# Patient Record
Sex: MALE | Race: WHITE | NOT HISPANIC OR LATINO | Employment: OTHER | URBAN - METROPOLITAN AREA
[De-identification: names, ages, dates, MRNs, and addresses within clinical notes are randomized per-mention and may not be internally consistent; named-entity substitution may affect disease eponyms.]

---

## 2017-05-18 ENCOUNTER — ALLSCRIPTS OFFICE VISIT (OUTPATIENT)
Dept: OTHER | Facility: OTHER | Age: 57
End: 2017-05-18

## 2017-05-30 ENCOUNTER — LAB CONVERSION - ENCOUNTER (OUTPATIENT)
Dept: OTHER | Facility: OTHER | Age: 57
End: 2017-05-30

## 2017-05-30 LAB
HBA1C MFR BLD HPLC: 5.7 %
PSA (HISTORICAL): 0.4
TSH SERPL DL<=0.05 MIU/L-ACNC: 1.71 M[IU]/L

## 2017-07-19 ENCOUNTER — ALLSCRIPTS OFFICE VISIT (OUTPATIENT)
Dept: OTHER | Facility: OTHER | Age: 57
End: 2017-07-19

## 2017-07-20 ENCOUNTER — GENERIC CONVERSION - ENCOUNTER (OUTPATIENT)
Dept: OTHER | Facility: OTHER | Age: 57
End: 2017-07-20

## 2017-07-24 ENCOUNTER — GENERIC CONVERSION - ENCOUNTER (OUTPATIENT)
Dept: OTHER | Facility: OTHER | Age: 57
End: 2017-07-24

## 2017-07-25 ENCOUNTER — ALLSCRIPTS OFFICE VISIT (OUTPATIENT)
Dept: OTHER | Facility: OTHER | Age: 57
End: 2017-07-25

## 2017-08-17 ENCOUNTER — ALLSCRIPTS OFFICE VISIT (OUTPATIENT)
Dept: OTHER | Facility: OTHER | Age: 57
End: 2017-08-17

## 2017-08-18 ENCOUNTER — GENERIC CONVERSION - ENCOUNTER (OUTPATIENT)
Dept: OTHER | Facility: OTHER | Age: 57
End: 2017-08-18

## 2017-10-17 ENCOUNTER — ALLSCRIPTS OFFICE VISIT (OUTPATIENT)
Dept: OTHER | Facility: OTHER | Age: 57
End: 2017-10-17

## 2017-10-27 RX ORDER — FUROSEMIDE 40 MG/1
40 TABLET ORAL DAILY
COMMUNITY
End: 2018-09-20 | Stop reason: SDUPTHER

## 2017-10-27 RX ORDER — SPIRONOLACTONE 25 MG/1
25 TABLET ORAL DAILY
COMMUNITY
End: 2018-09-20 | Stop reason: SDUPTHER

## 2017-10-27 RX ORDER — METOLAZONE 2.5 MG/1
2.5 TABLET ORAL DAILY
COMMUNITY
End: 2018-09-20 | Stop reason: SDUPTHER

## 2017-10-27 RX ORDER — BUPROPION HYDROCHLORIDE 300 MG/1
300 TABLET ORAL EVERY MORNING
COMMUNITY
End: 2020-03-17

## 2017-10-27 RX ORDER — OMEPRAZOLE 40 MG/1
40 CAPSULE, DELAYED RELEASE ORAL DAILY
COMMUNITY
End: 2018-09-20 | Stop reason: SDUPTHER

## 2017-10-27 NOTE — PRE-PROCEDURE INSTRUCTIONS
Pre-Surgery Instructions:   Medication Instructions    buPROPion (WELLBUTRIN XL) 300 mg 24 hr tablet Patient was instructed by Physician and understands   furosemide (LASIX) 40 mg tablet Patient was instructed by Physician and understands   metolazone (ZAROXOLYN) 2 5 mg tablet Patient was instructed by Physician and understands   omeprazole (PriLOSEC) 40 MG capsule Patient was instructed by Physician and understands   spironolactone (ALDACTONE) 25 mg tablet Patient was instructed by Physician and understands

## 2017-10-30 ENCOUNTER — GENERIC CONVERSION - ENCOUNTER (OUTPATIENT)
Dept: OTHER | Facility: OTHER | Age: 57
End: 2017-10-30

## 2017-10-30 ENCOUNTER — HOSPITAL ENCOUNTER (OUTPATIENT)
Facility: AMBULARY SURGERY CENTER | Age: 57
Setting detail: OUTPATIENT SURGERY
Discharge: HOME | End: 2017-10-30
Attending: INTERNAL MEDICINE | Admitting: INTERNAL MEDICINE
Payer: MEDICARE

## 2017-10-30 ENCOUNTER — ANESTHESIA EVENT (OUTPATIENT)
Dept: GASTROENTEROLOGY | Facility: AMBULARY SURGERY CENTER | Age: 57
End: 2017-10-30
Payer: MEDICARE

## 2017-10-30 VITALS
SYSTOLIC BLOOD PRESSURE: 120 MMHG | RESPIRATION RATE: 18 BRPM | TEMPERATURE: 97.1 F | HEIGHT: 69 IN | WEIGHT: 315 LBS | BODY MASS INDEX: 46.65 KG/M2 | OXYGEN SATURATION: 97 % | DIASTOLIC BLOOD PRESSURE: 68 MMHG | HEART RATE: 56 BPM

## 2017-10-30 RX ORDER — SODIUM CHLORIDE, SODIUM LACTATE, POTASSIUM CHLORIDE, CALCIUM CHLORIDE 600; 310; 30; 20 MG/100ML; MG/100ML; MG/100ML; MG/100ML
75 INJECTION, SOLUTION INTRAVENOUS CONTINUOUS
Status: CANCELLED | OUTPATIENT
Start: 2017-10-30

## 2017-10-30 RX ORDER — PROPOFOL 10 MG/ML
INJECTION, EMULSION INTRAVENOUS AS NEEDED
Status: DISCONTINUED | OUTPATIENT
Start: 2017-10-30 | End: 2017-10-30 | Stop reason: SURG

## 2017-10-30 RX ORDER — PROPOFOL 10 MG/ML
INJECTION, EMULSION INTRAVENOUS CONTINUOUS PRN
Status: DISCONTINUED | OUTPATIENT
Start: 2017-10-30 | End: 2017-10-30 | Stop reason: SURG

## 2017-10-30 RX ADMIN — PROPOFOL 60 MG: 10 INJECTION, EMULSION INTRAVENOUS at 09:36

## 2017-10-30 RX ADMIN — PROPOFOL 100 MCG/KG/MIN: 10 INJECTION, EMULSION INTRAVENOUS at 09:37

## 2017-10-30 RX ADMIN — PROPOFOL 20 MG: 10 INJECTION, EMULSION INTRAVENOUS at 09:37

## 2017-10-30 NOTE — ANESTHESIA PREPROCEDURE EVALUATION
Review of Systems/Medical History      No history of anesthetic complications     Cardiovascular  Exercise tolerance: good,  Hypertension poorly controlled, Past MI , History of percutaneous transluminal coronary angioplasty, No angina , No LAU,   Comment: MI 2013,  Pulmonary  Not a smoker , No COPD , Asthma: well controlled/ stable Last rescue: < 1 year ago Asthma type of rescue: PRN inhaler, No shortness of breath, Sleep apnea CPAP, ,        GI/Hepatic    GERD well controlled,             Endo/Other  Arthritis     GYN       Hematology   Musculoskeletal  Obesity  super morbid obesity,        Neurology   Psychology           Physical Exam    Airway    Mallampati score: IV  TM Distance: >3 FB  Neck ROM: full     Dental       Cardiovascular  Cardiovascular exam normal    Pulmonary  Breath sounds clear to auscultation,     Other Findings        Anesthesia Plan  ASA Score- 4       Anesthesia Type- IV sedation with anesthesia with ASA Monitors  Additional Monitors:   Airway Plan:           Induction- intravenous  Informed Consent- Anesthetic plan and risks discussed with patient

## 2017-10-30 NOTE — DISCHARGE INSTRUCTIONS
Discharge home  Resume regular diet  Resume home medications  Repeat colonoscopy in 10 years  Call with any abdominal pain, bleeding, fevers

## 2017-10-30 NOTE — H&P
History and Physical - [facility]  Romeo Calloway 59 y o  male MRN: [id number]    HPI: Romeo Calloway is a 59y o  year old male who presents with long standing GERD and for screening colonoscopy, last exam 10 years prior  Review of Systems    Historical Information   Past Medical History:   Diagnosis Date    Arthritis     Asthma     CPAP (continuous positive airway pressure) dependence     Edema     left leg    GERD (gastroesophageal reflux disease)     Hypertension     Myocardial infarct     2011    Myocardial infarction     Obesity     Sleep apnea      Past Surgical History:   Procedure Laterality Date    CHOLECYSTECTOMY      COLONOSCOPY      ERCP      GASTRIC BYPASS      2001    HERNIA REPAIR      HERNIA REPAIR      x5 last one 2011    JOINT REPLACEMENT      KNEE ARTHROPLASTY Right     2102     Social History   History   Alcohol Use    Yes     Comment: rarely     History   Drug Use No     History   Smoking Status    Former Smoker    Years: 6 00    Types: Cigarettes   Smokeless Tobacco    Never Used     History reviewed  No pertinent family history  Meds/Allergies     Prescriptions Prior to Admission   Medication    buPROPion (WELLBUTRIN XL) 300 mg 24 hr tablet    furosemide (LASIX) 40 mg tablet    metolazone (ZAROXOLYN) 2 5 mg tablet    omeprazole (PriLOSEC) 40 MG capsule    spironolactone (ALDACTONE) 25 mg tablet       No Known Allergies    Objective     Blood pressure 149/85, pulse 77, temperature (!) 97 1 °F (36 2 °C), temperature source Tympanic, resp  rate 20, height 5' 9" (1 753 m), weight (!) 159 kg (350 lb), SpO2 99 %  PHYSICAL EXAM    Gen: NAD, morbidly obese  CV: RRR  CHEST: Clear  ABD: soft, NT/ND  EXT: no edema  Neuro: AAO      ASSESSMENT/PLAN:  This is a 59y o  year old male here for long standing GERD and for screening colonoscopy, last exam 10 years prior         PLAN:   Procedure: egd/colonoscopy

## 2017-10-30 NOTE — OP NOTE
ESOPHAGOGASTRODUODENOSCOPY    PROCEDURE: EGD    INDICATIONS: GERD    POST-OP DIAGNOSIS: See the impression below    SEDATION: Monitored anesthesia care, check anesthesia records    PHYSICAL EXAM:    Vitals:    10/30/17 0840   BP: 149/85   Pulse: 77   Resp: 20   Temp: (!) 97 1 °F (36 2 °C)   SpO2: 99%    Body mass index is 51 69 kg/m²  General: NAD  Heart: S1 & S2 normal, RRR  Lungs: CTA, No rales or rhonchi  Abdomen: Soft, nontender, nondistended, good bowel sounds    CONSENT:  Informed consent was obtained for the procedure, including sedation after explaining the risks and benefits of the procedure  Risks including but not limited to bleeding, perforation, infection, aspiration were discussed in detail  Also explained about less than 100% sensitivity with the exam and other alternatives  PREPARATION:   EKG tracing, pulse oximetry, blood pressure were monitored throughout the procedure  Patient was identified by myself both verbally and by visual inspection of ID band  DESCRIPTION:   Patient was placed in the left lateral decubitus position and was sedated with the above medication  The gastroscope was introduced in to the oropharynx and the esophagus was intubated under direct visualization  Scope was passed down the esophagus up to 2nd part of the duodenum  A careful inspection was made as the gastroscope was withdrawn, including a retroflexed view of the stomach; findings and interventions are described below  FINDINGS:    #1  Esophagus and GEJ- normal GE junction    #2  Stomach- gastric bypass anatomy with medium to large gastric pouch, normal healthy and anastomosis with normal evaluation of small bowel    #3   Duodenum- normal small bowel, status post gastric bypass         IMPRESSIONS:      No significant changes of chronic GERD, healthy-appearing anastomosis    RECOMMENDATIONS:     Discharge home  Resume regular diet  Resume home medications    COMPLICATIONS:  None; patient tolerated the procedure well            DISPOSITION: PACU           CONDITION: Stable

## 2017-11-01 NOTE — CONSULTS
Assessment  1  Encounter for screening colonoscopy (V76 51) (Z12 11)   2  GERD without esophagitis (530 81) (K21 9)   3  Morbid obesity (278 01) (E66 01)    Plan  Encounter for screening colonoscopy    · Suprep Bowel Prep Kit 17 5-3 13-1 6 GM/180ML Oral Solution; USE AS DIRECTED   Rx By: Praneeth Estrella; Dispense: 0 Days ; #E#:1 X 177 ML Bottle (2 Bottles); Refill: 0;For: Encounter for screening colonoscopy; DIA = N; Verified Transmission to [facility]; Last Updated By: [employer]; 10/17/2017 2:55:17 PM   · COLONOSCOPY (GI, SURG); Status:Hold For - Scheduling; Requested EJF:[id number];    Perform:[facility]; Due:17Oct2018; Ordered;For:Encounter for screening colonoscopy; Ordered By:Kiran Fulton;  GERD without esophagitis    · EGD; Status:Hold For - Scheduling; Requested QMJ:[id number];    Perform:[facility]; Due:22Oct2017;Ordered;without esophagitis; Ordered By:Kiran Fulton;    Discussion/Summary  Discussion Summary:   Morbidly obese 59year-old gentle with history of sleep apnea, compliant with CPAP, history of gastric bypass in the past with chronic reflux, also due for colonoscopy, his last examination was 10 years ago unsure if there were polyps at that time  Screening colonoscopy: The patient is unclear he had polyps, he suspects that he may have however we have no records of thesewill schedule the patient for colonoscopy the  Chronic GERD: He has been on PPI therapy for many years, had a Nissen fundoplication, which was undone in the pastwill schedule for an upper endoscopy to evaluate for Mesa's esophagus  discussed with him the risks of the procedures including bleeding, surgery, perforation, missed polyp detection rate        Chief Complaint  Chief Complaint Free Text Note Form: Evaluation for upper endoscopy and colonoscopy      History of Present Illness  HPI: This is a morbidly obese 75-year-old gentle with history of hypertension, chronic acid reflux, had a gastric bypass many years ago, has remote history of Nissen fundoplication which apparently was undone during his bypass surgery  He reports intermittent breakthrough reflux symptoms, he has been on Nexium/PPI therapy for many years  Denies any dysphagia  Reports fairly regular bowel movements, denies any melena, rectal bleeding, abdominal pain  Last colonoscopy approximately 10 years ago, he is unclear if he had polyps at that time  lost 120 lb after his bypass however he has gained some of that back  Denies any family history of GI or associated malignancies  Has also had abdominal mesh repair and cholecystectomy  He is retired total   Denies any tobacco or alcohol   has sleep apnea but is compliant with CPAP  Review of Systems  Complete-Male GI Adult: Other Symptoms: The remainder of the ten ROS was negative  Active Problems  1  Acute on chronic diastolic congestive heart failure (428 33,428 0) (I50 33)   2  Asthma (493 90) (J45 909)   3  BMI 50 0-59 9, adult (V85 43) (Z68 43)   4  Chronic obstructive pulmonary disease (496) (J44 9)   5  Congestive heart failure (428 0) (I50 9)   6  Depression (311) (F32 9)   7  Diabetes mellitus screening (V77 1) (Z13 1)   8  Encounter for screening colonoscopy (V76 51) (Z12 11)   9  GERD without esophagitis (530 81) (K21 9)   10  Hyperlipidemia (272 4) (E78 5)   11  Hypertension (401 9) (I10)   12  Lymphedema (457 1) (I89 0)   13  Morbid obesity (278 01) (E66 01)   14  Need for pneumococcal vaccination (V03 82) (Z23)   15  Need for tetanus booster (V03 7) (Z23)   16  Prostate cancer screening (V76 44) (Z12 5)   17  Screening for colorectal cancer (V76 51) (Z12 11,Z12 12)   18  Sleep apnea (780 57) (G47 30)   19  Thyroid disorder screening (V77 0) (Z13 29)   20  Varicose Veins Of Lower Extremities (454 9)    Past Medical History  1  History of Thrombophlebitis (V12 52)  Active Problems And Past Medical History Reviewed:    The active problems and past medical history were reviewed and updated today  Surgical History  1  History of Cholecystectomy   2  History of Endovenous Ablation Incompetent Vein Laser Each Addit Vein   3  History of Gastric Surgery For Morbid Obesity   4  History of Knee Replacement   5  History of Repair Of Paraesophageal Hiatus Hernia  Surgical History Reviewed: The surgical history was reviewed and updated today  Family History  Father    1  Family history of malignant neoplasm of prostate (V16 42) (Z80 45)  Family History    2  Family history of Aortic Aneurysm   3  Family history of Stroke Syndrome (V17 1)  Family History Reviewed: The family history was reviewed and updated today  Social History     · Denied: History of Alcohol Use (History)   · Denied: History of Drug Use   · Never A Smoker  Social History Reviewed: The social history was reviewed and updated today  Current Meds   1  Advair Diskus 250-50 MCG/DOSE Inhalation Aerosol Powder Breath Activated; INHALE 1 PUFF TWICE DAILY; Therapy: (Recorded:18Apr2013) to Recorded   2  BuPROPion HCl ER (XL) 300 MG Oral Tablet Extended Release 24 Hour; ONE TAB DAILY  Requested for: 21Jun2017; Last IN:[id number] Ordered   3  Furosemide 40 MG Oral Tablet; one tab BID; Therapy: [id number] to (Last Rx:[id number]) Ordered   4  Furosemide 40 MG Oral Tablet; TAKE 1 TABLET DAILY  Requested for: 21Jun2017; Last JV:[id number] Ordered   5  MetOLazone 2 5 MG Oral Tablet; TAKE 1 TABLET Weekly; Therapy: (Recorded:17Aug2017) to Recorded   6  NexIUM 40 MG Oral Capsule Delayed Release; TAKE 1 CAPSULE DAILY  Requested for: 21Jun2017; Last GV:[id number] Ordered   7  Spironolactone 25 MG Oral Tablet; TAKE 1 TABLET DAILY; Therapy: (Recorded:17Aug2017) to Recorded   8  Tamsulosin HCl - 0 4 MG Oral Capsule; 1 Tab daily; Therapy: (Recorded:18Apr2013) to Recorded   9  Viagra 100 MG Oral Tablet; TAKE 1 TABLET DAILY 1 HOUR BEFORE NEEDED; Therapy: (Recorded:18Apr2013) to Recorded   10   Xopenex 1 25 MG/3ML Inhalation Nebulization Solution; Therapy: (Recorded:12Xuy5403) to Recorded  Medication List Reviewed: The medication list was reviewed and updated today  Allergies  1  No Known Drug Allergies  2  Wheat    Vitals  Vital Signs    Recorded: [id number] 02:47PM   Temperature 98 9 F   Heart Rate 77   Respiration 18   Systolic 087   Diastolic 90   Height 5 ft 9 in   Weight 354 lb    BMI Calculated 52 28   BSA Calculated 2 63   O2 Saturation 98       Physical Exam  Gen:  Morbidly obese wn/wd, NAD HEENT: anicteric, MMM, no cervical LAD CVS: RRR, no m/r/g CHEST: CTA b/l ABD: +BS, soft, NT,ND, no hepatosplenomegaly EXT:  Left lower extremity edema greater than right NEURO: aaox3 SKIN: NO rashes      Future Appointments    Date/Time Provider Specialty Site   11/28/2017 01:45 PM ROSALIA Celaya   Family Medicine [address] [place] [address]       Signatures   Electronically signed by : ELISEO Carpio ; Oct 17 2017  2:57PM EST                       (Author)

## 2017-11-28 ENCOUNTER — GENERIC CONVERSION - ENCOUNTER (OUTPATIENT)
Dept: OTHER | Facility: OTHER | Age: 57
End: 2017-11-28

## 2018-01-11 NOTE — MISCELLANEOUS
Provider Comments  Provider Comments:   CALLED PT FOR NOT SHOW, L/M TO RESCHEDULED          Signatures   Electronically signed by : Alexandre Meza MD; Jul 24 2017  1:22PM EST                       (Author)

## 2018-01-12 VITALS
OXYGEN SATURATION: 98 % | TEMPERATURE: 96.3 F | RESPIRATION RATE: 16 BRPM | BODY MASS INDEX: 46.65 KG/M2 | SYSTOLIC BLOOD PRESSURE: 136 MMHG | WEIGHT: 315 LBS | HEIGHT: 69 IN | DIASTOLIC BLOOD PRESSURE: 88 MMHG | HEART RATE: 74 BPM

## 2018-01-12 VITALS
SYSTOLIC BLOOD PRESSURE: 106 MMHG | BODY MASS INDEX: 46.65 KG/M2 | HEIGHT: 69 IN | OXYGEN SATURATION: 97 % | WEIGHT: 315 LBS | TEMPERATURE: 97.8 F | HEART RATE: 93 BPM | DIASTOLIC BLOOD PRESSURE: 82 MMHG | RESPIRATION RATE: 20 BRPM

## 2018-01-12 NOTE — MISCELLANEOUS
Message   Recorded as Task   Date: 07/20/2017 08:29 AM, Created By: Brett Johnson   Task Name: Call Back   Assigned To: Gary Liu   Regarding Patient: Caroline Cedeno, Status: Active   Comment:    [place] Cordial - 20 Jul 2017 8:29 AM     TASK CREATED  Caller: Self; Other; [phone] (Home); [phone] (Work)  RETURNED YOUR CALL  LR   I spoke to patient and dr Jere Villegas he did not diurese with oral lasix so went to er on cardiologists recommendation      Signatures   Electronically signed by : ELISEO Higgins ; Jul 20 2017  9:20AM EST                       (Author)

## 2018-01-13 VITALS
HEIGHT: 69 IN | WEIGHT: 315 LBS | DIASTOLIC BLOOD PRESSURE: 90 MMHG | OXYGEN SATURATION: 98 % | RESPIRATION RATE: 18 BRPM | HEART RATE: 77 BPM | BODY MASS INDEX: 46.65 KG/M2 | TEMPERATURE: 98.9 F | SYSTOLIC BLOOD PRESSURE: 134 MMHG

## 2018-01-13 VITALS
OXYGEN SATURATION: 97 % | HEART RATE: 82 BPM | HEIGHT: 69 IN | WEIGHT: 315 LBS | TEMPERATURE: 96 F | DIASTOLIC BLOOD PRESSURE: 70 MMHG | BODY MASS INDEX: 46.65 KG/M2 | SYSTOLIC BLOOD PRESSURE: 140 MMHG

## 2018-01-14 VITALS
TEMPERATURE: 97.2 F | OXYGEN SATURATION: 98 % | HEART RATE: 66 BPM | DIASTOLIC BLOOD PRESSURE: 78 MMHG | HEIGHT: 69 IN | BODY MASS INDEX: 46.65 KG/M2 | WEIGHT: 315 LBS | SYSTOLIC BLOOD PRESSURE: 104 MMHG | RESPIRATION RATE: 18 BRPM

## 2018-01-22 VITALS
WEIGHT: 315 LBS | TEMPERATURE: 98.3 F | BODY MASS INDEX: 46.65 KG/M2 | SYSTOLIC BLOOD PRESSURE: 140 MMHG | DIASTOLIC BLOOD PRESSURE: 80 MMHG | RESPIRATION RATE: 18 BRPM | OXYGEN SATURATION: 97 % | HEART RATE: 71 BPM | HEIGHT: 69 IN

## 2018-02-09 PROBLEM — N52.9 ERECTILE DYSFUNCTION: Status: ACTIVE | Noted: 2017-11-28

## 2018-02-09 PROBLEM — N40.0 BPH (BENIGN PROSTATIC HYPERPLASIA): Status: ACTIVE | Noted: 2017-11-28
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
## 2018-02-14 ENCOUNTER — CONSULT (OUTPATIENT)
Dept: UROLOGY | Facility: AMBULATORY SURGERY CENTER | Age: 58
End: 2018-02-14
Payer: MEDICARE

## 2018-02-14 VITALS
BODY MASS INDEX: 46.65 KG/M2 | DIASTOLIC BLOOD PRESSURE: 70 MMHG | HEART RATE: 89 BPM | WEIGHT: 315 LBS | HEIGHT: 69 IN | SYSTOLIC BLOOD PRESSURE: 120 MMHG

## 2018-02-14 DIAGNOSIS — R36.1 HEMATOSPERMIA: ICD-10-CM

## 2018-02-14 DIAGNOSIS — N40.1 BENIGN PROSTATIC HYPERPLASIA WITH LOWER URINARY TRACT SYMPTOMS, SYMPTOM DETAILS UNSPECIFIED: ICD-10-CM

## 2018-02-14 DIAGNOSIS — N52.9 ERECTILE DYSFUNCTION, UNSPECIFIED ERECTILE DYSFUNCTION TYPE: Primary | ICD-10-CM

## 2018-02-14 PROCEDURE — 99204 OFFICE O/P NEW MOD 45 MIN: CPT | Performed by: UROLOGY

## 2018-02-14 RX ORDER — ARIPIPRAZOLE 10 MG/1
10 TABLET ORAL DAILY
COMMUNITY
End: 2019-07-05

## 2018-02-14 RX ORDER — DULOXETIN HYDROCHLORIDE 60 MG/1
CAPSULE, DELAYED RELEASE ORAL DAILY
COMMUNITY
End: 2019-07-05

## 2018-02-14 RX ORDER — CLONIDINE HYDROCHLORIDE 0.1 MG/1
0.2 TABLET ORAL EVERY 12 HOURS SCHEDULED
COMMUNITY
End: 2018-09-20 | Stop reason: SDUPTHER

## 2018-02-14 RX ORDER — TRAZODONE HYDROCHLORIDE 150 MG/1
50 TABLET ORAL
COMMUNITY
End: 2019-07-05

## 2018-05-04 ENCOUNTER — OFFICE VISIT (OUTPATIENT)
Dept: FAMILY MEDICINE CLINIC | Facility: CLINIC | Age: 58
End: 2018-05-04
Payer: COMMERCIAL

## 2018-05-04 VITALS
BODY MASS INDEX: 46.65 KG/M2 | SYSTOLIC BLOOD PRESSURE: 126 MMHG | DIASTOLIC BLOOD PRESSURE: 80 MMHG | HEART RATE: 93 BPM | TEMPERATURE: 97.6 F | OXYGEN SATURATION: 98 % | HEIGHT: 69 IN | RESPIRATION RATE: 20 BRPM | WEIGHT: 315 LBS

## 2018-05-04 DIAGNOSIS — E66.01 MORBID OBESITY WITH BODY MASS INDEX OF 50 OR HIGHER (HCC): ICD-10-CM

## 2018-05-04 DIAGNOSIS — R07.89 CHEST PAIN, ATYPICAL: ICD-10-CM

## 2018-05-04 DIAGNOSIS — R06.00 DYSPNEA ON EXERTION: Primary | ICD-10-CM

## 2018-05-04 PROBLEM — R06.09 DYSPNEA ON EXERTION: Status: ACTIVE | Noted: 2018-05-04

## 2018-05-04 PROCEDURE — 99214 OFFICE O/P EST MOD 30 MIN: CPT | Performed by: FAMILY MEDICINE

## 2018-05-04 PROCEDURE — 3008F BODY MASS INDEX DOCD: CPT | Performed by: FAMILY MEDICINE

## 2018-05-04 RX ORDER — ESOMEPRAZOLE MAGNESIUM 40 MG/1
CAPSULE, DELAYED RELEASE ORAL
COMMUNITY
Start: 2018-02-26 | End: 2019-01-29

## 2018-05-04 NOTE — PATIENT INSTRUCTIONS
The patient's blood pressure is well controlled today  He will continue his present diuretics are used to control his blood pressure and his peripheral edema  The patient was advised to call his cardiologist to set up a evaluation for the atypical chest pain and dyspnea on exertion  The patient had a recent lipid panel done which was in normal limits  The patient's shortness of breath on exertion could be related to his morbid obesity  The patient should continue to use CPAP at night  The patient will follow up with me for his chronic medical problems in 3 months

## 2018-05-04 NOTE — PROGRESS NOTES
Assessment/Plan:    No problem-specific Assessment & Plan notes found for this encounter  Diagnoses and all orders for this visit:    Dyspnea on exertion    Chest pain, atypical    Morbid obesity with body mass index of 50 or higher (HCC)    Other orders  -     esomeprazole (NexIUM) 40 MG capsule;         Subjective:      Patient ID: Yoon Grullon is a 59 y o  male  This is a follow-up appointment for 70-year-old morbidly obese white male  The visit was scheduled for follow-up hypertension  The patient states he is having on and off symptoms of shortness of breath with exertion and chest pain with no particular relationship to his activity level  Patient has a history of congestive heart failure  Patient does see his cardiologist every 6 months related to his history of congestive heart failure  Note that the patient is morbidly obese is present weight is 345 lb  Patient gastric bypass surgery approximately in the year 2002  At one time the patient was over 500 lb  The following portions of the patient's history were reviewed and updated as appropriate: allergies, current medications, past family history, past medical history, past social history, past surgical history and problem list     Review of Systems   Constitutional: Negative  Respiratory: Positive for shortness of breath  Cardiovascular: Positive for chest pain and leg swelling  Musculoskeletal: Negative  Psychiatric/Behavioral: Negative  Objective:      /80   Pulse 93   Temp 97 6 °F (36 4 °C)   Resp 20   Ht 5' 9" (1 753 m)   Wt (!) 156 kg (345 lb)   SpO2 98%   BMI 50 95 kg/m²          Physical Exam   Constitutional: He is oriented to person, place, and time  The patient is morbidly obese with a BMI of 50 95   Cardiovascular: Normal rate, regular rhythm, normal heart sounds and intact distal pulses      Pulmonary/Chest: Effort normal and breath sounds normal    Musculoskeletal: Normal range of motion  Neurological: He is alert and oriented to person, place, and time  Psychiatric: He has a normal mood and affect  His behavior is normal  Judgment and thought content normal    Vitals reviewed

## 2018-09-20 ENCOUNTER — OFFICE VISIT (OUTPATIENT)
Dept: FAMILY MEDICINE CLINIC | Facility: CLINIC | Age: 58
End: 2018-09-20
Payer: COMMERCIAL

## 2018-09-20 VITALS
OXYGEN SATURATION: 98 % | BODY MASS INDEX: 52.28 KG/M2 | RESPIRATION RATE: 16 BRPM | SYSTOLIC BLOOD PRESSURE: 126 MMHG | TEMPERATURE: 98.1 F | DIASTOLIC BLOOD PRESSURE: 90 MMHG | HEART RATE: 72 BPM | WEIGHT: 315 LBS

## 2018-09-20 DIAGNOSIS — G89.29 CHRONIC LEFT HIP PAIN: Primary | ICD-10-CM

## 2018-09-20 DIAGNOSIS — G89.29 CHRONIC PAIN OF LEFT KNEE: ICD-10-CM

## 2018-09-20 DIAGNOSIS — M25.562 CHRONIC PAIN OF LEFT KNEE: ICD-10-CM

## 2018-09-20 DIAGNOSIS — R60.9 EDEMA, UNSPECIFIED TYPE: ICD-10-CM

## 2018-09-20 DIAGNOSIS — I10 ESSENTIAL HYPERTENSION: ICD-10-CM

## 2018-09-20 DIAGNOSIS — M25.552 CHRONIC LEFT HIP PAIN: Primary | ICD-10-CM

## 2018-09-20 DIAGNOSIS — E66.01 MORBID OBESITY WITH BODY MASS INDEX OF 50 OR HIGHER (HCC): ICD-10-CM

## 2018-09-20 DIAGNOSIS — K21.9 GASTROESOPHAGEAL REFLUX DISEASE WITHOUT ESOPHAGITIS: ICD-10-CM

## 2018-09-20 PROCEDURE — 99214 OFFICE O/P EST MOD 30 MIN: CPT | Performed by: FAMILY MEDICINE

## 2018-09-20 RX ORDER — CLONIDINE HYDROCHLORIDE 0.1 MG/1
0.2 TABLET ORAL EVERY 12 HOURS SCHEDULED
Qty: 180 TABLET | Refills: 3 | Status: SHIPPED | OUTPATIENT
Start: 2018-09-20

## 2018-09-20 RX ORDER — FUROSEMIDE 40 MG/1
40 TABLET ORAL DAILY
Qty: 90 TABLET | Refills: 3 | Status: SHIPPED | OUTPATIENT
Start: 2018-09-20 | End: 2019-01-30 | Stop reason: HOSPADM

## 2018-09-20 RX ORDER — OMEPRAZOLE 40 MG/1
40 CAPSULE, DELAYED RELEASE ORAL DAILY
Qty: 90 CAPSULE | Refills: 3 | Status: SHIPPED | OUTPATIENT
Start: 2018-09-20

## 2018-09-20 RX ORDER — METOLAZONE 2.5 MG/1
2.5 TABLET ORAL DAILY
Qty: 90 TABLET | Refills: 3 | Status: SHIPPED | OUTPATIENT
Start: 2018-09-20 | End: 2018-09-25 | Stop reason: SDUPTHER

## 2018-09-20 RX ORDER — SPIRONOLACTONE 25 MG/1
25 TABLET ORAL DAILY
Qty: 90 TABLET | Refills: 3 | Status: SHIPPED | OUTPATIENT
Start: 2018-09-20 | End: 2019-01-24

## 2018-09-20 NOTE — PATIENT INSTRUCTIONS
Patient's hypertension is well controlled at this point  Secondary to his complaints of left hip and knee pain the patient was given orders for x-rays of these 2 joints  The patient states he has a plan to decrease his diet dietary intake of food with 1 meal a day and to increase his exercise  He hopes that this will help him lose weight and help the pain from his joints  I will fill all his chronic  meds with his new mail order pharmacy

## 2018-09-20 NOTE — PROGRESS NOTES
Assessment/Plan:    No problem-specific Assessment & Plan notes found for this encounter  Diagnoses and all orders for this visit:    Chronic left hip pain  -     XR hip/pelv 2-3 vws left if performed; Future    Chronic pain of left knee  -     XR knee 3 vw left non injury; Future        Subjective:      Patient ID: Romeo Calloway is a 59 y o  male  This is a follow-up appointment for 78-year-old male with a history of chronic medical problems that include hypertension, morbid obesity  and new complaints of left hip and knee pain  The complaints of left and he pain are actually chronic to go back over many years related to trauma in the past   The patient is obese and has not had x-rays of his left hip her left knee and many years  The patient states he has new insurance and needs his medicines  refilled        The following portions of the patient's history were reviewed and updated as appropriate: allergies, current medications, past family history, past medical history, past social history, past surgical history and problem list     Review of Systems   Constitutional: Negative  Respiratory: Negative  Cardiovascular: Negative  Endocrine: Negative  Musculoskeletal:         Left knee and hip pain  Allergic/Immunologic: Negative  Psychiatric/Behavioral: Negative  Objective:      /90   Pulse 72   Temp 98 1 °F (36 7 °C)   Resp 16   Wt (!) 161 kg (354 lb)   SpO2 98%   BMI 52 28 kg/m²          Physical Exam   Constitutional: He is oriented to person, place, and time  Patient is morbidly obese with a BMI of 52 28   Cardiovascular: Normal rate, regular rhythm and normal heart sounds  Pulmonary/Chest: Effort normal and breath sounds normal    Musculoskeletal: Normal range of motion  The patient can ambulate without any noticeable  difficulty   Neurological: He is alert and oriented to person, place, and time  Psychiatric: He has a normal mood and affect   His behavior is normal  Judgment and thought content normal    Vitals reviewed

## 2018-09-25 DIAGNOSIS — R60.9 EDEMA, UNSPECIFIED TYPE: ICD-10-CM

## 2018-10-01 RX ORDER — METOLAZONE 2.5 MG/1
2.5 TABLET ORAL DAILY
Qty: 90 TABLET | Refills: 3 | Status: SHIPPED | OUTPATIENT
Start: 2018-10-01 | End: 2019-01-30 | Stop reason: HOSPADM

## 2018-10-04 ENCOUNTER — OFFICE VISIT (OUTPATIENT)
Dept: FAMILY MEDICINE CLINIC | Facility: CLINIC | Age: 58
End: 2018-10-04
Payer: MEDICARE

## 2018-10-04 ENCOUNTER — TELEPHONE (OUTPATIENT)
Dept: FAMILY MEDICINE CLINIC | Facility: CLINIC | Age: 58
End: 2018-10-04

## 2018-10-04 VITALS
SYSTOLIC BLOOD PRESSURE: 122 MMHG | WEIGHT: 315 LBS | RESPIRATION RATE: 20 BRPM | TEMPERATURE: 99 F | OXYGEN SATURATION: 98 % | BODY MASS INDEX: 52.13 KG/M2 | HEART RATE: 78 BPM | DIASTOLIC BLOOD PRESSURE: 88 MMHG

## 2018-10-04 DIAGNOSIS — J20.9 ACUTE BRONCHITIS, UNSPECIFIED ORGANISM: Primary | ICD-10-CM

## 2018-10-04 PROCEDURE — 1036F TOBACCO NON-USER: CPT | Performed by: NURSE PRACTITIONER

## 2018-10-04 PROCEDURE — 99213 OFFICE O/P EST LOW 20 MIN: CPT | Performed by: NURSE PRACTITIONER

## 2018-10-04 RX ORDER — ALBUTEROL SULFATE 2.5 MG/3ML
2.5 SOLUTION RESPIRATORY (INHALATION) EVERY 6 HOURS PRN
Qty: 75 ML | Refills: 0 | Status: SHIPPED | OUTPATIENT
Start: 2018-10-04 | End: 2019-01-29

## 2018-10-04 RX ORDER — ALBUTEROL SULFATE 1.25 MG/3ML
1.25 SOLUTION RESPIRATORY (INHALATION) EVERY 6 HOURS PRN
Status: DISCONTINUED | OUTPATIENT
Start: 2018-10-04 | End: 2019-01-30 | Stop reason: HOSPADM

## 2018-10-04 RX ORDER — PREDNISONE 10 MG/1
10 TABLET ORAL DAILY
Qty: 25 TABLET | Refills: 0 | Status: SHIPPED | OUTPATIENT
Start: 2018-10-04 | End: 2019-01-29

## 2018-10-04 RX ORDER — IPRATROPIUM BROMIDE AND ALBUTEROL SULFATE 2.5; .5 MG/3ML; MG/3ML
3 SOLUTION RESPIRATORY (INHALATION)
Status: DISCONTINUED | OUTPATIENT
Start: 2018-10-04 | End: 2018-10-04

## 2018-10-04 RX ORDER — METHYLPREDNISOLONE SODIUM SUCCINATE 125 MG/2ML
125 INJECTION, POWDER, LYOPHILIZED, FOR SOLUTION INTRAMUSCULAR; INTRAVENOUS ONCE
Status: COMPLETED | OUTPATIENT
Start: 2018-10-04 | End: 2018-10-04

## 2018-10-04 RX ADMIN — ALBUTEROL SULFATE 1.25 MG: 1.25 SOLUTION RESPIRATORY (INHALATION) at 11:24

## 2018-10-04 RX ADMIN — METHYLPREDNISOLONE SODIUM SUCCINATE 125 MG: 125 INJECTION, POWDER, LYOPHILIZED, FOR SOLUTION INTRAMUSCULAR; INTRAVENOUS at 09:45

## 2018-10-04 NOTE — PROGRESS NOTES
Assessment/Plan:  1  Start oral steroid tomorrow  2  Nebulized self 4 times a day  3  Follow up on Monday for recheck  4  Follow-up condition changes or worsens     Diagnoses and all orders for this visit:    Acute bronchitis, unspecified organism  -     predniSONE 10 mg tablet; Take 1 tablet (10 mg total) by mouth daily Take 5 tablets day 1, take 4 tablets day 2, take 3 tablets day 3, take 2 tablets day 4, take 1 tablet day 5  -     albuterol (2 5 mg/3 mL) 0 083 % nebulizer solution; Take 1 vial (2 5 mg total) by nebulization every 6 (six) hours as needed for wheezing or shortness of breath  -     XR chest pa & lateral; Future  -     Discontinue: ipratropium-albuterol (DUO-NEB) 0 5-2 5 mg/3 mL inhalation solution 3 mL; Take 3 mL by nebulization every 6 (six) hours   -     methylPREDNISolone sodium succinate (Solu-MEDROL) injection 125 mg; Inject 2 mL (125 mg total) into a muscle once   -     albuterol (ACCUNEB) nebulizer solution 1 25 mg; Take 3 mL (1 25 mg total) by nebulization every 6 (six) hours as needed for wheezing           Subjective:      Patient ID: Jt Alvarado is a 59 y o  male  63yo M presents with cough for 2 weeks  Some SOB  Denies CP  Has hx CHF  Denies any recent weight gain  Hx of pneumonia  Has low grade fever  Denies medications  Nasal congestion  Wheezing/wet cough  The following portions of the patient's history were reviewed and updated as appropriate: allergies and current medications  Review of Systems   Constitutional: Positive for fever  HENT: Positive for congestion and postnasal drip  Respiratory: Positive for cough and shortness of breath  Cardiovascular: Negative  Objective:      /88 (BP Location: Left arm, Patient Position: Sitting)   Pulse 78   Temp 99 °F (37 2 °C) (Tympanic)   Resp 20   Wt (!) 160 kg (353 lb)   SpO2 98%   BMI 52 13 kg/m²          Physical Exam   Constitutional: He appears well-developed and well-nourished  HENT:   Head: Normocephalic and atraumatic  Right Ear: External ear normal    Left Ear: External ear normal    Nose: Nose normal    Mouth/Throat: Oropharynx is clear and moist    Cardiovascular: Normal rate, regular rhythm and normal heart sounds  Pulmonary/Chest: He has wheezes     Bronchial sounds

## 2018-10-04 NOTE — TELEPHONE ENCOUNTER
JUAN CARLOS - SHE HAS AN ORDER FOR NEBULIZER, TUBING, MASH, ETC   IT'S NOT SIGNED BY A DOCTOR  SHE WANTS A NEW ONE SIGNED BY A DOCTOR AND SENT BACK TO THEM  ALSO DIAG CODE NEEDS TO BE ON SCRIPT

## 2018-10-08 ENCOUNTER — OFFICE VISIT (OUTPATIENT)
Dept: FAMILY MEDICINE CLINIC | Facility: CLINIC | Age: 58
End: 2018-10-08
Payer: MEDICARE

## 2018-10-08 VITALS
SYSTOLIC BLOOD PRESSURE: 152 MMHG | BODY MASS INDEX: 52.28 KG/M2 | HEART RATE: 64 BPM | WEIGHT: 315 LBS | TEMPERATURE: 98.3 F | DIASTOLIC BLOOD PRESSURE: 82 MMHG

## 2018-10-08 DIAGNOSIS — J20.9 ACUTE BRONCHITIS, UNSPECIFIED ORGANISM: Primary | ICD-10-CM

## 2018-10-08 PROCEDURE — 99212 OFFICE O/P EST SF 10 MIN: CPT | Performed by: NURSE PRACTITIONER

## 2018-10-08 NOTE — PROGRESS NOTES
Assessment/Plan:  1  Follow up if condition changes or worsens       Diagnoses and all orders for this visit:    Acute bronchitis, unspecified organism          Subjective:      Patient ID: Juan José Loyola is a 59 y o  male  59 a feels like he still wheezing -year-old male presents for follow-up bronchitis  Has been taking medications as prescribed  Feeling a little better  Denies fever  The following portions of the patient's history were reviewed and updated as appropriate: allergies and current medications  Review of Systems   Constitutional: Negative  HENT: Negative  Respiratory: Positive for cough  Cardiovascular: Negative  Objective:      /82   Pulse 64   Temp 98 3 °F (36 8 °C)   Wt (!) 161 kg (354 lb)   BMI 52 28 kg/m²          Physical Exam   Constitutional: He appears well-developed and well-nourished  HENT:   Head: Normocephalic and atraumatic  Right Ear: External ear normal    Left Ear: External ear normal    Nose: Nose normal    Mouth/Throat: Oropharynx is clear and moist    Cardiovascular: Normal rate, regular rhythm and normal heart sounds      Pulmonary/Chest: Effort normal and breath sounds normal    Dry cough

## 2018-10-25 ENCOUNTER — OFFICE VISIT (OUTPATIENT)
Dept: FAMILY MEDICINE CLINIC | Facility: CLINIC | Age: 58
End: 2018-10-25
Payer: MEDICARE

## 2018-10-25 VITALS
OXYGEN SATURATION: 99 % | TEMPERATURE: 98 F | WEIGHT: 315 LBS | DIASTOLIC BLOOD PRESSURE: 80 MMHG | HEIGHT: 69 IN | SYSTOLIC BLOOD PRESSURE: 150 MMHG | BODY MASS INDEX: 46.65 KG/M2 | HEART RATE: 76 BPM

## 2018-10-25 DIAGNOSIS — L71.9 ROSACEA: Primary | ICD-10-CM

## 2018-10-25 PROCEDURE — 99213 OFFICE O/P EST LOW 20 MIN: CPT | Performed by: FAMILY MEDICINE

## 2018-10-25 NOTE — PROGRESS NOTES
Assessment/Plan:    No problem-specific Assessment & Plan notes found for this encounter  Diagnoses and all orders for this visit:    Rosacea  -     metroNIDAZOLE (METROCREAM) 0 75 % cream; Apply topically 2 (two) times a day        Subjective:      Patient ID: Nico Devlin is a 59 y o  male  This is a 64-year-old morbidly obese white male with complaints of several weeks to months of a erythematous rash on his cheeks  The patient denies the rash is present on other parts of his body  He states the rash has become more prominent with sun exposure  The patient has not tried anything to treat the rash  He denies any other complaints  The following portions of the patient's history were reviewed and updated as appropriate: allergies, current medications, past family history, past medical history, past social history, past surgical history and problem list     Review of Systems   Constitutional: Negative  Respiratory: Negative  Cardiovascular: Negative  Skin: Positive for rash  Erythematous rash on his cheeks with areas of pustules  Psychiatric/Behavioral: Negative  Objective:      /80   Pulse 76   Temp 98 °F (36 7 °C)   Ht 5' 9" (1 753 m)   Wt (!) 159 kg (350 lb)   SpO2 99%   BMI 51 69 kg/m²          Physical Exam   Constitutional: He is oriented to person, place, and time  Patient is morbidly obese with a BMI of 51 69   Cardiovascular: Normal rate, regular rhythm and normal heart sounds  Pulmonary/Chest: Effort normal and breath sounds normal    Neurological: He is alert and oriented to person, place, and time  Skin:   Erythematous rash with scattered pustules present on bilateral cheeks  Psychiatric: He has a normal mood and affect  His behavior is normal  Judgment and thought content normal    Vitals reviewed

## 2018-10-25 NOTE — PATIENT INSTRUCTIONS
This patient has rosacea  He will be started on metronidazole cream to be used b i d  to his face  The patient was advised to avoid excessive alcohol use which can worsen the rosacea  The patient was also advised to avoid excess some sun exposure which could also worsen that was a shin

## 2018-12-20 ENCOUNTER — OFFICE VISIT (OUTPATIENT)
Dept: FAMILY MEDICINE CLINIC | Facility: CLINIC | Age: 58
End: 2018-12-20
Payer: MEDICARE

## 2018-12-20 VITALS
DIASTOLIC BLOOD PRESSURE: 88 MMHG | TEMPERATURE: 98 F | WEIGHT: 315 LBS | BODY MASS INDEX: 46.65 KG/M2 | HEART RATE: 83 BPM | OXYGEN SATURATION: 99 % | SYSTOLIC BLOOD PRESSURE: 148 MMHG | HEIGHT: 69 IN

## 2018-12-20 DIAGNOSIS — R60.9 EDEMA, UNSPECIFIED TYPE: ICD-10-CM

## 2018-12-20 DIAGNOSIS — E66.01 MORBID OBESITY WITH BODY MASS INDEX OF 50 OR HIGHER (HCC): Primary | ICD-10-CM

## 2018-12-20 PROCEDURE — 99214 OFFICE O/P EST MOD 30 MIN: CPT | Performed by: FAMILY MEDICINE

## 2018-12-20 NOTE — PATIENT INSTRUCTIONS
The patient's blood pressure is controlled with his present medications  The patient has gained 10 lb since his last exam and may have a component of heart failure  The patient does have edema in bilateral lower extremities but it is nonpitting  The patient has been noncompliant with his Lasix to treat the edema in his lower extremities  The patient's complaint of shortness of breath with exertion is probably related to his morbid obesity  The patient was instructed to go back to taking Lasix 40 mg daily to treat his peripheral edema  The patient will continue his other medications  The patient will follow up with me in 1 month to recheck his weight once he restarts his Lasix 40 mg daily  The he will continue to take his other diuretics that include Zaroxolyn and Aldactone

## 2018-12-20 NOTE — PROGRESS NOTES
Assessment/Plan:    No problem-specific Assessment & Plan notes found for this encounter  Diagnoses and all orders for this visit:    Morbid obesity with body mass index of 50 or higher (HCC)    Edema, unspecified type        Subjective:      Patient ID: Jericho Subramanian is a 59 y o  male  This is a follow-up appointment for a 40-year-old morbidly obese white male who states recent increased shortness of breath while walking [place]  The patient also states an increase in his weight since his last visit  He denies any chest pain  The patient does state that he is not taking his Lasix as prescribed  He did see his cardiologist in September there was no changes in his medications or any new diagnosis to at to his present list of differential diagnosis  Denies any other complaints        The following portions of the patient's history were reviewed and updated as appropriate: allergies, current medications, past family history, past medical history, past social history, past surgical history and problem list     Review of Systems   Constitutional: Positive for unexpected weight change  Respiratory: Positive for shortness of breath  Shortness of breath while ambulating  Cardiovascular: Negative  Endocrine: Negative  Musculoskeletal: Negative  Neurological: Negative  Psychiatric/Behavioral: Negative  Objective:      /88   Pulse 83   Temp 98 °F (36 7 °C)   Ht 5' 9" (1 753 m)   Wt (!) 163 kg (360 lb)   SpO2 99%   BMI 53 16 kg/m²          Physical Exam   Constitutional: He is oriented to person, place, and time  He appears well-developed and well-nourished  The patient is morbidly obese with a BMI of 53 16   Cardiovascular: Normal rate, regular rhythm and normal heart sounds  Pulmonary/Chest: Effort normal and breath sounds normal    Musculoskeletal: He exhibits edema  Neurological: He is alert and oriented to person, place, and time     Psychiatric: He has a normal mood and affect  His behavior is normal  Judgment and thought content normal    Vitals reviewed

## 2019-01-24 ENCOUNTER — OFFICE VISIT (OUTPATIENT)
Dept: FAMILY MEDICINE CLINIC | Facility: CLINIC | Age: 59
End: 2019-01-24
Payer: MEDICARE

## 2019-01-24 VITALS
WEIGHT: 315 LBS | DIASTOLIC BLOOD PRESSURE: 90 MMHG | HEIGHT: 69 IN | OXYGEN SATURATION: 98 % | SYSTOLIC BLOOD PRESSURE: 148 MMHG | HEART RATE: 65 BPM | BODY MASS INDEX: 46.65 KG/M2

## 2019-01-24 DIAGNOSIS — E66.01 CLASS 3 SEVERE OBESITY DUE TO EXCESS CALORIES WITHOUT SERIOUS COMORBIDITY WITH BODY MASS INDEX (BMI) OF 50.0 TO 59.9 IN ADULT (HCC): ICD-10-CM

## 2019-01-24 DIAGNOSIS — I50.43 CHF (CONGESTIVE HEART FAILURE), NYHA CLASS III, ACUTE ON CHRONIC, COMBINED (HCC): Primary | ICD-10-CM

## 2019-01-24 PROBLEM — E66.813 CLASS 3 SEVERE OBESITY DUE TO EXCESS CALORIES WITHOUT SERIOUS COMORBIDITY WITH BODY MASS INDEX (BMI) OF 50.0 TO 59.9 IN ADULT (HCC): Status: ACTIVE | Noted: 2019-01-24

## 2019-01-24 PROCEDURE — 99214 OFFICE O/P EST MOD 30 MIN: CPT | Performed by: FAMILY MEDICINE

## 2019-01-24 RX ORDER — SPIRONOLACTONE 50 MG/1
50 TABLET, FILM COATED ORAL DAILY
Qty: 30 TABLET | Refills: 1 | Status: SHIPPED | OUTPATIENT
Start: 2019-01-24 | End: 2019-03-27 | Stop reason: SDUPTHER

## 2019-01-24 NOTE — PATIENT INSTRUCTIONS
The patient has worsening congestive heart failure  The patient's Aldactone will be increased to 50 mg daily he will continue his other doses of diuretics  Patient will get a BMP and BNP on Monday morning  He will follow up with me on Tuesday afternoon at 4:00 p m  To check his weight and status of his peripheral edema and congestive heart failure  The patient was advised to go to the nearest emergency room should he developed increased shortness of breath with chest pain

## 2019-01-24 NOTE — PROGRESS NOTES
Assessment/Plan:    No problem-specific Assessment & Plan notes found for this encounter  Diagnoses and all orders for this visit:    CHF (congestive heart failure), NYHA class III, acute on chronic, combined ([facility] )  -     NT-BNP PRO; Future  -     Basic metabolic panel; Future  -     spironolactone (ALDACTONE) 50 mg tablet; Take 1 tablet (50 mg total) by mouth daily    Class 3 severe obesity due to excess calories without serious comorbidity with body mass index (BMI) of 50 0 to 59 9 in adult [facility])        Subjective:      Patient ID: Sanjeev Cool is a 59 y o  male  This is a 68-year-old morbidly obese white male who is following up for congestive heart failure  Over the course of last several months the patient has gained 14 lb  Patient states increased shortness of breath with exertion  He denies shortness of breath at rest   The patient is already on 3 different diuretics to control his congestive heart failure  Patient denies any chest pain  The patient does note increased swelling in his lower extremities  The following portions of the patient's history were reviewed and updated as appropriate: allergies, current medications, past family history, past medical history, past social history, past surgical history and problem list     Review of Systems   Constitutional: Negative  Respiratory: Positive for shortness of breath  Cardiovascular: Positive for leg swelling  Negative for chest pain and palpitations  Musculoskeletal: Negative  Psychiatric/Behavioral: Negative            Objective:      /90 (BP Location: Left arm, Patient Position: Sitting, Cuff Size: Adult)   Pulse 65   Ht 5' 9" (1 753 m)   Wt (!) 165 kg (364 lb)   SpO2 98%   BMI 53 75 kg/m²          Physical Exam

## 2019-01-29 ENCOUNTER — APPOINTMENT (EMERGENCY)
Dept: RADIOLOGY | Facility: HOSPITAL | Age: 59
End: 2019-01-29
Payer: MEDICARE

## 2019-01-29 ENCOUNTER — OFFICE VISIT (OUTPATIENT)
Dept: FAMILY MEDICINE CLINIC | Facility: CLINIC | Age: 59
End: 2019-01-29
Payer: MEDICARE

## 2019-01-29 ENCOUNTER — HOSPITAL ENCOUNTER (OUTPATIENT)
Facility: HOSPITAL | Age: 59
Setting detail: OBSERVATION
Discharge: HOME | End: 2019-01-30
Attending: EMERGENCY MEDICINE | Admitting: FAMILY MEDICINE
Payer: MEDICARE

## 2019-01-29 VITALS
SYSTOLIC BLOOD PRESSURE: 138 MMHG | BODY MASS INDEX: 54.2 KG/M2 | HEART RATE: 70 BPM | RESPIRATION RATE: 20 BRPM | WEIGHT: 315 LBS | DIASTOLIC BLOOD PRESSURE: 82 MMHG | OXYGEN SATURATION: 98 % | TEMPERATURE: 98.5 F

## 2019-01-29 DIAGNOSIS — I50.43 CHF (CONGESTIVE HEART FAILURE), NYHA CLASS III, ACUTE ON CHRONIC, COMBINED (HCC): Primary | ICD-10-CM

## 2019-01-29 DIAGNOSIS — E66.01 CLASS 3 SEVERE OBESITY DUE TO EXCESS CALORIES WITHOUT SERIOUS COMORBIDITY WITH BODY MASS INDEX (BMI) OF 50.0 TO 59.9 IN ADULT (HCC): ICD-10-CM

## 2019-01-29 DIAGNOSIS — R60.0 LEG EDEMA: ICD-10-CM

## 2019-01-29 DIAGNOSIS — I50.9 CHF (CONGESTIVE HEART FAILURE) (HCC): Primary | ICD-10-CM

## 2019-01-29 LAB
ALBUMIN SERPL BCP-MCNC: 3.4 G/DL (ref 3.5–5)
ALP SERPL-CCNC: 79 U/L (ref 46–116)
ALT SERPL W P-5'-P-CCNC: 24 U/L (ref 12–78)
ANION GAP SERPL CALCULATED.3IONS-SCNC: 8 MMOL/L (ref 4–13)
APTT PPP: 27 SECONDS (ref 24–33)
AST SERPL W P-5'-P-CCNC: 18 U/L (ref 5–45)
BASOPHILS # BLD AUTO: 0.05 THOUSANDS/ΜL (ref 0–0.1)
BASOPHILS NFR BLD AUTO: 1 % (ref 0–1)
BILIRUB SERPL-MCNC: 0.5 MG/DL (ref 0.2–1)
BNP SERPL-MCNC: 38 PG/ML (ref 0–100)
BUN SERPL-MCNC: 13 MG/DL (ref 5–25)
BUN SERPL-MCNC: 15 MG/DL (ref 8–27)
BUN/CREAT SERPL: 13 (ref 10–24)
CALCIUM SERPL-MCNC: 8.7 MG/DL (ref 8.3–10.1)
CALCIUM SERPL-MCNC: 9.7 MG/DL (ref 8.6–10.2)
CHLORIDE SERPL-SCNC: 101 MMOL/L (ref 100–108)
CHLORIDE SERPL-SCNC: 98 MMOL/L (ref 96–106)
CHOLEST SERPL-MCNC: 117 MG/DL (ref 50–200)
CO2 SERPL-SCNC: 26 MMOL/L (ref 20–29)
CO2 SERPL-SCNC: 28 MMOL/L (ref 21–32)
CREAT SERPL-MCNC: 1.09 MG/DL (ref 0.6–1.3)
CREAT SERPL-MCNC: 1.12 MG/DL (ref 0.76–1.27)
EOSINOPHIL # BLD AUTO: 0.11 THOUSAND/ΜL (ref 0–0.61)
EOSINOPHIL NFR BLD AUTO: 2 % (ref 0–6)
ERYTHROCYTE [DISTWIDTH] IN BLOOD BY AUTOMATED COUNT: 13.6 % (ref 11.6–15.1)
GFR SERPL CREATININE-BSD FRML MDRD: 73 ML/MIN/1.73SQ M
GLUCOSE SERPL-MCNC: 104 MG/DL (ref 65–140)
GLUCOSE SERPL-MCNC: 124 MG/DL (ref 65–99)
HCT VFR BLD AUTO: 42 % (ref 36.5–49.3)
HDLC SERPL-MCNC: 31 MG/DL (ref 40–60)
HGB BLD-MCNC: 12.9 G/DL (ref 12–17)
IMM GRANULOCYTES # BLD AUTO: 0.03 THOUSAND/UL (ref 0–0.2)
IMM GRANULOCYTES NFR BLD AUTO: 1 % (ref 0–2)
INR PPP: 0.99 (ref 0.86–1.16)
LABCORP COMMENT: NORMAL
LDLC SERPL CALC-MCNC: 64 MG/DL (ref 0–100)
LYMPHOCYTES # BLD AUTO: 1.33 THOUSANDS/ΜL (ref 0.6–4.47)
LYMPHOCYTES NFR BLD AUTO: 21 % (ref 14–44)
MCH RBC QN AUTO: 24.1 PG (ref 26.8–34.3)
MCHC RBC AUTO-ENTMCNC: 30.7 G/DL (ref 31.4–37.4)
MCV RBC AUTO: 79 FL (ref 82–98)
MONOCYTES # BLD AUTO: 0.61 THOUSAND/ΜL (ref 0.17–1.22)
MONOCYTES NFR BLD AUTO: 10 % (ref 4–12)
NEUTROPHILS # BLD AUTO: 4.24 THOUSANDS/ΜL (ref 1.85–7.62)
NEUTS SEG NFR BLD AUTO: 65 % (ref 43–75)
NRBC BLD AUTO-RTO: 0 /100 WBCS
NT-PROBNP SERPL-MCNC: 127 PG/ML
PLATELET # BLD AUTO: 301 THOUSANDS/UL (ref 149–390)
PMV BLD AUTO: 8.8 FL (ref 8.9–12.7)
POTASSIUM SERPL-SCNC: 4 MMOL/L (ref 3.5–5.3)
POTASSIUM SERPL-SCNC: 4.6 MMOL/L (ref 3.5–5.2)
PROT SERPL-MCNC: 7.2 G/DL (ref 6.4–8.2)
PROTHROMBIN TIME: 10.4 SECONDS (ref 9.4–11.7)
RBC # BLD AUTO: 5.35 MILLION/UL (ref 3.88–5.62)
SL AMB EGFR AFRICAN AMERICAN: 82 ML/MIN/1.73
SL AMB EGFR NON AFRICAN AMERICAN: 71 ML/MIN/1.73
SODIUM SERPL-SCNC: 136 MMOL/L (ref 134–144)
SODIUM SERPL-SCNC: 137 MMOL/L (ref 136–145)
TRIGL SERPL-MCNC: 111 MG/DL
TROPONIN I SERPL-MCNC: <0.02 NG/ML
TSH SERPL DL<=0.05 MIU/L-ACNC: 1.82 UIU/ML (ref 0.36–3.74)
WBC # BLD AUTO: 6.37 THOUSAND/UL (ref 4.31–10.16)

## 2019-01-29 PROCEDURE — 85025 COMPLETE CBC W/AUTO DIFF WBC: CPT | Performed by: EMERGENCY MEDICINE

## 2019-01-29 PROCEDURE — 99214 OFFICE O/P EST MOD 30 MIN: CPT | Performed by: FAMILY MEDICINE

## 2019-01-29 PROCEDURE — 36415 COLL VENOUS BLD VENIPUNCTURE: CPT | Performed by: EMERGENCY MEDICINE

## 2019-01-29 PROCEDURE — 87081 CULTURE SCREEN ONLY: CPT | Performed by: FAMILY MEDICINE

## 2019-01-29 PROCEDURE — 83036 HEMOGLOBIN GLYCOSYLATED A1C: CPT | Performed by: STUDENT IN AN ORGANIZED HEALTH CARE EDUCATION/TRAINING PROGRAM

## 2019-01-29 PROCEDURE — 85730 THROMBOPLASTIN TIME PARTIAL: CPT | Performed by: EMERGENCY MEDICINE

## 2019-01-29 PROCEDURE — 80053 COMPREHEN METABOLIC PANEL: CPT | Performed by: EMERGENCY MEDICINE

## 2019-01-29 PROCEDURE — 94660 CPAP INITIATION&MGMT: CPT

## 2019-01-29 PROCEDURE — 84484 ASSAY OF TROPONIN QUANT: CPT | Performed by: EMERGENCY MEDICINE

## 2019-01-29 PROCEDURE — 93005 ELECTROCARDIOGRAM TRACING: CPT

## 2019-01-29 PROCEDURE — 96374 THER/PROPH/DIAG INJ IV PUSH: CPT

## 2019-01-29 PROCEDURE — 71045 X-RAY EXAM CHEST 1 VIEW: CPT

## 2019-01-29 PROCEDURE — 99285 EMERGENCY DEPT VISIT HI MDM: CPT

## 2019-01-29 PROCEDURE — 80061 LIPID PANEL: CPT | Performed by: STUDENT IN AN ORGANIZED HEALTH CARE EDUCATION/TRAINING PROGRAM

## 2019-01-29 PROCEDURE — 84443 ASSAY THYROID STIM HORMONE: CPT | Performed by: STUDENT IN AN ORGANIZED HEALTH CARE EDUCATION/TRAINING PROGRAM

## 2019-01-29 PROCEDURE — 85610 PROTHROMBIN TIME: CPT | Performed by: EMERGENCY MEDICINE

## 2019-01-29 PROCEDURE — 94760 N-INVAS EAR/PLS OXIMETRY 1: CPT

## 2019-01-29 PROCEDURE — 83880 ASSAY OF NATRIURETIC PEPTIDE: CPT | Performed by: EMERGENCY MEDICINE

## 2019-01-29 RX ORDER — TRAZODONE HYDROCHLORIDE 50 MG/1
50 TABLET ORAL
Status: DISCONTINUED | OUTPATIENT
Start: 2019-01-29 | End: 2019-01-30 | Stop reason: HOSPADM

## 2019-01-29 RX ORDER — SPIRONOLACTONE 25 MG/1
50 TABLET ORAL DAILY
Status: DISCONTINUED | OUTPATIENT
Start: 2019-01-30 | End: 2019-01-30

## 2019-01-29 RX ORDER — PANTOPRAZOLE SODIUM 40 MG/1
40 TABLET, DELAYED RELEASE ORAL
Status: DISCONTINUED | OUTPATIENT
Start: 2019-01-30 | End: 2019-01-30 | Stop reason: HOSPADM

## 2019-01-29 RX ORDER — FUROSEMIDE 10 MG/ML
60 INJECTION INTRAMUSCULAR; INTRAVENOUS ONCE
Status: COMPLETED | OUTPATIENT
Start: 2019-01-29 | End: 2019-01-29

## 2019-01-29 RX ORDER — CLONIDINE HYDROCHLORIDE 0.1 MG/1
0.2 TABLET ORAL EVERY 12 HOURS SCHEDULED
Status: DISCONTINUED | OUTPATIENT
Start: 2019-01-29 | End: 2019-01-30

## 2019-01-29 RX ORDER — METOLAZONE 5 MG/1
2.5 TABLET ORAL DAILY
Status: DISCONTINUED | OUTPATIENT
Start: 2019-01-30 | End: 2019-01-30

## 2019-01-29 RX ORDER — BUPROPION HYDROCHLORIDE 300 MG/1
300 TABLET ORAL EVERY MORNING
Status: DISCONTINUED | OUTPATIENT
Start: 2019-01-30 | End: 2019-01-30 | Stop reason: HOSPADM

## 2019-01-29 RX ORDER — DULOXETIN HYDROCHLORIDE 30 MG/1
60 CAPSULE, DELAYED RELEASE ORAL DAILY
Status: DISCONTINUED | OUTPATIENT
Start: 2019-01-30 | End: 2019-01-30 | Stop reason: HOSPADM

## 2019-01-29 RX ORDER — ARIPIPRAZOLE 5 MG/1
10 TABLET ORAL DAILY
Status: DISCONTINUED | OUTPATIENT
Start: 2019-01-30 | End: 2019-01-30 | Stop reason: HOSPADM

## 2019-01-29 RX ADMIN — CLONIDINE HYDROCHLORIDE 0.2 MG: 0.1 TABLET ORAL at 21:22

## 2019-01-29 RX ADMIN — FUROSEMIDE 60 MG: 10 INJECTION, SOLUTION INTRAMUSCULAR; INTRAVENOUS at 16:22

## 2019-01-29 RX ADMIN — TRAZODONE HYDROCHLORIDE 50 MG: 50 TABLET ORAL at 21:20

## 2019-01-29 NOTE — PATIENT INSTRUCTIONS
This patient's heart failure is worse over the last several days  The patient has gained 3 lb since last Friday and a total of 17 lb since the end of October  His peripheral edema is worse in both lower extremities  The patient has exertional shortness of breath secondary to his congestive heart failure  The patient has failed outpatient oral diuretics to treat his congestive heart failure  The patient was sent to the emergency room for most likely admission for worsening congestive heart failure  Cardiology will be consulted for this patient  The patient will most likely need IV diuresis  Further cardiac workup could include a echocardiogram   Emergency room was called about the status of this patient

## 2019-01-29 NOTE — ED PROVIDER NOTES
History  Chief Complaint   Patient presents with    Shortness of Breath     patient c/o being SOB x several months  states Dr Biswas Self sent him in due to CHF and oral Lasix is not helping  60 y/o male with a history of CHF presents with bilateral lower extremity edema, weight gain, worsening over the past few days  Patient is already on 3 oral diuretics  Denies chest pain,admits to dyspnea worsening on exertion, orthopnea  No fevers,chills or other symptoms  Sent in by his PCP for evaluation and management  History provided by:  Patient   used: No        Prior to Admission Medications   Prescriptions Last Dose Informant Patient Reported? Taking? ARIPiprazole (ABILIFY) 10 mg tablet  Self Yes Yes   Sig: Take 10 mg by mouth daily   DULoxetine (CYMBALTA) 60 mg delayed release capsule  Self Yes Yes   Sig: Take by mouth daily     buPROPion (WELLBUTRIN XL) 300 mg 24 hr tablet   Yes Yes   Sig: Take 300 mg by mouth every morning   cloNIDine (CATAPRES) 0 1 mg tablet   No Yes   Sig: Take 2 tablets (0 2 mg total) by mouth every 12 (twelve) hours   furosemide (LASIX) 40 mg tablet   No Yes   Sig: Take 1 tablet (40 mg total) by mouth daily   metolazone (ZAROXOLYN) 2 5 mg tablet   No Yes   Sig: Take 1 tablet (2 5 mg total) by mouth daily Takes every wed  a m    omeprazole (PriLOSEC) 40 MG capsule   No Yes   Sig: Take 1 capsule (40 mg total) by mouth daily   spironolactone (ALDACTONE) 50 mg tablet   No Yes   Sig: Take 1 tablet (50 mg total) by mouth daily   traZODone (DESYREL) 150 mg tablet  Self Yes Yes   Sig: Take 50 mg by mouth daily at bedtime      Facility-Administered Medications Last Administration Doses Remaining   albuterol (ACCUNEB) nebulizer solution 1 25 mg 10/4/2018 11:24 AM           Past Medical History:   Diagnosis Date    Arthritis     Asthma     CPAP (continuous positive airway pressure) dependence     Edema     left leg    GERD (gastroesophageal reflux disease)     Hypertension     Myocardial infarct [facility])     2011    [facility]ocardial infarction ([facility] [address] )     Obesity     Sleep apnea     Thrombophlebitis        Past Surgical History:   Procedure Laterality Date    CHOLECYSTECTOMY      COLONOSCOPY      COLONOSCOPY N/A 10/30/2017    Procedure: COLONOSCOPY;  Surgeon: Cody Hassan MD;  Location: [place] [address] GI LAB; Service: Gastroenterology    ENDOVENOUS ABLATION SAPHENOUS VEIN W/ LASER      each addit vein    ERCP      ESOPHAGOGASTRODUODENOSCOPY N/A 10/30/2017    Procedure: ESOPHAGOGASTRODUODENOSCOPY (EGD); Surgeon: Cody Hassan MD;  Location: [facility] GI LAB; Service: Gastroenterology    GASTRIC BYPASS      2001    GASTRIC BYPASS      HERNIA REPAIR      paraesophageal hiatus hernia    HERNIA REPAIR      x5 last one 2011    JOINT REPLACEMENT      KNEE ARTHROPLASTY Right     2102    REPLACEMENT TOTAL KNEE Right 02/2011       Family History   Problem Relation Age of Onset    No Known Problems Mother     Prostate cancer Father     Stroke Family         syndrome    Aneurysm Family         aoritc     I have reviewed and agree with the history as documented  Social History   Substance Use Topics    Smoking status: Former Smoker     Years: 6 00     Types: Cigarettes    Smokeless tobacco: Never Used      Comment: never a smoker per allscripts     Alcohol use Yes      Comment: rarely - denied per allscripts         Review of Systems   All other systems reviewed and are negative  Physical Exam  Physical Exam   Constitutional: He is oriented to person, place, and time  He appears well-developed and well-nourished  HENT:   Head: Normocephalic and atraumatic  Eyes: Pupils are equal, round, and reactive to light  EOM are normal    Neck: Normal range of motion  Neck supple  Cardiovascular: Normal rate and regular rhythm  Pulmonary/Chest: Effort normal and breath sounds normal  No respiratory distress  He has no wheezes  He has no rales  He exhibits no tenderness  Abdominal: Soft  Bowel sounds are normal    Musculoskeletal: Normal range of motion  He exhibits edema  Neurological: He is alert and oriented to person, place, and time  Skin: Skin is warm and dry  Psychiatric: He has a normal mood and affect  Nursing note and vitals reviewed        Vital Signs  ED Triage Vitals [01/29/19 1539]   Temperature Pulse Respirations Blood Pressure SpO2   98 °F (36 7 °C) 68 22 141/84 97 %      Temp Source Heart Rate Source Patient Position - Orthostatic VS BP Location FiO2 (%)   Tympanic Monitor Sitting Right arm --      Pain Score       No Pain           Vitals:    01/29/19 1539   BP: 141/84   Pulse: 68   Patient Position - Orthostatic VS: Sitting       Visual Acuity      ED Medications  Medications   furosemide (LASIX) injection 60 mg (60 mg Intravenous Given 1/29/19 1622)       Diagnostic Studies  Results Reviewed     Procedure Component Value Units Date/Time    NT-BNP PRO (BNP for [place], [place], [place], [place], [place], [facility], [facility], [place] campuses) [[id number]]  (Abnormal) Collected:  01/29/19 1559    Lab Status:  Final result Specimen:  Blood from Arm, Left Updated:  01/29/19 1628     NT-proBNP 127 (H) pg/mL     Troponin I [[id number]]  (Normal) Collected:  01/29/19 1559    Lab Status:  Final result Specimen:  Blood from Arm, Left Updated:  01/29/19 1626     Troponin I <0 02 ng/mL     Comprehensive metabolic panel [[id number]]  (Abnormal) Collected:  01/29/19 1559    Lab Status:  Final result Specimen:  Blood from Arm, Left Updated:  01/29/19 1621     Sodium 137 mmol/L      Potassium 4 0 mmol/L      Chloride 101 mmol/L      CO2 28 mmol/L      ANION GAP 8 mmol/L      BUN 13 mg/dL      Creatinine 1 09 mg/dL      Glucose 104 mg/dL      Calcium 8 7 mg/dL      AST 18 U/L      ALT 24 U/L      Alkaline Phosphatase 79 U/L      Total Protein 7 2 g/dL      Albumin 3 4 (L) g/dL      Total Bilirubin 0 50 mg/dL      eGFR 73 ml/min/1 73sq m     Narrative:         National Kidney Disease Education Program recommendations are as follows:  GFR calculation is accurate only with a steady state creatinine  Chronic Kidney disease less than 60 ml/min/1 73 sq  meters  Kidney failure less than 15 ml/min/1 73 sq  meters      Protime-INR [[id number]]  (Normal) Collected:  01/29/19 1559    Lab Status:  Final result Specimen:  Blood from Arm, Left Updated:  01/29/19 1620     Protime 10 4 seconds      INR 0 99    APTT [[id number]]  (Normal) Collected:  01/29/19 1559    Lab Status:  Final result Specimen:  Blood from Arm, Left Updated:  01/29/19 1620     PTT 27 seconds     CBC and differential [[id number]]  (Abnormal) Collected:  01/29/19 1559    Lab Status:  Final result Specimen:  Blood from Arm, Left Updated:  01/29/19 1605     WBC 6 37 Thousand/uL      RBC 5 35 Million/uL      Hemoglobin 12 9 g/dL      Hematocrit 42 0 %      MCV 79 (L) fL      MCH 24 1 (L) pg      MCHC 30 7 (L) g/dL      RDW 13 6 %      MPV 8 8 (L) fL      Platelets 364 Thousands/uL      nRBC 0 /100 WBCs      Neutrophils Relative 65 %      Immat GRANS % 1 %      Lymphocytes Relative 21 %      Monocytes Relative 10 %      Eosinophils Relative 2 %      Basophils Relative 1 %      Neutrophils Absolute 4 24 Thousands/µL      Immature Grans Absolute 0 03 Thousand/uL      Lymphocytes Absolute 1 33 Thousands/µL      Monocytes Absolute 0 61 Thousand/µL      Eosinophils Absolute 0 11 Thousand/µL      Basophils Absolute 0 05 Thousands/µL                  XR chest 1 view portable    (Results Pending)              Procedures  ECG 12 Lead Documentation  Performed by: Robby France by: Esther Bautista     ECG reviewed by me, the ED Provider: yes    Patient location:  ED  Previous ECG:     Previous ECG:  Unavailable    Comparison to cardiac monitor: Yes    Interpretation:     Interpretation: non-specific    Rate:     ECG rate assessment: normal    Rhythm:     Rhythm: sinus rhythm    Ectopy:     Ectopy: none    QRS:     QRS axis:  Normal  Conduction:     Conduction: normal    ST segments:     ST segments:  Non-specific  T waves:     T waves: non-specific             Phone Contacts  ED Phone Contact    ED Course                               MDM  Number of Diagnoses or Management Options  CHF (congestive heart failure) [facility]):   Leg edema:   Diagnosis management comments: Patient evaluated with labs, imaging  Reviewed results discussed with patient  Patient given IV diuretics in the ED  Patient admitted to [facility] for further evaluation and management  Patient verbalized understanding of results and agreed with the plan  Amount and/or Complexity of Data Reviewed  Clinical lab tests: ordered and reviewed  Tests in the radiology section of CPT®: reviewed and ordered  Tests in the medicine section of CPT®: ordered and reviewed    Patient Progress  Patient progress: stable      Disposition  Final diagnoses:   CHF (congestive heart failure) ([facility] [address] )   Leg edema     Time reflects when diagnosis was documented in both MDM as applicable and the Disposition within this note     Time User Action Codes Description Comment    1/29/2019  5:48 PM Kenn Beck Add [I50 9] CHF (congestive heart failure) ([facility] [address] )     1/29/2019  5:48 PM Kenn Beck Add [R60 0] Leg edema       ED Disposition     ED Disposition Condition Date/Time Comment    Admit  Tue Jan 29, 2019  5:48 PM        Follow-up Information    None         Patient's Medications   Discharge Prescriptions    No medications on file     No discharge procedures on file      ED Provider  Electronically Signed by           Carlos Conrad DO  01/29/19 2483

## 2019-01-29 NOTE — ASSESSMENT & PLAN NOTE
Located in Bilateral lower extremities likely secondary to CHF   Given lasix 60 mg PO qd  · Monitor I/o's  · Daily weights  · Treatment per CHF

## 2019-01-29 NOTE — H&P
H&P Exam - Jovita Life 59 y o  male MRN: [id number]    Unit/Bed#: ED 08 Encounter: [id number]    Pt with past medical history of morbid obesity status gastric bypass, COPD, and CHF class III will be admitted to the general medical floor under observation for lower extremity edema for an anticipated stay of less than two midnights under the services of Dr Vincent Connolly  Assessment/Plan:  * CHF (congestive heart failure), NYHA class III, acute on chronic, combined (HCC)   Assessment & Plan    , Trop -, 60 mg PO lasix given in ED  -Consults cards   -Echo  -I/O's  -Hold lasix for now since given    -Administer based on I/O's  -Monitor I/O's  -Monitor Weight  -CXR final read pending  -Continue home aldactone 50 mg PO qd  -Continue home metolazone 2 5 mg PO qd       Dyspnea on exertion   Assessment & Plan    Likely secondary to CHF exacerbation  · Manage as above for CHF  · Albuterol prn     Edema   Assessment & Plan    Located in Bilateral lower extremities likely secondary to CHF  Given lasix 60 mg PO qd  · Monitor I/o's  · Daily weights  · Treatment per CHF         Global: SQL, SCD, Cardiac Diet    History of Present Illness     Pt with past medical history of morbid obesity status gastric bypass, COPD, and CHF class III comes to hospital from outpatient clinic complaining of increased shortness of breath on exertion with bilateral lower extremity edema  When asked to describe his dyspnea patient reports he cannot Walk from the [employer] office to the big lots without being out of breath  Sitting down and relaxing helps his shortness of breath  Exertion makes it worse  Patient reports in addition to shortness of breath he is sleeping a lot more  Patient reports these symptoms have been going on "for quite a while "    In regards to his sleeping He has to be raised with pillows and is on CPAP (13)  Patient has COPD but reports he has not had to use his inhaler more   In regard to his swollen legs patient reports "my legs are killing me " Patient went for doctor visit on 1/24 where he complained of leg swelling  He was told to follow up in clinic today (1/29)  Since that last visit he gained another 3 lbs and reports gaining 17 lbs altogether over the last month from retaining fluid  ED Course: lasix 60 mg PO qd, CXR     Review of Systems   Constitutional: Positive for activity change, fatigue and unexpected weight change  Negative for appetite change, chills and fever  Eyes: Negative for visual disturbance  Respiratory: Positive for cough and shortness of breath  Negative for chest tightness and wheezing  Cardiovascular: Positive for leg swelling  Negative for chest pain and palpitations  Gastrointestinal: Negative for abdominal pain, diarrhea, nausea and vomiting  Genitourinary: Negative for difficulty urinating  Musculoskeletal: Positive for joint swelling (right and left knee)  Neurological: Negative for syncope, speech difficulty, light-headedness and headaches  Historical Information   Past Medical History:   Diagnosis Date    Arthritis     Asthma     CPAP (continuous positive airway pressure) dependence     Edema     left leg    GERD (gastroesophageal reflux disease)     Hypertension     Myocardial infarct [facility])     2011    Myocardial infarction ([facility] [address] )     Obesity     Sleep apnea     Thrombophlebitis      Past Surgical History:   Procedure Laterality Date    CHOLECYSTECTOMY      COLONOSCOPY      COLONOSCOPY N/A 10/30/2017    Procedure: COLONOSCOPY;  Surgeon: Magdiel Goodwin MD;  Location: [facility] GI LAB; Service: Gastroenterology    ENDOVENOUS ABLATION SAPHENOUS VEIN W/ LASER      each addit vein    ERCP      ESOPHAGOGASTRODUODENOSCOPY N/A 10/30/2017    Procedure: ESOPHAGOGASTRODUODENOSCOPY (EGD); Surgeon: Magdiel Goodwin MD;  Location: [facility] GI LAB;   Service: Gastroenterology    GASTRIC BYPASS      2001    GASTRIC BYPASS      HERNIA REPAIR      paraesophageal hiatus hernia    HERNIA REPAIR      x5 last one 2011    JOINT REPLACEMENT      KNEE ARTHROPLASTY Right     2102    REPLACEMENT TOTAL KNEE Right 02/2011     Social History   History   Alcohol Use    Yes     Comment: rarely - denied per allscripts      History   Drug Use No     History   Smoking Status    Former Smoker    Years: 6 00    Types: Cigarettes   Smokeless Tobacco    Never Used     Comment: never a smoker per allscripts      Family History:   Family History   Problem Relation Age of Onset    No Known Problems Mother     Prostate cancer Father     Stroke Family         syndrome    Aneurysm Family         aoritc       Meds/Allergies   PTA meds:   Prior to Admission Medications   Prescriptions Last Dose Informant Patient Reported? Taking? ARIPiprazole (ABILIFY) 10 mg tablet  Self Yes Yes   Sig: Take 10 mg by mouth daily   DULoxetine (CYMBALTA) 60 mg delayed release capsule  Self Yes Yes   Sig: Take by mouth daily     buPROPion (WELLBUTRIN XL) 300 mg 24 hr tablet   Yes Yes   Sig: Take 300 mg by mouth every morning   cloNIDine (CATAPRES) 0 1 mg tablet   No Yes   Sig: Take 2 tablets (0 2 mg total) by mouth every 12 (twelve) hours   furosemide (LASIX) 40 mg tablet   No Yes   Sig: Take 1 tablet (40 mg total) by mouth daily   metolazone (ZAROXOLYN) 2 5 mg tablet   No Yes   Sig: Take 1 tablet (2 5 mg total) by mouth daily Takes every wed  a m    omeprazole (PriLOSEC) 40 MG capsule   No Yes   Sig: Take 1 capsule (40 mg total) by mouth daily   spironolactone (ALDACTONE) 50 mg tablet   No Yes   Sig: Take 1 tablet (50 mg total) by mouth daily   traZODone (DESYREL) 150 mg tablet  Self Yes Yes   Sig: Take 50 mg by mouth daily at bedtime      Facility-Administered Medications Last Administration Doses Remaining   albuterol (ACCUNEB) nebulizer solution 1 25 mg 10/4/2018 11:24 AM         Allergies   Allergen Reactions    Gluten Meal        Objective   First Vitals:   Blood Pressure: 141/84 (01/29/19 1539)  Pulse: 68 (01/29/19 1539)  Temperature: 98 °F (36 7 °C) (01/29/19 1539)  Temp Source: Tympanic (01/29/19 1539)  Respirations: 22 (01/29/19 1539)  Weight - Scale: (!) 166 kg (367 lb) (01/29/19 1539)  SpO2: 97 % (01/29/19 1539)    Current Vitals:   Blood Pressure: 141/84 (01/29/19 1539)  Pulse: 68 (01/29/19 1539)  Temperature: 98 °F (36 7 °C) (01/29/19 1539)  Temp Source: Tympanic (01/29/19 1539)  Respirations: 22 (01/29/19 1539)  Weight - Scale: (!) 166 kg (367 lb) (01/29/19 1539)  SpO2: 97 % (01/29/19 1539)    No intake or output data in the 24 hours ending 01/29/19 1800    Invasive Devices     Peripheral Intravenous Line            Peripheral IV 01/29/19 Left Antecubital less than 1 day                Physical Exam   Constitutional: He appears well-developed  HENT:   Head: Normocephalic and atraumatic  Right Ear: External ear normal    Left Ear: External ear normal    Nose: Nose normal    Mouth/Throat: Oropharynx is clear and moist    Eyes: Pupils are equal, round, and reactive to light  Conjunctivae and EOM are normal    Neck: Normal range of motion  Neck supple  No JVD present  No thyromegaly present  Cardiovascular: Normal rate, regular rhythm, normal heart sounds and intact distal pulses  Pulmonary/Chest: Effort normal and breath sounds normal    Abdominal: Soft  Bowel sounds are normal  He exhibits shifting dullness and distension  There is no tenderness  There is no rebound  Musculoskeletal:        Right lower leg: He exhibits edema  Left lower leg: He exhibits edema         Lab Results:   Results for orders placed or performed during the hospital encounter of 01/29/19   CBC and differential   Result Value Ref Range    WBC 6 37 4 31 - 10 16 Thousand/uL    RBC 5 35 3 88 - 5 62 Million/uL    Hemoglobin 12 9 12 0 - 17 0 g/dL    Hematocrit 42 0 36 5 - 49 3 %    MCV 79 (L) 82 - 98 fL    MCH 24 1 (L) 26 8 - 34 3 pg    MCHC 30 7 (L) 31 4 - 37 4 g/dL    RDW 13 6 11 6 - 15 1 %    MPV 8 8 (L) 8 9 - 12 7 fL Platelets 463 124 - 957 Thousands/uL    nRBC 0 /100 WBCs    Neutrophils Relative 65 43 - 75 %    Immat GRANS % 1 0 - 2 %    Lymphocytes Relative 21 14 - 44 %    Monocytes Relative 10 4 - 12 %    Eosinophils Relative 2 0 - 6 %    Basophils Relative 1 0 - 1 %    Neutrophils Absolute 4 24 1 85 - 7 62 Thousands/µL    Immature Grans Absolute 0 03 0 00 - 0 20 Thousand/uL    Lymphocytes Absolute 1 33 0 60 - 4 47 Thousands/µL    Monocytes Absolute 0 61 0 17 - 1 22 Thousand/µL    Eosinophils Absolute 0 11 0 00 - 0 61 Thousand/µL    Basophils Absolute 0 05 0 00 - 0 10 Thousands/µL   Comprehensive metabolic panel   Result Value Ref Range    Sodium 137 136 - 145 mmol/L    Potassium 4 0 3 5 - 5 3 mmol/L    Chloride 101 100 - 108 mmol/L    CO2 28 21 - 32 mmol/L    ANION GAP 8 4 - 13 mmol/L    BUN 13 5 - 25 mg/dL    Creatinine 1 09 0 60 - 1 30 mg/dL    Glucose 104 65 - 140 mg/dL    Calcium 8 7 8 3 - 10 1 mg/dL    AST 18 5 - 45 U/L    ALT 24 12 - 78 U/L    Alkaline Phosphatase 79 46 - 116 U/L    Total Protein 7 2 6 4 - 8 2 g/dL    Albumin 3 4 (L) 3 5 - 5 0 g/dL    Total Bilirubin 0 50 0 20 - 1 00 mg/dL    eGFR 73 ml/min/1 73sq m   Protime-INR   Result Value Ref Range    Protime 10 4 9 4 - 11 7 seconds    INR 0 99 0 86 - 1 16   APTT   Result Value Ref Range    PTT 27 24 - 33 seconds   Troponin I   Result Value Ref Range    Troponin I <0 02 <=0 04 ng/mL   NT-BNP PRO (BNP for [place], [place], [place], [place], [place], [place], [place], [place] campuses)   Result Value Ref Range    NT-proBNP 127 (H) <125 pg/mL       Imaging:   XR chest 1 view portable    (Results Pending)       EKG, Pathology, and Other Studies: Sinus rhythm with 1st degree AV block left axis deviation minimal voltage criteria for LVH, may be normal variant abnormal  Vent rate 60 bpm  AK interval 210 ms   QRS duration 112  QT/QTc 406/406 ms    Code Status: No Order  Advance Directive and Living Will:      Power of :    POLST:      Counseling / Coordination of Care:   Greater than 50% of total time was spent with the patient and / or family counseling and / or coordination of care  A description of the counseling / coordination of care: assessment and plan discussed with attending physician

## 2019-01-29 NOTE — ASSESSMENT & PLAN NOTE
-Follows with [facility]  -  POA, Trop -ve, CXR POA with mild vascular congestion  - s/p 60 mg PO lasix in ED  - Echo ordered and pending  - monitor strict I/O's and daily weights  - s/p 60mg IV lasix in ED, will re-evaluate during rounds for further diuresis, pt is on 40mg PO qd at home which is held as he is getting IV diuresis  -Continue home aldactone 50 mg PO qd, metolazone 2 5 mg PO qd  - Cards consulted, will appreciate further recs, will discuss need for ACEi and/or beta-blocker after current acute exac episode resolves for long-term basis  - currently maintaining sats on RA

## 2019-01-29 NOTE — ASSESSMENT & PLAN NOTE
Likely secondary to CHF exacerbation in setting of vascular congestion and peripheral edema  · Manage as above for CHF  · Albuterol prn

## 2019-01-29 NOTE — PROGRESS NOTES
Assessment/Plan:    No problem-specific Assessment & Plan notes found for this encounter  There are no diagnoses linked to this encounter  Subjective:      Patient ID: Rafat Vann is a 59 y o  male  This is a follow-up appointment for 49-year-old morbidly obese male that was seen last Friday and diagnosed with acute exacerbation of congestive heart failure  The patient has a history of diastolic and systolic heart failure  The patient is already on 3 different diuretics and last Friday his spironolactone was doubled from 25 mg to 50 mg  Since the end of October the patient has gained 14 lb  The patient was complaining of exertional shortness of breath  Patient states an occasional episode of chest pain with the shortness of breath  The patient sees Dr Pilo La in [facility]  He was scheduled to see Dr Pilo La this Thursday  The patient has been on 3 days of double dose of Aldactone  Since last week he has gained 3 more lb  The patient states increased edema in his lower extremities  The patient is having difficulty getting socks the on at this point  The following portions of the patient's history were reviewed and updated as appropriate: allergies, current medications, past family history, past medical history, past social history, past surgical history and problem list     Review of Systems   Constitutional: Positive for unexpected weight change  HENT: Negative  Respiratory: Positive for shortness of breath  Cardiovascular: Positive for leg swelling  Endocrine: Negative  Musculoskeletal: Negative  Psychiatric/Behavioral: Negative  Objective:      /82   Pulse 70   Temp 98 5 °F (36 9 °C)   Resp 20   Wt (!) 166 kg (367 lb)   SpO2 98%   BMI 54 20 kg/m²          Physical Exam   Constitutional: He is oriented to person, place, and time  He appears well-developed and well-nourished     The patient is morbidly obese with a BMI of 54 20   Cardiovascular: Normal rate, regular rhythm, normal heart sounds and intact distal pulses  Pulmonary/Chest: Effort normal and breath sounds normal    Musculoskeletal: He exhibits edema  Neurological: He is alert and oriented to person, place, and time  Psychiatric: He has a normal mood and affect  His behavior is normal  Judgment and thought content normal    Vitals reviewed

## 2019-01-30 ENCOUNTER — APPOINTMENT (OUTPATIENT)
Dept: NON INVASIVE DIAGNOSTICS | Facility: HOSPITAL | Age: 59
End: 2019-01-30
Payer: MEDICARE

## 2019-01-30 VITALS
OXYGEN SATURATION: 93 % | HEIGHT: 69 IN | TEMPERATURE: 97.4 F | RESPIRATION RATE: 22 BRPM | HEART RATE: 57 BPM | BODY MASS INDEX: 46.65 KG/M2 | WEIGHT: 315 LBS | DIASTOLIC BLOOD PRESSURE: 57 MMHG | SYSTOLIC BLOOD PRESSURE: 125 MMHG

## 2019-01-30 PROBLEM — F32.A DEPRESSION: Status: ACTIVE | Noted: 2019-01-30

## 2019-01-30 PROBLEM — I10 HTN (HYPERTENSION): Status: ACTIVE | Noted: 2019-01-30

## 2019-01-30 PROBLEM — G47.30 SLEEP APNEA: Status: ACTIVE | Noted: 2017-07-19

## 2019-01-30 PROBLEM — I50.9 ACUTE ON CHRONIC CONGESTIVE HEART FAILURE (HCC): Status: ACTIVE | Noted: 2019-01-24

## 2019-01-30 PROBLEM — K21.9 GASTROESOPHAGEAL REFLUX DISEASE WITHOUT ESOPHAGITIS: Chronic | Status: ACTIVE | Noted: 2018-09-20

## 2019-01-30 LAB
ANION GAP SERPL CALCULATED.3IONS-SCNC: 8 MMOL/L (ref 4–13)
BUN SERPL-MCNC: 14 MG/DL (ref 5–25)
CALCIUM SERPL-MCNC: 8.6 MG/DL (ref 8.3–10.1)
CHLORIDE SERPL-SCNC: 100 MMOL/L (ref 100–108)
CO2 SERPL-SCNC: 29 MMOL/L (ref 21–32)
CREAT SERPL-MCNC: 1.02 MG/DL (ref 0.6–1.3)
ERYTHROCYTE [DISTWIDTH] IN BLOOD BY AUTOMATED COUNT: 13.8 % (ref 11.6–15.1)
EST. AVERAGE GLUCOSE BLD GHB EST-MCNC: 131 MG/DL
GFR SERPL CREATININE-BSD FRML MDRD: 80 ML/MIN/1.73SQ M
GLUCOSE P FAST SERPL-MCNC: 111 MG/DL (ref 65–99)
GLUCOSE SERPL-MCNC: 111 MG/DL (ref 65–140)
HBA1C MFR BLD: 6.2 % (ref 4.2–6.3)
HCT VFR BLD AUTO: 41.5 % (ref 36.5–49.3)
HGB BLD-MCNC: 12.6 G/DL (ref 12–17)
MAGNESIUM SERPL-MCNC: 2.2 MG/DL (ref 1.6–2.6)
MCH RBC QN AUTO: 24 PG (ref 26.8–34.3)
MCHC RBC AUTO-ENTMCNC: 30.4 G/DL (ref 31.4–37.4)
MCV RBC AUTO: 79 FL (ref 82–98)
PHOSPHATE SERPL-MCNC: 3.5 MG/DL (ref 2.3–4.1)
PLATELET # BLD AUTO: 303 THOUSANDS/UL (ref 149–390)
PMV BLD AUTO: 8.6 FL (ref 8.9–12.7)
POTASSIUM SERPL-SCNC: 3.8 MMOL/L (ref 3.5–5.3)
RBC # BLD AUTO: 5.25 MILLION/UL (ref 3.88–5.62)
SODIUM SERPL-SCNC: 137 MMOL/L (ref 136–145)
WBC # BLD AUTO: 6.32 THOUSAND/UL (ref 4.31–10.16)

## 2019-01-30 PROCEDURE — 93306 TTE W/DOPPLER COMPLETE: CPT | Performed by: INTERNAL MEDICINE

## 2019-01-30 PROCEDURE — 94760 N-INVAS EAR/PLS OXIMETRY 1: CPT

## 2019-01-30 PROCEDURE — 83735 ASSAY OF MAGNESIUM: CPT | Performed by: STUDENT IN AN ORGANIZED HEALTH CARE EDUCATION/TRAINING PROGRAM

## 2019-01-30 PROCEDURE — G8987 SELF CARE CURRENT STATUS: HCPCS

## 2019-01-30 PROCEDURE — 84100 ASSAY OF PHOSPHORUS: CPT | Performed by: STUDENT IN AN ORGANIZED HEALTH CARE EDUCATION/TRAINING PROGRAM

## 2019-01-30 PROCEDURE — 97163 PT EVAL HIGH COMPLEX 45 MIN: CPT

## 2019-01-30 PROCEDURE — 93306 TTE W/DOPPLER COMPLETE: CPT

## 2019-01-30 PROCEDURE — 99204 OFFICE O/P NEW MOD 45 MIN: CPT | Performed by: INTERNAL MEDICINE

## 2019-01-30 PROCEDURE — G8988 SELF CARE GOAL STATUS: HCPCS

## 2019-01-30 PROCEDURE — G8979 MOBILITY GOAL STATUS: HCPCS

## 2019-01-30 PROCEDURE — 97167 OT EVAL HIGH COMPLEX 60 MIN: CPT

## 2019-01-30 PROCEDURE — 99217 PR OBSERVATION CARE DISCHARGE MANAGEMENT: CPT | Performed by: FAMILY MEDICINE

## 2019-01-30 PROCEDURE — 85027 COMPLETE CBC AUTOMATED: CPT | Performed by: STUDENT IN AN ORGANIZED HEALTH CARE EDUCATION/TRAINING PROGRAM

## 2019-01-30 PROCEDURE — G8978 MOBILITY CURRENT STATUS: HCPCS

## 2019-01-30 PROCEDURE — 80048 BASIC METABOLIC PNL TOTAL CA: CPT | Performed by: STUDENT IN AN ORGANIZED HEALTH CARE EDUCATION/TRAINING PROGRAM

## 2019-01-30 RX ORDER — SPIRONOLACTONE 25 MG/1
25 TABLET ORAL DAILY
Status: DISCONTINUED | OUTPATIENT
Start: 2019-01-30 | End: 2019-01-30 | Stop reason: HOSPADM

## 2019-01-30 RX ORDER — CLONIDINE HYDROCHLORIDE 0.1 MG/1
0.1 TABLET ORAL EVERY 12 HOURS SCHEDULED
Status: DISCONTINUED | OUTPATIENT
Start: 2019-01-30 | End: 2019-01-30 | Stop reason: HOSPADM

## 2019-01-30 RX ORDER — TORSEMIDE 20 MG/1
20 TABLET ORAL DAILY
Status: DISCONTINUED | OUTPATIENT
Start: 2019-01-30 | End: 2019-01-30 | Stop reason: HOSPADM

## 2019-01-30 RX ORDER — TORSEMIDE 20 MG/1
20 TABLET ORAL DAILY
Qty: 30 TABLET | Refills: 3 | Status: SHIPPED | OUTPATIENT
Start: 2019-01-31 | End: 2022-01-28 | Stop reason: HOSPADM

## 2019-01-30 RX ADMIN — TORSEMIDE 20 MG: 20 TABLET ORAL at 09:07

## 2019-01-30 RX ADMIN — CLONIDINE HYDROCHLORIDE 0.1 MG: 0.1 TABLET ORAL at 09:07

## 2019-01-30 RX ADMIN — DULOXETINE HYDROCHLORIDE 60 MG: 30 CAPSULE, DELAYED RELEASE ORAL at 09:07

## 2019-01-30 RX ADMIN — ENOXAPARIN SODIUM 40 MG: 40 INJECTION SUBCUTANEOUS at 09:07

## 2019-01-30 RX ADMIN — BUPROPION HYDROCHLORIDE 300 MG: 300 TABLET, FILM COATED, EXTENDED RELEASE ORAL at 09:07

## 2019-01-30 RX ADMIN — ARIPIPRAZOLE 10 MG: 5 TABLET ORAL at 09:07

## 2019-01-30 RX ADMIN — PANTOPRAZOLE SODIUM 40 MG: 40 TABLET, DELAYED RELEASE ORAL at 05:45

## 2019-01-30 RX ADMIN — SPIRONOLACTONE 25 MG: 25 TABLET ORAL at 09:07

## 2019-01-30 NOTE — PLAN OF CARE
CARDIOVASCULAR - ADULT     Maintains optimal cardiac output and hemodynamic stability Progressing     Absence of cardiac dysrhythmias or at baseline rhythm Progressing        DISCHARGE PLANNING     Discharge to home or other facility with appropriate resources Progressing        INFECTION - ADULT     Absence or prevention of progression during hospitalization Progressing     Absence of fever/infection during neutropenic period Progressing        Knowledge Deficit     Patient/family/caregiver demonstrates understanding of disease process, treatment plan, medications, and discharge instructions Progressing        PAIN - ADULT     Verbalizes/displays adequate comfort level or baseline comfort level Progressing        SAFETY ADULT     Patient will remain free of falls Progressing     Maintain or return to baseline ADL function Progressing     Maintain or return mobility status to optimal level Progressing        SKIN/TISSUE INTEGRITY - ADULT     Skin integrity remains intact Progressing

## 2019-01-30 NOTE — PROGRESS NOTES
[address] [facility] [address] [facility] Progress Note - Carie Orozco 59 y o  male MRN: [id number]    Unit/Bed#: ICU 06 Encounter: [id number]      Assessment/Plan:  Sleep apnea   Assessment & Plan    Stable, continue CPAP at night with positive airway pressure of 13     Depression   Assessment & Plan    - c/w home Wellbutrin and Cymbalta  -denies SI/HI     Hypertension   Assessment & Plan    - c/w catapres, aldactone, monitor vitals, BP controlled at this time  - will d/w use of ACEi or beta-blocker after pt euvolemic long term mgt in setting of co-existing CHF     Class 3 severe obesity due to excess calories without serious comorbidity with body mass index (BMI) of 50 0 to 59 9 in adult [facility])   Assessment & Plan    -  on lifestyle modification aimed towards weight loss for goal BMI 25 or less     GERD without esophagitis   Assessment & Plan    - c/w home protonix     Dyspnea on exertion   Assessment & Plan    Likely secondary to CHF exacerbation in setting of vascular congestion and peripheral edema  · Manage as above for CHF  · Albuterol prn     * Acute on chronic congestive heart failure (HCC)   Assessment & Plan    -Follows with [facility]  -  POA, Trop -ve, CXR POA with mild vascular congestion  - s/p 60 mg PO lasix in ED  - Echo ordered and pending  - monitor strict I/O's and daily weights  - s/p 60mg IV lasix in ED, will re-evaluate during rounds for further diuresis, pt is on 40mg PO qd at home which is held as he is getting IV diuresis  -Continue home aldactone 50 mg PO qd, metolazone 2 5 mg PO qd  - Cards consulted, will appreciate further recs, will discuss need for ACEi and/or beta-blocker after current acute exac episode resolves for long-term basis  - currently maintaining sats on RA           Subjective:   Patient seen examined at the bedside  Reports he urinated large amounts after receiving diuresis in the ED, however this did slow down    Denies any shortness of breath at rest or with lying flat  Patient had episodes of bradycardia on telemetry with heart rate in high 40s to low 50s overnight, asymptomatic  Objective:     Vitals: Blood pressure 112/56, pulse 56, temperature (!) 96 7 °F (35 9 °C), temperature source Tympanic, resp  rate (!) 25, height 5' 9" (1 753 m), weight (!) 167 kg (367 lb 9 6 oz), SpO2 95 %  ,Body mass index is 54 29 kg/m²    Wt Readings from Last 3 Encounters:   01/29/19 (!) 167 kg (367 lb 9 6 oz)   01/29/19 (!) 166 kg (367 lb)   01/24/19 (!) 165 kg (364 lb)     No intake or output data in the 24 hours ending 01/30/19 0753    Physical Exam:   General: Pt is AAO x 3, not in any acute distress, morbidly obese  Cardio: RRR, S1 and S2 +, no murmurs/rubs/gallops/clicks  Resp: CTA b/l, no wheezes/rales/rhonchi  Abdomen: soft, NT/ND, BS+  Extremities:  1+ pitting edema up to mid thigh, bilateral lower extremity pulses intact     Recent Results (from the past 24 hour(s))   CBC and differential    Collection Time: 01/29/19  3:59 PM   Result Value Ref Range    WBC 6 37 4 31 - 10 16 Thousand/uL    RBC 5 35 3 88 - 5 62 Million/uL    Hemoglobin 12 9 12 0 - 17 0 g/dL    Hematocrit 42 0 36 5 - 49 3 %    MCV 79 (L) 82 - 98 fL    MCH 24 1 (L) 26 8 - 34 3 pg    MCHC 30 7 (L) 31 4 - 37 4 g/dL    RDW 13 6 11 6 - 15 1 %    MPV 8 8 (L) 8 9 - 12 7 fL    Platelets 703 840 - 290 Thousands/uL    nRBC 0 /100 WBCs    Neutrophils Relative 65 43 - 75 %    Immat GRANS % 1 0 - 2 %    Lymphocytes Relative 21 14 - 44 %    Monocytes Relative 10 4 - 12 %    Eosinophils Relative 2 0 - 6 %    Basophils Relative 1 0 - 1 %    Neutrophils Absolute 4 24 1 85 - 7 62 Thousands/µL    Immature Grans Absolute 0 03 0 00 - 0 20 Thousand/uL    Lymphocytes Absolute 1 33 0 60 - 4 47 Thousands/µL    Monocytes Absolute 0 61 0 17 - 1 22 Thousand/µL    Eosinophils Absolute 0 11 0 00 - 0 61 Thousand/µL    Basophils Absolute 0 05 0 00 - 0 10 Thousands/µL   Comprehensive metabolic panel    Collection Time: 01/29/19  3:59 PM Result Value Ref Range    Sodium 137 136 - 145 mmol/L    Potassium 4 0 3 5 - 5 3 mmol/L    Chloride 101 100 - 108 mmol/L    CO2 28 21 - 32 mmol/L    ANION GAP 8 4 - 13 mmol/L    BUN 13 5 - 25 mg/dL    Creatinine 1 09 0 60 - 1 30 mg/dL    Glucose 104 65 - 140 mg/dL    Calcium 8 7 8 3 - 10 1 mg/dL    AST 18 5 - 45 U/L    ALT 24 12 - 78 U/L    Alkaline Phosphatase 79 46 - 116 U/L    Total Protein 7 2 6 4 - 8 2 g/dL    Albumin 3 4 (L) 3 5 - 5 0 g/dL    Total Bilirubin 0 50 0 20 - 1 00 mg/dL    eGFR 73 ml/min/1 73sq m   Protime-INR    Collection Time: 01/29/19  3:59 PM   Result Value Ref Range    Protime 10 4 9 4 - 11 7 seconds    INR 0 99 0 86 - 1 16   APTT    Collection Time: 01/29/19  3:59 PM   Result Value Ref Range    PTT 27 24 - 33 seconds   Troponin I    Collection Time: 01/29/19  3:59 PM   Result Value Ref Range    Troponin I <0 02 <=0 04 ng/mL   NT-BNP PRO (BNP for [place], [facility], [place], [place], [place], [facility], [facility], [place] campuses)    Collection Time: 01/29/19  3:59 PM   Result Value Ref Range    NT-proBNP 127 (H) <125 pg/mL   Lipid Panel with Direct LDL reflex    Collection Time: 01/29/19  3:59 PM   Result Value Ref Range    Cholesterol 117 50 - 200 mg/dL    Triglycerides 111 <=150 mg/dL    HDL, Direct 31 (L) 40 - 60 mg/dL    LDL Calculated 64 0 - 100 mg/dL   TSH, 3rd generation with Free T4 reflex    Collection Time: 01/29/19  3:59 PM   Result Value Ref Range    TSH 3RD GENERATON 1 822 0 358 - 3 740 uIU/mL   Magnesium    Collection Time: 01/30/19  6:01 AM   Result Value Ref Range    Magnesium 2 2 1 6 - 2 6 mg/dL   Phosphorus    Collection Time: 01/30/19  6:01 AM   Result Value Ref Range    Phosphorus 3 5 2 3 - 4 1 mg/dL   Basic metabolic panel    Collection Time: 01/30/19  6:01 AM   Result Value Ref Range    Sodium 137 136 - 145 mmol/L    Potassium 3 8 3 5 - 5 3 mmol/L    Chloride 100 100 - 108 mmol/L    CO2 29 21 - 32 mmol/L    ANION GAP 8 4 - 13 mmol/L    BUN 14 5 - 25 mg/dL    Creatinine 1 02 0 60 - 1 30 mg/dL    Glucose 111 65 - 140 mg/dL    Glucose, Fasting 111 (H) 65 - 99 mg/dL    Calcium 8 6 8 3 - 10 1 mg/dL    eGFR 80 ml/min/1 73sq m   CBC (With Platelets)    Collection Time: 01/30/19  6:01 AM   Result Value Ref Range    WBC 6 32 4 31 - 10 16 Thousand/uL    RBC 5 25 3 88 - 5 62 Million/uL    Hemoglobin 12 6 12 0 - 17 0 g/dL    Hematocrit 41 5 36 5 - 49 3 %    MCV 79 (L) 82 - 98 fL    MCH 24 0 (L) 26 8 - 34 3 pg    MCHC 30 4 (L) 31 4 - 37 4 g/dL    RDW 13 8 11 6 - 15 1 %    Platelets 682 337 - 748 Thousands/uL    MPV 8 6 (L) 8 9 - 12 7 fL       Current Facility-Administered Medications   Medication Dose Route Frequency Provider Last Rate Last Dose    ARIPiprazole (ABILIFY) tablet 10 mg  10 mg Oral Daily Jordana Ramos MD        buPROPion (WELLBUTRIN XL) 24 hr tablet 300 mg  300 mg Oral QAM Jordana Ramos MD        cloNIDine (CATAPRES) tablet 0 2 mg  0 2 mg Oral Q12H [address] Jordana Ramos MD   0 2 mg at 01/29/19 2122    DULoxetine (CYMBALTA) delayed release capsule 60 mg  60 mg Oral Daily Jordana Ramos MD        enoxaparin (LOVENOX) subcutaneous injection 40 mg  40 mg Subcutaneous Daily Jordana Ramos MD        metolazone (ZAROXOLYN) tablet 2 5 mg  2 5 mg Oral Daily Jordana Ramos MD        pantoprazole (PROTONIX) EC tablet 40 mg  40 mg Oral Early Morning Jordana Ramos MD   40 mg at 01/30/19 0545    spironolactone (ALDACTONE) tablet 50 mg  50 mg Oral Daily Jordana Ramos MD        traZODone (DESYREL) tablet 50 mg  50 mg Oral HS Jordana Ramos MD   50 mg at 01/29/19 2120       Invasive Devices     Peripheral Intravenous Line            Peripheral IV 01/29/19 Left Antecubital less than 1 day                    Jordana Ramos MD

## 2019-01-30 NOTE — DISCHARGE SUMMARY
[facility] Discharge Summary - Medical Diana Orozco 59 y o  male MRN: [id number]    [place] 45  Room / Bed: ICU 06/ICU 06 Encounter: [id number]    BRIEF OVERVIEW  Admitting Provider: Mercedes Barrera, DO  Discharge Provider: Dr Doreen Moreno,     Discharge To: Home  Facility:  None    Outpatient Follow-Up:  [facility], [facility] Cardiology  Things to address at first follow up visit:  Diuresis, lower extremity edema, morbid obesity  Labs and results pending at discharge: none    Admission Date: 1/29/2019     Discharge Date: 1/30/2019  4:22 PM    Primary Discharge Diagnosis  Principal Problem:    Acute on chronic congestive heart failure ([facility] )  Active Problems:    Dyspnea on exertion    GERD without esophagitis    Class 3 severe obesity due to excess calories without serious comorbidity with body mass index (BMI) of 50 0 to 59 9 in [facility])    Hypertension    Depression    Chronic obstructive pulmonary disease ([place] [address] )    Sleep apnea  Resolved Problems:    * No resolved hospital problems  *        Consulting Providers   -cardiology        [address] STAY    Procedures Performed/Pertinent Test results  -echo:  EF 50-55%  -BNP: 127  -chest x-ray:  Mild vascular congestion    HPI  (copied from H and P) Pt with past medical history of morbid obesity status gastric bypass, COPD, and CHF class III comes to hospital from outpatient clinic complaining of increased shortness of breath on exertion with bilateral lower extremity edema  When asked to describe his dyspnea patient reports he cannot Walk from the [place] office to the [place] without being out of breath  Sitting down and relaxing helps his shortness of breath  Exertion makes it worse  Patient reports in addition to shortness of breath he is sleeping a lot more   Patient reports these symptoms have been going on "for quite a while "     In regards to his sleeping He has to be raised with pillows and is on CPAP (13)  Patient has COPD but reports he has not had to use his inhaler more  In regard to his swollen legs patient reports "my legs are killing me " Patient went for doctor visit on 1/24 where he complained of leg swelling  He was told to follow up in clinic today (1/29)  Since that last visit he gained another 3 lbs and reports gaining 17 lbs altogether over the last month from retaining fluid       ED Course: lasix 60 mg PO qd, CXR     Hospital Course  Patient was admitted to the floor with worsening lower extremity edema concern for worsening heart failure  Patient was initially diuresed with 60 milligrams IV Lasix  Cardiology was consulted, an echo was performed which revealed normal EF without right-sided heart failure  Metolazone was discontinued, and patient was transitioned to torsemide 20 milligrams p o  Daily  Lower extremity edema improved significantly and patient was discharged home in stable condition  He will follow up outpatient with cardiologist at [facility]        Medications   ARIPiprazole 10 mg tablet   Commonly known as: ABILIFY   Take 10 mg by mouth daily   Refills: 0            buPROPion 300 mg 24 hr tablet   Commonly known as: WELLBUTRIN XL   Take 300 mg by mouth every morning   Refills: 0           cloNIDine 0 1 mg tablet   Commonly known as: CATAPRES   Take 2 tablets (0 2 mg total) by mouth every 12 (twelve) hours   Refills: 3            DULoxetine 60 mg delayed release capsule   Commonly known as: CYMBALTA   Take by mouth daily   Refills: 0           omeprazole 40 MG capsule   Commonly known as: PriLOSEC   Take 1 capsule (40 mg total) by mouth daily   Refills: 3           spironolactone 50 mg tablet   Commonly known as: ALDACTONE   Take 1 tablet (50 mg total) by mouth daily   Refills: 1           torsemide 20 mg tablet   Commonly known as: DEMADEX   Start taking on: 1/31/2019   Take 1 tablet (20 mg total) by mouth daily   Refills: 3           traZODone 150 mg tablet Commonly known as: DESYREL   Take 50 mg by mouth daily at bedtime   Refills: 0                       Allergies  Allergies   Allergen Reactions    Gluten Meal        Diet restrictions: none  Activity restrictions: none  Code Status: Level 1 - Full Code  Advance Directive and Living Will: <no information>  Power of :    POLST:      Discharge Condition: good      Discharge  Statement   I spent 25 minutes discharging the patient  This time was spent on the day of discharge  I had direct contact with the patient on the day of discharge  Additional documentation is required if more than 30 minutes were spent on discharge

## 2019-01-30 NOTE — PLAN OF CARE
Problem: RESPIRATORY - ADULT  Goal: Achieves optimal ventilation and oxygenation  INTERVENTIONS:  - Assess for changes in respiratory status  - Assess for changes in mentation and behavior  - Position to facilitate oxygenation and minimize respiratory effort  - Oxygen administration by appropriate delivery method based on oxygen saturation (per order) or ABGs  - Encourage broncho-pulmonary hygiene including cough, deep breathe, Incentive Spirometry  - Assess the need for suctioning and aspirate as needed  - Assess and instruct to report SOB or any respiratory difficulty  - Respiratory Therapy support as indicated  - Cpap at bedtime  Outcome: Progressing

## 2019-01-30 NOTE — CONSULTS
Consultation - Cardiology   Sissy Orozco 59 y o  male MRN: [id number]  Unit/Bed#: ICU 06 Encounter: [id number]    Assessment/Plan     Assessment:  1  Weight gain question of right-sided heart failure  2  Hypertension  3  Obstructive sleep apnea compliant with CPAP morbid obesity with a BMI greater than 50   4  Depression with history of bipolar   5  GERD    Plan:  Patient has been admitted to [facility] service  1  Unclear why patient is not on Ace or ARB  Patient is currently on clonidine and has been having bradycardia  Will decrease clonidine to 0 1 mg q 12 hours with the plan on weaning him off and transitioning to standard heart failure/hypertensive care  Unclear why clonidine was 1st line unless is being used also for his bipolar disorder  2  Continue monitor I& O, patient voided 200 mL of dark karlene urine this morning at 8:00 a m  And states that is the 1st time he has urinated since 2:00 a m  This morning  Would hold off on aggressive diuresis and continue spironolactone and torsemide  Would discontinue Zaroxolyn at this time  3  2D echocardiogram pending question if patient has component of pulmonary hypertension  History of Present Illness   Physician Requesting Consult: Yesica Crowder DO  Reason for Consult / Principal Problem:  Shortness of breath peripheral edema question of 14 lb weight gain since October  HPI: Jericho Subramanian is a 59y o  year old male who has been followed by his family doctor for which he describes is a 14 lb weight gain, shortness of breath since October  Patient's diuretics were adjusted as an outpatient with minimal affect per patient  He was admitted for IV diuresis and further evaluation  Chest x-ray on presentation to the hospital demonstrated mild vascular congestion  His proBNP was 127   Patient states that he has a history of an MI and underwent cardiac catheterization at [facility] and was told at that time his ejection fraction was only 25%, he states he has had a few bouts of congestive heart failure  He also has history of COPD, seizure disorder, bipolar, obstructive sleep apnea for which patient states he is compliant with his CPAP  BPH, GERD, dyslipidemia and gastric bypass in 2002  He states at one time he was over 500 lb  Per Dr Brian Pretty note dated August of 2018, patient's cardiac catheterization from 2012 demonstrated no signs of coronary artery disease with an EF by LVgram of 55-60%  Echocardiogram from 2017 was noted to be a technically difficult study due to patient's body habitus  Patient had a Joen Piggs nuclear stress test in 2014 which was also noted as a nonischemic study  Inpatient consult to Cardiology  Consult performed by: Yumiko Ramsey ordered by: Brandee Aguilar          Review of Systems   Constitutional: Negative for activity change, appetite change, fatigue and unexpected weight change  HENT: Negative  Eyes: Negative for photophobia, redness and visual disturbance  Respiratory: Negative for apnea, cough, shortness of breath and wheezing  Cardiovascular: Positive for leg swelling  Negative for chest pain and palpitations  Gastrointestinal: Negative  Endocrine: Negative for polydipsia, polyphagia and polyuria  Genitourinary: Negative  Negative for difficulty urinating and frequency  Musculoskeletal: Positive for arthralgias, back pain and gait problem  Skin: Negative  Neurological: Positive for dizziness, syncope, weakness and light-headedness  Hematological: Negative  Psychiatric/Behavioral: Negative          Historical Information   Past Medical History:   Diagnosis Date    Arthritis     Asthma     CPAP (continuous positive airway pressure) dependence     Edema     left leg    GERD (gastroesophageal reflux disease)     Hypertension     Myocardial infarct [facility])     2011    Myocardial infarction ([facility] )     Obesity     Sleep apnea     Thrombophlebitis      Past Surgical History:   Procedure Laterality Date    CHOLECYSTECTOMY      COLONOSCOPY      COLONOSCOPY N/A 10/30/2017    Procedure: COLONOSCOPY;  Surgeon: Gisela Calabrese MD;  Location: [place] [address] GI LAB; Service: Gastroenterology    ENDOVENOUS ABLATION SAPHENOUS VEIN W/ LASER      each addit vein    ERCP      ESOPHAGOGASTRODUODENOSCOPY N/A 10/30/2017    Procedure: ESOPHAGOGASTRODUODENOSCOPY (EGD); Surgeon: Gisela Calabrese MD;  Location: [facility] GI LAB;   Service: Gastroenterology    GASTRIC BYPASS      2001    GASTRIC BYPASS      HERNIA REPAIR      paraesophageal hiatus hernia    HERNIA REPAIR      x5 last one 2011    JOINT REPLACEMENT      KNEE ARTHROPLASTY Right     2102    REPLACEMENT TOTAL KNEE Right 02/2011     History   Alcohol Use    Yes     Comment: rarely - denied per allscripts      History   Drug Use No     History   Smoking Status    Former Smoker    Years: 6 00    Types: Cigarettes   Smokeless Tobacco    Never Used     Comment: never a smoker per allscripts      Family History:   Family History   Problem Relation Age of Onset    No Known Problems Mother     Prostate cancer Father     Stroke Family         syndrome    Aneurysm Family         aoritc       Meds/Allergies   all current active meds have been reviewed, current meds:   Current Facility-Administered Medications   Medication Dose Route Frequency    ARIPiprazole (ABILIFY) tablet 10 mg  10 mg Oral Daily    buPROPion (WELLBUTRIN XL) 24 hr tablet 300 mg  300 mg Oral QAM    DULoxetine (CYMBALTA) delayed release capsule 60 mg  60 mg Oral Daily    enoxaparin (LOVENOX) subcutaneous injection 40 mg  40 mg Subcutaneous Daily    metolazone (ZAROXOLYN) tablet 2 5 mg  2 5 mg Oral Daily    pantoprazole (PROTONIX) EC tablet 40 mg  40 mg Oral Early Morning    spironolactone (ALDACTONE) tablet 50 mg  50 mg Oral Daily    traZODone (DESYREL) tablet 50 mg  50 mg Oral HS    and PTA meds:   Prior to Admission Medications   Prescriptions Last Dose Informant Patient Reported? Taking? ARIPiprazole (ABILIFY) 10 mg tablet  Self Yes No   Sig: Take 10 mg by mouth daily   DULoxetine (CYMBALTA) 60 mg delayed release capsule  Self Yes Yes   Sig: Take by mouth daily     buPROPion (WELLBUTRIN XL) 300 mg 24 hr tablet   Yes Yes   Sig: Take 300 mg by mouth every morning   cloNIDine (CATAPRES) 0 1 mg tablet   No Yes   Sig: Take 2 tablets (0 2 mg total) by mouth every 12 (twelve) hours   furosemide (LASIX) 40 mg tablet   No Yes   Sig: Take 1 tablet (40 mg total) by mouth daily   metolazone (ZAROXOLYN) 2 5 mg tablet   No Yes   Sig: Take 1 tablet (2 5 mg total) by mouth daily Takes every wed  a m    omeprazole (PriLOSEC) 40 MG capsule   No Yes   Sig: Take 1 capsule (40 mg total) by mouth daily   spironolactone (ALDACTONE) 50 mg tablet   No Yes   Sig: Take 1 tablet (50 mg total) by mouth daily   traZODone (DESYREL) 150 mg tablet  Self Yes Yes   Sig: Take 50 mg by mouth daily at bedtime      Facility-Administered Medications Last Administration Doses Remaining   albuterol (ACCUNEB) nebulizer solution 1 25 mg 10/4/2018 11:24 AM         Allergies   Allergen Reactions    Gluten Meal        Objective   Vitals: Blood pressure 112/56, pulse 56, temperature (!) 96 7 °F (35 9 °C), temperature source Tympanic, resp  rate (!) 25, height 5' 9" (1 753 m), weight (!) 167 kg (367 lb 9 6 oz), SpO2 95 %  Orthostatic Blood Pressures      Most Recent Value   Blood Pressure  112/56 filed at 01/30/2019 0715   Patient Position - Orthostatic VS  Lying filed at 01/30/2019 0715          No intake or output data in the 24 hours ending 01/30/19 0823    Invasive Devices     Peripheral Intravenous Line            Peripheral IV 01/29/19 Left Antecubital less than 1 day                Physical Exam   Constitutional: He is oriented to person, place, and time  He appears well-developed and well-nourished  No distress  HENT:   Head: Normocephalic and atraumatic     Right Ear: External ear normal    Left Ear: External ear normal    Eyes: Pupils are equal, round, and reactive to light  Conjunctivae are normal  Right eye exhibits no discharge  Left eye exhibits no discharge  No scleral icterus  Neck: Normal range of motion  Neck supple  No JVD present  No thyromegaly present  Cardiovascular: Normal rate, regular rhythm, intact distal pulses and normal pulses  Exam reveals no distant heart sounds  No murmur heard  Pulmonary/Chest: Effort normal and breath sounds normal  No respiratory distress  He has no wheezes  Decreased without wheezing, no use of accessory muscles   Abdominal: Soft  Bowel sounds are normal  He exhibits no distension  Musculoskeletal:   Boggy tissue on legs but no pitting edema   Neurological: He is alert and oriented to person, place, and time  Skin: Skin is warm and dry  He is not diaphoretic  Psychiatric: He has a normal mood and affect  Nursing note and vitals reviewed  Lab Results:   I have personally reviewed pertinent lab results      CBC with diff:   Results from last 7 days  Lab Units 01/30/19  0601   WBC Thousand/uL 6 32   RBC Million/uL 5 25   HEMOGLOBIN g/dL 12 6   HEMATOCRIT % 41 5   MCV fL 79*   MCH pg 24 0*   MCHC g/dL 30 4*   RDW % 13 8   MPV fL 8 6*   PLATELETS Thousands/uL 303     CMP:   Results from last 7 days  Lab Units 01/30/19  0601 01/29/19  1559   SODIUM mmol/L 137 137   POTASSIUM mmol/L 3 8 4 0   CHLORIDE mmol/L 100 101   CO2 mmol/L 29 28   BUN mg/dL 14 13   CREATININE mg/dL 1 02 1 09   CALCIUM mg/dL 8 6 8 7   AST U/L  --  18   ALT U/L  --  24   ALK PHOS U/L  --  79   EGFR ml/min/1 73sq m 80 73     Troponin:   0  Lab Value Date/Time   TROPONINI <0 02 01/29/2019 1559     BNP:   Results from last 7 days  Lab Units 01/30/19  0601   POTASSIUM mmol/L 3 8   CHLORIDE mmol/L 100   CO2 mmol/L 29   BUN mg/dL 14   CREATININE mg/dL 1 02   CALCIUM mg/dL 8 6   EGFR ml/min/1 73sq m 80     Coags:   Results from last 7 days  Lab Units 01/29/19  1559   PTT seconds 27   INR  0 99 TSH:   Results from last 7 days  Lab Units 01/29/19  1559   TSH 3RD GENERATON uIU/mL 1 822     Magnesium:   Results from last 7 days  Lab Units 01/30/19  0601   MAGNESIUM mg/dL 2 2     Lipid Profile:   Results from last 7 days  Lab Units 01/29/19  1559   HDL mg/dL 31*   LDL CALC mg/dL 64   TRIGLYCERIDES mg/dL 111     Imaging: I have personally reviewed pertinent reports      EKG:  Sinus rhythm  VTE Prophylaxis: Sequential compression device Bronwyn Pope)     Code Status: Level 1 - Full Code  Advance Directive and Living Will:      Power of :    POLST:      SELVIN [place]

## 2019-01-30 NOTE — NURSING NOTE
Patient discharged to home, medication and discharge instructions given, patient verbalized understanding, IV removed, all questions answered, patient ambulated off unit with family member, gait was steady

## 2019-01-30 NOTE — PHYSICAL THERAPY NOTE
PT EVALUATION     01/30/19 1320   Pain Assessment   Pain Assessment 0-10   Pain Score 9  (B knees and lower leg areas)   Home Living   Type of [address] One level; Laundry in basement;Stairs to enter without rails  (one step to enter)   Home Equipment Walker;Cane   Additional Comments patient independent and not using assistive device prior to admission   Prior Function   Level of [place] Needs assistance with ADLs and functional mobility   Lives With [employer] Help From Family   ADL Assistance Independent   IADLs Needs assistance   Comments patient reports being disabled due to COPD/CHF   Restrictions/Precautions   Other Precautions Fall Risk;Pain   General   Additional Pertinent History chart reviewed, patient admitted with SOB to ICU with CHF, LE edema  Family/Caregiver Present No   Cognition   Overall Cognitive Status WFL   Arousal/Participation Cooperative   Attention Within functional limits   Orientation Level Oriented X4   Following Commands Follows all commands and directions without difficulty   RLE Assessment   RLE Assessment (ROM WFL, strength 3+/4-)   LLE Assessment   LLE Assessment (ROM WFL, strength 3+/4-)   Coordination   Movements are Fluid and Coordinated 0   Transfers   Sit to Stand 5  Supervision   Stand to Sit 5  Supervision   Ambulation/Elevation   Gait Assistance (supervision to min assist)   Additional items Assist x 1;Verbal cues; Tactile cues   Assistive Device (none)   Distance 60 feet with change in direction, antalgic gait patterning at times with reports of B lower leg pains   Balance   Static Sitting Fair +   Dynamic Sitting Fair   Static Standing Fair   Dynamic Standing Fair -   Ambulatory Fair -   Activity Tolerance   Activity Tolerance Patient limited by pain; Patient tolerated treatment well   Nurse Made Aware yes   Assessment   Prognosis Good   Problem List Decreased strength;Decreased range of motion;Decreased endurance; Impaired balance;Decreased mobility; Decreased coordination;Pain   Assessment Patient seen for Physical Therapy evaluation  Patient admitted with Acute on chronic congestive heart failure ([facility] [address] )  Comorbidities affecting patient's physical performance include:morbid obesity status gastric bypass, COPD, and CHF class III  Personal factors affecting patient at time of initial evaluation include: lives in two story house, stairs to enter home, inability to ambulate household distances, inability to navigate community distances, inability to navigate level surfaces without external assistance, inability to perform dynamic tasks in community, limited home support, inability to perform caregiver support/tasks, inability to perform physical activity, inability to perform ADLS and inability to perform IADLS   Prior to admission, patient was independent with functional mobility without assistive device, independent with ADLS, requiring assist for IADLS, living in a multi-level home, ambulating household distance, ambulating community distances and home with family assist   Please find objective findings from Physical Therapy assessment regarding body systems outlined above with impairments and limitations including weakness, decreased ROM, impaired balance, decreased endurance, impaired coordination, gait deviations, pain, decreased activity tolerance, decreased functional mobility tolerance and fall risk  The Barthel Index was used as a functional outcome tool presenting with a score of 55 today indicating marked limitations of functional mobility and ADLS  Patient's clinical presentation is currently unstable/unpredictable as seen in patient's presentation of vital sign response, changing level of pain, increased fall risk, new onset of impairment of functional mobility, decreased endurance and new onset of weakness   Pt would benefit from continued Physical Therapy treatment to address deficits as defined above and maximize level of functional mobility  As demonstrated by objective findings, the assigned level of complexity for this evaluation is high  Goals   Patient Goals go home soon   STG Expiration Date 02/06/19   Short Term Goal #1 transfers and gait independently   Short Term Goal #2 gait endurance to functional household distances, strength BLEs 4-/5 all to meet patient goal of returning home   LTG Expiration Date 02/13/19   Long Term Goal #1 gait endurance to functional community distances   Plan   Treatment/Interventions ADL retraining;Functional transfer training;LE strengthening/ROM; Elevations; Therapeutic exercise; Endurance training;Patient/family training;Equipment eval/education;Gait training;Bed mobility; Compensatory technique education   PT Frequency 5x/wk   Recommendation   Recommendation Home with family support   Barthel Index   Feeding 10   Bathing 0   Grooming Score 0   Dressing Score 5   Bladder Score 10   Bowels Score 10   Toilet Use Score 5   Transfers (Bed/Chair) Score 10   Mobility (Level Surface) Score 0   Stairs Score 5   Barthel Index Score 54   Licensure   [place] License Number  Jules Dsouza PT [id number]

## 2019-01-30 NOTE — OCCUPATIONAL THERAPY NOTE
OT EVALUATION     01/30/19 1340   Note Type   Note type Eval only   Restrictions/Precautions   Other Precautions Fall Risk;Pain   Pain Assessment   Pain Assessment 0-10   Pain Score 9   Pain Type Acute pain   Pain Location Leg;Knee   Pain Orientation Lower   Home Living   Type of [address] One level   Bathroom Shower/Tub Walk-in shower   Bathroom Toilet Standard   Home Equipment Walker;Cane  ([facility])   Additional Comments Pts laundry room in basement  Prior Function   Level of [place] Independent with ADLs and functional mobility   Lives With Family  (Wife and daughter )   ADL Assistance Independent   IADLs Needs assistance   Vocational On disability   Comments Pt states that he is able to shower and dress independently  Pt still drives, wife completes IADLs such as laundry at home  Pt able to perform light housework when necessary  ADL   Eating Assistance 7  Independent   Grooming Assistance 5  Supervision/Setup   UB Bathing Assistance 5  Supervision/Setup   LB Bathing Assistance 4  Minimal Assistance   UB Dressing Assistance 5  Supervision/Setup   LB Dressing Assistance 4  Minimal Assistance   Toileting Assistance  4  Minimal Assistance   Transfers   Sit to Stand 5  Supervision   Stand to Sit 5  Supervision   Functional Mobility   Functional Mobility 5  Supervision   Additional Comments Pt walked approximately 50 feet in hallway, did not display signs of shortness of breath or fatigue  Pt states that with longer distances (walking the mall) he does become fatigued     Balance   Static Sitting Good   Dynamic Sitting Fair   Static Standing Fair   Dynamic Standing Fair -   Activity Tolerance   Activity Tolerance Patient tolerated treatment well   RUE Assessment   RUE Assessment WFL   LUE Assessment   LUE Assessment WFL   Hand Function   Gross Motor Coordination Functional   Fine Motor Coordination Functional   Cognition   Overall Cognitive Status WFL   Attention Within functional limits   Orientation Level Oriented X4   Following Commands Follows all commands and directions without difficulty   Assessment   Limitation Decreased ADL status; Decreased Safe judgement during ADL;Decreased endurance;Decreased high-level ADLs   Prognosis Good   Assessment Patient evaluated by Occupational Therapy  Patient admitted with Acute on chronic congestive heart failure ([facility] [address] )  The patients occupational profile, medical and therapy history includes a extensive additional review of physical, cognitive, or psychosocial history related to current functional performance  Comorbidities affecting functional mobility and ADLS include: dyspnea on exertion, morbid obesity, hypertension, depression, COPD, chronic L hip pain, and chronic L knee pain  Prior to admission, patient was independent with functional mobility without assistive device, independent with ADLS and requiring assist for ADLS  The evaluation identifies the following performance deficits: impaired balance, decreased endurance, increased fall risk, new onset of impairment of functional mobility, decreased ADLS, pain and decreased activity tolerance, that result in activity limitations and/or participation restrictions  This evaluation requires clinical decision making of high complexity, because the patient presents with comorbidites that affect occupational performance and required significant modification of tasks or assistance with consideration of multiple treatment options  The Barthel Index was used as a functional outcome tool presenting with a score of 55, indicating marked limitations of functional mobility and ADLS  Patient will benefit from skilled Occupational Therapy services to address above deficits and facilitate a safe return to prior level of function     Goals   Patient Goals To go home   STG Time Frame (1-7 days)   Short Term Goal  Goals established to promote patient goal of going home:  Patient will increase standing tolerance to 5 minutes during ADL task to decrease assistance level and decrease fall risk; Patient will increase bed mobility to independent in preparation for ADLS and transfers; Patient will increase functional mobility to and from bathroom with single point cane independently to increase performance with ADLS and to use a toilet; Patient will tolerate 7 minutes of UE ROM/strengthening to increase general activity tolerance and performance in ADLS/IADLS; Patient will improve functional activity tolerance to 10 minutes of sustained functional tasks to increase participation in basic self-care and decrease assistance level; Patient will increase static sitting balance to normal and dynamic sitting balance to fair+ to improve the ability to sit at edge of bed or on a chair for ADLS;  Patient will increase static standing balance to fair+ and dynamic standing balance to fair to improve postural stability and decrease fall risk during standing ADLS and transfers  LTG Time Frame (8-14 days)   Long Term Goal Goals established to promote patient goal of going home:  Patient will increase standing tolerance to 10 minutes during ADL task to decrease assistance level and decrease fall risk; Patient will tolerate 10 minutes of UE ROM/strengthening to increase general activity tolerance and performance in ADLS/IADLS; Patient will improve functional activity tolerance to 15 minutes of sustained functional tasks to increase participation in basic self-care and decrease assistance level; Patient will increase dynamic sitting balance to good to improve the ability to sit at edge of bed or on a chair for ADLS;  Patient will increase static standing balance to good and dynamic standing balance to fair+ to improve postural stability and decrease fall risk during standing ADLS and transfers       Functional Transfer Goals   Pt Will Transfer To Toilet (STG independently)   ADL Goals   Pt Will Perform Grooming (STG independent)   Pt Will Perform Bathing (STG supervision, LTG independent)   Pt Will Perform UE Dressing (STG independent)   Pt Will Perform LE Dressing (STG supervision, LTG independent)   Pt Will Perform Toileting (STG supervision, LTG independent)   Plan   Treatment Interventions ADL retraining;Functional transfer training;UE strengthening/ROM; Endurance training;Patient/family training;Equipment evaluation/education; Activityengagement   Goal Expiration Date 02/13/19   OT Frequency 3-5x/wk   Barthel Index   Feeding 10   Bathing 0   Grooming Score 0   Dressing Score 5   Bladder Score 10   Bowels Score 10   Toilet Use Score 5   Transfers (Bed/Chair) Score 10   Mobility (Level Surface) Score 0   Stairs Score 5   Barthel Index Score 54   Licensure   [place] License Number  Jose M Baltazar Lance [address], OTR/L [id number]

## 2019-01-30 NOTE — UTILIZATION REVIEW
Initial Clinical Review    Admission: Date/Time/Statement: 1/29/19 1748  Orders Placed This Encounter   Procedures    Place in Observation (expected length of stay for this patient is less than two midnights)     Standing Status:   Standing     Number of Occurrences:   1     Order Specific Question:   Admitting Physician     Answer:   Shobha Weathers     Order Specific Question:   Level of Care     Answer:   Med Surg [16]     ED: Date/Time/Mode of Arrival:   ED Arrival Information     Expected Arrival Acuity Means of Arrival Escorted By Service Admission Type    - 1/29/2019 15:27 Urgent Walk-In Family Member General Medicine Urgent    Arrival Complaint    sent by Dr Paco Ladna        Chief Complaint:   Chief Complaint   Patient presents with    Shortness of Breath     patient c/o being SOB x several months  states Dr Rafita Edgar sent him in due to CHF and oral Lasix is not helping  History of Illness: Pt with past medical history of morbid obesity status gastric bypass, COPD, and CHF class III comes to hospital from outpatient clinic complaining of increased shortness of breath on exertion with bilateral lower extremity edema  When asked to describe his dyspnea patient reports he cannot Walk from the [place] office to the big [employer] without being out of breath  Sitting down and relaxing helps his shortness of breath  Exertion makes it worse  Patient reports in addition to shortness of breath he is sleeping a lot more  Patient reports these symptoms have been going on "for quite a while "  In regards to his sleeping He has to be raised with pillows and is on CPAP (13)  Patient has COPD but reports he has not had to use his inhaler more  In regard to his swollen legs patient reports "my legs are killing me " Patient went for doctor visit on 1/24 where he complained of leg swelling  He was told to follow up in clinic today (1/29)   Since that last visit he gained another 3 lbs and reports gaining 17 lbs altogether over the last month from retaining fluid  ED Course: lasix 60 mg PO qd, CXR   ABDOMEN He exhibits shifting dullness and distension B/L LOWER EXTREMITY EDEMA  ED Vital Signs:   ED Triage Vitals [01/29/19 1539]   Temperature Pulse Respirations Blood Pressure SpO2   98 °F (36 7 °C) 68 22 141/84 97 %      Temp Source Heart Rate Source Patient Position - Orthostatic VS BP Location FiO2 (%)   Tympanic Monitor Sitting Right arm --      Pain Score       No Pain        Wt Readings from Last 1 Encounters:   01/29/19 (!) 167 kg (367 lb 9 6 oz)     Vital Signs (abnormal):   Pertinent Labs/Diagnostic Test Results: GLUCOSE 111  ALB 3 4   CXR Mild vascular congestion      ED Treatment:   Medication Administration from 01/29/2019 1527 to 01/29/2019 1951       Date/Time Order Dose Route Action Action by Comments     01/29/2019 1622 furosemide (LASIX) injection 60 mg 60 mg Intravenous Given Lu Lake RN         Past Medical/Surgical History:    Active Ambulatory Problems     Diagnosis Date Noted    Erectile dysfunction 11/28/2017    BPH (benign prostatic hyperplasia) 11/28/2017    Hematospermia 02/14/2018    Dyspnea on exertion 05/04/2018    Chest pain, atypical 05/04/2018    Morbid obesity with body mass index of 50 or higher ([facility] [address] ) 05/04/2018    Chronic left hip pain 09/20/2018    Chronic pain of left knee 09/20/2018    GERD without esophagitis 09/20/2018    Acute bronchitis 10/04/2018    Rosacea 10/25/2018    Acute on chronic congestive heart failure ([facility] [address] ) 01/24/2019    Class 3 severe obesity due to excess calories without serious comorbidity with body mass index (BMI) of 50 0 to 59 9 in adult [facility]) 01/24/2019    Asthma 04/18/2013     Resolved Ambulatory Problems     Diagnosis Date Noted    No Resolved Ambulatory Problems     Past Medical History:   Diagnosis Date    Arthritis     Asthma     CPAP (continuous positive airway pressure) dependence     Edema     GERD (gastroesophageal reflux disease)     Hypertension     Myocardial infarct ([facility] [address] )     Myocardial infarction ([facility] [address] )     Obesity     Sleep apnea     Thrombophlebitis      Admitting Diagnosis: Shortness of breath [R06 02]  CHF (congestive heart failure) ([facility]) [I50 9]  Leg edema [R60 0]  Age/Sex: 59 y o  male  Assessment/Plan:      Pt with past medical history of morbid obesity status gastric bypass, COPD, and CHF class III will be admitted to the general medical floor under observation for lower extremity edema for an anticipated stay of less than two midnights under the services of Dr Romero Marlow      Assessment/Plan:      * CHF (congestive heart failure), NYHA class III, acute on chronic, combined ([facility])   Assessment & Plan     , Trop -, 60 mg PO lasix given in ED  -Consults cards              -Echo  -I/O's  -Hold lasix for now since given               -Administer based on I/O's  -Monitor I/O's  -Monitor Weight  -CXR final read pending  -Continue home aldactone 50 mg PO qd  -Continue home metolazone 2 5 mg PO       Dyspnea on exertion   Assessment & Plan     Likely secondary to CHF exacerbation  · Manage as above for CHF  · Albuterol prn   Edema   Assessment & Plan     Located in Bilateral lower extremities likely secondary to CHF   Given lasix 60 mg PO qd  · Monitor I/o's  · Daily weights  · Treatment per CHF    Global: SQL, SCD, Cardiac Diet       Admission Orders:  TELE MON  CPAP HS  DAILY WEIGHT I/O  ECHO  PLT CT  PTOT  CONSULT CARDIOLOGY  Scheduled Meds:   Current Facility-Administered Medications:  ARIPiprazole 10 mg Oral Daily Ruben Valles MD   buPROPion 300 mg Oral QAM Ruben Valles MD   cloNIDine 0 2 mg Oral Q12H Lilliam Coto MD   DULoxetine 60 mg Oral Daily Ruben Valles MD   enoxaparin 40 mg Subcutaneous Daily Ruben Valles MD   metolazone 2 5 mg Oral Daily Ruben Valles MD   pantoprazole 40 mg Oral Early Morning Ruben Valles MD   spironolactone 50 mg Oral Daily Ruben Valles MD   traZODone 50 mg Oral HS Gurdeep Lujan MD     Continuous Infusions:    PRN Meds:

## 2019-01-30 NOTE — PLAN OF CARE
CARDIOVASCULAR - ADULT     Maintains optimal cardiac output and hemodynamic stability Completed     Absence of cardiac dysrhythmias or at baseline rhythm Completed        DISCHARGE PLANNING     Discharge to home or other facility with appropriate resources Completed        INFECTION - ADULT     Absence or prevention of progression during hospitalization Completed     Absence of fever/infection during neutropenic period Completed        Knowledge Deficit     Patient/family/caregiver demonstrates understanding of disease process, treatment plan, medications, and discharge instructions Completed        PAIN - ADULT     Verbalizes/displays adequate comfort level or baseline comfort level Completed        RESPIRATORY - ADULT     Achieves optimal ventilation and oxygenation Completed        SAFETY ADULT     Patient will remain free of falls Completed     Maintain or return to baseline ADL function Completed     Maintain or return mobility status to optimal level Completed        SKIN/TISSUE INTEGRITY - ADULT     Skin integrity remains intact Completed

## 2019-01-30 NOTE — PLAN OF CARE
CARDIOVASCULAR - ADULT     Maintains optimal cardiac output and hemodynamic stability Progressing     Absence of cardiac dysrhythmias or at baseline rhythm Progressing        DISCHARGE PLANNING     Discharge to home or other facility with appropriate resources Progressing        INFECTION - ADULT     Absence or prevention of progression during hospitalization Progressing     Absence of fever/infection during neutropenic period Progressing        Knowledge Deficit     Patient/family/caregiver demonstrates understanding of disease process, treatment plan, medications, and discharge instructions Progressing        PAIN - ADULT     Verbalizes/displays adequate comfort level or baseline comfort level Progressing        RESPIRATORY - ADULT     Achieves optimal ventilation and oxygenation Progressing        SAFETY ADULT     Patient will remain free of falls Progressing     Maintain or return to baseline ADL function Progressing     Maintain or return mobility status to optimal level Progressing        SKIN/TISSUE INTEGRITY - ADULT     Skin integrity remains intact Progressing

## 2019-01-30 NOTE — ASSESSMENT & PLAN NOTE
- c/w catapres, aldactone, monitor vitals, BP controlled at this time  - will d/w use of ACEi or beta-blocker after pt euvolemic long term mgt in setting of co-existing CHF

## 2019-01-31 ENCOUNTER — TRANSITIONAL CARE MANAGEMENT (OUTPATIENT)
Dept: FAMILY MEDICINE CLINIC | Facility: CLINIC | Age: 59
End: 2019-01-31

## 2019-02-01 LAB — MRSA NOSE QL CULT: NORMAL

## 2019-02-02 LAB
ATRIAL RATE: 60 BPM
P AXIS: 48 DEGREES
PR INTERVAL: 210 MS
QRS AXIS: -41 DEGREES
QRSD INTERVAL: 112 MS
QT INTERVAL: 406 MS
QTC INTERVAL: 406 MS
T WAVE AXIS: 31 DEGREES
VENTRICULAR RATE: 60 BPM

## 2019-02-02 PROCEDURE — 93010 ELECTROCARDIOGRAM REPORT: CPT | Performed by: INTERNAL MEDICINE

## 2019-02-06 ENCOUNTER — TELEPHONE (OUTPATIENT)
Dept: FAMILY MEDICINE CLINIC | Facility: CLINIC | Age: 59
End: 2019-02-06

## 2019-02-06 NOTE — TELEPHONE ENCOUNTER
DR Louise Ill - HE IS CALLING TO LET YOU KNOW THAT HE WILL BE FAXING FORMS FOR DME SUPPLIES  HE WILL ALSO NEED OFFICE NOTES TO GO ALONG WITH THE FORMS WHEN THEY ARE DONE

## 2019-02-08 ENCOUNTER — TELEPHONE (OUTPATIENT)
Dept: FAMILY MEDICINE CLINIC | Facility: CLINIC | Age: 59
End: 2019-02-08

## 2019-02-08 NOTE — TELEPHONE ENCOUNTER
DR Fifi Mcallister - PT IS CALLING FOR REFILL ALBUTERAL SULFATE INHALATION SOLUTION FOR NEBULIZER  PT IS OUT AND NEEDS TO HAVE THIS TODAY  [facility] [address] IN [place]  [facility]

## 2019-02-11 ENCOUNTER — OFFICE VISIT (OUTPATIENT)
Dept: FAMILY MEDICINE CLINIC | Facility: CLINIC | Age: 59
End: 2019-02-11
Payer: MEDICARE

## 2019-02-11 VITALS
DIASTOLIC BLOOD PRESSURE: 86 MMHG | OXYGEN SATURATION: 96 % | SYSTOLIC BLOOD PRESSURE: 138 MMHG | TEMPERATURE: 98 F | RESPIRATION RATE: 16 BRPM | WEIGHT: 315 LBS | BODY MASS INDEX: 46.65 KG/M2 | HEIGHT: 69 IN | HEART RATE: 61 BPM

## 2019-02-11 DIAGNOSIS — Z09 HOSPITAL DISCHARGE FOLLOW-UP: Primary | ICD-10-CM

## 2019-02-11 DIAGNOSIS — R60.0 BILATERAL EDEMA OF LOWER EXTREMITY: ICD-10-CM

## 2019-02-11 DIAGNOSIS — Z71.3 NUTRITIONAL COUNSELING: ICD-10-CM

## 2019-02-11 DIAGNOSIS — E66.01 MORBIDLY OBESE (HCC): ICD-10-CM

## 2019-02-11 PROCEDURE — 99495 TRANSJ CARE MGMT MOD F2F 14D: CPT | Performed by: FAMILY MEDICINE

## 2019-02-11 RX ORDER — METOLAZONE 2.5 MG/1
TABLET ORAL
COMMUNITY
Start: 2019-02-05 | End: 2019-07-05

## 2019-02-11 NOTE — PROGRESS NOTES
TCM Call (since 1/11/2019)     Date and time call was made  1/31/2019 12:52 PM    Hospital care reviewed  Records reviewed    Patient was hospitialized at  [facility]    Date of Admission  01/29/19    Date of discharge  01/30/19    Diagnosis  CHF    Disposition  Home    Were the patients medications reviewed and updated  Yes    Current Symptoms  None      TCM Call (since 1/11/2019)     Post hospital issues  None    Should patient be enrolled in anticoag monitoring? No    Scheduled for follow up? Yes    Patients specialists  Cardiologist    Cardiologist name  [facility] cardiology group/ Dr Tamara Martinez    Did you obtain your prescribed medications  Yes    Do you need help managing your prescriptions or medications  No    Is transportation to your appointment needed  No    I have advised the patient to call PCP with any new or worsening symptoms  TACHO Tello LPN             Assessment/Plan:     Diagnoses and all orders for this visit:    Hospital discharge follow-up  Patient presents following up after discharge from hospital   Patient has not seen his cardiologist as discussed prior to his discharge  Advised him to make an appointment to be seen as soon as possible  Overall, patient states that he is feeling better and taking all his prescribed medication especially for his heart condition and leg swelling  Morbidly obese ([address] )   patient lying regarding his weight  Discussed different methods and nutritional approaches to start weight reduction  Bilateral edema of lower extremity  Continue diuretics as prescribed upon discharge from hospital     Nutritional counseling  Discussed at length the benefits of weight loss    Discussed with patient diet and lifestyle modifications which will greatly benefit him in weight loss and improve his quality of life    Other orders  -     metolazone (ZAROXOLYN) 2 5 mg tablet    RTO as necessary if symptoms worsen        Subjective:      Patient ID: Ludger Goltz is a 59 y o  male  58 y/o male presents following up for a hospital admission  Pt was initially admitted due to shortness of breath secondary to congestive heart failure  Additionally, he had significantly swollen lower extremities which were causing him great discomfort  Since discharge from the hospital pt has not been experiencing any  shortness of breath  He does report feeling tired when he is walking  He has started a new diuretic upon discharge and has been complaint with it and other medications  The swelling of his bilateral lower extremities has decreases significantly since admission, however it remains more swollen than his baseline  Patient has not followed up with his cardiologist since discharge  Patient expresses desire to try to lose weight  The following portions of the patient's history were reviewed and updated as appropriate: allergies, current medications, past family history, past medical history, past social history, past surgical history and problem list     Review of Systems   Constitutional: Negative for chills and fever  HENT: Negative for congestion, rhinorrhea, sinus pressure, sinus pain and sore throat  Respiratory: Negative for cough, choking, shortness of breath and wheezing  Cardiovascular: Positive for leg swelling  Negative for chest pain  Gastrointestinal: Negative for constipation, diarrhea, nausea and vomiting  Genitourinary: Negative  Musculoskeletal: Negative  Objective:      /86 (BP Location: Left arm, Patient Position: Sitting, Cuff Size: Extra-Large)   Pulse 61   Temp 98 °F (36 7 °C) (Tympanic)   Resp 16   Ht 5' 9" (1 753 m)   Wt (!) 166 kg (366 lb)   SpO2 96%   BMI 54 05 kg/m²          Physical Exam   Constitutional: He is oriented to person, place, and time  He appears well-developed and well-nourished  No distress  Neck: Normal range of motion  Neck supple     Cardiovascular: Normal rate, regular rhythm and normal heart sounds  Pulmonary/Chest: Effort normal and breath sounds normal  No respiratory distress  He has no wheezes  Abdominal: Soft  Bowel sounds are normal  He exhibits no distension  There is no tenderness  Obese   Musculoskeletal:   +2 pitting edema bilateral    Neurological: He is alert and oriented to person, place, and time  Skin: Skin is warm and dry  He is not diaphoretic  Psychiatric: He has a normal mood and affect  His behavior is normal  Judgment and thought content normal    Nursing note and vitals reviewed

## 2019-02-19 NOTE — PROGRESS NOTES
I have reviewed the notes, assessments, and/or procedures performed by the resident, I concur with her/his documentation of Martinez Villalobos

## 2019-02-20 DIAGNOSIS — I50.9 CONGESTIVE HEART FAILURE, UNSPECIFIED HF CHRONICITY, UNSPECIFIED HEART FAILURE TYPE (HCC): Primary | ICD-10-CM

## 2019-02-20 RX ORDER — IPRATROPIUM BROMIDE AND ALBUTEROL SULFATE 2.5; .5 MG/3ML; MG/3ML
3 SOLUTION RESPIRATORY (INHALATION) 4 TIMES DAILY
Qty: 3 VIAL | Refills: 5 | Status: SHIPPED | OUTPATIENT
Start: 2019-02-20

## 2019-02-26 LAB — PSA SERPL-MCNC: 0.2 NG/ML (ref 0–4)
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
## 2019-02-28 ENCOUNTER — OFFICE VISIT (OUTPATIENT)
Dept: UROLOGY | Facility: CLINIC | Age: 59
End: 2019-02-28
Payer: MEDICARE

## 2019-02-28 VITALS
HEART RATE: 83 BPM | BODY MASS INDEX: 46.65 KG/M2 | DIASTOLIC BLOOD PRESSURE: 80 MMHG | WEIGHT: 315 LBS | HEIGHT: 69 IN | SYSTOLIC BLOOD PRESSURE: 110 MMHG

## 2019-02-28 DIAGNOSIS — R39.15 URGENCY OF URINATION: ICD-10-CM

## 2019-02-28 DIAGNOSIS — N40.1 BENIGN PROSTATIC HYPERPLASIA WITH LOWER URINARY TRACT SYMPTOMS, SYMPTOM DETAILS UNSPECIFIED: Primary | ICD-10-CM

## 2019-02-28 DIAGNOSIS — N52.9 ERECTILE DYSFUNCTION, UNSPECIFIED ERECTILE DYSFUNCTION TYPE: ICD-10-CM

## 2019-02-28 LAB — POST-VOID RESIDUAL VOLUME, ML POC: 68 ML

## 2019-02-28 PROCEDURE — 99214 OFFICE O/P EST MOD 30 MIN: CPT | Performed by: UROLOGY

## 2019-02-28 PROCEDURE — 51798 US URINE CAPACITY MEASURE: CPT | Performed by: UROLOGY

## 2019-02-28 RX ORDER — SOLIFENACIN SUCCINATE 10 MG/1
10 TABLET, FILM COATED ORAL DAILY
Qty: 90 TABLET | Refills: 3 | Status: SHIPPED | OUTPATIENT
Start: 2019-02-28 | End: 2019-07-05

## 2019-03-01 ENCOUNTER — TELEPHONE (OUTPATIENT)
Dept: FAMILY MEDICINE CLINIC | Facility: CLINIC | Age: 59
End: 2019-03-01

## 2019-03-01 NOTE — TELEPHONE ENCOUNTER
DR Yahaira Arriola - PT WOULD LIKE COMPRESSION STOCKINGS  HE SAYS THAT HIS INS WILL COVER THESE EVERY 3 MONTHS  HE WANTS YOU TO DO AN ORDER AND FAX IT TO [facility] IN [facility]  HE DIDN'T HAVE THE FAX NO, BUT THEIR PHONE NO  [phone]  JOBST  HEAVY DUTY NYLON STOCKINGS FOR BOTH LEGS  HE WOULD LIKE TO SPEAK TO YOU

## 2019-03-04 ENCOUNTER — TELEPHONE (OUTPATIENT)
Dept: FAMILY MEDICINE CLINIC | Facility: CLINIC | Age: 59
End: 2019-03-04

## 2019-03-27 DIAGNOSIS — I50.43 CHF (CONGESTIVE HEART FAILURE), NYHA CLASS III, ACUTE ON CHRONIC, COMBINED (HCC): ICD-10-CM

## 2019-03-27 RX ORDER — SPIRONOLACTONE 50 MG/1
TABLET, FILM COATED ORAL
Qty: 30 TABLET | Refills: 5 | Status: SHIPPED | OUTPATIENT
Start: 2019-03-27 | End: 2019-09-19 | Stop reason: SDUPTHER

## 2019-04-22 ENCOUNTER — TELEPHONE (OUTPATIENT)
Dept: FAMILY MEDICINE CLINIC | Facility: CLINIC | Age: 59
End: 2019-04-22

## 2019-06-24 ENCOUNTER — TELEPHONE (OUTPATIENT)
Dept: NEPHROLOGY | Facility: CLINIC | Age: 59
End: 2019-06-24

## 2019-06-24 DIAGNOSIS — E87.6 HYPOKALEMIA: Primary | ICD-10-CM

## 2019-07-05 ENCOUNTER — OFFICE VISIT (OUTPATIENT)
Dept: NEPHROLOGY | Facility: CLINIC | Age: 59
End: 2019-07-05
Payer: MEDICARE

## 2019-07-05 VITALS
HEIGHT: 69 IN | BODY MASS INDEX: 46.65 KG/M2 | SYSTOLIC BLOOD PRESSURE: 112 MMHG | DIASTOLIC BLOOD PRESSURE: 70 MMHG | HEART RATE: 60 BPM | WEIGHT: 315 LBS

## 2019-07-05 DIAGNOSIS — E87.1 HYPONATREMIA: ICD-10-CM

## 2019-07-05 DIAGNOSIS — N17.9 ACUTE KIDNEY INJURY (HCC): Primary | ICD-10-CM

## 2019-07-05 DIAGNOSIS — E87.6 HYPOKALEMIA: ICD-10-CM

## 2019-07-05 PROCEDURE — 99213 OFFICE O/P EST LOW 20 MIN: CPT | Performed by: PHYSICIAN ASSISTANT

## 2019-07-05 RX ORDER — POTASSIUM CHLORIDE 750 MG/1
10 TABLET, EXTENDED RELEASE ORAL DAILY
Qty: 30 TABLET | Refills: 3
Start: 2019-07-05 | End: 2020-03-17

## 2019-07-05 NOTE — PROGRESS NOTES
Assessment and Plan:    Diagnoses and all orders for this visit:    Acute kidney injury ([address] )    Hypokalemia  -     potassium chloride (K-DUR,KLOR-CON) 10 mEq tablet; Take 1 tablet (10 mEq total) by mouth daily    Hyponatremia      Acute Kidney Injury- Resolved  Baseline creatinine around 1   UA bland  Creatinine on 7/1 is 1 02  Hypokalemia- he takes kdur 10meq daily  He also takes spironolactone 50mg daily  Hyponatremia- resolved    Borderline Low Bicarbonate Level- monitor, if it continues to be low, I would recommend sodium bicarbonate tablets 650mg twice a day  With your family doctor and as needed with our office  Follow up with your primary care physician  Reason for Visit: No chief complaint on file  HPI: Chris Barriga is a 62 y o  male who is here for follow up after hospitalization at [facility] being discharged on 6/23/19  He has no acute complaints except he always feels short of breath  He follows with cardiology, pulmonology, gastroenterology, and primary care  ROS: A complete review of systems was performed and was negative unless otherwise noted in the history of present illness      Allergies:   Gluten meal    Medications:     Current Outpatient Medications:     buPROPion (WELLBUTRIN XL) 300 mg 24 hr tablet, Take 300 mg by mouth every morning, Disp: , Rfl:     cloNIDine (CATAPRES) 0 1 mg tablet, Take 2 tablets (0 2 mg total) by mouth every 12 (twelve) hours, Disp: 180 tablet, Rfl: 3    ipratropium-albuterol (DUO-NEB) 0 5-2 5 mg/3 mL nebulizer solution, Take 1 vial (3 mL total) by nebulization 4 (four) times a day, Disp: 3 vial, Rfl: 5    omeprazole (PriLOSEC) 40 MG capsule, Take 1 capsule (40 mg total) by mouth daily, Disp: 90 capsule, Rfl: 3    spironolactone (ALDACTONE) 50 mg tablet, TAKE 1 TABLET BY MOUTH EVERY DAY, Disp: 30 tablet, Rfl: 5    torsemide (DEMADEX) 20 mg tablet, Take 1 tablet (20 mg total) by mouth daily, Disp: 30 tablet, Rfl: 3    potassium chloride (K-DUR,KLOR-CON) 10 mEq tablet, Take 1 tablet (10 mEq total) by mouth daily, Disp: 30 tablet, Rfl: 3    Past Medical History:   Diagnosis Date    Arthritis     Asthma     CPAP (continuous positive airway pressure) dependence     Edema     left leg    GERD (gastroesophageal reflux disease)     Hypertension     My[facility] infarct [facility])     2011    Myocardial infarction ([facility] [address] )     Obesity     Sleep apnea     Thrombophlebitis      Past Surgical History:   Procedure Laterality Date    CHOLECYSTECTOMY      COLONOSCOPY      COLONOSCOPY N/A 10/30/2017    Procedure: COLONOSCOPY;  Surgeon: Kenia Guillen MD;  Location: [facility]; Service: Gastroenterology    ENDOVENOUS ABLATION SAPHENOUS VEIN W/ LASER      each addit vein    ERCP      ESOPHAGOGASTRODUODENOSCOPY N/A 10/30/2017    Procedure: ESOPHAGOGASTRODUODENOSCOPY (EGD); Surgeon: Kenia Guillen MD;  Location: [facility]; Service: Gastroenterology    GASTRIC BYPASS      2001    GASTRIC BYPASS      HERNIA REPAIR      paraesophageal hiatus hernia    HERNIA REPAIR      x5 last one 2011    JOINT REPLACEMENT      KNEE ARTHROPLASTY Right     2102    REPLACEMENT TOTAL KNEE Right 02/2011     Family History   Problem Relation Age of Onset    No Known Problems Mother     Prostate cancer Father     Stroke Family         syndrome    Aneurysm Family         aoritc      reports that he has quit smoking  His smoking use included cigarettes  He quit after 6 00 years of use  He has quit using smokeless tobacco  He reports that he drank alcohol  He reports that he does not use drugs  Physical Exam:   Vitals:    07/05/19 1342 07/05/19 1356   BP:  112/70   BP Location:  Right arm   Patient Position:  Sitting   Cuff Size:  Standard   Pulse:  60   Weight: (!) 156 kg (343 lb 12 8 oz)    Height: 5' 9" (1 753 m)      Body mass index is 50 77 kg/m²      General: NAD  Neuro: AAO  Neck: supple  Skin: no rash  Heart: RRR  Lungs: CTAB  Abdomen: soft nt nd obese  Extremities: trace edema    Procedure:  No results found for this or any previous visit  Labs reviewed      Lab Results   Component Value Date    GLUCOSE 98 07/25/2014    CALCIUM 8 6 01/30/2019     07/25/2014    K 3 8 01/30/2019    CO2 29 01/30/2019     01/30/2019    BUN 14 01/30/2019    CREATININE 1 02 01/30/2019

## 2019-07-08 LAB
EXT GLUCOSE BLD: 125
EXTERNAL ALBUMIN: 4.5
EXTERNAL ALK PHOS: 75
EXTERNAL ALT: 69
EXTERNAL AST: 55
EXTERNAL BICARBONATE: 19
EXTERNAL BUN: 11
EXTERNAL CALCIUM: 9.4
EXTERNAL CHLORIDE: 100
EXTERNAL CREATININE: 1.1
EXTERNAL EGFR: 72
EXTERNAL POTASSIUM: 3.9
EXTERNAL SODIUM: 140
EXTERNAL T.BILIRUBIN: 0.6
EXTERNAL TOTAL PROTEIN: 7.5

## 2019-08-22 LAB — HBA1C MFR BLD HPLC: 6.2 %
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
## 2019-09-12 ENCOUNTER — OFFICE VISIT (OUTPATIENT)
Dept: UROLOGY | Facility: CLINIC | Age: 59
End: 2019-09-12
Payer: MEDICARE

## 2019-09-12 ENCOUNTER — OFFICE VISIT (OUTPATIENT)
Dept: PODIATRY | Facility: CLINIC | Age: 59
End: 2019-09-12
Payer: MEDICARE

## 2019-09-12 ENCOUNTER — TELEPHONE (OUTPATIENT)
Dept: UROLOGY | Facility: CLINIC | Age: 59
End: 2019-09-12

## 2019-09-12 VITALS
SYSTOLIC BLOOD PRESSURE: 128 MMHG | DIASTOLIC BLOOD PRESSURE: 66 MMHG | HEIGHT: 69 IN | WEIGHT: 315 LBS | BODY MASS INDEX: 46.65 KG/M2

## 2019-09-12 VITALS
DIASTOLIC BLOOD PRESSURE: 66 MMHG | WEIGHT: 315 LBS | HEART RATE: 60 BPM | SYSTOLIC BLOOD PRESSURE: 128 MMHG | BODY MASS INDEX: 46.65 KG/M2 | HEIGHT: 69 IN

## 2019-09-12 DIAGNOSIS — N40.1 BENIGN PROSTATIC HYPERPLASIA WITH LOWER URINARY TRACT SYMPTOMS, SYMPTOM DETAILS UNSPECIFIED: Primary | ICD-10-CM

## 2019-09-12 DIAGNOSIS — S93.402A SPRAIN OF LEFT ANKLE, UNSPECIFIED LIGAMENT, INITIAL ENCOUNTER: ICD-10-CM

## 2019-09-12 DIAGNOSIS — M76.822 POSTERIOR TIBIAL TENDINITIS OF LEFT LOWER EXTREMITY: ICD-10-CM

## 2019-09-12 DIAGNOSIS — E11.40 TYPE 2 DIABETES MELLITUS WITH DIABETIC NEUROPATHY, WITHOUT LONG-TERM CURRENT USE OF INSULIN (HCC): Primary | ICD-10-CM

## 2019-09-12 DIAGNOSIS — M25.572 SINUS TARSI SYNDROME OF LEFT FOOT: ICD-10-CM

## 2019-09-12 DIAGNOSIS — B35.1 ONYCHOMYCOSIS: ICD-10-CM

## 2019-09-12 DIAGNOSIS — M24.875 OTHER SPECIFIC JOINT DERANGEMENTS LEFT FOOT, NOT ELSEWHERE CLASSIFIED: ICD-10-CM

## 2019-09-12 LAB — POST-VOID RESIDUAL VOLUME, ML POC: 0 ML

## 2019-09-12 PROCEDURE — 99213 OFFICE O/P EST LOW 20 MIN: CPT | Performed by: PODIATRIST

## 2019-09-12 PROCEDURE — 51798 US URINE CAPACITY MEASURE: CPT | Performed by: PHYSICIAN ASSISTANT

## 2019-09-12 PROCEDURE — 99214 OFFICE O/P EST MOD 30 MIN: CPT | Performed by: PHYSICIAN ASSISTANT

## 2019-09-12 PROCEDURE — 11721 DEBRIDE NAIL 6 OR MORE: CPT | Performed by: PODIATRIST

## 2019-09-12 RX ORDER — EMPAGLIFLOZIN 10 MG/1
10 TABLET, FILM COATED ORAL EVERY MORNING
Refills: 0 | COMMUNITY
Start: 2019-08-29 | End: 2022-01-10 | Stop reason: ALTCHOICE

## 2019-09-12 NOTE — PROGRESS NOTES
PATIENT:  Della Clark  1960       ASSESSMENT:     1  Type 2 diabetes mellitus with diabetic neuropathy, without long-term current use of insulin (Formerly [facility])     2  Sprain of left ankle, unspecified ligament, initial encounter     3  Other specific joint derangements left foot, not elsewhere classified     4  Sinus tarsi syndrome of left foot     5  Posterior tibial tendinitis of left lower extremity     6  Onychomycosis  Debridement             PLAN:  Patient was counseled on the condition and diagnosis  Educated disease prevention and risks related to diabetes  Educated proper daily foot care and exam   Instructed proper skin care / protection and footwear  Instructed to identify any signs of infection and related foot problem  The recent blood work was reviewed and the last HbA1c was 6 2  Discussed proper blood glucose control with diet and exercise  Also discussed management of LE edema with compression, elevation, and diet  The patient will return in 9 weeks periodic diabetic foot exam     Procedure: All mycotic toenails were reduced and debrided in length, width, and girth using a nail nipper and dremel  Patient tolerated procedure(s) well without complications  He also has pain around left sinus tarsi and PT tendon  He is seeing orthopedist for his condition  Discussed possible sinus tarsi injection and physical therapy for PT tendinopathy  He would consider if his orthopedist cannot help him with left ankle pain  Subjective:       HPI  The patient presents for diabetic foot evaluation  The patient has diabetes for more than 5 years  The blood glucose is under control  The patient denied any history of diabetic foot ulcer, related foot infection, or amputation  He has some numbness or paresthesia in his feet  His brother was double amputee before he passed away  Denied weakness or significant functional deficit  He reports he has chronic swelling in his legs  He complained of left ankle pain since April  He sprained his ankle  He is seeing an orthopedist   He also complained of thick toenails and had it for a few years  The following portions of the patient's history were reviewed and updated as appropriate: allergies, current medications, past family history, past medical history, past social history, past surgical history and problem list   All pertinent labs and images were reviewed  Past Medical History  Past Medical History:   Diagnosis Date    Arthritis     Asthma     CPAP (continuous positive airway pressure) dependence     Edema     left leg    GERD (gastroesophageal reflux disease)     Hypertension     Myocardial infarct [facility])     2011    Myocardial infarction ([facility] [address] )     Obesity     Sleep apnea     Thrombophlebitis        Past Surgical History  Past Surgical History:   Procedure Laterality Date    CHOLECYSTECTOMY      COLONOSCOPY      COLONOSCOPY N/A 10/30/2017    Procedure: COLONOSCOPY;  Surgeon: Annette Sommer MD;  Location: [place] [address] GI LAB; Service: Gastroenterology    ENDOVENOUS ABLATION SAPHENOUS VEIN W/ LASER      each addit vein    ERCP      ESOPHAGOGASTRODUODENOSCOPY N/A 10/30/2017    Procedure: ESOPHAGOGASTRODUODENOSCOPY (EGD); Surgeon: Annette Sommer MD;  Location: [facility] GI [facility];   Service: Gastroenterology    GASTRIC BYPASS      2001    GASTRIC BYPASS      HERNIA REPAIR      paraesophageal hiatus hernia    HERNIA REPAIR      x5 last one 2011    JOINT REPLACEMENT      KNEE ARTHROPLASTY Right     2102    REPLACEMENT TOTAL KNEE Right 02/2011        Allergies:  Gluten meal    Medications:  Current Outpatient Medications   Medication Sig Dispense Refill    buPROPion (WELLBUTRIN XL) 300 mg 24 hr tablet Take 300 mg by mouth every morning      cloNIDine (CATAPRES) 0 1 mg tablet Take 2 tablets (0 2 mg total) by mouth every 12 (twelve) hours 180 tablet 3    omeprazole (PriLOSEC) 40 MG capsule Take 1 capsule (40 mg total) by mouth daily 90 capsule 3    potassium chloride (K-DUR,KLOR-CON) 10 mEq tablet Take 1 tablet (10 mEq total) by mouth daily 30 tablet 3    spironolactone (ALDACTONE) 50 mg tablet TAKE 1 TABLET BY MOUTH EVERY DAY 30 tablet 5    ipratropium-albuterol (DUO-NEB) 0 5-2 5 mg/3 mL nebulizer solution Take 1 vial (3 mL total) by nebulization 4 (four) times a day (Patient not taking: Reported on 9/12/2019) 3 vial 5    JARDIANCE 10 MG TABS Take 10 mg by mouth every morning  0    torsemide (DEMADEX) 20 mg tablet Take 1 tablet (20 mg total) by mouth daily (Patient not taking: Reported on 9/12/2019) 30 tablet 3     No current facility-administered medications for this visit          Social History:  Social History     Socioeconomic History    Marital status: /Civil Union     Spouse name: None    Number of children: None    Years of education: None    Highest education level: None   Occupational History    None   Social Needs    Financial resource strain: None    Food insecurity:     Worry: None     Inability: None    Transportation needs:     Medical: None     Non-medical: None   Tobacco Use    Smoking status: Former Smoker     Years: 6 00     Types: Cigarettes    Smokeless tobacco: Former User    Tobacco comment: never a smoker per allscripts    Substance and Sexual Activity    Alcohol use: Not Currently     Comment: rarely - denied per allscripts     Drug use: No    Sexual activity: None   Lifestyle    Physical activity:     Days per week: None     Minutes per session: None    Stress: None   Relationships    Social connections:     Talks on phone: None     Gets together: None     Attends [employer] service: None     Active member of club or organization: None     Attends meetings of clubs or organizations: None     Relationship status: None    Intimate partner violence:     Fear of current or ex partner: None     Emotionally abused: None     Physically abused: None     Forced sexual activity: None Other Topics Concern    None   Social History Narrative    None          Review of Systems   Constitutional: Negative for chills and fever  Respiratory: Negative for cough and shortness of breath  Cardiovascular: Positive for leg swelling  Negative for chest pain  Gastrointestinal: Negative for diarrhea, nausea and vomiting  Musculoskeletal: Positive for arthralgias  Skin: Negative for wound  Neurological: Positive for numbness  Negative for weakness  Hematological: Does not bruise/bleed easily  Psychiatric/Behavioral: Negative for behavioral problems  Objective:      /66 Comment: cuff not working  Pulse 60   Ht 5' 9" (1 753 m)   Wt (!) 157 kg (346 lb 3 2 oz)   BMI 51 12 kg/m²          Physical Exam   Constitutional: He is oriented to person, place, and time  He appears well-developed  No distress  obese   HENT:   Head: Normocephalic and atraumatic  Neck: Normal range of motion  Neck supple  Cardiovascular: Normal rate and regular rhythm  Pulses are no weak pulses  Pulses:       Dorsalis pedis pulses are 1+ on the right side, and 1+ on the left side  Posterior tibial pulses are 0 on the right side, and 0 on the left side  He has class findings with lack of pedal hairs, skin atrophy, and nail dystrophy / brittleness  Pulmonary/Chest: Effort normal and breath sounds normal  No respiratory distress  Musculoskeletal:   Pes planus noted  Focal pain noted left sinus tarsi  Minimal pain at lateral ankle ligament  Pain also noted left PTT  Feet:   Right Foot:   Skin Integrity: Negative for ulcer, skin breakdown, erythema, warmth, callus or dry skin  Left Foot:   Skin Integrity: Negative for ulcer, skin breakdown, erythema, warmth, callus or dry skin  Neurological: He is alert and oriented to person, place, and time  Skin: No rash noted  No erythema  No pallor  Mycotic nails X 6 with hypertrophy and discoloration, especially bilateral hallux  Psychiatric: He has a normal mood and affect  His behavior is normal    Vitals reviewed  Diabetic Foot Exam    Right Foot/Ankle   Right Foot Inspection  Skin Exam: skin normal and skin intact no dry skin, no warmth, no callus, no erythema, no maceration, no abnormal color, no pre-ulcer, no ulcer and no callus                          Toe Exam: right toe deformityno swelling, no tenderness and erythema  Sensory   Vibration: diminished  Proprioception: intact   Monofilament testing: diminished  Vascular  Capillary refills: < 3 seconds  The right DP pulse is 1+  The right PT pulse is 0  Left Foot/Ankle  Left Foot Inspection  Skin Exam: skin normal and skin intactno dry skin, no warmth, no erythema, no maceration, normal color, no pre-ulcer, no ulcer and no callus                         Toe Exam: left toe deformityno swelling, no tenderness and no erythema                   Sensory   Vibration: diminished  Proprioception: intact  Monofilament: diminished  Vascular  Capillary refills: < 3 seconds  The left DP pulse is 1+  The left PT pulse is 0  Assign Risk Category:  Deformity present; Loss of protective sensation;  No weak pulses       Risk: 2

## 2019-09-13 ENCOUNTER — TELEPHONE (OUTPATIENT)
Dept: NEPHROLOGY | Facility: CLINIC | Age: 59
End: 2019-09-13

## 2019-09-13 NOTE — TELEPHONE ENCOUNTER
Called patient and left a message  I told him that it is okay to restart Torsemide if he is swelling  May use previous dose as before  We will attempt to call him back on Monday, 9/16/19, to discuss follow up labs

## 2019-09-13 NOTE — TELEPHONE ENCOUNTER
Patient and stated that his Endocrinologist stopped his Torsemide and he is now having swelling  He called his cardiologist asking what to do and they advise him to call us  He wants to know if he should go back on his water pill  Please advise      Yoni Cunningham MA

## 2019-09-16 DIAGNOSIS — N17.9 ACUTE KIDNEY INJURY (HCC): Primary | ICD-10-CM

## 2019-09-16 NOTE — TELEPHONE ENCOUNTER
Patient has been schedule for a follow up on 9/19/19 and will be getting his BMP on Wednesday and the lab slip has been mailed out for him        Emmanuel Bravo MA

## 2019-09-16 NOTE — TELEPHONE ENCOUNTER
Talked with patient over the phone  He reports improvement in edema with the torsemide  Will have him repeat a BMP on September 18, 2019  Will arrange for office follow-up

## 2019-09-16 NOTE — TELEPHONE ENCOUNTER
Patient returning your call and he is aware that it is ok to restart Torsemide if he is having swelling  No further questions at this time        Kareem Madrid MA

## 2019-09-17 NOTE — TELEPHONE ENCOUNTER
Patient seen for BPH with LUTS ordered cysto and UDS for further evaluation  Cysto should be scheduled first to rule out anatomical cause of symptoms  Called and spoke with patient  Briefly reviewed urodynamic testing and need for cysto prior  Patient agreeable  He is tentatively scheduled for urodynamic testing 12/19 and FU with Dr Huber Johansen 1/7/20  Will reassess whether or not urodynamic testing is truly necessary pending the results of cystoscopy

## 2019-09-19 ENCOUNTER — OFFICE VISIT (OUTPATIENT)
Dept: NEPHROLOGY | Facility: CLINIC | Age: 59
End: 2019-09-19
Payer: MEDICARE

## 2019-09-19 VITALS
BODY MASS INDEX: 46.65 KG/M2 | HEIGHT: 69 IN | SYSTOLIC BLOOD PRESSURE: 136 MMHG | DIASTOLIC BLOOD PRESSURE: 84 MMHG | WEIGHT: 315 LBS

## 2019-09-19 DIAGNOSIS — E55.9 VITAMIN D DEFICIENCY: ICD-10-CM

## 2019-09-19 DIAGNOSIS — I10 BENIGN ESSENTIAL HYPERTENSION: ICD-10-CM

## 2019-09-19 DIAGNOSIS — I50.43 CHF (CONGESTIVE HEART FAILURE), NYHA CLASS III, ACUTE ON CHRONIC, COMBINED (HCC): ICD-10-CM

## 2019-09-19 DIAGNOSIS — E87.6 HYPOKALEMIA: Primary | ICD-10-CM

## 2019-09-19 PROCEDURE — 99214 OFFICE O/P EST MOD 30 MIN: CPT | Performed by: INTERNAL MEDICINE

## 2019-09-19 RX ORDER — FLUCONAZOLE 150 MG/1
150 TABLET ORAL ONCE
COMMUNITY
End: 2020-03-17

## 2019-09-19 RX ORDER — SPIRONOLACTONE 25 MG/1
25 TABLET ORAL DAILY
Qty: 30 TABLET | Refills: 3 | Status: SHIPPED | OUTPATIENT
Start: 2019-09-19 | End: 2020-03-17

## 2019-09-19 RX ORDER — ERGOCALCIFEROL (VITAMIN D2) 1250 MCG
50000 CAPSULE ORAL WEEKLY
Qty: 16 CAPSULE | Refills: 0 | Status: SHIPPED | OUTPATIENT
Start: 2019-09-19 | End: 2020-03-17

## 2019-09-19 RX ORDER — RAMIPRIL 5 MG/1
5 CAPSULE ORAL DAILY
COMMUNITY

## 2019-09-19 RX ORDER — METFORMIN HYDROCHLORIDE 500 MG/1
500 TABLET, FILM COATED, EXTENDED RELEASE ORAL 2 TIMES DAILY WITH MEALS
COMMUNITY

## 2019-09-19 NOTE — LETTER
September 19, 2019     Sage Tom, [address] [place] [address]    Patient: Flori Ovalle   YOB: 1960   Date of Visit: 9/19/2019       Dear Dr Farr Simpler:    Thank you for referring Flori Ovalle to me for evaluation  Below are my notes for this consultation  If you have questions, please do not hesitate to call me  I look forward to following your patient along with you  Sincerely,        Shraddha Gee MD        CC: MD Elicia Huizar, [address], MD  9/19/2019  2:16 PM  Sign at close encounter  [address] [place] 62 y o  male MRN: [id number]  DATE: 09/19/19  Reason for visit: Continued evaluation of electrolyte abnormalities  ASSESSMENT & PLAN:  1  Hypokalemia:  · Most recent K was 3 2 prompting Torsemide to be stopped  · However, he developed swelling and called us on 9/13/19 at which time we asked him to restart Torsemide  · He will continue on Torsemide 20 mg daily and Kdur 10 meq BID  · Will add Spironolactone 25 mg daily and repeat BMP in 10 days  · If K is > 4 5 on repeat labs, we will decrease or stop Kdur  2  Hypertension:  · BP is controlled on Ramipril 5 mg daily, Clonidine 0 2 mg BID and Torsemide 20 mg daily  · Will decrease Clonidine to 0 1 mg BID since Spironolactone will be added  3  Congestive heart failure:  · Continue Torsemide 20 mg daily  · Reports that he has lost weight since restarting Torsemide on 9/13/19      4  Vitamin D deficiency:  · Level 13 1 on 8/22/19 - Start Ergocalciferol 50,000 units once a week  Patient Instructions   Decrease Clonidine to 0 1 mg twice a day  Add Spironolactone 25 mg daily  Stay on Torsemide 20 mg a day  Check blood test in 10 days and 2 weeks after that  Start Ergocalciferol 26525 units once a week  Follow up in 2 months       SUBJECTIVE / INTERVAL HISTORY:  Flori Ovalle is a 17-year-old  gentleman who I am seeing in the office for the 1st time for continued evaluation of electrolyte abnormalities  We met him during a recent admission to [facility] in June 2019 when we were consulted for electrolyte abnormalities  He was at [facility] for pancreatitis on May 26 to Vivian 3  He was readmitted on June 18 to June 23 for [place] and low potassium at which time we were consulted  He was discharged on torsemide 20 mg daily and Kdur 10 meq daily  He had repeat labs on 8/22/19 and his K was 3 2  He was told by Dr Wendy Mcallister to stop Torsemide on 9/5/19  Since then, he developed swelling and called Dr Otis Keyes and Dr Jean-Claude Higgins who recommended that he call us  He called us on 9/13/19 and I recommended that he restart Torsemide 20 mg daily  He has lost weight since then  He had labs today and the results are pending  He is only on Torsemide 20 mg daily  He is not on Metolazone or Spironolactone  PMH/PSH: HTN, DM, CHF, cholecystectomy, fatty liver, GERD, obesity s/p gastric bypass in Jan 2002, COPD, JOSIAH on CPAP, DJD  No malignancy  ALLERGIES:   Allergies   Allergen Reactions    Gluten Meal      REVIEW OF SYSTEMS:  Review of Systems   Constitutional: Positive for fatigue  Negative for appetite change, chills and fever  Respiratory: Positive for shortness of breath  Negative for cough  Cardiovascular: Positive for leg swelling  Negative for chest pain  Gastrointestinal: Positive for abdominal pain  Negative for diarrhea, nausea and vomiting  Genitourinary: Negative for hematuria  Musculoskeletal: Positive for arthralgias  Neurological: Negative for dizziness and light-headedness  OBJECTIVE:  /84   Ht 5' 9" (1 753 m)   Wt (!) 154 kg (339 lb)   BMI 50 06 kg/m²    Current Weight: Weight - Scale: (!) 154 kg (339 lb) Body mass index is 50 06 kg/m²  Physical Exam   Constitutional: He is oriented to person, place, and time  He appears well-developed and well-nourished  No distress     HENT:   Head: Normocephalic and atraumatic  Mouth/Throat: Mucous membranes are normal    Eyes: Conjunctivae are normal  No scleral icterus  Neck: Neck supple  No JVD present  Cardiovascular: Normal rate, regular rhythm and normal heart sounds  Pulmonary/Chest: Effort normal and breath sounds normal  No respiratory distress  Abdominal: Soft  Bowel sounds are normal    Musculoskeletal: He exhibits edema (mild)  Neurological: He is alert and oriented to person, place, and time  Skin: Skin is warm and dry  Psychiatric: He has a normal mood and affect  His behavior is normal      Medications:  Current Outpatient Medications:     buPROPion (WELLBUTRIN XL) 300 mg 24 hr tablet, Take 300 mg by mouth every morning, Disp: , Rfl:     cloNIDine (CATAPRES) 0 1 mg tablet, Take 2 tablets (0 2 mg total) by mouth every 12 (twelve) hours, Disp: 180 tablet, Rfl: 3    ipratropium-albuterol (DUO-NEB) 0 5-2 5 mg/3 mL nebulizer solution, Take 1 vial (3 mL total) by nebulization 4 (four) times a day (Patient not taking: Reported on 9/12/2019), Disp: 3 vial, Rfl: 5    JARDIANCE 10 MG TABS, Take 10 mg by mouth every morning, Disp: , Rfl: 0    omeprazole (PriLOSEC) 40 MG capsule, Take 1 capsule (40 mg total) by mouth daily, Disp: 90 capsule, Rfl: 3    potassium chloride (K-DUR,KLOR-CON) 10 mEq tablet, Take 1 tablet (10 mEq total) by mouth daily, Disp: 30 tablet, Rfl: 3    spironolactone (ALDACTONE) 50 mg tablet, TAKE 1 TABLET BY MOUTH EVERY DAY, Disp: 30 tablet, Rfl: 5    torsemide (DEMADEX) 20 mg tablet, Take 1 tablet (20 mg total) by mouth daily (Patient not taking: Reported on 9/12/2019), Disp: 30 tablet, Rfl: 3    Laboratory Results:  Aug 22, 2019:  Vitamin-D 13 1, glucose 127, BUN 8, creatinine 0 94, sodium 138, potassium 3 2, chloride 98, carbon dioxide 22, calcium 9 5, albumin 4 2, WBC 7 1, hemoglobin 13 3, platelets 248

## 2019-09-19 NOTE — PATIENT INSTRUCTIONS
Decrease Clonidine to 0 1 mg twice a day  Add Spironolactone 25 mg daily  Stay on Torsemide 20 mg a day  Check blood test in 10 days and 2 weeks after that  Start Ergocalciferol 17057 units once a week  Follow up in 2 months

## 2019-09-19 NOTE — PROGRESS NOTES
NEPHROLOGY OFFICE PROGRESS NOTE   Romeo Calloway 62 y o  male MRN: [id number]  DATE: 09/19/19  Reason for visit: Continued evaluation of electrolyte abnormalities  ASSESSMENT & PLAN:  1  Hypokalemia:  · Most recent K was 3 2 prompting Torsemide to be stopped  · However, he developed swelling and called us on 9/13/19 at which time we asked him to restart Torsemide  · He will continue on Torsemide 20 mg daily and Kdur 10 meq BID  · Will add Spironolactone 25 mg daily and repeat BMP in 10 days  · If K is > 4 5 on repeat labs, we will decrease or stop Kdur  2  Hypertension:  · BP is controlled on Ramipril 5 mg daily, Clonidine 0 2 mg BID and Torsemide 20 mg daily  · Will decrease Clonidine to 0 1 mg BID since Spironolactone will be added  3  Congestive heart failure:  · Continue Torsemide 20 mg daily  · Reports that he has lost weight since restarting Torsemide on 9/13/19      4  Vitamin D deficiency:  · Level 13 1 on 8/22/19 - Start Ergocalciferol 50,000 units once a week  Patient Instructions   Decrease Clonidine to 0 1 mg twice a day  Add Spironolactone 25 mg daily  Stay on Torsemide 20 mg a day  Check blood test in 10 days and 2 weeks after that  Start Ergocalciferol 09520 units once a week  Follow up in 2 months  SUBJECTIVE / INTERVAL HISTORY:  Romeo Calloway is a 56-year-old  gentleman who I am seeing in the office for the 1st time for continued evaluation of electrolyte abnormalities  We met him during a recent admission to [facility] in June 2019 when we were consulted for electrolyte abnormalities  He was at [facility] for pancreatitis on May 26 to Vivian 3  He was readmitted on June 18 to June 23 for [place] and low potassium at which time we were consulted  He was discharged on torsemide 20 mg daily and Kdur 10 meq daily  He had repeat labs on 8/22/19 and his K was 3 2  He was told by Dr Wendy Mcallister to stop Torsemide on 9/5/19     Since then, he developed swelling and called Dr Jens Haro and Dr Harley Carney who recommended that he call us  He called us on 9/13/19 and I recommended that he restart Torsemide 20 mg daily  He has lost weight since then  He had labs today and the results are pending  He is only on Torsemide 20 mg daily  He is not on Metolazone or Spironolactone  PMH/PSH: HTN, DM, CHF, cholecystectomy, fatty liver, GERD, obesity s/p gastric bypass in Jan 2002, COPD, JOSIAH on CPAP, DJD  No malignancy  ALLERGIES:   Allergies   Allergen Reactions    Gluten Meal      REVIEW OF SYSTEMS:  Review of Systems   Constitutional: Positive for fatigue  Negative for appetite change, chills and fever  Respiratory: Positive for shortness of breath  Negative for cough  Cardiovascular: Positive for leg swelling  Negative for chest pain  Gastrointestinal: Positive for abdominal pain  Negative for diarrhea, nausea and vomiting  Genitourinary: Negative for hematuria  Musculoskeletal: Positive for arthralgias  Neurological: Negative for dizziness and light-headedness  OBJECTIVE:  /84   Ht 5' 9" (1 753 m)   Wt (!) 154 kg (339 lb)   BMI 50 06 kg/m²   Current Weight: Weight - Scale: (!) 154 kg (339 lb) Body mass index is 50 06 kg/m²  Physical Exam   Constitutional: He is oriented to person, place, and time  He appears well-developed and well-nourished  No distress  HENT:   Head: Normocephalic and atraumatic  Mouth/Throat: Mucous membranes are normal    Eyes: Conjunctivae are normal  No scleral icterus  Neck: Neck supple  No JVD present  Cardiovascular: Normal rate, regular rhythm and normal heart sounds  Pulmonary/Chest: Effort normal and breath sounds normal  No respiratory distress  Abdominal: Soft  Bowel sounds are normal    Musculoskeletal: He exhibits edema (mild)  Neurological: He is alert and oriented to person, place, and time  Skin: Skin is warm and dry  Psychiatric: He has a normal mood and affect   His behavior is normal      Medications:  Current Outpatient Medications:     buPROPion (WELLBUTRIN XL) 300 mg 24 hr tablet, Take 300 mg by mouth every morning, Disp: , Rfl:     cloNIDine (CATAPRES) 0 1 mg tablet, Take 2 tablets (0 2 mg total) by mouth every 12 (twelve) hours, Disp: 180 tablet, Rfl: 3    ipratropium-albuterol (DUO-NEB) 0 5-2 5 mg/3 mL nebulizer solution, Take 1 vial (3 mL total) by nebulization 4 (four) times a day (Patient not taking: Reported on 9/12/2019), Disp: 3 vial, Rfl: 5    JARDIANCE 10 MG TABS, Take 10 mg by mouth every morning, Disp: , Rfl: 0    omeprazole (PriLOSEC) 40 MG capsule, Take 1 capsule (40 mg total) by mouth daily, Disp: 90 capsule, Rfl: 3    potassium chloride (K-DUR,KLOR-CON) 10 mEq tablet, Take 1 tablet (10 mEq total) by mouth daily, Disp: 30 tablet, Rfl: 3    spironolactone (ALDACTONE) 50 mg tablet, TAKE 1 TABLET BY MOUTH EVERY DAY, Disp: 30 tablet, Rfl: 5    torsemide (DEMADEX) 20 mg tablet, Take 1 tablet (20 mg total) by mouth daily (Patient not taking: Reported on 9/12/2019), Disp: 30 tablet, Rfl: 3    Laboratory Results:  Aug 22, 2019:  Vitamin-D 13 1, glucose 127, BUN 8, creatinine 0 94, sodium 138, potassium 3 2, chloride 98, carbon dioxide 22, calcium 9 5, albumin 4 2, WBC 7 1, hemoglobin 13 3, platelets 485

## 2019-09-20 ENCOUNTER — TELEPHONE (OUTPATIENT)
Dept: NEPHROLOGY | Facility: CLINIC | Age: 59
End: 2019-09-20

## 2019-09-20 LAB
EXT GLUCOSE BLD: 101
EXTERNAL BUN: 14
EXTERNAL CALCIUM: 10.1
EXTERNAL CHLORIDE: 99
EXTERNAL CO2: 24
EXTERNAL CREATININE: 0.91
EXTERNAL EGFR: 91
EXTERNAL POTASSIUM: 4.1
EXTERNAL SODIUM: 140

## 2019-09-20 NOTE — TELEPHONE ENCOUNTER
----- Message from Yvon Melo MD sent at 9/20/2019 10:38 AM EDT -----  Please call patient and let him know that I reviewed the labs that he did on 9/19/19 but were not available yet during his visit with me  Please have him decrease Kdur to 10 meq once a day (I recall he told me he was taking it BID)  Let me know if he is taking a different frequency

## 2019-09-20 NOTE — TELEPHONE ENCOUNTER
Spoke with the patient and he is aware of his lab test results and to reduce his potassium to 10 MEQ's daily from twice daily  The patient has no further questions at this time        Sheila Quinones MA

## 2019-10-01 ENCOUNTER — TELEPHONE (OUTPATIENT)
Dept: NEPHROLOGY | Facility: CLINIC | Age: 59
End: 2019-10-01

## 2019-10-01 LAB
BUN SERPL-MCNC: 11 MG/DL (ref 8–27)
BUN/CREAT SERPL: 11 (ref 10–24)
CALCIUM SERPL-MCNC: 9.6 MG/DL (ref 8.6–10.2)
CHLORIDE SERPL-SCNC: 98 MMOL/L (ref 96–106)
CO2 SERPL-SCNC: 24 MMOL/L (ref 20–29)
CREAT SERPL-MCNC: 0.96 MG/DL (ref 0.76–1.27)
GLUCOSE SERPL-MCNC: 104 MG/DL (ref 65–99)
MAGNESIUM SERPL-MCNC: 2.1 MG/DL (ref 1.6–2.3)
POTASSIUM SERPL-SCNC: 4.6 MMOL/L (ref 3.5–5.2)
SL AMB EGFR AFRICAN AMERICAN: 98 ML/MIN/1.73
SL AMB EGFR NON AFRICAN AMERICAN: 85 ML/MIN/1.73
SODIUM SERPL-SCNC: 138 MMOL/L (ref 134–144)

## 2019-10-01 NOTE — TELEPHONE ENCOUNTER
----- Message from Levon Gallardo MD sent at 10/1/2019 12:02 PM EDT -----  Please call patient and inform him that his potassium level is great and he can stop Kdur 10 meq daily

## 2019-10-01 NOTE — TELEPHONE ENCOUNTER
Spoke with the patient and he is aware of his potassium level and that he can stop his potassium 10 MEQ's daily  The patient has no further questions at this time        Pat Singh MA

## 2019-10-15 ENCOUNTER — TELEPHONE (OUTPATIENT)
Dept: NEPHROLOGY | Facility: CLINIC | Age: 59
End: 2019-10-15

## 2019-10-15 LAB
BUN SERPL-MCNC: 14 MG/DL (ref 8–27)
BUN/CREAT SERPL: 18 (ref 10–24)
CALCIUM SERPL-MCNC: 9.9 MG/DL (ref 8.6–10.2)
CHLORIDE SERPL-SCNC: 100 MMOL/L (ref 96–106)
CO2 SERPL-SCNC: 20 MMOL/L (ref 20–29)
CREAT SERPL-MCNC: 0.8 MG/DL (ref 0.76–1.27)
GLUCOSE SERPL-MCNC: 104 MG/DL (ref 65–99)
POTASSIUM SERPL-SCNC: 4.6 MMOL/L (ref 3.5–5.2)
SL AMB EGFR AFRICAN AMERICAN: 111 ML/MIN/1.73
SL AMB EGFR NON AFRICAN AMERICAN: 96 ML/MIN/1.73
SODIUM SERPL-SCNC: 138 MMOL/L (ref 134–144)

## 2019-10-15 NOTE — TELEPHONE ENCOUNTER
----- Message from Margot Chamberlain MD sent at 10/15/2019  2:54 PM EDT -----  Please call patient and inform him that kidney function and potassium level are stable  No changes necessary at this time unless he has worsening swelling

## 2019-11-07 ENCOUNTER — TELEPHONE (OUTPATIENT)
Dept: NEPHROLOGY | Facility: CLINIC | Age: 59
End: 2019-11-07

## 2019-11-07 NOTE — TELEPHONE ENCOUNTER
Just an FYI- Pt called today stating that he hasn't felt right since last evening, dry heaves and same as he felt last time that all levels were low, pt will be going to ER, possibly [facility] made aware of the above

## 2019-11-13 LAB
ALBUMIN SERPL-MCNC: 4.5 G/DL (ref 3.6–4.8)
ALBUMIN/GLOB SERPL: 1.8 {RATIO} (ref 1.2–2.2)
ALP SERPL-CCNC: 67 IU/L (ref 39–117)
ALT SERPL-CCNC: 33 IU/L (ref 0–44)
AST SERPL-CCNC: 29 IU/L (ref 0–40)
BILIRUB SERPL-MCNC: 0.5 MG/DL (ref 0–1.2)
BUN SERPL-MCNC: 12 MG/DL (ref 8–27)
BUN/CREAT SERPL: 13 (ref 10–24)
CALCIUM SERPL-MCNC: 9.4 MG/DL (ref 8.6–10.2)
CHLORIDE SERPL-SCNC: 98 MMOL/L (ref 96–106)
CO2 SERPL-SCNC: 23 MMOL/L (ref 20–29)
CREAT SERPL-MCNC: 0.91 MG/DL (ref 0.76–1.27)
CREAT UR-MCNC: 161.9 MG/DL
GLOBULIN SER-MCNC: 2.5 G/DL (ref 1.5–4.5)
GLUCOSE SERPL-MCNC: 122 MG/DL (ref 65–99)
MAGNESIUM SERPL-MCNC: 2.3 MG/DL (ref 1.6–2.3)
POTASSIUM SERPL-SCNC: 4.5 MMOL/L (ref 3.5–5.2)
PROT SERPL-MCNC: 7 G/DL (ref 6–8.5)
PROT UR-MCNC: 21.4 MG/DL
PROT/CREAT UR: 132 MG/G CREAT (ref 0–200)
SL AMB EGFR AFRICAN AMERICAN: 105 ML/MIN/1.73
SL AMB EGFR NON AFRICAN AMERICAN: 91 ML/MIN/1.73
SODIUM SERPL-SCNC: 137 MMOL/L (ref 134–144)

## 2019-11-19 ENCOUNTER — OFFICE VISIT (OUTPATIENT)
Dept: NEPHROLOGY | Facility: CLINIC | Age: 59
End: 2019-11-19
Payer: MEDICARE

## 2019-11-19 VITALS
HEIGHT: 69 IN | WEIGHT: 315 LBS | SYSTOLIC BLOOD PRESSURE: 142 MMHG | BODY MASS INDEX: 46.65 KG/M2 | DIASTOLIC BLOOD PRESSURE: 82 MMHG

## 2019-11-19 DIAGNOSIS — I10 BENIGN ESSENTIAL HYPERTENSION: ICD-10-CM

## 2019-11-19 DIAGNOSIS — E55.9 VITAMIN D DEFICIENCY: ICD-10-CM

## 2019-11-19 DIAGNOSIS — E87.6 HYPOKALEMIA: Primary | ICD-10-CM

## 2019-11-19 PROCEDURE — 99213 OFFICE O/P EST LOW 20 MIN: CPT | Performed by: INTERNAL MEDICINE

## 2019-11-19 NOTE — PATIENT INSTRUCTIONS
Once you are done with weekly Vitamin D - start taking over the counter Vitamin D3 5000 units a day  Continue with Spironolactone 50 mg daily  You can take Torsemide 20 mg daily if you have swelling or gain fluid weight  Check your BP at home x 1 week  Follow up in 4 months

## 2019-11-19 NOTE — LETTER
November 19, 2019     Aime Solis, [address] [place] [address] [place]    Patient: Terell Cristina   YOB: 1960   Date of Visit: 11/19/2019       Dear Dr Julio Starr:    Thank you for referring Terell Cristina to me for evaluation  Below are my notes for this consultation  If you have questions, please do not hesitate to call me  I look forward to following your patient along with you  Sincerely,        Levi Cody MD        CC: MD Levi Rebollar MD  11/19/2019 12:54 PM  Sign at close encounter  [address] [place] 62 y o  male MRN: [id number]  DATE: 11/19/19  Reason for visit: Continued evaluation of electrolyte abnormalities  ASSESSMENT & PLAN:  1  Hypokalemia:  · Resolved with initiation of Spironolactone  · K is 4 5  Now off [address]  2  Hypertension:  · BP is high in the office today but controlled at other MD offices on Ramipril 5 mg daily, Clonidine 0 2 mg BID, Spironolactone 50 mg daily  · No changes  · Advised to check BP at home x 1 week prior to next follow up  3  Congestive heart failure:  · Currently not on daily Torsemide  · Advised to take Torsemide if gains weight or has swelling  4  Vitamin D deficiency:  · Level 13 1 on 8/22/19 and started on Ergocalciferol 50,000 units once a week  · Change to Vitamin D3 5000 units daily once Ergocalciferol completed  Patient Instructions   Once you are done with weekly Vitamin D - start taking over the counter Vitamin D3 5000 units a day  Continue with Spironolactone 50 mg daily  You can take Torsemide 20 mg daily if you have swelling or gain fluid weight  Check your BP at home x 1 week  Follow up in 4 months  SUBJECTIVE / INTERVAL HISTORY:  Marci Bianchi was last seen in the office in September 2019  Blood pressure readings at other doctor's offices has been in the 120s over 80s  He does not check his blood pressure at home    Since his last office visit, we weaned him off potassium chloride after he was started on spironolactone  Since then, his spironolactone dose was increased by another doctor is now up to 50 mg daily  Additionally, he was taken off Torsemide by another doctor  No recent hospitalizations  PMH/PSH: HTN, DM, CHF, cholecystectomy, fatty liver, GERD, obesity s/p gastric bypass in Jan 2002, COPD, JOSIAH on CPAP, DJD  ALLERGIES:   Allergies   Allergen Reactions    Gluten Meal      REVIEW OF SYSTEMS:  Review of Systems   Constitutional: Negative for appetite change, chills, fatigue and fever  Respiratory: Negative for cough and shortness of breath  Cardiovascular: Positive for leg swelling (stable  )  Negative for chest pain  Gastrointestinal: Positive for diarrhea (resolved  ) and nausea  Negative for abdominal pain and vomiting  Genitourinary: Negative for dysuria and hematuria  Musculoskeletal: Negative for arthralgias  Neurological: Negative for dizziness and light-headedness  OBJECTIVE:  /82   Ht 5' 9" (1 753 m)   Wt (!) 153 kg (338 lb)   BMI 49 91 kg/m²    Current Weight: Weight - Scale: (!) 153 kg (338 lb) Body mass index is 49 91 kg/m²  Physical Exam   Constitutional: He is oriented to person, place, and time  He appears well-developed and well-nourished  No distress  Obese   HENT:   Head: Normocephalic and atraumatic  Mouth/Throat: Mucous membranes are normal    Eyes: Conjunctivae are normal  No scleral icterus  Neck: Neck supple  No JVD present  Cardiovascular: Normal rate, regular rhythm and normal heart sounds  Pulmonary/Chest: Effort normal and breath sounds normal    Abdominal: Soft  Bowel sounds are normal    Musculoskeletal: He exhibits edema (mild)  Neurological: He is alert and oriented to person, place, and time  Skin: Skin is warm and dry  Psychiatric: He has a normal mood and affect   His behavior is normal  Judgment normal      Medications:  Current Outpatient Medications:    buPROPion (WELLBUTRIN XL) 300 mg 24 hr tablet, Take 300 mg by mouth every morning, Disp: , Rfl:     cloNIDine (CATAPRES) 0 1 mg tablet, Take 2 tablets (0 2 mg total) by mouth every 12 (twelve) hours, Disp: 180 tablet, Rfl: 3    ergocalciferol (ERGOCALCIFEROL) 89661 units capsule, Take 1 capsule (50,000 Units total) by mouth once a week, Disp: 16 capsule, Rfl: 0    fluconazole (DIFLUCAN) 150 mg tablet, Take 150 mg by mouth once, Disp: , Rfl:     ipratropium-albuterol (DUO-NEB) 0 5-2 5 mg/3 mL nebulizer solution, Take 1 vial (3 mL total) by nebulization 4 (four) times a day, Disp: 3 vial, Rfl: 5    JARDIANCE 10 MG TABS, Take 10 mg by mouth every morning, Disp: , Rfl: 0    metFORMIN (GLUMETZA) 500 MG (MOD) 24 hr tablet, Take 500 mg by mouth 2 (two) times a day with meals, Disp: , Rfl:     omeprazole (PriLOSEC) 40 MG capsule, Take 1 capsule (40 mg total) by mouth daily, Disp: 90 capsule, Rfl: 3    Oyster Shell (OYSCO 500 PO), Take 500 mg by mouth daily, Disp: , Rfl:     ramipril (ALTACE) 5 mg capsule, Take 5 mg by mouth daily, Disp: , Rfl:     spironolactone (ALDACTONE) 25 mg tablet, Take 1 tablet (25 mg total) by mouth daily (Patient taking differently: Take 50 mg by mouth daily ), Disp: 30 tablet, Rfl: 3    potassium chloride (K-DUR,KLOR-CON) 10 mEq tablet, Take 1 tablet (10 mEq total) by mouth daily (Patient taking differently: Take 10 mEq by mouth 2 (two) times a day ), Disp: 30 tablet, Rfl: 3    torsemide (DEMADEX) 20 mg tablet, Take 1 tablet (20 mg total) by mouth daily (Patient not taking: Reported on 11/19/2019), Disp: 30 tablet, Rfl: 3    Laboratory Results:  Results for orders placed or performed in visit on 11/11/19   Comprehensive metabolic panel   Result Value Ref Range    Glucose, Random 122 (H) 65 - 99 mg/dL    BUN 12 8 - 27 mg/dL    Creatinine 0 91 0 76 - 1 27 mg/dL    eGFR Non African American 91 >59 mL/min/1 73    eGFR  105 >59 mL/min/1 73    SL AMB BUN/CREATININE RATIO 13 10 - 24    Sodium 137 134 - 144 mmol/L    Potassium 4 5 3 5 - 5 2 mmol/L    Chloride 98 96 - 106 mmol/L    CO2 23 20 - 29 mmol/L    CALCIUM 9 4 8 6 - 10 2 mg/dL    Protein, Total 7 0 6 0 - 8 5 g/dL    Albumin 4 5 3 6 - 4 8 g/dL    Globulin, Total 2 5 1 5 - 4 5 g/dL    Albumin/Globulin Ratio 1 8 1 2 - 2 2    TOTAL BILIRUBIN 0 5 0 0 - 1 2 mg/dL    Alk Phos Isoenzymes 67 39 - 117 IU/L    AST 29 0 - 40 IU/L    ALT 33 0 - 44 IU/L   Protein / creatinine ratio, urine   Result Value Ref Range    Creatinine, Urine 161 9 Not Estab  mg/dL    Total Protein, Urine 21 4 Not Estab  mg/dL    Prot/Creat Ratio, Ur 132 0 - 200 mg/g creat   Magnesium   Result Value Ref Range    Magnesium, Serum 2 3 1 6 - 2 3 mg/dL

## 2019-11-19 NOTE — PROGRESS NOTES
NEPHROLOGY OFFICE PROGRESS NOTE   Paige Caballero 62 y o  male MRN: [id number]  DATE: 11/19/19  Reason for visit: Continued evaluation of electrolyte abnormalities  ASSESSMENT & PLAN:  1  Hypokalemia:  · Resolved with initiation of Spironolactone  · K is 4 5  Now off [address]  2  Hypertension:  · BP is high in the office today but controlled at other MD offices on Ramipril 5 mg daily, Clonidine 0 2 mg BID, Spironolactone 50 mg daily  · No changes  · Advised to check BP at home x 1 week prior to next follow up  3  Congestive heart failure:  · Currently not on daily Torsemide  · Advised to take Torsemide if gains weight or has swelling  4  Vitamin D deficiency:  · Level 13 1 on 8/22/19 and started on Ergocalciferol 50,000 units once a week  · Change to Vitamin D3 5000 units daily once Ergocalciferol completed  Patient Instructions   Once you are done with weekly Vitamin D - start taking over the counter Vitamin D3 5000 units a day  Continue with Spironolactone 50 mg daily  You can take Torsemide 20 mg daily if you have swelling or gain fluid weight  Check your BP at home x 1 week  Follow up in 4 months  SUBJECTIVE / INTERVAL HISTORY:  Raymond Alvarez was last seen in the office in September 2019  Blood pressure readings at other doctor's offices has been in the 120s over 80s  He does not check his blood pressure at home  Since his last office visit, we weaned him off potassium chloride after he was started on spironolactone  Since then, his spironolactone dose was increased by another doctor is now up to 50 mg daily  Additionally, he was taken off Torsemide by another doctor  No recent hospitalizations  PMH/PSH: HTN, DM, CHF, cholecystectomy, fatty liver, GERD, obesity s/p gastric bypass in Jan 2002, COPD, JOSIAH on CPAP, DJD       ALLERGIES:   Allergies   Allergen Reactions    Gluten Meal      REVIEW OF SYSTEMS:  Review of Systems   Constitutional: Negative for appetite change, chills, fatigue and fever  Respiratory: Negative for cough and shortness of breath  Cardiovascular: Positive for leg swelling (stable  )  Negative for chest pain  Gastrointestinal: Positive for diarrhea (resolved  ) and nausea  Negative for abdominal pain and vomiting  Genitourinary: Negative for dysuria and hematuria  Musculoskeletal: Negative for arthralgias  Neurological: Negative for dizziness and light-headedness  OBJECTIVE:  /82   Ht 5' 9" (1 753 m)   Wt (!) 153 kg (338 lb)   BMI 49 91 kg/m²   Current Weight: Weight - Scale: (!) 153 kg (338 lb) Body mass index is 49 91 kg/m²  Physical Exam   Constitutional: He is oriented to person, place, and time  He appears well-developed and well-nourished  No distress  Obese   HENT:   Head: Normocephalic and atraumatic  Mouth/Throat: Mucous membranes are normal    Eyes: Conjunctivae are normal  No scleral icterus  Neck: Neck supple  No JVD present  Cardiovascular: Normal rate, regular rhythm and normal heart sounds  Pulmonary/Chest: Effort normal and breath sounds normal    Abdominal: Soft  Bowel sounds are normal    Musculoskeletal: He exhibits edema (mild)  Neurological: He is alert and oriented to person, place, and time  Skin: Skin is warm and dry  Psychiatric: He has a normal mood and affect   His behavior is normal  Judgment normal      Medications:  Current Outpatient Medications:     buPROPion (WELLBUTRIN XL) 300 mg 24 hr tablet, Take 300 mg by mouth every morning, Disp: , Rfl:     cloNIDine (CATAPRES) 0 1 mg tablet, Take 2 tablets (0 2 mg total) by mouth every 12 (twelve) hours, Disp: 180 tablet, Rfl: 3    ergocalciferol (ERGOCALCIFEROL) 94969 units capsule, Take 1 capsule (50,000 Units total) by mouth once a week, Disp: 16 capsule, Rfl: 0    fluconazole (DIFLUCAN) 150 mg tablet, Take 150 mg by mouth once, Disp: , Rfl:     ipratropium-albuterol (DUO-NEB) 0 5-2 5 mg/3 mL nebulizer solution, Take 1 vial (3 mL total) by nebulization 4 (four) times a day, Disp: 3 vial, Rfl: 5    JARDIANCE 10 MG TABS, Take 10 mg by mouth every morning, Disp: , Rfl: 0    metFORMIN (GLUMETZA) 500 MG (MOD) 24 hr tablet, Take 500 mg by mouth 2 (two) times a day with meals, Disp: , Rfl:     omeprazole (PriLOSEC) 40 MG capsule, Take 1 capsule (40 mg total) by mouth daily, Disp: 90 capsule, Rfl: 3    Oyster Shell (OYSCO 500 PO), Take 500 mg by mouth daily, Disp: , Rfl:     ramipril (ALTACE) 5 mg capsule, Take 5 mg by mouth daily, Disp: , Rfl:     spironolactone (ALDACTONE) 25 mg tablet, Take 1 tablet (25 mg total) by mouth daily (Patient taking differently: Take 50 mg by mouth daily ), Disp: 30 tablet, Rfl: 3    potassium chloride (K-DUR,KLOR-CON) 10 mEq tablet, Take 1 tablet (10 mEq total) by mouth daily (Patient taking differently: Take 10 mEq by mouth 2 (two) times a day ), Disp: 30 tablet, Rfl: 3    torsemide (DEMADEX) 20 mg tablet, Take 1 tablet (20 mg total) by mouth daily (Patient not taking: Reported on 11/19/2019), Disp: 30 tablet, Rfl: 3    Laboratory Results:  Results for orders placed or performed in visit on 11/11/19   Comprehensive metabolic panel   Result Value Ref Range    Glucose, Random 122 (H) 65 - 99 mg/dL    BUN 12 8 - 27 mg/dL    Creatinine 0 91 0 76 - 1 27 mg/dL    eGFR Non African American 91 >59 mL/min/1 73    eGFR  105 >59 mL/min/1 73    SL AMB BUN/CREATININE RATIO 13 10 - 24    Sodium 137 134 - 144 mmol/L    Potassium 4 5 3 5 - 5 2 mmol/L    Chloride 98 96 - 106 mmol/L    CO2 23 20 - 29 mmol/L    CALCIUM 9 4 8 6 - 10 2 mg/dL    Protein, Total 7 0 6 0 - 8 5 g/dL    Albumin 4 5 3 6 - 4 8 g/dL    Globulin, Total 2 5 1 5 - 4 5 g/dL    Albumin/Globulin Ratio 1 8 1 2 - 2 2    TOTAL BILIRUBIN 0 5 0 0 - 1 2 mg/dL    Alk Phos Isoenzymes 67 39 - 117 IU/L    AST 29 0 - 40 IU/L    ALT 33 0 - 44 IU/L   Protein / creatinine ratio, urine   Result Value Ref Range    Creatinine, Urine 161 9 Not Estab  mg/dL    Total Protein, Urine 21 4 Not Estab  mg/dL    Prot/Creat Ratio, Ur 132 0 - 200 mg/g creat   Magnesium   Result Value Ref Range    Magnesium, Serum 2 3 1 6 - 2 3 mg/dL

## 2019-12-09 PROCEDURE — 52000 CYSTOURETHROSCOPY: CPT | Performed by: UROLOGY
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
## 2019-12-13 ENCOUNTER — PROCEDURE VISIT (OUTPATIENT)
Dept: UROLOGY | Facility: CLINIC | Age: 59
End: 2019-12-13
Payer: MEDICARE

## 2019-12-13 VITALS
DIASTOLIC BLOOD PRESSURE: 84 MMHG | BODY MASS INDEX: 46.65 KG/M2 | WEIGHT: 315 LBS | HEART RATE: 72 BPM | HEIGHT: 69 IN | SYSTOLIC BLOOD PRESSURE: 160 MMHG

## 2019-12-13 DIAGNOSIS — N40.1 BENIGN PROSTATIC HYPERPLASIA WITH LOWER URINARY TRACT SYMPTOMS, SYMPTOM DETAILS UNSPECIFIED: Primary | ICD-10-CM

## 2019-12-13 DIAGNOSIS — R39.15 URGENCY OF URINATION: ICD-10-CM

## 2019-12-13 LAB
SL AMB  POCT GLUCOSE, UA: 2000
SL AMB LEUKOCYTE ESTERASE,UA: NORMAL
SL AMB POCT BILIRUBIN,UA: NORMAL
SL AMB POCT BLOOD,UA: NORMAL
SL AMB POCT CLARITY,UA: CLEAR
SL AMB POCT COLOR,UA: NORMAL
SL AMB POCT KETONES,UA: NORMAL
SL AMB POCT NITRITE,UA: NORMAL
SL AMB POCT PH,UA: 6
SL AMB POCT SPECIFIC GRAVITY,UA: 1.01
SL AMB POCT URINE PROTEIN: NORMAL
SL AMB POCT UROBILINOGEN: 0.2

## 2019-12-13 PROCEDURE — 81002 URINALYSIS NONAUTO W/O SCOPE: CPT | Performed by: UROLOGY

## 2019-12-13 RX ORDER — MULTIVITAMIN
1 TABLET ORAL DAILY
COMMUNITY

## 2019-12-19 ENCOUNTER — PROCEDURE VISIT (OUTPATIENT)
Dept: UROLOGY | Facility: CLINIC | Age: 59
End: 2019-12-19
Payer: MEDICARE

## 2019-12-19 DIAGNOSIS — R39.15 URGENCY OF URINATION: ICD-10-CM

## 2019-12-19 DIAGNOSIS — N32.81 DETRUSOR INSTABILITY: ICD-10-CM

## 2019-12-19 DIAGNOSIS — N39.41 URGE INCONTINENCE OF URINE: Primary | ICD-10-CM

## 2019-12-19 LAB
SL AMB  POCT GLUCOSE, UA: 4
SL AMB LEUKOCYTE ESTERASE,UA: NEGATIVE
SL AMB POCT BILIRUBIN,UA: NEGATIVE
SL AMB POCT BLOOD,UA: NEGATIVE
SL AMB POCT CLARITY,UA: CLEAR
SL AMB POCT COLOR,UA: YELLOW
SL AMB POCT KETONES,UA: NEGATIVE
SL AMB POCT NITRITE,UA: NEGATIVE
SL AMB POCT PH,UA: 5
SL AMB POCT SPECIFIC GRAVITY,UA: 1.02
SL AMB POCT URINE PROTEIN: NEGATIVE
SL AMB POCT UROBILINOGEN: NORMAL

## 2019-12-19 PROCEDURE — 51797 INTRAABDOMINAL PRESSURE TEST: CPT

## 2019-12-19 PROCEDURE — 51728 CYSTOMETROGRAM W/VP: CPT

## 2019-12-19 PROCEDURE — 81002 URINALYSIS NONAUTO W/O SCOPE: CPT

## 2019-12-19 PROCEDURE — 51784 ANAL/URINARY MUSCLE STUDY: CPT

## 2019-12-19 NOTE — PROGRESS NOTES
CC: " I get a sudden urge and it leaks out"    Voided 5 ml, Volume in bladder with straight cath 125 ml     CMG:       Position:     standing       First urge:          37 ml,     pdet  2 cm of H2O         Normal urge:     Not verbalized           Must urge:         49 ml,     pdet  9 cm of H2O            Capacity:           54 ml,     pdet 30 cm of H2O             Max pdet during void  42   cm H2O       Voided volume:   52  Ml         Bladder stability:     unstable at 20 ml and 54 ml with  Leakage         Compliance:  normal        EMG activity:       Normal during filling,        normal during voiding    Comments:   After fill started at 15 ml, patient had detrusor contraction and leaked and voided 86 ml  Fill then restarted at decreased rate of 30 ml / min and after 54 ml again had detrusor contraction with leak and void       Urodynamics  Date/Time: 12/19/2019 2:43 PM  Performed by: Bartolo Daly RN  Authorized by: Sophia Darnell MD   Procedure - Urodynamics:  Procedure details: CMG      Voiding Pressure Study: Yes    Intra-abdominal Voiding Pressure Study: Yes    Post-procedure:     Patient tolerance: Patient tolerated procedure well with no immediate complications

## 2020-01-06 ENCOUNTER — OFFICE VISIT (OUTPATIENT)
Dept: UROLOGY | Facility: CLINIC | Age: 60
End: 2020-01-06
Payer: MEDICARE

## 2020-01-06 VITALS
BODY MASS INDEX: 46.65 KG/M2 | DIASTOLIC BLOOD PRESSURE: 82 MMHG | WEIGHT: 315 LBS | HEART RATE: 89 BPM | SYSTOLIC BLOOD PRESSURE: 124 MMHG | HEIGHT: 69 IN

## 2020-01-06 DIAGNOSIS — R39.15 URGENCY OF URINATION: ICD-10-CM

## 2020-01-06 DIAGNOSIS — N32.81 DETRUSOR INSTABILITY: Primary | ICD-10-CM

## 2020-01-06 PROCEDURE — 99214 OFFICE O/P EST MOD 30 MIN: CPT | Performed by: UROLOGY
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
## 2020-03-02 LAB
CREAT ?TM UR-SCNC: 25.4 UMOL/L
EXT PROTEIN URINE: 209.4
PROT/CREAT UR: 121 MG/G{CREAT}

## 2020-03-03 ENCOUNTER — DOCUMENTATION (OUTPATIENT)
Dept: NEPHROLOGY | Facility: CLINIC | Age: 60
End: 2020-03-03

## 2020-03-03 LAB
25(OH)D3 SERPL-MCNC: 25.1 NG/ML (ref 30–100)
ALBUMIN SNV-MCNC: 4.6 G/DL
ALP SERPL-CCNC: 83 U/L (ref 46–116)
ALT SERPL W P-5'-P-CCNC: 28 U/L (ref 12–78)
AST SERPL W P-5'-P-CCNC: 26 U/L (ref 5–45)
BUN SERPL-MCNC: 10 MG/DL (ref 5–25)
CALCIUM SERPL-MCNC: 9.6 MG/DL (ref 8.3–10.1)
CHLORIDE SERPL-SCNC: 102 MMOL/L (ref 100–108)
CO2 SERPL-SCNC: 25 MMOL/L (ref 21–32)
CREAT SERPL-MCNC: 0.81 MG/DL (ref 0.6–1.3)
GLUCOSE SERPL-MCNC: 103 MG/DL
MAGNESIUM SERPL-MCNC: 1.9 MG/DL (ref 1.6–2.6)
PHOSPHATE SERPL-MCNC: 2.8 MG/DL (ref 2.3–4.1)
POTASSIUM SERPL-SCNC: 4.4 MMOL/L (ref 3.5–5.3)
PROTEIN/CREAT RATIO (HISTORICAL): 0.1
SODIUM SERPL-SCNC: 142 MMOL/L (ref 136–145)

## 2020-03-04 LAB
25(OH)D3+25(OH)D2 SERPL-MCNC: 25.1 NG/ML (ref 30–100)
ALBUMIN SERPL-MCNC: 4.6 G/DL (ref 3.8–4.8)
ALBUMIN/GLOB SERPL: 1.8 {RATIO} (ref 1.2–2.2)
ALP SERPL-CCNC: 83 IU/L (ref 39–117)
ALT SERPL-CCNC: 28 IU/L (ref 0–44)
AST SERPL-CCNC: 26 IU/L (ref 0–40)
BILIRUB SERPL-MCNC: 0.5 MG/DL (ref 0–1.2)
BUN SERPL-MCNC: 10 MG/DL (ref 8–27)
BUN/CREAT SERPL: 12 (ref 10–24)
CALCIUM SERPL-MCNC: 9.6 MG/DL (ref 8.6–10.2)
CHLORIDE SERPL-SCNC: 102 MMOL/L (ref 96–106)
CO2 SERPL-SCNC: 25 MMOL/L (ref 20–29)
CREAT SERPL-MCNC: 0.81 MG/DL (ref 0.76–1.27)
CREAT UR-MCNC: 209.4 MG/DL
GLOBULIN SER-MCNC: 2.6 G/DL (ref 1.5–4.5)
GLUCOSE SERPL-MCNC: 103 MG/DL (ref 65–99)
MAGNESIUM SERPL-MCNC: 1.9 MG/DL (ref 1.6–2.3)
PHOSPHATE SERPL-MCNC: 2.8 MG/DL (ref 2.8–4.1)
POTASSIUM SERPL-SCNC: 4.4 MMOL/L (ref 3.5–5.2)
PROT SERPL-MCNC: 7.2 G/DL (ref 6–8.5)
PROT UR-MCNC: 25.4 MG/DL
PROT/CREAT UR: 121 MG/G CREAT (ref 0–200)
SL AMB EGFR AFRICAN AMERICAN: 111 ML/MIN/1.73
SL AMB EGFR NON AFRICAN AMERICAN: 96 ML/MIN/1.73
SODIUM SERPL-SCNC: 142 MMOL/L (ref 134–144)

## 2020-03-17 ENCOUNTER — OFFICE VISIT (OUTPATIENT)
Dept: NEPHROLOGY | Facility: CLINIC | Age: 60
End: 2020-03-17
Payer: MEDICARE

## 2020-03-17 VITALS
DIASTOLIC BLOOD PRESSURE: 84 MMHG | HEIGHT: 69 IN | BODY MASS INDEX: 46.65 KG/M2 | WEIGHT: 315 LBS | SYSTOLIC BLOOD PRESSURE: 122 MMHG

## 2020-03-17 DIAGNOSIS — E55.9 VITAMIN D DEFICIENCY: ICD-10-CM

## 2020-03-17 DIAGNOSIS — E87.6 HYPOKALEMIA: ICD-10-CM

## 2020-03-17 DIAGNOSIS — I10 BENIGN ESSENTIAL HYPERTENSION: Primary | ICD-10-CM

## 2020-03-17 PROBLEM — N17.9 ACUTE KIDNEY INJURY (HCC): Status: RESOLVED | Noted: 2019-07-05 | Resolved: 2020-03-17

## 2020-03-17 PROBLEM — E87.1 HYPONATREMIA: Status: RESOLVED | Noted: 2019-07-05 | Resolved: 2020-03-17

## 2020-03-17 PROCEDURE — 3066F NEPHROPATHY DOC TX: CPT | Performed by: INTERNAL MEDICINE

## 2020-03-17 PROCEDURE — 3008F BODY MASS INDEX DOCD: CPT | Performed by: INTERNAL MEDICINE

## 2020-03-17 PROCEDURE — 99213 OFFICE O/P EST LOW 20 MIN: CPT | Performed by: INTERNAL MEDICINE

## 2020-03-17 PROCEDURE — 3079F DIAST BP 80-89 MM HG: CPT | Performed by: INTERNAL MEDICINE

## 2020-03-17 PROCEDURE — 3074F SYST BP LT 130 MM HG: CPT | Performed by: INTERNAL MEDICINE

## 2020-03-17 PROCEDURE — 1036F TOBACCO NON-USER: CPT | Performed by: INTERNAL MEDICINE

## 2020-03-17 RX ORDER — MELOXICAM 15 MG/1
15 TABLET ORAL DAILY
COMMUNITY
End: 2022-01-10 | Stop reason: ALTCHOICE

## 2020-03-17 NOTE — PROGRESS NOTES
NEPHROLOGY OFFICE PROGRESS NOTE   Sammi Blood 62 y o  male MRN: [id number]  DATE: 03/17/20  Reason for visit: Continued evaluation of electrolyte abnormalities  ASSESSMENT & PLAN:  1  Hypokalemia:  · K is stable at 4 4 without KDur or Spironolactone  · No changes today  2  Hypertension:  · BP is at goal with Ramipril 5 mg daily, Torsemide 20 mg daily, Clonidine 0 2 mg BID  · No changes today  3  Congestive heart failure:  · Continue Torsemide 20 mg daily  · Unclear when Spironolactone was stopped  · No objection to ACEi or ARB use but will defer to cardiology  4  Vitamin D deficiency:  · Level 25 1 on 3/2/20 while on Vitamin D3 5000 units daily  · Will increase to 96757 units daily  Patient Instructions   No change to medications  Increase Vitamin D to 66736 units daily  Try to minimize the use of Meloxicam    Follow up in 9 months  SUBJECTIVE / INTERVAL HISTORY:  Carlton San was last seen in the office in November 2019  During that time, we had him on spironolactone 50 mg daily and not on Torsemide  On review of medications today, he appears to be on torsemide and no longer on spironolactone  He does not seem to remember when this change happened  His labs show stable renal function and a normal potassium level  He denies any acute complaints right now but he is taking meloxicam for joint pain  PMH/PSH: HTN, DM, CHF, cholecystectomy, fatty liver, GERD, obesity s/p gastric bypass in Jan 2002, COPD, JOSIAH on CPAP, DJD  ALLERGIES:   Allergies   Allergen Reactions    Gluten Meal      REVIEW OF SYSTEMS:  Review of Systems   Constitutional: Negative for appetite change, fatigue and fever  Respiratory: Negative for cough and shortness of breath  Cardiovascular: Positive for leg swelling  Negative for chest pain  Gastrointestinal: Negative for diarrhea, nausea and vomiting  Genitourinary: Negative for dysuria and hematuria  Musculoskeletal: Positive for arthralgias  Neurological: Negative for light-headedness  OBJECTIVE:  Ht 5' 9" (1 753 m)   Wt (!) 159 kg (351 lb)   BMI 51 83 kg/m²   Current Weight: Weight - Scale: (!) 159 kg (351 lb) Body mass index is 51 83 kg/m²  Physical Exam   Constitutional: He is oriented to person, place, and time  He appears well-developed and well-nourished  No distress  HENT:   Head: Normocephalic and atraumatic  Mouth/Throat: Mucous membranes are normal    Eyes: Conjunctivae are normal  No scleral icterus  Neck: Neck supple  No JVD present  Cardiovascular: Normal rate, regular rhythm and normal heart sounds  Pulmonary/Chest: Effort normal and breath sounds normal  No respiratory distress  Abdominal: Soft  Bowel sounds are normal    Musculoskeletal: He exhibits edema  Neurological: He is alert and oriented to person, place, and time  Skin: Skin is warm and dry  Psychiatric: He has a normal mood and affect   His behavior is normal      Medications:  Current Outpatient Medications:     cloNIDine (CATAPRES) 0 1 mg tablet, Take 2 tablets (0 2 mg total) by mouth every 12 (twelve) hours, Disp: 180 tablet, Rfl: 3    JARDIANCE 10 MG TABS, Take 10 mg by mouth every morning, Disp: , Rfl: 0    meloxicam (MOBIC) 15 mg tablet, Take 15 mg by mouth daily, Disp: , Rfl:     metFORMIN (GLUMETZA) 500 MG (MOD) 24 hr tablet, Take 500 mg by mouth 2 (two) times a day with meals, Disp: , Rfl:     Mirabegron ER 25 MG TB24, Take 25 mg by mouth daily at bedtime, Disp: 90 tablet, Rfl: 3    Multiple Vitamin (MULTIVITAMIN) tablet, Take 1 tablet by mouth daily, Disp: , Rfl:     omeprazole (PriLOSEC) 40 MG capsule, Take 1 capsule (40 mg total) by mouth daily, Disp: 90 capsule, Rfl: 3    Oyster Shell (OYSCO 500 PO), Take 500 mg by mouth daily, Disp: , Rfl:     ramipril (ALTACE) 5 mg capsule, Take 5 mg by mouth daily, Disp: , Rfl:     torsemide (DEMADEX) 20 mg tablet, Take 1 tablet (20 mg total) by mouth daily, Disp: 30 tablet, Rfl: 3   buPROPion (WELLBUTRIN XL) 300 mg 24 hr tablet, Take 300 mg by mouth every morning, Disp: , Rfl:     ergocalciferol (ERGOCALCIFEROL) 39314 units capsule, Take 1 capsule (50,000 Units total) by mouth once a week (Patient not taking: Reported on 12/13/2019), Disp: 16 capsule, Rfl: 0    fluconazole (DIFLUCAN) 150 mg tablet, Take 150 mg by mouth once, Disp: , Rfl:     ipratropium-albuterol (DUO-NEB) 0 5-2 5 mg/3 mL nebulizer solution, Take 1 vial (3 mL total) by nebulization 4 (four) times a day (Patient not taking: Reported on 12/13/2019), Disp: 3 vial, Rfl: 5    potassium chloride (K-DUR,KLOR-CON) 10 mEq tablet, Take 1 tablet (10 mEq total) by mouth daily (Patient not taking: Reported on 12/13/2019), Disp: 30 tablet, Rfl: 3    spironolactone (ALDACTONE) 25 mg tablet, Take 1 tablet (25 mg total) by mouth daily (Patient not taking: Reported on 3/17/2020), Disp: 30 tablet, Rfl: 3    Laboratory Results:  Results for orders placed or performed in visit on 03/03/20   VITAMIN D   Result Value Ref Range    GLUCOSE RANDOM 103 mg/dL    BUN 10 5 - 25 mg/dL    Creatinine 0 81 0 60 - 1 30 mg/dL    GFR MDRD Non Af Amer 96 ml/min/1 73sq m    Sodium 142 136 - 145 mmol/L    Potassium 4 4 3 5 - 5 3 mmol/L    Chloride 102 100 - 108 mmol/L    CO2 25 21 - 32 mmol/L    Calcium 9 6 8 3 - 10 1 mg/dL    Albumin 4 6     Alkaline Phosphatase 83 46 - 116 U/L    AST 26 5 - 45 U/L    ALT 28 12 - 78 U/L    Phosphorus 2 8 2 3 - 4 1 mg/dL    Protein/Creat Ratio 0 1     Vit D, 25-Hydroxy 25 1 (A) 30 0 - 100 0 ng/mL    Magnesium 1 9 1 6 - 2 6 mg/dL

## 2020-03-17 NOTE — LETTER
March 17, 2020     Sara Simpson, [address] [place] [address]    Patient: Sosa Calle   YOB: 1960   Date of Visit: 3/17/2020       Dear Dr Jsoette Skelton:    Thank you for referring Sosa Calle to me for evaluation  Below are my notes for this consultation  If you have questions, please do not hesitate to call me  I look forward to following your patient along with you  Sincerely,        Jennifer Hyman MD        CC: MD Jennifer Arshad MD  3/17/2020 11:39 AM  Sign at close encounter  [address] [place] 62 y o  male MRN: [id number]  DATE: 03/17/20  Reason for visit: Continued evaluation of electrolyte abnormalities  ASSESSMENT & PLAN:  1  Hypokalemia:  · K is stable at 4 4 without KDur or Spironolactone  · No changes today  2  Hypertension:  · BP is at goal with Ramipril 5 mg daily, Torsemide 20 mg daily, Clonidine 0 2 mg BID  · No changes today  3  Congestive heart failure:  · Continue Torsemide 20 mg daily  · Unclear when Spironolactone was stopped  · No objection to ACEi or ARB use but will defer to cardiology  4  Vitamin D deficiency:  · Level 25 1 on 3/2/20 while on Vitamin D3 5000 units daily  · Will increase to 61283 units daily  Patient Instructions   No change to medications  Increase Vitamin D to 03312 units daily  Try to minimize the use of Meloxicam    Follow up in 9 months  SUBJECTIVE / INTERVAL HISTORY:  Karan Irizarry was last seen in the office in November 2019  During that time, we had him on spironolactone 50 mg daily and not on Torsemide  On review of medications today, he appears to be on torsemide and no longer on spironolactone  He does not seem to remember when this change happened  His labs show stable renal function and a normal potassium level  He denies any acute complaints right now but he is taking meloxicam for joint pain      PMH/PSH: HTN, DM, CHF, cholecystectomy, fatty liver, GERD, obesity s/p gastric bypass in Jan 2002, COPD, JOSIAH on CPAP, DJD  ALLERGIES:   Allergies   Allergen Reactions    Gluten Meal      REVIEW OF SYSTEMS:  Review of Systems   Constitutional: Negative for appetite change, fatigue and fever  Respiratory: Negative for cough and shortness of breath  Cardiovascular: Positive for leg swelling  Negative for chest pain  Gastrointestinal: Negative for diarrhea, nausea and vomiting  Genitourinary: Negative for dysuria and hematuria  Musculoskeletal: Positive for arthralgias  Neurological: Negative for light-headedness  OBJECTIVE:  Ht 5' 9" (1 753 m)   Wt (!) 159 kg (351 lb)   BMI 51 83 kg/m²    Current Weight: Weight - Scale: (!) 159 kg (351 lb) Body mass index is 51 83 kg/m²  Physical Exam   Constitutional: He is oriented to person, place, and time  He appears well-developed and well-nourished  No distress  HENT:   Head: Normocephalic and atraumatic  Mouth/Throat: Mucous membranes are normal    Eyes: Conjunctivae are normal  No scleral icterus  Neck: Neck supple  No JVD present  Cardiovascular: Normal rate, regular rhythm and normal heart sounds  Pulmonary/Chest: Effort normal and breath sounds normal  No respiratory distress  Abdominal: Soft  Bowel sounds are normal    Musculoskeletal: He exhibits edema  Neurological: He is alert and oriented to person, place, and time  Skin: Skin is warm and dry  Psychiatric: He has a normal mood and affect   His behavior is normal      Medications:  Current Outpatient Medications:     cloNIDine (CATAPRES) 0 1 mg tablet, Take 2 tablets (0 2 mg total) by mouth every 12 (twelve) hours, Disp: 180 tablet, Rfl: 3    JARDIANCE 10 MG TABS, Take 10 mg by mouth every morning, Disp: , Rfl: 0    meloxicam (MOBIC) 15 mg tablet, Take 15 mg by mouth daily, Disp: , Rfl:     metFORMIN (GLUMETZA) 500 MG (MOD) 24 hr tablet, Take 500 mg by mouth 2 (two) times a day with meals, Disp: , Rfl:     Mirabegron ER 25 MG TB24, Take 25 mg by mouth daily at bedtime, Disp: 90 tablet, Rfl: 3    Multiple Vitamin (MULTIVITAMIN) tablet, Take 1 tablet by mouth daily, Disp: , Rfl:     omeprazole (PriLOSEC) 40 MG capsule, Take 1 capsule (40 mg total) by mouth daily, Disp: 90 capsule, Rfl: 3    Oyster Shell (OYSCO 500 PO), Take 500 mg by mouth daily, Disp: , Rfl:     ramipril (ALTACE) 5 mg capsule, Take 5 mg by mouth daily, Disp: , Rfl:     torsemide (DEMADEX) 20 mg tablet, Take 1 tablet (20 mg total) by mouth daily, Disp: 30 tablet, Rfl: 3    buPROPion (WELLBUTRIN XL) 300 mg 24 hr tablet, Take 300 mg by mouth every morning, Disp: , Rfl:     ergocalciferol (ERGOCALCIFEROL) 62146 units capsule, Take 1 capsule (50,000 Units total) by mouth once a week (Patient not taking: Reported on 12/13/2019), Disp: 16 capsule, Rfl: 0    fluconazole (DIFLUCAN) 150 mg tablet, Take 150 mg by mouth once, Disp: , Rfl:     ipratropium-albuterol (DUO-NEB) 0 5-2 5 mg/3 mL nebulizer solution, Take 1 vial (3 mL total) by nebulization 4 (four) times a day (Patient not taking: Reported on 12/13/2019), Disp: 3 vial, Rfl: 5    potassium chloride (K-DUR,KLOR-CON) 10 mEq tablet, Take 1 tablet (10 mEq total) by mouth daily (Patient not taking: Reported on 12/13/2019), Disp: 30 tablet, Rfl: 3    spironolactone (ALDACTONE) 25 mg tablet, Take 1 tablet (25 mg total) by mouth daily (Patient not taking: Reported on 3/17/2020), Disp: 30 tablet, Rfl: 3    Laboratory Results:  Results for orders placed or performed in visit on 03/03/20   VITAMIN D   Result Value Ref Range    GLUCOSE RANDOM 103 mg/dL    BUN 10 5 - 25 mg/dL    Creatinine 0 81 0 60 - 1 30 mg/dL    GFR MDRD Non Af Amer 96 ml/min/1 73sq m    Sodium 142 136 - 145 mmol/L    Potassium 4 4 3 5 - 5 3 mmol/L    Chloride 102 100 - 108 mmol/L    CO2 25 21 - 32 mmol/L    Calcium 9 6 8 3 - 10 1 mg/dL    Albumin 4 6     Alkaline Phosphatase 83 46 - 116 U/L    AST 26 5 - 45 U/L    ALT 28 12 - 78 U/L    Phosphorus 2 8 2 3 - 4 1 mg/dL    Protein/Creat Ratio 0 1     Vit D, 25-Hydroxy 25 1 (A) 30 0 - 100 0 ng/mL    Magnesium 1 9 1 6 - 2 6 mg/dL

## 2020-03-17 NOTE — PATIENT INSTRUCTIONS
No change to medications  Increase Vitamin D to 91792 units daily  Try to minimize the use of Meloxicam    Follow up in 9 months

## 2020-06-08 LAB
25(OH)D3+25(OH)D2 SERPL-MCNC: 32.2 NG/ML (ref 30–100)
ALBUMIN SERPL-MCNC: 4.7 G/DL (ref 3.8–4.8)
BUN SERPL-MCNC: 10 MG/DL (ref 8–27)
BUN/CREAT SERPL: 11 (ref 10–24)
CALCIUM SERPL-MCNC: 9.5 MG/DL (ref 8.6–10.2)
CHLORIDE SERPL-SCNC: 105 MMOL/L (ref 96–106)
CO2 SERPL-SCNC: 23 MMOL/L (ref 20–29)
CREAT SERPL-MCNC: 0.89 MG/DL (ref 0.76–1.27)
GLUCOSE SERPL-MCNC: 164 MG/DL (ref 65–99)
MAGNESIUM SERPL-MCNC: 2.2 MG/DL (ref 1.6–2.3)
PHOSPHATE SERPL-MCNC: 2.3 MG/DL (ref 2.8–4.1)
POTASSIUM SERPL-SCNC: 4.2 MMOL/L (ref 3.5–5.2)
SL AMB EGFR AFRICAN AMERICAN: 106 ML/MIN/1.73
SL AMB EGFR NON AFRICAN AMERICAN: 92 ML/MIN/1.73
SODIUM SERPL-SCNC: 142 MMOL/L (ref 134–144)

## 2020-06-10 ENCOUNTER — TELEPHONE (OUTPATIENT)
Dept: NEPHROLOGY | Facility: CLINIC | Age: 60
End: 2020-06-10

## 2020-10-13 ENCOUNTER — TELEPHONE (OUTPATIENT)
Dept: NEPHROLOGY | Facility: CLINIC | Age: 60
End: 2020-10-13

## 2020-12-21 ENCOUNTER — DOCUMENTATION (OUTPATIENT)
Dept: NEPHROLOGY | Facility: CLINIC | Age: 60
End: 2020-12-21

## 2020-12-21 LAB
EXT GLUCOSE BLD: 80
EXTERNAL ALBUMIN: 3.6
EXTERNAL ALK PHOS: 80
EXTERNAL ALT: 31
EXTERNAL AST: 21
EXTERNAL BICARBONATE: 29
EXTERNAL BUN: 9
EXTERNAL CALCIUM: 8.6
EXTERNAL CHLORIDE: 105
EXTERNAL CREATININE: 0.77
EXTERNAL EGFR: 97
EXTERNAL POTASSIUM: 3.9
EXTERNAL SODIUM: 138

## 2020-12-23 ENCOUNTER — OFFICE VISIT (OUTPATIENT)
Dept: NEPHROLOGY | Facility: CLINIC | Age: 60
End: 2020-12-23
Payer: MEDICARE

## 2020-12-23 VITALS
WEIGHT: 315 LBS | HEIGHT: 69 IN | DIASTOLIC BLOOD PRESSURE: 78 MMHG | SYSTOLIC BLOOD PRESSURE: 128 MMHG | BODY MASS INDEX: 46.65 KG/M2

## 2020-12-23 DIAGNOSIS — I10 ESSENTIAL HYPERTENSION: ICD-10-CM

## 2020-12-23 DIAGNOSIS — I50.32 CHRONIC DIASTOLIC CONGESTIVE HEART FAILURE (HCC): ICD-10-CM

## 2020-12-23 DIAGNOSIS — E87.6 HYPOKALEMIA: Primary | ICD-10-CM

## 2020-12-23 DIAGNOSIS — E55.9 VITAMIN D DEFICIENCY: ICD-10-CM

## 2020-12-23 PROCEDURE — 99214 OFFICE O/P EST MOD 30 MIN: CPT | Performed by: INTERNAL MEDICINE

## 2020-12-23 RX ORDER — SPIRONOLACTONE 25 MG/1
25 TABLET ORAL DAILY
COMMUNITY
End: 2022-01-28 | Stop reason: HOSPADM

## 2020-12-23 RX ORDER — CARIPRAZINE 3 MG/1
3 CAPSULE, GELATIN COATED ORAL DAILY
COMMUNITY
Start: 2020-12-18

## 2020-12-23 RX ORDER — TRAZODONE HYDROCHLORIDE 150 MG/1
150 TABLET ORAL
COMMUNITY
Start: 2020-12-18

## 2020-12-23 RX ORDER — BUPROPION HYDROCHLORIDE 300 MG/1
300 TABLET ORAL DAILY
COMMUNITY
Start: 2020-12-18

## 2021-01-07 ENCOUNTER — TELEPHONE (OUTPATIENT)
Dept: UROLOGY | Facility: AMBULATORY SURGERY CENTER | Age: 61
End: 2021-01-07

## 2021-01-07 NOTE — TELEPHONE ENCOUNTER
Patient called to cancel OV with Roxianne Dakin today  Asked if wanted to r/s and reason for cancelling, he stated "I just want to cancel at this time" he will call back when ready to r/s

## 2021-01-12 ENCOUNTER — TELEPHONE (OUTPATIENT)
Dept: PULMONOLOGY | Facility: CLINIC | Age: 61
End: 2021-01-12

## 2021-01-12 NOTE — TELEPHONE ENCOUNTER
Called and spoke to patient to reschedule his march 1st appt with Dr Angelita Donovan in [facility], patient stated he will take care of it as he has another provider in mind  Not scheduling at this time with our office

## 2021-03-25 NOTE — TELEPHONE ENCOUNTER
Note to chart:  3/25/21 [facility] because [facility] CPAP supplies to the pt under Dr Jodi Pizarro name, but I checked the chart and advised they need to call the pt b/c he chose to not schedule a f/u with one of our current Pulm Drs  Spoke with "Doreen Rouse" from [facility]

## 2022-01-09 NOTE — PROGRESS NOTES
[facility]- Outpatient Follow-up Note  Georges Adan 59 y o  male MRN: [id number]  Encounter: [id number]          ASSESSMENT AND PLAN:      1  Constipation, unspecified constipation type  2  Lower abdominal pain  3  Colon cancer screening  Patient with chronic constipation, now with intermittent bilateral lower abdominal pain  Recent CT abdomen/pelvis without contrast showed moderate volume of fecal material throughout the colon without evidence of diverticulitis colitis, or focal colonic lesion  Last colonoscopy was in 2017 with internal hemorrhoids, no polyps  He has many risk factors for constipation including parkinson's disease, sedentary lifestyle, low fiber diet, and some of his cardiac medications can contribute as well  Discussed increasing fiber in his diet, activity as tolerated, and starting Miralax daily w/ titration to effect  Will obtain colonoscopy for further evaluation  Prep and procedure explained  -     Colonoscopy; Future; Expected date: 01/10/2022  -     polyethylene glycol (GLYCOLAX) 17 GM/SCOOP powder; Take 17 g by mouth daily        ______________________________________________________________________    SUBJECTIVE:  Georges Adan is a 59 y o  male with history of HTN, HLD, CHF, cholecystectomy, fatty liver, GERD, obesity s/p gastric bypass 2002, COPD, JOSIAH on CPAP, BPH, pancreatitis, Parkinson's disease, urinary incontinence who presents for follow up for constipation  He was seen recently at [facility] ED due to left flank pain which felt similar to kidney stones he had in the past, he had CT abd/pelvis w/o contrast done showing no stone, but did show evidence of a moderate volume of fecal material in the colon  CBC, CMP, lipase unremarkable  He has a history of constipation for years but had ignored it until he started having pain  He typically moves his bowels every 3-4 days with staining and hard stool  Denies any blood in bowel movements   He reports colace and magnesium citrate were ineffective so he stopped taking them  He has mid lower abdominal pain, LLQ abdominal pain which is intermittent and associated with moving his bowels  GERD well controlled on Prilosec 40 mg  Takes Advil rarely for headaches, advised him to avoid this due to history of gastric bypass  He used to be 500 pounds, lowest was 220 pounds after his weight loss surgery in 2002  Last colonoscopy was done by Dr Derik Leslie in 2017 showing mild internal hemorrhoids, no polyps  He was recommended to repeat colonoscopy in October 2027  Last EGD was done in 2019 by Dr Romel Collado showing 1cm hematoma vs superficial ulcer w/ adherent clot in the gastric pouch  Follows with Dr Katharina Francis cardiologist from [facility]  Had his gastric bypass done at [address]  Denies alcohol, drug use, tobacco use  Denies family history of colon cancer  He has had vaccination series for COVID as well as booster  REVIEW OF SYSTEMS IS OTHERWISE NEGATIVE  Historical Information   Past Medical History:   Diagnosis Date    Arthritis     Asthma     CPAP (continuous positive airway pressure) dependence     Edema     left leg    GERD (gastroesophageal reflux disease)     History of echocardiogram 07/2017    History of Holter monitoring 09/2014    History of stress test 08/2014    Hypertension     Myocardial infarct [facility])     2011    Myocardial infarction ([facility] [address] )     Obesity     Sleep apnea     Thrombophlebitis      Past Surgical History:   Procedure Laterality Date    CARDIAC CATHETERIZATION  03/2012    [id number]    CHOLECYSTECTOMY      COLONOSCOPY      COLONOSCOPY N/A 10/30/2017    Procedure: COLONOSCOPY;  Surgeon: Adela Lozada MD;  Location: [place] [address] GI LAB; Service: Gastroenterology    ENDOVENOUS ABLATION SAPHENOUS VEIN W/ LASER      each addit vein    ERCP      ESOPHAGOGASTRODUODENOSCOPY N/A 10/30/2017    Procedure: ESOPHAGOGASTRODUODENOSCOPY (EGD);   Surgeon: Adela Lozada MD;  Location: [facility] GI LAB;   Service: Gastroenterology    GASTRIC BYPASS      2001    GASTRIC BYPASS      HERNIA REPAIR      paraesophageal hiatus hernia    HERNIA REPAIR      x5 last one 2011    JOINT REPLACEMENT      KNEE ARTHROPLASTY Right     2102    REPLACEMENT TOTAL KNEE Right 02/2011     Social History   Social History     Substance and Sexual Activity   Alcohol Use Not Currently    Comment: rarely - denied per allscripts      Social History     Substance and Sexual Activity   Drug Use No     Social History     Tobacco Use   Smoking Status Former Smoker    Packs/day: 1 00    Years: 3 00    Pack years: 3 00    Types: Cigarettes   Smokeless Tobacco Former User   Tobacco Comment    never a smoker per allscripts - as per [place]     Family History   Problem Relation Age of Onset    Uterine cancer Mother     Prostate cancer Father     Diabetes Father     Heart failure Father     Stroke Family         syndrome    Aneurysm Family         aoritc    Diabetes Brother     Other Brother         amputation-    Diabetes Paternal Grandmother     Diabetes Paternal Grandfather        Meds/Allergies       Current Outpatient Medications:     aspirin (ECOTRIN LOW STRENGTH) 81 mg EC tablet    atorvastatin (LIPITOR) 20 mg tablet    buPROPion (WELLBUTRIN XL) 300 mg 24 hr tablet    carbidopa-levodopa (SINEMET)  mg per tablet    cloNIDine (CATAPRES) 0 1 mg tablet    Dulaglutide 0 75 MG/0 5ML SOPN    fluorouracil (EFUDEX) 5 % cream    gabapentin (NEURONTIN) 100 mg capsule    HYDROcodone-acetaminophen (NORCO) 5-325 mg per tablet    ipratropium-albuterol (DUO-NEB) 0 5-2 5 mg/3 mL nebulizer solution    metFORMIN (GLUMETZA) 500 MG (MOD) 24 hr tablet    Multiple Vitamin (MULTIVITAMIN) tablet    omeprazole (PriLOSEC) 40 MG capsule    Oyster Shell (OYSCO 500 PO)    ramipril (ALTACE) 5 mg capsule    sildenafil (VIAGRA) 100 mg tablet    spironolactone (ALDACTONE) 25 mg tablet    torsemide (DEMADEX) 20 mg tablet   traZODone (DESYREL) 150 mg tablet    Vraylar 3 MG capsule    Mirabegron ER 25 MG TB24    polyethylene glycol (GLYCOLAX) 17 GM/SCOOP powder    Allergies   Allergen Reactions    Gluten Meal - Food Allergy     Adhesive [Medical Tape] Rash           Objective     Blood pressure 116/86, pulse 85, height 5' 9" (1 753 m), weight (!) 153 kg (338 lb)  Body mass index is 49 91 kg/m²  PHYSICAL EXAM:      General Appearance:   Alert, cooperative, no distress   HEENT:   Normocephalic, atraumatic, anicteric  Neck:  Supple, symmetrical, trachea midline   Lungs:   Clear to auscultation bilaterally; no rales, rhonchi or wheezing; respirations unlabored    Heart[de-identified]   Regular rate and rhythm; no murmur  Abdomen:   Soft, non-tender, non-distended; normal bowel sounds; no masses, no organomegaly    Genitalia:   Deferred    Rectal:   Deferred    Extremities:  No cyanosis, clubbing or edema    Skin:  No jaundice, rashes, or lesions    Lymph nodes:  No palpable cervical lymphadenopathy        Lab Results:   No visits with results within 1 Day(s) from this visit  Latest known visit with results is:   Documentation on 12/21/2020   Component Date Value    SODIUM 10/30/2020 138     POTASSIUM 10/30/2020 3 9     CHLORIDE 10/30/2020 105     BICARB 10/30/2020 29     BUN 10/30/2020 9     CREATININE 10/30/2020 0 77     Glucose 10/30/2020 80     EXTERNAL CALCIUM 10/30/2020 8 6     ALBUMIN 10/30/2020 3 6     AST 10/30/2020 21     ALT 10/30/2020 31     ALK PHOS 10/30/2020 80     EXTERNAL EGFR 10/30/2020 97          Radiology Results:   No results found

## 2022-01-10 ENCOUNTER — OFFICE VISIT (OUTPATIENT)
Dept: GASTROENTEROLOGY | Facility: CLINIC | Age: 62
End: 2022-01-10
Payer: MEDICARE

## 2022-01-10 VITALS
HEART RATE: 85 BPM | HEIGHT: 69 IN | BODY MASS INDEX: 46.65 KG/M2 | SYSTOLIC BLOOD PRESSURE: 116 MMHG | WEIGHT: 315 LBS | DIASTOLIC BLOOD PRESSURE: 86 MMHG

## 2022-01-10 DIAGNOSIS — Z12.11 COLON CANCER SCREENING: ICD-10-CM

## 2022-01-10 DIAGNOSIS — K59.00 CONSTIPATION, UNSPECIFIED CONSTIPATION TYPE: Primary | ICD-10-CM

## 2022-01-10 DIAGNOSIS — R10.30 LOWER ABDOMINAL PAIN: ICD-10-CM

## 2022-01-10 PROCEDURE — 99203 OFFICE O/P NEW LOW 30 MIN: CPT | Performed by: NURSE PRACTITIONER

## 2022-01-10 RX ORDER — HYDROCODONE BITARTRATE AND ACETAMINOPHEN 5; 325 MG/1; MG/1
TABLET ORAL
COMMUNITY
Start: 2021-10-09 | End: 2022-03-21 | Stop reason: ALTCHOICE

## 2022-01-10 RX ORDER — FLUOROURACIL 50 MG/G
CREAM TOPICAL
COMMUNITY
Start: 2021-10-18

## 2022-01-10 RX ORDER — B-COMPLEX WITH VITAMIN C
1 TABLET ORAL
COMMUNITY
End: 2022-01-10 | Stop reason: ALTCHOICE

## 2022-01-10 RX ORDER — GABAPENTIN 100 MG/1
300 CAPSULE ORAL
COMMUNITY
Start: 2021-02-23 | End: 2022-02-23

## 2022-01-10 RX ORDER — ATORVASTATIN CALCIUM 20 MG/1
20 TABLET, FILM COATED ORAL DAILY
COMMUNITY
Start: 2021-10-27 | End: 2022-10-27

## 2022-01-10 RX ORDER — SILDENAFIL 100 MG/1
100 TABLET, FILM COATED ORAL DAILY PRN
COMMUNITY
Start: 2021-10-05

## 2022-01-10 RX ORDER — POLYETHYLENE GLYCOL 3350 17 G/17G
17 POWDER, FOR SOLUTION ORAL DAILY
Qty: 289 G | Refills: 1 | Status: SHIPPED | OUTPATIENT
Start: 2022-01-10

## 2022-01-10 RX ORDER — ASPIRIN 81 MG/1
81 TABLET ORAL DAILY
COMMUNITY
Start: 2021-01-14 | End: 2022-01-14

## 2022-01-10 NOTE — PATIENT INSTRUCTIONS
You have been prescribed miralax (polyethylene glycol) for treatment of your CONSTIPATION  Start 1 capful daily  Take this dose x 3 days  If your symptoms are improved, great! Continue this dose daily  If you have diarrhea (stools are loose, associated with accidents, or urgency) with this dose, cut down to 1/2 capful daily  Continue to titrate down until you find the dose for you  This might be 1 tbsp daily  Wait 3 days before making a change  If you have continued constipation with 1 capful daily, you may need a higher dose  Start with 1 5 capfuls daily and titrate upward  Eg might need 1 capful twice daily  There is no maximum dose, whatever works for you  Again, wait 3 days before making a change  High Fiber Diet   AMBULATORY CARE:   A high-fiber diet  includes foods that have a high amount of fiber  Fiber is the part of fruits, vegetables, and grains that is not broken down by your body  Fiber keeps your bowel movements regular  Fiber can also help lower your cholesterol level, control blood sugar in people with diabetes, and relieve constipation  Fiber can also help you control your weight because it helps you feel full faster  Most adults should eat 25 to 35 grams of fiber each day  Talk to your dietitian or healthcare provider about the amount of fiber you need  Good sources of fiber:       · Foods with at least 4 grams of fiber per serving:      ? ? to ½ cup of high-fiber cereal (check the nutrition label on the box)    ? ½ cup of blackberries or raspberries    ? 4 dried prunes    ? 1 cooked artichoke    ? ½ cup of cooked legumes, such as lentils, or red, kidney, and navarro beans    · Foods with 1 to 3 grams of fiber per serving:      ? 1 slice of whole-wheat, pumpernickel, or rye bread    ? ½ cup of cooked brown rice    ? 4 whole-wheat crackers    ? 1 cup of oatmeal    ? ½ cup of cereal with 1 to 3 grams of fiber per serving (check the nutrition label on the box)    ?  1 small piece of fruit, such as an apple, banana, pear, kiwi, or orange    ? 3 dates    ? ½ cup of canned apricots, fruit cocktail, peaches, or pears    ? ½ cup of raw or cooked vegetables, such as carrots, cauliflower, cabbage, spinach, squash, or corn    Ways that you can increase fiber in your diet:   · Choose brown or wild rice instead of white rice  · Use whole wheat flour in recipes instead of white or all-purpose flour  · Add beans and peas to casseroles or soups  · Choose fresh fruit and vegetables with peels or skins on instead of juices  Other diet guidelines to follow:   · Add fiber to your diet slowly  You may have abdominal discomfort, bloating, and gas if you add fiber to your diet too quickly  · Drink plenty of liquids as you add fiber to your diet  You may have nausea or develop constipation if you do not drink enough water  Ask how much liquid to drink each day and which liquids are best for you  © Copyright [employer] 2021 Information is for End User's use only and may not be sold, redistributed or otherwise used for commercial purposes  All illustrations and images included in CareNotes® are the copyrighted property of [employer]  or [facility] Krystle Watson   The above information is an  only  It is not intended as medical advice for individual conditions or treatments  Talk to your doctor, nurse or pharmacist before following any medical regimen to see if it is safe and effective for you

## 2022-01-25 ENCOUNTER — APPOINTMENT (EMERGENCY)
Dept: CT IMAGING | Facility: HOSPITAL | Age: 62
DRG: 439 | End: 2022-01-25
Payer: MEDICARE

## 2022-01-25 ENCOUNTER — HOSPITAL ENCOUNTER (INPATIENT)
Facility: HOSPITAL | Age: 62
LOS: 3 days | Discharge: HOME HEALTH | DRG: 439 | End: 2022-01-28
Attending: EMERGENCY MEDICINE | Admitting: INTERNAL MEDICINE
Payer: MEDICARE

## 2022-01-25 ENCOUNTER — TELEPHONE (OUTPATIENT)
Dept: GASTROENTEROLOGY | Facility: CLINIC | Age: 62
End: 2022-01-25

## 2022-01-25 DIAGNOSIS — I48.91 ATRIAL FIBRILLATION, UNSPECIFIED TYPE (HCC): ICD-10-CM

## 2022-01-25 DIAGNOSIS — K85.90 ACUTE PANCREATITIS, UNSPECIFIED COMPLICATION STATUS, UNSPECIFIED PANCREATITIS TYPE: Primary | ICD-10-CM

## 2022-01-25 DIAGNOSIS — K21.9 GERD WITHOUT ESOPHAGITIS: Chronic | ICD-10-CM

## 2022-01-25 DIAGNOSIS — I48.91 NEW ONSET ATRIAL FIBRILLATION (HCC): ICD-10-CM

## 2022-01-25 PROBLEM — K59.09 OTHER CONSTIPATION: Status: ACTIVE | Noted: 2022-01-25

## 2022-01-25 PROBLEM — E11.9 TYPE 2 DIABETES MELLITUS WITHOUT COMPLICATION, WITHOUT LONG-TERM CURRENT USE OF INSULIN (HCC): Status: ACTIVE | Noted: 2022-01-25

## 2022-01-25 LAB
ALBUMIN SERPL BCP-MCNC: 3.8 G/DL (ref 3.5–5)
ALP SERPL-CCNC: 93 U/L (ref 46–116)
ALT SERPL W P-5'-P-CCNC: 9 U/L (ref 12–78)
ANION GAP SERPL CALCULATED.3IONS-SCNC: 8 MMOL/L (ref 4–13)
AST SERPL W P-5'-P-CCNC: 20 U/L (ref 5–45)
BASOPHILS # BLD AUTO: 0.04 THOUSANDS/ΜL (ref 0–0.1)
BASOPHILS NFR BLD AUTO: 0 % (ref 0–1)
BILIRUB SERPL-MCNC: 0.81 MG/DL (ref 0.2–1)
BILIRUB UR QL STRIP: NEGATIVE
BUN SERPL-MCNC: 12 MG/DL (ref 5–25)
CALCIUM SERPL-MCNC: 8.7 MG/DL (ref 8.3–10.1)
CHLORIDE SERPL-SCNC: 101 MMOL/L (ref 100–108)
CLARITY UR: CLEAR
CO2 SERPL-SCNC: 27 MMOL/L (ref 21–32)
COLOR UR: YELLOW
CREAT SERPL-MCNC: 0.83 MG/DL (ref 0.6–1.3)
EOSINOPHIL # BLD AUTO: 0.11 THOUSAND/ΜL (ref 0–0.61)
EOSINOPHIL NFR BLD AUTO: 1 % (ref 0–6)
ERYTHROCYTE [DISTWIDTH] IN BLOOD BY AUTOMATED COUNT: 14.1 % (ref 11.6–15.1)
FLUAV RNA RESP QL NAA+PROBE: NEGATIVE
FLUBV RNA RESP QL NAA+PROBE: NEGATIVE
GFR SERPL CREATININE-BSD FRML MDRD: 93 ML/MIN/1.73SQ M
GLUCOSE SERPL-MCNC: 109 MG/DL (ref 65–140)
GLUCOSE SERPL-MCNC: 121 MG/DL (ref 65–140)
GLUCOSE SERPL-MCNC: 98 MG/DL (ref 65–140)
GLUCOSE UR STRIP-MCNC: NEGATIVE MG/DL
HCT VFR BLD AUTO: 45.2 % (ref 36.5–49.3)
HGB BLD-MCNC: 13.9 G/DL (ref 12–17)
HGB UR QL STRIP.AUTO: NEGATIVE
IMM GRANULOCYTES # BLD AUTO: 0.05 THOUSAND/UL (ref 0–0.2)
IMM GRANULOCYTES NFR BLD AUTO: 0 % (ref 0–2)
KETONES UR STRIP-MCNC: NEGATIVE MG/DL
LEUKOCYTE ESTERASE UR QL STRIP: NEGATIVE
LIPASE SERPL-CCNC: 1187 U/L (ref 73–393)
LYMPHOCYTES # BLD AUTO: 1.37 THOUSANDS/ΜL (ref 0.6–4.47)
LYMPHOCYTES NFR BLD AUTO: 11 % (ref 14–44)
MCH RBC QN AUTO: 23.9 PG (ref 26.8–34.3)
MCHC RBC AUTO-ENTMCNC: 30.8 G/DL (ref 31.4–37.4)
MCV RBC AUTO: 78 FL (ref 82–98)
MONOCYTES # BLD AUTO: 1.03 THOUSAND/ΜL (ref 0.17–1.22)
MONOCYTES NFR BLD AUTO: 8 % (ref 4–12)
NEUTROPHILS # BLD AUTO: 10.23 THOUSANDS/ΜL (ref 1.85–7.62)
NEUTS SEG NFR BLD AUTO: 80 % (ref 43–75)
NITRITE UR QL STRIP: NEGATIVE
NRBC BLD AUTO-RTO: 0 /100 WBCS
PH UR STRIP.AUTO: 7 [PH] (ref 4.5–8)
PLATELET # BLD AUTO: 319 THOUSANDS/UL (ref 149–390)
PMV BLD AUTO: 9.3 FL (ref 8.9–12.7)
POTASSIUM SERPL-SCNC: 4.2 MMOL/L (ref 3.5–5.3)
PROT SERPL-MCNC: 7.6 G/DL (ref 6.4–8.2)
PROT UR STRIP-MCNC: NEGATIVE MG/DL
RBC # BLD AUTO: 5.81 MILLION/UL (ref 3.88–5.62)
RSV RNA RESP QL NAA+PROBE: NEGATIVE
SARS-COV-2 RNA RESP QL NAA+PROBE: NEGATIVE
SODIUM SERPL-SCNC: 136 MMOL/L (ref 136–145)
SP GR UR STRIP.AUTO: 1.02 (ref 1–1.03)
TRIGL SERPL-MCNC: 48 MG/DL
UROBILINOGEN UR QL STRIP.AUTO: 4 E.U./DL
WBC # BLD AUTO: 12.83 THOUSAND/UL (ref 4.31–10.16)

## 2022-01-25 PROCEDURE — 36415 COLL VENOUS BLD VENIPUNCTURE: CPT | Performed by: PHYSICIAN ASSISTANT

## 2022-01-25 PROCEDURE — 74177 CT ABD & PELVIS W/CONTRAST: CPT

## 2022-01-25 PROCEDURE — 80053 COMPREHEN METABOLIC PANEL: CPT | Performed by: PHYSICIAN ASSISTANT

## 2022-01-25 PROCEDURE — 85025 COMPLETE CBC W/AUTO DIFF WBC: CPT | Performed by: PHYSICIAN ASSISTANT

## 2022-01-25 PROCEDURE — 0241U HB NFCT DS VIR RESP RNA 4 TRGT: CPT | Performed by: NURSE PRACTITIONER

## 2022-01-25 PROCEDURE — 84478 ASSAY OF TRIGLYCERIDES: CPT | Performed by: INTERNAL MEDICINE

## 2022-01-25 PROCEDURE — 83036 HEMOGLOBIN GLYCOSYLATED A1C: CPT | Performed by: INTERNAL MEDICINE

## 2022-01-25 PROCEDURE — 83690 ASSAY OF LIPASE: CPT | Performed by: PHYSICIAN ASSISTANT

## 2022-01-25 PROCEDURE — 82948 REAGENT STRIP/BLOOD GLUCOSE: CPT

## 2022-01-25 PROCEDURE — 81003 URINALYSIS AUTO W/O SCOPE: CPT

## 2022-01-25 PROCEDURE — 99285 EMERGENCY DEPT VISIT HI MDM: CPT

## 2022-01-25 PROCEDURE — 99285 EMERGENCY DEPT VISIT HI MDM: CPT | Performed by: PHYSICIAN ASSISTANT

## 2022-01-25 PROCEDURE — G1004 CDSM NDSC: HCPCS

## 2022-01-25 PROCEDURE — 96375 TX/PRO/DX INJ NEW DRUG ADDON: CPT

## 2022-01-25 PROCEDURE — 96376 TX/PRO/DX INJ SAME DRUG ADON: CPT

## 2022-01-25 PROCEDURE — 99223 1ST HOSP IP/OBS HIGH 75: CPT | Performed by: INTERNAL MEDICINE

## 2022-01-25 PROCEDURE — 96374 THER/PROPH/DIAG INJ IV PUSH: CPT

## 2022-01-25 RX ORDER — OXYBUTYNIN CHLORIDE 5 MG/1
5 TABLET, EXTENDED RELEASE ORAL DAILY
Status: DISCONTINUED | OUTPATIENT
Start: 2022-01-26 | End: 2022-01-28 | Stop reason: HOSPADM

## 2022-01-25 RX ORDER — OXYCODONE HYDROCHLORIDE 5 MG/1
5 TABLET ORAL EVERY 4 HOURS PRN
Status: DISCONTINUED | OUTPATIENT
Start: 2022-01-25 | End: 2022-01-28 | Stop reason: HOSPADM

## 2022-01-25 RX ORDER — GABAPENTIN 100 MG/1
100 CAPSULE ORAL 3 TIMES DAILY
Status: DISCONTINUED | OUTPATIENT
Start: 2022-01-25 | End: 2022-01-28 | Stop reason: HOSPADM

## 2022-01-25 RX ORDER — ATORVASTATIN CALCIUM 20 MG/1
20 TABLET, FILM COATED ORAL DAILY
Status: DISCONTINUED | OUTPATIENT
Start: 2022-01-26 | End: 2022-01-28 | Stop reason: HOSPADM

## 2022-01-25 RX ORDER — HYDROMORPHONE HCL/PF 1 MG/ML
0.5 SYRINGE (ML) INJECTION EVERY 4 HOURS PRN
Status: DISCONTINUED | OUTPATIENT
Start: 2022-01-25 | End: 2022-01-28 | Stop reason: HOSPADM

## 2022-01-25 RX ORDER — BUPROPION HYDROCHLORIDE 150 MG/1
300 TABLET ORAL DAILY
Status: DISCONTINUED | OUTPATIENT
Start: 2022-01-26 | End: 2022-01-28 | Stop reason: HOSPADM

## 2022-01-25 RX ORDER — TORSEMIDE 20 MG/1
20 TABLET ORAL DAILY
Status: DISCONTINUED | OUTPATIENT
Start: 2022-01-26 | End: 2022-01-27

## 2022-01-25 RX ORDER — PANTOPRAZOLE SODIUM 40 MG/1
40 INJECTION, POWDER, FOR SOLUTION INTRAVENOUS
Status: DISCONTINUED | OUTPATIENT
Start: 2022-01-26 | End: 2022-01-28

## 2022-01-25 RX ORDER — AMOXICILLIN 250 MG
2 CAPSULE ORAL DAILY
Status: DISCONTINUED | OUTPATIENT
Start: 2022-01-26 | End: 2022-01-28 | Stop reason: HOSPADM

## 2022-01-25 RX ORDER — SPIRONOLACTONE 25 MG/1
25 TABLET ORAL DAILY
Status: DISCONTINUED | OUTPATIENT
Start: 2022-01-26 | End: 2022-01-27

## 2022-01-25 RX ORDER — SODIUM CHLORIDE 9 MG/ML
50 INJECTION, SOLUTION INTRAVENOUS CONTINUOUS
Status: DISCONTINUED | OUTPATIENT
Start: 2022-01-25 | End: 2022-01-28 | Stop reason: HOSPADM

## 2022-01-25 RX ORDER — CLONIDINE HYDROCHLORIDE 0.1 MG/1
0.1 TABLET ORAL EVERY 12 HOURS SCHEDULED
Status: DISCONTINUED | OUTPATIENT
Start: 2022-01-25 | End: 2022-01-28 | Stop reason: HOSPADM

## 2022-01-25 RX ORDER — ACETAMINOPHEN 325 MG/1
650 TABLET ORAL EVERY 4 HOURS PRN
Status: DISCONTINUED | OUTPATIENT
Start: 2022-01-25 | End: 2022-01-28 | Stop reason: HOSPADM

## 2022-01-25 RX ORDER — ONDANSETRON 2 MG/ML
4 INJECTION INTRAMUSCULAR; INTRAVENOUS ONCE
Status: COMPLETED | OUTPATIENT
Start: 2022-01-25 | End: 2022-01-25

## 2022-01-25 RX ORDER — OXYCODONE HYDROCHLORIDE 10 MG/1
10 TABLET ORAL EVERY 4 HOURS PRN
Status: DISCONTINUED | OUTPATIENT
Start: 2022-01-25 | End: 2022-01-28 | Stop reason: HOSPADM

## 2022-01-25 RX ORDER — MORPHINE SULFATE 4 MG/ML
4 INJECTION, SOLUTION INTRAMUSCULAR; INTRAVENOUS ONCE
Status: COMPLETED | OUTPATIENT
Start: 2022-01-25 | End: 2022-01-25

## 2022-01-25 RX ORDER — LISINOPRIL 20 MG/1
20 TABLET ORAL DAILY
Status: DISCONTINUED | OUTPATIENT
Start: 2022-01-26 | End: 2022-01-28 | Stop reason: HOSPADM

## 2022-01-25 RX ORDER — POLYETHYLENE GLYCOL 3350 17 G/17G
17 POWDER, FOR SOLUTION ORAL DAILY
Status: DISCONTINUED | OUTPATIENT
Start: 2022-01-26 | End: 2022-01-28 | Stop reason: HOSPADM

## 2022-01-25 RX ORDER — ASPIRIN 81 MG/1
81 TABLET ORAL DAILY
Status: DISCONTINUED | OUTPATIENT
Start: 2022-01-26 | End: 2022-01-28 | Stop reason: HOSPADM

## 2022-01-25 RX ADMIN — CLONIDINE HYDROCHLORIDE 0.1 MG: 0.1 TABLET ORAL at 20:21

## 2022-01-25 RX ADMIN — OXYCODONE HYDROCHLORIDE 10 MG: 10 TABLET ORAL at 20:21

## 2022-01-25 RX ADMIN — TRAZODONE HYDROCHLORIDE 150 MG: 100 TABLET ORAL at 22:23

## 2022-01-25 RX ADMIN — IOHEXOL 50 ML: 240 INJECTION, SOLUTION INTRATHECAL; INTRAVASCULAR; INTRAVENOUS; ORAL at 12:28

## 2022-01-25 RX ADMIN — SODIUM CHLORIDE 100 ML/HR: 9 INJECTION, SOLUTION INTRAVENOUS at 20:23

## 2022-01-25 RX ADMIN — MORPHINE SULFATE 4 MG: 4 INJECTION INTRAVENOUS at 15:07

## 2022-01-25 RX ADMIN — IOHEXOL 100 ML: 350 INJECTION, SOLUTION INTRAVENOUS at 14:07

## 2022-01-25 RX ADMIN — MORPHINE SULFATE 4 MG: 4 INJECTION INTRAVENOUS at 12:14

## 2022-01-25 RX ADMIN — ONDANSETRON 4 MG: 2 INJECTION INTRAMUSCULAR; INTRAVENOUS at 12:13

## 2022-01-25 RX ADMIN — CARBIDOPA AND LEVODOPA 1 TABLET: 25; 100 TABLET ORAL at 20:21

## 2022-01-25 RX ADMIN — GABAPENTIN 100 MG: 100 CAPSULE ORAL at 20:21

## 2022-01-25 NOTE — ASSESSMENT & PLAN NOTE
· POA, CT of the abdomen/pelvis showed moderate amount of feces in the colon  · He follows outpatient with GI and was recently seen on 1/10/22  · Apparently has chronic constipation and was prescribed MiraLax daily  · Will continue with bowel regimen

## 2022-01-25 NOTE — TELEPHONE ENCOUNTER
Agree thanks
Called and scheduled f/u to colon and pancreatitis 
Patient called  He states he woke up at 3 am with excruciating pain in the abdomen  Pain level 8/10  He also states he has dry heaves and has vomited liquid 3-5 times an hour since 3 am  He takes omeprazole 40 mg daily  He denies any other symptoms  Please advise   Thank you
Patient called again and states his pain is getting worse  Pain level 10/10  I advised patient to go to ER  Patient verbalized understanding and will go to [facility]   Thank you
Patient in ER with Ct scan showing pancreatitis   Can call for follow up in office in the next 1-2 months
No

## 2022-01-25 NOTE — ASSESSMENT & PLAN NOTE
Wt Readings from Last 3 Encounters:   01/10/22 (!) 153 kg (338 lb)   12/23/20 (!) 167 kg (368 lb 12 8 oz)   03/17/20 (!) 159 kg (351 lb)     · No acute exacerbation at this time  · Continue home medications  · Monitor with daily weights, I's and O's  · Low-salt diet once cleared for oral intake

## 2022-01-25 NOTE — ED PROVIDER NOTES
History  Chief Complaint   Patient presents with    Abdominal Pain     States generalized mid abd cramping since this am, pain making him nauseous, denies vomiting or fevers     42-year-old male presents the emergency department with complaints of abdominal pain  States that he has had some mid abdominal pain and cramping starting this morning  Reports associated nausea without vomiting  No fevers  He denies diarrhea  States that he has had several previous abdominal surgeries including a hiatal hernia surgery and gastric bypass  States that the hiatal hernia surgery was done in the late 90s while the Gastric Bypass was done at [facility] in 2002 by Dr Duque Epp  Additionally has had abdominal hernia surgery repaired with mesh  No history of small-bowel obstructions  States that although he is nauseous he has been unable to vomit  Unsure if he is able to vomit after hiatal hernia and gastric bypass surgery  History provided by:  Patient   used: No    Abdominal Pain  Pain location:  Periumbilical and epigastric  Pain quality: aching and cramping    Pain radiates to:  Does not radiate  Pain severity:  Moderate  Onset quality:  Gradual  Timing:  Constant  Progression:  Waxing and waning  Chronicity:  New  Relieved by:  Nothing  Ineffective treatments:  None tried  Associated symptoms: nausea    Associated symptoms: no chest pain, no chills, no constipation, no cough, no diarrhea, no dysuria, no fever, no shortness of breath, no sore throat and no vomiting        Prior to Admission Medications   Prescriptions Last Dose Informant Patient Reported? Taking?    Dulaglutide 0 75 MG/0 5ML SOPN  Self Yes No   Sig: Inject 1 5 mg under the skin Once a week   HYDROcodone-acetaminophen (NORCO) 5-325 mg per tablet  Self Yes No   Mirabegron ER 25 MG TB24  Self No No   Sig: Take 25 mg by mouth daily at bedtime   Multiple Vitamin (MULTIVITAMIN) tablet  Self Yes No   Sig: Take 1 tablet by mouth daily   Oyster Shell (OYSCO 500 PO)  Self Yes No   Sig: Take 500 mg by mouth daily   Vraylar 3 MG capsule  Self Yes No   Sig: Take 3 mg by mouth daily   aspirin (ECOTRIN LOW STRENGTH) 81 mg EC tablet  Self Yes No   Sig: Take 81 mg by mouth daily   atorvastatin (LIPITOR) 20 mg tablet  Self Yes No   Sig: Take 20 mg by mouth daily   buPROPion (WELLBUTRIN XL) 300 mg 24 hr tablet  Self Yes No   Sig: Take 300 mg by mouth daily   carbidopa-levodopa (SINEMET)  mg per tablet  Self Yes No   Sig: TAKE 1 TABLET BY MOUTH THREE TIMES DAILY   INCREASE SLOWLY ACCORDING TO SEPARATE SCHEDULE   cloNIDine (CATAPRES) 0 1 mg tablet  Self No No   Sig: Take 2 tablets (0 2 mg total) by mouth every 12 (twelve) hours   fluorouracil (EFUDEX) 5 % cream  Self Yes No   gabapentin (NEURONTIN) 100 mg capsule  Self Yes No   Sig: Take 300 mg by mouth   ipratropium-albuterol (DUO-NEB) 0 5-2 5 mg/3 mL nebulizer solution  Self No No   Sig: Take 1 vial (3 mL total) by nebulization 4 (four) times a day   metFORMIN (GLUMETZA) 500 MG (MOD) 24 hr tablet  Self Yes No   Sig: Take 500 mg by mouth 2 (two) times a day with meals   omeprazole (PriLOSEC) 40 MG capsule  Self No No   Sig: Take 1 capsule (40 mg total) by mouth daily   polyethylene glycol (GLYCOLAX) 17 GM/SCOOP powder   No No   Sig: Take 17 g by mouth daily   ramipril (ALTACE) 5 mg capsule  Self Yes No   Sig: Take 5 mg by mouth daily   sildenafil (VIAGRA) 100 mg tablet  Self Yes No   Sig: Take 100 mg by mouth daily as needed   spironolactone (ALDACTONE) 25 mg tablet  Self Yes No   Sig: Take 25 mg by mouth daily   torsemide (DEMADEX) 20 mg tablet  Self No No   Sig: Take 1 tablet (20 mg total) by mouth daily   traZODone (DESYREL) 150 mg tablet  Self Yes No   Sig: Take 150 mg by mouth daily at bedtime      Facility-Administered Medications: None       Past Medical History:   Diagnosis Date    Arthritis     Asthma     CPAP (continuous positive airway pressure) dependence     Edema left leg    GERD (gastroesophageal reflux disease)     History of echocardiogram 07/2017    History of Holter monitoring 09/2014    History of stress test 08/2014    Hypertension     Myocardial infarct [facility])     2011    Myocardial infarction ([facility] [address] )     Obesity     Sleep apnea     Thrombophlebitis        Past Surgical History:   Procedure Laterality Date    CARDIAC CATHETERIZATION  03/2012    6099,[id number]    CHOLECYSTECTOMY      COLONOSCOPY      COLONOSCOPY N/A 10/30/2017    Procedure: COLONOSCOPY;  Surgeon: Gisela Calabrese MD;  Location: [facility] GI LAB; Service: Gastroenterology    ENDOVENOUS ABLATION SAPHENOUS VEIN W/ LASER      each addit vein    ERCP      ESOPHAGOGASTRODUODENOSCOPY N/A 10/30/2017    Procedure: ESOPHAGOGASTRODUODENOSCOPY (EGD); Surgeon: Gisela Calabrese MD;  Location: [facility] GI LAB; Service: Gastroenterology    GASTRIC BYPASS      2001    GASTRIC BYPASS      HERNIA REPAIR      paraesophageal hiatus hernia    HERNIA REPAIR      x5 last one 2011    JOINT REPLACEMENT      KNEE ARTHROPLASTY Right     2102    REPLACEMENT TOTAL KNEE Right 02/2011       Family History   Problem Relation Age of Onset    Uterine cancer Mother     Prostate cancer Father     Diabetes Father     Heart failure Father     Stroke Family         syndrome    Aneurysm Family         aoritc    Diabetes Brother     Other Brother         amputation-    Diabetes Paternal Grandmother     Diabetes Paternal Grandfather      I have reviewed and agree with the history as documented      E-Cigarette/Vaping    E-Cigarette Use Never User      E-Cigarette/Vaping Substances     Social History     Tobacco Use    Smoking status: Former Smoker     Packs/day: 1 00     Years: 3 00     Pack years: 3 00     Types: Cigarettes    Smokeless tobacco: Former User    Tobacco comment: never a smoker per allscripts - as per [employer] Vaping Use: Never used   Substance Use Topics    Alcohol use: Not Currently Comment: rarely - denied per allscripts     Drug use: No       Review of Systems   Constitutional: Negative for activity change, appetite change, chills and fever  HENT: Negative for congestion, dental problem, drooling, ear discharge, ear pain, mouth sores, nosebleeds, rhinorrhea, sore throat and trouble swallowing  Eyes: Negative for pain, discharge and itching  Respiratory: Negative for cough, chest tightness, shortness of breath and wheezing  Cardiovascular: Negative for chest pain and palpitations  Gastrointestinal: Positive for abdominal distention, abdominal pain and nausea  Negative for blood in stool, constipation, diarrhea and vomiting  Endocrine: Negative for cold intolerance and heat intolerance  Genitourinary: Negative for difficulty urinating, dysuria, flank pain, frequency and urgency  Skin: Negative for rash and wound  Allergic/Immunologic: Negative for food allergies and immunocompromised state  Neurological: Negative for dizziness, seizures, syncope, weakness, numbness and headaches  Psychiatric/Behavioral: Negative for agitation, behavioral problems and confusion  Physical Exam  Physical Exam  Vitals and nursing note reviewed  Constitutional:       General: He is not in acute distress  Appearance: He is not diaphoretic  HENT:      Head: Normocephalic and atraumatic  Right Ear: External ear normal       Left Ear: External ear normal       Mouth/Throat:      Pharynx: No oropharyngeal exudate  Eyes:      Conjunctiva/sclera: Conjunctivae normal    Neck:      Vascular: No JVD  Trachea: No tracheal deviation  Cardiovascular:      Rate and Rhythm: Normal rate and regular rhythm  Heart sounds: Normal heart sounds  No murmur heard  No friction rub  No gallop  Pulmonary:      Effort: Pulmonary effort is normal  No respiratory distress  Breath sounds: Normal breath sounds  No wheezing or rales  Chest:      Chest wall: No tenderness  Abdominal:      General: Bowel sounds are normal  There is distension  Palpations: Abdomen is soft  Tenderness: There is abdominal tenderness in the epigastric area and periumbilical area  There is no guarding  Musculoskeletal:         General: No tenderness or deformity  Normal range of motion  Lymphadenopathy:      Cervical: No cervical adenopathy  Skin:     General: Skin is warm and dry  Findings: No erythema or rash  Neurological:      General: No focal deficit present  Mental Status: He is alert and oriented to person, place, and time     Psychiatric:         Mood and Affect: Mood normal          Behavior: Behavior normal          Vital Signs  ED Triage Vitals [01/25/22 1107]   Temperature Pulse Respirations Blood Pressure SpO2   98 3 °F (36 8 °C) 60 18 (!) 185/88 100 %      Temp Source Heart Rate Source Patient Position - Orthostatic VS BP Location FiO2 (%)   Oral Monitor Sitting Right arm --      Pain Score       8           Vitals:    01/25/22 1107 01/25/22 1505 01/25/22 1938 01/25/22 1938   BP: (!) 185/88 145/68 (!) 176/88 (!) 176/88   Pulse: 60 63 70 71   Patient Position - Orthostatic VS: Sitting Lying           Visual Acuity      ED Medications  Medications   cloNIDine (CATAPRES) tablet 0 1 mg (0 1 mg Oral Given 1/25/22 2021)   torsemide (DEMADEX) tablet 20 mg (has no administration in time range)   lisinopril (ZESTRIL) tablet 20 mg (has no administration in time range)   multivitamin stress formula tablet 1 tablet (has no administration in time range)   oxybutynin (DITROPAN-XL) 24 hr tablet 5 mg (has no administration in time range)   Dulaglutide SOPN 1 5 mg (has no administration in time range)   buPROPion (WELLBUTRIN XL) 24 hr tablet 300 mg (has no administration in time range)   carbidopa-levodopa (SINEMET)  mg per tablet 1 tablet (1 tablet Oral Given 1/25/22 2021)   cariprazine (VRAYLAR) capsule 3 mg (has no administration in time range)   traZODone (DESYREL) tablet 150 mg (has no administration in time range)   spironolactone (ALDACTONE) tablet 25 mg (has no administration in time range)   gabapentin (NEURONTIN) capsule 100 mg (100 mg Oral Given 1/25/22 2021)   atorvastatin (LIPITOR) tablet 20 mg (has no administration in time range)   aspirin (ECOTRIN LOW STRENGTH) EC tablet 81 mg (has no administration in time range)   pantoprazole (PROTONIX) injection 40 mg (has no administration in time range)   sodium chloride 0 9 % infusion (100 mL/hr Intravenous New Bag 1/25/22 2023)   enoxaparin (LOVENOX) subcutaneous injection 40 mg (has no administration in time range)   insulin lispro (HumaLOG) 100 units/mL subcutaneous injection 2-12 Units (2 Units Subcutaneous Not Given 1/25/22 1856)   senna-docusate sodium (SENOKOT S) 8 6-50 mg per tablet 2 tablet (has no administration in time range)   polyethylene glycol (MIRALAX) packet 17 g (has no administration in time range)   acetaminophen (TYLENOL) tablet 650 mg (has no administration in time range)   oxyCODONE (ROXICODONE) IR tablet 5 mg (has no administration in time range)   HYDROmorphone (DILAUDID) injection 0 5 mg (has no administration in time range)   oxyCODONE (ROXICODONE) immediate release tablet 10 mg (10 mg Oral Given 1/25/22 2021)   naloxone (NARCAN) 0 04 mg/mL syringe 0 04 mg (has no administration in time range)   ondansetron (ZOFRAN) injection 4 mg (4 mg Intravenous Given 1/25/22 1213)   morphine (PF) 4 mg/mL injection 4 mg (4 mg Intravenous Given 1/25/22 1214)   iohexol (OMNIPAQUE) 240 MG/ML solution 50 mL (50 mL Oral Given 1/25/22 1228)   iohexol (OMNIPAQUE) 350 MG/ML injection (SINGLE-DOSE) 100 mL (100 mL Intravenous Given 1/25/22 1407)   morphine (PF) 4 mg/mL injection 4 mg (4 mg Intravenous Given 1/25/22 1507)       Diagnostic Studies  Results Reviewed     Procedure Component Value Units Date/Time    Triglycerides [[id number]]  (Normal) Collected: 01/25/22 1214    Lab Status: Final result Specimen: Blood from Arm, Right Updated: 01/25/22 1949     Triglycerides 48 mg/dL     Fingerstick Glucose (POCT) [[id number]]  (Normal) Collected: 01/25/22 1832    Lab Status: Final result Updated: 01/25/22 1833     POC Glucose 98 mg/dl     Hemoglobin A1c w/EAG Estimation (Orders if not completed within the last 90 days) [[id number]] Collected: 01/25/22 1214    Lab Status:  In process Specimen: Blood from Arm, Right Updated: 01/25/22 1800    Comprehensive metabolic panel [[id number]]  (Abnormal) Collected: 01/25/22 1214    Lab Status: Final result Specimen: Blood from Arm, Right Updated: 01/25/22 1300     Sodium 136 mmol/L      Potassium 4 2 mmol/L      Chloride 101 mmol/L      CO2 27 mmol/L      ANION GAP 8 mmol/L      BUN 12 mg/dL      Creatinine 0 83 mg/dL      Glucose 109 mg/dL      Calcium 8 7 mg/dL      AST 20 U/L      ALT 9 U/L      Alkaline Phosphatase 93 U/L      Total Protein 7 6 g/dL      Albumin 3 8 g/dL      Total Bilirubin 0 81 mg/dL      eGFR 93 ml/min/1 73sq m     Narrative:      [place] guidelines for Chronic Kidney Disease (CKD):     Stage 1 with normal or high GFR (GFR > 90 mL/min/1 73 square meters)    Stage 2 Mild CKD (GFR = 60-89 mL/min/1 73 square meters)    Stage 3A Moderate CKD (GFR = 45-59 mL/min/1 73 square meters)    Stage 3B Moderate CKD (GFR = 30-44 mL/min/1 73 square meters)    Stage 4 Severe CKD (GFR = 15-29 mL/min/1 73 square meters)    Stage 5 End Stage CKD (GFR <15 mL/min/1 73 square meters)  Note: GFR calculation is accurate only with a steady state creatinine    Lipase [[id number]]  (Abnormal) Collected: 01/25/22 1214    Lab Status: Final result Specimen: Blood from Arm, Right Updated: 01/25/22 1300     Lipase 1,187 u/L     CBC and differential [[id number]]  (Abnormal) Collected: 01/25/22 1214    Lab Status: Final result Specimen: Blood from Arm, Right Updated: 01/25/22 1236     WBC 12 83 Thousand/uL      RBC 5 81 Million/uL      Hemoglobin 13 9 g/dL      Hematocrit 45 2 % MCV 78 fL      MCH 23 9 pg      MCHC 30 8 g/dL      RDW 14 1 %      MPV 9 3 fL      Platelets 894 Thousands/uL      nRBC 0 /100 WBCs      Neutrophils Relative 80 %      Immat GRANS % 0 %      Lymphocytes Relative 11 %      Monocytes Relative 8 %      Eosinophils Relative 1 %      Basophils Relative 0 %      Neutrophils Absolute 10 23 Thousands/µL      Immature Grans Absolute 0 05 Thousand/uL      Lymphocytes Absolute 1 37 Thousands/µL      Monocytes Absolute 1 03 Thousand/µL      Eosinophils Absolute 0 11 Thousand/µL      Basophils Absolute 0 04 Thousands/µL     Urine Macroscopic, POC [[id number]]  (Abnormal) Collected: 01/25/22 1220    Lab Status: Final result Specimen: Urine Updated: 01/25/22 1221     Color, UA Yellow     Clarity, UA Clear     pH, UA 7 0     Leukocytes, UA Negative     Nitrite, UA Negative     Protein, UA Negative mg/dl      Glucose, UA Negative mg/dl      Ketones, UA Negative mg/dl      Urobilinogen, UA 4 0 E U /dl      Bilirubin, UA Negative     Blood, UA Negative     Specific Gravity, UA 1 020    Narrative:      CLINITEK RESULT                 CT abdomen pelvis with contrast   Final Result by Loree Rouse MD (01/25 1433)      Findings consistent with acute pancreatitis involving the head and proximal body of the pancreas  Workstation performed: [id number]                    Procedures  Procedures         ED Course                               SBIRT 20yo+      Most Recent Value   SBIRT (22 yo +)    In order to provide better care to our patients, we are screening all of our patients for alcohol and drug use  Would it be okay to ask you these screening questions?  Unable to answer at this time Filed at: 01/25/2022 1110                    MDM  Number of Diagnoses or Management Options  Acute pancreatitis, unspecified complication status, unspecified pancreatitis type  Diagnosis management comments: Differential diagnosis includes but not limited to:  Ileus, small-bowel obstruction, pancreatitis, cholelithiasis, cholecystitis         Amount and/or Complexity of Data Reviewed  Clinical lab tests: ordered and reviewed  Tests in the radiology section of CPT®: ordered and reviewed  Discuss the patient with other providers: yes        Disposition  Final diagnoses:   Acute pancreatitis, unspecified complication status, unspecified pancreatitis type     Time reflects when diagnosis was documented in both MDM as applicable and the Disposition within this note     Time User Action Codes Description Comment    1/25/2022  3:22 PM Alla Jennifermelissa Garvey Add [K85 [id number]] Acute pancreatitis, unspecified complication status, unspecified pancreatitis type       ED Disposition     ED Disposition Condition Date/Time Comment    Admit Stable Tue Jan 25, 2022  3:22 PM Case was discussed with KILEY and the patient's admission status was agreed to be Admission Status: inpatient status to the service of Dr Stevens Beverage   Follow-up Information    None         Current Discharge Medication List      CONTINUE these medications which have NOT CHANGED    Details   aspirin (ECOTRIN LOW STRENGTH) 81 mg EC tablet Take 81 mg by mouth daily      atorvastatin (LIPITOR) 20 mg tablet Take 20 mg by mouth daily      buPROPion (WELLBUTRIN XL) 300 mg 24 hr tablet Take 300 mg by mouth daily      carbidopa-levodopa (SINEMET)  mg per tablet TAKE 1 TABLET BY MOUTH THREE TIMES DAILY   INCREASE SLOWLY ACCORDING TO SEPARATE SCHEDULE      cloNIDine (CATAPRES) 0 1 mg tablet Take 2 tablets (0 2 mg total) by mouth every 12 (twelve) hours  Qty: 180 tablet, Refills: 3    Associated Diagnoses: Essential hypertension      Dulaglutide 0 75 MG/0 5ML SOPN Inject 1 5 mg under the skin Once a week      fluorouracil (EFUDEX) 5 % cream       gabapentin (NEURONTIN) 100 mg capsule Take 300 mg by mouth      HYDROcodone-acetaminophen (NORCO) 5-325 mg per tablet       ipratropium-albuterol (DUO-NEB) 0 5-2 5 mg/3 mL nebulizer solution Take 1 vial (3 mL total) by nebulization 4 (four) times a day  Qty: 3 vial, Refills: 5    Associated Diagnoses: Congestive heart failure, unspecified HF chronicity, unspecified heart failure type ([facility])      metFORMIN (GLUMETZA) 500 MG (MOD) 24 hr tablet Take 500 mg by mouth 2 (two) times a day with meals      Mirabegron ER 25 MG TB24 Take 25 mg by mouth daily at bedtime  Qty: 90 tablet, Refills: 3    Associated Diagnoses: Detrusor instability; Urgency of urination      Multiple Vitamin (MULTIVITAMIN) tablet Take 1 tablet by mouth daily      omeprazole (PriLOSEC) 40 MG capsule Take 1 capsule (40 mg total) by mouth daily  Qty: 90 capsule, Refills: 3    Associated Diagnoses: Gastroesophageal reflux disease without esophagitis      Oyster Shell (OYSCO 500 PO) Take 500 mg by mouth daily      polyethylene glycol (GLYCOLAX) 17 GM/SCOOP powder Take 17 g by mouth daily  Qty: 289 g, Refills: 1    Associated Diagnoses: Constipation, unspecified constipation type; Lower abdominal pain; Colon cancer screening      ramipril (ALTACE) 5 mg capsule Take 5 mg by mouth daily      sildenafil (VIAGRA) 100 mg tablet Take 100 mg by mouth daily as needed      spironolactone (ALDACTONE) 25 mg tablet Take 25 mg by mouth daily      torsemide (DEMADEX) 20 mg tablet Take 1 tablet (20 mg total) by mouth daily  Qty: 30 tablet, Refills: 3    Associated Diagnoses: CHF (congestive heart failure) ([facility])      traZODone (DESYREL) 150 mg tablet Take 150 mg by mouth daily at bedtime      Vraylar 3 MG capsule Take 3 mg by mouth daily             No discharge procedures on file      PDMP Review     None          ED Provider  Electronically Signed by           Chanel George PA-C  01/25/22 2032

## 2022-01-25 NOTE — ASSESSMENT & PLAN NOTE
Lab Results   Component Value Date    HGBA1C 6 6 (H) 03/26/2020     No results for input(s): POCGLU in the last 72 hours  Blood Sugar Average: Last 72 hrs:  · Check hemoglobin A1c    Takes Trulicity and metformin at home  · Accu-Cheks q 6 hours while NPO, SSI coverage  · Hold metformin

## 2022-01-25 NOTE — ASSESSMENT & PLAN NOTE
· POA, presented with abdominal pain which started at 3:00 a m  This morning  Woke patient from sleep  · CT of the abdomen/pelvis in the ED showed findings consistent with acute pancreatitis involving the head and proximal body of the pancreas  · He is status post cholecystectomy 1993  No ductal dilatation noted  Denies alcohol use  · Admit to med surg, bowel rest, pain control    Gentle IV fluids and will also continue patient's diuretic for CHF  · Will also check triglycerides

## 2022-01-25 NOTE — H&P
[facility]  H&P- Cely Orozco 1960, 59 y o  male MRN: [id number]  Unit/Bed#: ED 15 Encounter: [id number]  Primary Care Provider: Vijay Sandoval DO   Date and time admitted to hospital: 1/25/2022 11:05 AM    * Acute pancreatitis without infection or necrosis  Assessment & Plan  · POA, presented with abdominal pain which started at 3:00 a m  This morning  Woke patient from sleep  · CT of the abdomen/pelvis in the ED showed findings consistent with acute pancreatitis involving the head and proximal body of the pancreas  · He is status post cholecystectomy 1993  No ductal dilatation noted  Denies alcohol use  · Admit to med surg, bowel rest, pain control  Gentle IV fluids and will also continue patient's diuretic for CHF  · Will also check triglycerides    Type 2 diabetes mellitus without complication, without long-term current use of insulin (HCC)  Assessment & Plan  Lab Results   Component Value Date    HGBA1C 6 6 (H) 03/26/2020     No results for input(s): POCGLU in the last 72 hours  Blood Sugar Average: Last 72 hrs:  · Check hemoglobin A1c  Takes Trulicity and metformin at home  · Accu-Cheks q 6 hours while NPO, SSI coverage  · Hold metformin    Chronic congestive heart failure ([facility] [address] )  Assessment & Plan  Wt Readings from Last 3 Encounters:   01/10/22 (!) 153 kg (338 lb)   12/23/20 (!) 167 kg (368 lb 12 8 oz)   03/17/20 (!) 159 kg (351 lb)     · No acute exacerbation at this time  · Continue home medications  · Monitor with daily weights, I's and O's  · Low-salt diet once cleared for oral intake    Benign essential hypertension  Assessment & Plan  · Blood pressure acceptable at 145/68  · Continue home medications    Sleep apnea  Assessment & Plan  · On q h s   CPAP    Other constipation  Assessment & Plan  · POA, CT of the abdomen/pelvis showed moderate amount of feces in the colon  · He follows outpatient with GI and was recently seen on 1/10/22  · Apparently has chronic constipation and was prescribed MiraLax daily  · Will continue with bowel regimen      VTE Prophylaxis: Enoxaparin (Lovenox)      Code Status:  Full code    Anticipated Length of Stay:  Patient will be admitted on an Inpatient basis with an anticipated length of stay of  > 2 midnights  Justification for Hospital Stay:  Acute pancreatitis    Chief Complaint:     Abdominal pain    History of Present Illness:  Georges Adan is a 59 y o  male who presents with acute onset abdominal pain which woke him up from sleep at about 3:00 a m  This morning   Pain located central abdomen and epigastric area  He reports associated nausea and spitting up of phlegmy content  No mac vomiting  He denies fever or chills, no nausea vomiting  He is status post cholecystectomy 1993  No aggravating or relieving factors  He is unaware if associated with meals as onset was recent  He reports similar episode approximately 2-3 years ago and things he was admitted at the hospital during that time  He however is unaware of details  He continues to report excruciating abdominal pain during my evaluation  Review of Systems   Constitutional: Negative  HENT: Negative  Respiratory: Negative  Cardiovascular: Negative  Gastrointestinal: Positive for abdominal pain and nausea  Negative for vomiting  Genitourinary: Negative for dysuria, flank pain and hematuria  Chronic urinary incontinence   Musculoskeletal: Negative  Neurological: Negative  Psychiatric/Behavioral: Negative  All other systems reviewed and are negative        Past Medical History:   Diagnosis Date    Arthritis     Asthma     CPAP (continuous positive airway pressure) dependence     Edema     left leg    GERD (gastroesophageal reflux disease)     History of echocardiogram 07/2017    History of Holter monitoring 09/2014    History of stress test 08/2014    Hypertension     Myocardial infarct [facility])     2011    Myocardial infarction ([facility] [place] [address] )     Obesity     Sleep apnea     Thrombophlebitis        Past Surgical History:   Procedure Laterality Date    CARDIAC CATHETERIZATION  03/2012    [id number]    CHOLECYSTECTOMY      COLONOSCOPY      COLONOSCOPY N/A 10/30/2017    Procedure: COLONOSCOPY;  Surgeon: Russ Alston MD;  Location: [facility] GI LAB; Service: Gastroenterology    ENDOVENOUS ABLATION SAPHENOUS VEIN W/ LASER      each addit vein    ERCP      ESOPHAGOGASTRODUODENOSCOPY N/A 10/30/2017    Procedure: ESOPHAGOGASTRODUODENOSCOPY (EGD); Surgeon: Russ Alston MD;  Location: [facility] GI LAB; Service: Gastroenterology    GASTRIC BYPASS      2001    GASTRIC BYPASS      HERNIA REPAIR      paraesophageal hiatus hernia    HERNIA REPAIR      x5 last one 2011    JOINT REPLACEMENT      KNEE ARTHROPLASTY Right     2102    REPLACEMENT TOTAL KNEE Right 02/2011       Family History:  Family History   Problem Relation Age of Onset    Uterine cancer Mother     Prostate cancer Father     Diabetes Father     Heart failure Father     Stroke Family         syndrome    Aneurysm Family         aoritc    Diabetes Brother     Other Brother         amputation-    Diabetes Paternal Grandmother     Diabetes Paternal Grandfather        Home Meds:  all medications and allergies reviewed    Allergies:    Allergies   Allergen Reactions    Gluten Meal - Food Allergy     Adhesive [Medical Tape] Rash         Marital Status: /Civil Union     Social History     Substance and Sexual Activity   Alcohol Use Not Currently    Comment: rarely - denied per allscripts      Social History     Tobacco Use   Smoking Status Former Smoker    Packs/day: 1 00    Years: 3 00    Pack years: 3 00    Types: Cigarettes   Smokeless Tobacco Former User   Tobacco Comment    never a smoker per allscripts - as per Yuliana Dang     Social History     Substance and Sexual Activity   Drug Use No           Physical Exam:   Vitals:   Blood Pressure: 145/68 (01/25/22 1505)  Pulse: 63 (01/25/22 1505)  Temperature: 98 3 °F (36 8 °C) (01/25/22 1107)  Temp Source: Oral (01/25/22 1107)  Respirations: 18 (01/25/22 1505)  Height: 5' 9" (175 3 cm) (01/25/22 1107)  SpO2: 97 % (01/25/22 1505)    Physical Exam  Vitals reviewed  Constitutional:       General: He is not in acute distress  Appearance: He is not ill-appearing, toxic-appearing or diaphoretic  HENT:      Head: Normocephalic and atraumatic  Nose: Nose normal       Mouth/Throat:      Mouth: Mucous membranes are dry  Eyes:      General: No scleral icterus  Right eye: No discharge  Left eye: No discharge  Extraocular Movements: Extraocular movements intact  Cardiovascular:      Rate and Rhythm: Normal rate and regular rhythm  Pulmonary:      Effort: Pulmonary effort is normal  No respiratory distress  Breath sounds: Normal breath sounds  No wheezing, rhonchi or rales  Abdominal:      Palpations: Abdomen is soft  There is no mass  Tenderness: There is abdominal tenderness in the epigastric area and periumbilical area  There is no guarding  Musculoskeletal:      Cervical back: Neck supple  Comments: Trace lower extremity edema bilaterally   Skin:     General: Skin is warm  Neurological:      General: No focal deficit present  Mental Status: He is alert and oriented to person, place, and time  Mental status is at baseline  Psychiatric:         Mood and Affect: Mood normal          Behavior: Behavior normal          Lab Results: I have personally reviewed pertinent reports        Results from last 7 days   Lab Units 01/25/22  1214   WBC Thousand/uL 12 83*   HEMOGLOBIN g/dL 13 9   HEMATOCRIT % 45 2   PLATELETS Thousands/uL 319   NEUTROS PCT % 80*   LYMPHS PCT % 11*   MONOS PCT % 8   EOS PCT % 1     Results from last 7 days   Lab Units 01/25/22  1214   POTASSIUM mmol/L 4 2   CHLORIDE mmol/L 101   CO2 mmol/L 27   BUN mg/dL 12   CREATININE mg/dL 0 83   CALCIUM mg/dL 8 7   ALK PHOS U/L 93 ALT U/L 9*   AST U/L 20           Imaging: I have personally reviewed pertinent reports  CT abdomen pelvis with contrast    Result Date: 1/25/2022  Narrative: CT ABDOMEN AND PELVIS WITH IV CONTRAST INDICATION:   Bowel obstruction suspected SBO   64-year-old male with mid abdominal cramping and nausea since this morning  COMPARISON:  CT of the abdomen and pelvis from June 18, 2019  TECHNIQUE:  CT examination of the abdomen and pelvis was performed  Axial, sagittal, and coronal 2D reformatted images were created from the source data and submitted for interpretation  Radiation dose length product (DLP) for this visit:  9254 mGy-cm   This examination, like all CT scans performed in the [facility], was performed utilizing techniques to minimize radiation dose exposure, including the use of iterative reconstruction and automated exposure control  IV Contrast:  100 mL of iohexol (OMNIPAQUE) 50 mL of iohexol (OMNIPAQUE) Enteric Contrast:  Enteric contrast was administered  FINDINGS: ABDOMEN LOWER CHEST:  No clinically significant abnormality identified in the visualized lower chest  LIVER/BILIARY TREE:  Unremarkable  GALLBLADDER:  Postcholecystectomy  SPLEEN:  Unremarkable  PANCREAS:  Atrophy and fatty infiltration  Relative enlargement of the pancreatic head and proximal body with surrounding fluid  No peripancreatic gas or discrete collection  ADRENAL GLANDS:  Unremarkable  KIDNEYS/URETERS:  Bilateral renal cysts, the largest a 5 7 cm cyst in the upper pole the left kidney  No calculi or hydronephrosis  STOMACH AND BOWEL:  Stomach unremarkable  Mural thickening in the 2nd and 3rd portions of the duodenum  Small intestine otherwise unremarkable  Other than a moderate amount of feces, colon unremarkable  APPENDIX:  No findings to suggest appendicitis  ABDOMINOPELVIC CAVITY: No lymphadenopathy or mass  No ascites or discrete fluid collection  No extraluminal gas   VESSELS:  Unremarkable for patient's age  PELVIS REPRODUCTIVE ORGANS: Prostate and seminal vesicles unremarkable for patient's age  URINARY BLADDER:  Unremarkable  ABDOMINAL WALL/INGUINAL REGIONS:  Status post ventral hernia repair  Otherwise unremarkable  OSSEOUS STRUCTURES:  L5 spondylolysis and 1st degree spondylolisthesis of L5 on S1  No acute fracture or destructive lesion  Impression: Findings consistent with acute pancreatitis involving the head and proximal body of the pancreas  Workstation performed: [id number]     ** Please Note: Dragon 360 Dictation voice to text software may have been used in the creation of this document   **

## 2022-01-26 LAB
ALBUMIN SERPL BCP-MCNC: 3.4 G/DL (ref 3.5–5)
ALP SERPL-CCNC: 82 U/L (ref 46–116)
ALT SERPL W P-5'-P-CCNC: 7 U/L (ref 12–78)
ANION GAP SERPL CALCULATED.3IONS-SCNC: 11 MMOL/L (ref 4–13)
AST SERPL W P-5'-P-CCNC: 15 U/L (ref 5–45)
BILIRUB SERPL-MCNC: 1.53 MG/DL (ref 0.2–1)
BUN SERPL-MCNC: 10 MG/DL (ref 5–25)
CALCIUM ALBUM COR SERPL-MCNC: 9.3 MG/DL (ref 8.3–10.1)
CALCIUM SERPL-MCNC: 8.8 MG/DL (ref 8.3–10.1)
CHLORIDE SERPL-SCNC: 99 MMOL/L (ref 100–108)
CO2 SERPL-SCNC: 24 MMOL/L (ref 21–32)
CREAT SERPL-MCNC: 0.77 MG/DL (ref 0.6–1.3)
ERYTHROCYTE [DISTWIDTH] IN BLOOD BY AUTOMATED COUNT: 14 % (ref 11.6–15.1)
EST. AVERAGE GLUCOSE BLD GHB EST-MCNC: 128 MG/DL
GFR SERPL CREATININE-BSD FRML MDRD: 96 ML/MIN/1.73SQ M
GLUCOSE SERPL-MCNC: 123 MG/DL (ref 65–140)
GLUCOSE SERPL-MCNC: 125 MG/DL (ref 65–140)
GLUCOSE SERPL-MCNC: 126 MG/DL (ref 65–140)
GLUCOSE SERPL-MCNC: 133 MG/DL (ref 65–140)
GLUCOSE SERPL-MCNC: 137 MG/DL (ref 65–140)
HBA1C MFR BLD: 6.1 %
HCT VFR BLD AUTO: 41.2 % (ref 36.5–49.3)
HGB BLD-MCNC: 12.8 G/DL (ref 12–17)
LIPASE SERPL-CCNC: 326 U/L (ref 73–393)
MCH RBC QN AUTO: 24.1 PG (ref 26.8–34.3)
MCHC RBC AUTO-ENTMCNC: 31.1 G/DL (ref 31.4–37.4)
MCV RBC AUTO: 78 FL (ref 82–98)
PLATELET # BLD AUTO: 277 THOUSANDS/UL (ref 149–390)
PMV BLD AUTO: 9.5 FL (ref 8.9–12.7)
POTASSIUM SERPL-SCNC: 4 MMOL/L (ref 3.5–5.3)
PROT SERPL-MCNC: 7 G/DL (ref 6.4–8.2)
RBC # BLD AUTO: 5.31 MILLION/UL (ref 3.88–5.62)
SODIUM SERPL-SCNC: 134 MMOL/L (ref 136–145)
WBC # BLD AUTO: 11.55 THOUSAND/UL (ref 4.31–10.16)

## 2022-01-26 PROCEDURE — C9113 INJ PANTOPRAZOLE SODIUM, VIA: HCPCS | Performed by: INTERNAL MEDICINE

## 2022-01-26 PROCEDURE — 82948 REAGENT STRIP/BLOOD GLUCOSE: CPT

## 2022-01-26 PROCEDURE — 85027 COMPLETE CBC AUTOMATED: CPT | Performed by: INTERNAL MEDICINE

## 2022-01-26 PROCEDURE — 83690 ASSAY OF LIPASE: CPT | Performed by: INTERNAL MEDICINE

## 2022-01-26 PROCEDURE — 99232 SBSQ HOSP IP/OBS MODERATE 35: CPT | Performed by: PHYSICIAN ASSISTANT

## 2022-01-26 PROCEDURE — 80053 COMPREHEN METABOLIC PANEL: CPT | Performed by: INTERNAL MEDICINE

## 2022-01-26 RX ORDER — MAGNESIUM CARB/ALUMINUM HYDROX 105-160MG
296 TABLET,CHEWABLE ORAL ONCE
Status: COMPLETED | OUTPATIENT
Start: 2022-01-26 | End: 2022-01-26

## 2022-01-26 RX ORDER — ONDANSETRON 2 MG/ML
4 INJECTION INTRAMUSCULAR; INTRAVENOUS ONCE
Status: COMPLETED | OUTPATIENT
Start: 2022-01-26 | End: 2022-01-26

## 2022-01-26 RX ADMIN — ATORVASTATIN CALCIUM 20 MG: 20 TABLET, FILM COATED ORAL at 08:42

## 2022-01-26 RX ADMIN — GABAPENTIN 100 MG: 100 CAPSULE ORAL at 14:50

## 2022-01-26 RX ADMIN — ASPIRIN 81 MG: 81 TABLET, COATED ORAL at 08:42

## 2022-01-26 RX ADMIN — TORSEMIDE 20 MG: 20 TABLET ORAL at 08:43

## 2022-01-26 RX ADMIN — PANTOPRAZOLE SODIUM 40 MG: 40 INJECTION, POWDER, FOR SOLUTION INTRAVENOUS at 08:44

## 2022-01-26 RX ADMIN — HYDROMORPHONE HYDROCHLORIDE 0.5 MG: 1 INJECTION, SOLUTION INTRAMUSCULAR; INTRAVENOUS; SUBCUTANEOUS at 18:43

## 2022-01-26 RX ADMIN — SPIRONOLACTONE 25 MG: 25 TABLET ORAL at 08:43

## 2022-01-26 RX ADMIN — ENOXAPARIN SODIUM 40 MG: 40 INJECTION SUBCUTANEOUS at 08:43

## 2022-01-26 RX ADMIN — SODIUM CHLORIDE 100 ML/HR: 9 INJECTION, SOLUTION INTRAVENOUS at 05:24

## 2022-01-26 RX ADMIN — CARBIDOPA AND LEVODOPA 1 TABLET: 25; 100 TABLET ORAL at 08:42

## 2022-01-26 RX ADMIN — CLONIDINE HYDROCHLORIDE 0.1 MG: 0.1 TABLET ORAL at 08:43

## 2022-01-26 RX ADMIN — OXYCODONE HYDROCHLORIDE 10 MG: 10 TABLET ORAL at 03:32

## 2022-01-26 RX ADMIN — CLONIDINE HYDROCHLORIDE 0.1 MG: 0.1 TABLET ORAL at 22:26

## 2022-01-26 RX ADMIN — TRAZODONE HYDROCHLORIDE 150 MG: 100 TABLET ORAL at 22:26

## 2022-01-26 RX ADMIN — OXYCODONE HYDROCHLORIDE 10 MG: 10 TABLET ORAL at 08:43

## 2022-01-26 RX ADMIN — GABAPENTIN 100 MG: 100 CAPSULE ORAL at 08:43

## 2022-01-26 RX ADMIN — HYDROMORPHONE HYDROCHLORIDE 0.5 MG: 1 INJECTION, SOLUTION INTRAMUSCULAR; INTRAVENOUS; SUBCUTANEOUS at 10:58

## 2022-01-26 RX ADMIN — ONDANSETRON 4 MG: 2 INJECTION INTRAMUSCULAR; INTRAVENOUS at 06:14

## 2022-01-26 RX ADMIN — CARBIDOPA AND LEVODOPA 1 TABLET: 25; 100 TABLET ORAL at 14:50

## 2022-01-26 RX ADMIN — ACETAMINOPHEN 650 MG: 325 TABLET, FILM COATED ORAL at 14:50

## 2022-01-26 RX ADMIN — CARBIDOPA AND LEVODOPA 1 TABLET: 25; 100 TABLET ORAL at 22:26

## 2022-01-26 RX ADMIN — GABAPENTIN 100 MG: 100 CAPSULE ORAL at 22:25

## 2022-01-26 RX ADMIN — HYDROMORPHONE HYDROCHLORIDE 0.5 MG: 1 INJECTION, SOLUTION INTRAMUSCULAR; INTRAVENOUS; SUBCUTANEOUS at 06:14

## 2022-01-26 RX ADMIN — OXYBUTYNIN CHLORIDE 5 MG: 5 TABLET, EXTENDED RELEASE ORAL at 08:43

## 2022-01-26 RX ADMIN — BUPROPION HYDROCHLORIDE 300 MG: 150 TABLET, EXTENDED RELEASE ORAL at 08:42

## 2022-01-26 RX ADMIN — OXYCODONE HYDROCHLORIDE 10 MG: 10 TABLET ORAL at 14:50

## 2022-01-26 RX ADMIN — SENNOSIDES AND DOCUSATE SODIUM 2 TABLET: 50; 8.6 TABLET ORAL at 08:43

## 2022-01-26 RX ADMIN — B-COMPLEX W/ C & FOLIC ACID TAB 1 TABLET: TAB at 08:42

## 2022-01-26 RX ADMIN — LISINOPRIL 20 MG: 20 TABLET ORAL at 08:42

## 2022-01-26 RX ADMIN — ACETAMINOPHEN 650 MG: 325 TABLET, FILM COATED ORAL at 00:21

## 2022-01-26 RX ADMIN — MAGNESIUM CITRATE 296 ML: 1.75 LIQUID ORAL at 12:04

## 2022-01-26 RX ADMIN — POLYETHYLENE GLYCOL 3350 17 G: 17 POWDER, FOR SOLUTION ORAL at 08:44

## 2022-01-26 RX ADMIN — OXYCODONE HYDROCHLORIDE 10 MG: 10 TABLET ORAL at 22:39

## 2022-01-26 NOTE — PROGRESS NOTES
[facility]  Progress Note Jarrod Orozco 1960, 59 y o  male MRN: [id number]  Unit/Bed#: W -96 Encounter: [id number]  Primary Care Provider: Brian Méndez DO   Date and time admitted to hospital: 1/25/2022 11:05 AM    * Acute pancreatitis without infection or necrosis  Assessment & Plan  · POA, presented with abdominal pain which started at 3:00 AM this morning  Woke patient from sleep  · CT of the abdomen/pelvis in the ED showed findings consistent with acute pancreatitis involving the head and proximal body of the pancreas  · He is status post cholecystectomy 1993  No ductal dilatation noted  · Triglycerides normal  · Suspect medication induced - Trulicity/Lipitor  · Symptoms unimproved   · Continue Fluids, Bowel Rest, Antiemetics, Pain control   · Attempt to advance diet tomorrow       Chronic congestive heart failure (HCC)  Assessment & Plan  Wt Readings from Last 3 Encounters:   01/25/22 (!) 152 kg (335 lb 12 2 oz)   01/10/22 (!) 153 kg (338 lb)   12/23/20 (!) 167 kg (368 lb 12 8 oz)     · No acute exacerbation at this time  · Continue home medications  · Monitor with daily weights, I's and O's  · Low-salt diet once cleared for oral intake    Type 2 diabetes mellitus without complication, without long-term current use of insulin [facility])  Assessment & Plan  Lab Results   Component Value Date    HGBA1C 6 1 (H) 01/25/2022     Recent Labs     01/25/22  1832 01/25/22  2103 01/26/22  0021 01/26/22  0602   POCGLU 98 121 133 126       Blood Sugar Average: Last 72 hrs:  · (P) 119 5   · Check hemoglobin A1c    · Takes Trulicity and metformin at home  · Accu-Cheks q 6 hours while NPO, SSI coverage  · Hold Metformin, Trulicity     Other constipation  Assessment & Plan  · POA, CT of the abdomen/pelvis showed moderate amount of feces in the colon  · He follows outpatient with GI and was recently seen on 1/10/22  · Apparently has chronic constipation and was prescribed MiraLax daily  · Will continue with bowel regimen  · Add Citroma     Sleep apnea  Assessment & Plan  · On QHS CPAP    Benign essential hypertension  Assessment & Plan  · Blood pressure acceptable at 145/68  · Continue home medications        VTE Pharmacologic Prophylaxis: VTE Score: 2 Moderate Risk (Score 3-4) - Pharmacological DVT Prophylaxis Ordered: enoxaparin (Lovenox)  Patient Centered Rounds: I performed bedside rounds with nursing staff today  Discussions with Specialists or Other Care Team Provider: Discussed with RN, ARYAN    Education and Discussions with Family / Patient: Attempted to update  (wife) via phone  Left voicemail  Time Spent for Care: 30 minutes  More than 50% of total time spent on counseling and coordination of care as described above  Current Length of Stay: 1 day(s)  Current Patient Status: Inpatient   Certification Statement: The patient will continue to require additional inpatient hospital stay due to further management of pancreatitis   Discharge Plan: Anticipate discharge in 24-48 hrs to home  Code Status: Level 1 - Full Code    Subjective:   Patient reports only minimal improvement of abdominal pain  Reports some nausea  Denies vomiting, also denies bowel movement  Denies fevers or chills  Denies chest pain  Reports chronic shortness of breath  Objective:     Vitals:   Temp (24hrs), Av 7 °F (37 1 °C), Min:97 8 °F (36 6 °C), Max:99 2 °F (37 3 °C)    Temp:  [97 8 °F (36 6 °C)-99 2 °F (37 3 °C)] 97 8 °F (36 6 °C)  HR:  [63-72] 72  Resp:  [18] 18  BP: (145-179)/(68-94) 179/94  SpO2:  [95 %-97 %] 95 %  Body mass index is 49 58 kg/m²  Input and Output Summary (last 24 hours): Intake/Output Summary (Last 24 hours) at 2022 1143  Last data filed at 2022 1104  Gross per 24 hour   Intake 1141 67 ml   Output 575 ml   Net 566 67 ml       Physical Exam:   Physical Exam  Constitutional:       General: He is not in acute distress       Appearance: Normal appearance  He is obese  He is not ill-appearing or diaphoretic  HENT:      Head: Normocephalic and atraumatic  Mouth/Throat:      Mouth: Mucous membranes are dry  Eyes:      General: No scleral icterus  Pupils: Pupils are equal, round, and reactive to light  Cardiovascular:      Rate and Rhythm: Normal rate and regular rhythm  Pulses: Normal pulses  Heart sounds: Normal heart sounds, S1 normal and S2 normal  No murmur heard  No systolic murmur is present  No diastolic murmur is present  No gallop  No S3 or S4 sounds  Pulmonary:      Effort: Pulmonary effort is normal  No accessory muscle usage or respiratory distress  Breath sounds: Normal breath sounds  No stridor  No decreased breath sounds, wheezing, rhonchi or rales  Chest:      Chest wall: No tenderness  Abdominal:      General: Bowel sounds are normal  There is no distension  Palpations: Abdomen is soft  Tenderness: There is no abdominal tenderness  There is no guarding  Musculoskeletal:      Right lower leg: No edema  Left lower leg: No edema  Skin:     General: Skin is warm and dry  Coloration: Skin is not jaundiced  Neurological:      General: No focal deficit present  Mental Status: He is alert  Mental status is at baseline  Motor: No tremor or seizure activity  Psychiatric:         Behavior: Behavior is cooperative  Additional Data:     Labs:  Results from last 7 days   Lab Units 01/26/22  0518 01/25/22  1214 01/25/22  1214   WBC Thousand/uL 11 55*   < > 12 83*   HEMOGLOBIN g/dL 12 8   < > 13 9   HEMATOCRIT % 41 2   < > 45 2   PLATELETS Thousands/uL 277   < > 319   NEUTROS PCT %  --   --  80*   LYMPHS PCT %  --   --  11*   MONOS PCT %  --   --  8   EOS PCT %  --   --  1    < > = values in this interval not displayed       Results from last 7 days   Lab Units 01/26/22  0518   SODIUM mmol/L 134*   POTASSIUM mmol/L 4 0   CHLORIDE mmol/L 99*   CO2 mmol/L 24   BUN mg/dL 10 CREATININE mg/dL 0 77   ANION GAP mmol/L 11   CALCIUM mg/dL 8 8   ALBUMIN g/dL 3 4*   TOTAL BILIRUBIN mg/dL 1 53*   ALK PHOS U/L 82   ALT U/L 7*   AST U/L 15   GLUCOSE RANDOM mg/dL 125         Results from last 7 days   Lab Units 01/26/22  0602 01/26/22  0021 01/25/22  2103 01/25/22  1832   POC GLUCOSE mg/dl 126 133 121 98     Results from last 7 days   Lab Units 01/25/22  1214   HEMOGLOBIN A1C % 6 1*           Lines/Drains:  Invasive Devices  Report    Peripheral Intravenous Line            Peripheral IV 01/25/22 Right Antecubital <1 day          Drain            External Urinary Catheter Small <1 day                      Imaging: Reviewed radiology reports from this admission including: abdominal/pelvic CT    Recent Cultures (last 7 days):         Last 24 Hours Medication List:   Current Facility-Administered Medications   Medication Dose Route Frequency Provider Last Rate    acetaminophen  650 mg Oral Q4H PRN Franco Love MD      aspirin  81 mg Oral Daily Kirsty Geiger MD      atorvastatin  20 mg Oral Daily Kirsty Geiger MD      buPROPion  300 mg Oral Daily Kirsty Geiger MD      carbidopa-levodopa  1 tablet Oral TID Sherley Candace Geiger MD      cariprazine  3 mg Oral Daily Kirsty Geiger MD      cloNIDine  0 1 mg Oral Q12H [address] Kirsty Geiger MD      enoxaparin  40 mg Subcutaneous Q24H [address] Kirsty Geiger MD      gabapentin  100 mg Oral TID Franco Love MD      HYDROmorphone  0 5 mg Intravenous Q4H PRN Kirsty Geiger MD      insulin lispro  2-12 Units Subcutaneous Q6H [address] Kirsty Geiger MD      lisinopril  20 mg Oral Daily Kirsty Geiger MD      magnesium citrate  296 mL Oral Once Denys Emi Adams PA-C      multivitamin stress formula  1 tablet Oral Daily Kirsty Geiger MD      naloxone  0 04 mg Intravenous Q1MIN PRN Franco Love MD      oxybutynin  5 mg Oral Daily MD Jennifer Rees oxyCODONE  10 mg Oral Q4H PRN True Block, MD      oxyCODONE  5 mg Oral Q4H PRN True Block, MD      pantoprazole  40 mg Intravenous Q24H [address] Kirsty Ogden MD      polyethylene glycol  17 g Oral Daily Kirsty Ogden MD      senna-docusate sodium  2 tablet Oral Daily Kirsty Ogden MD      sodium chloride  100 mL/hr Intravenous Continuous True Block,  mL/hr (01/26/22 0524)    spironolactone  25 mg Oral Daily Kirsty Ogden MD      torsemide  20 mg Oral Daily Kirsty Ogden MD      traZODone  150 mg Oral HS True Block, MD          Today, Patient Was Seen By: Jairo Ruelas PA-C    **Please Note: This note may have been constructed using a voice recognition system  **

## 2022-01-26 NOTE — ASSESSMENT & PLAN NOTE
Wt Readings from Last 3 Encounters:   01/25/22 (!) 152 kg (335 lb 12 2 oz)   01/10/22 (!) 153 kg (338 lb)   12/23/20 (!) 167 kg (368 lb 12 8 oz)     · No acute exacerbation at this time  · Continue home medications  · Monitor with daily weights, I's and O's  · Low-salt diet once cleared for oral intake

## 2022-01-26 NOTE — ASSESSMENT & PLAN NOTE
· POA, CT of the abdomen/pelvis showed moderate amount of feces in the colon  · He follows outpatient with GI and was recently seen on 1/10/22  · Apparently has chronic constipation and was prescribed MiraLax daily  · Will continue with bowel regimen  · Add Citroma

## 2022-01-26 NOTE — ASSESSMENT & PLAN NOTE
Lab Results   Component Value Date    HGBA1C 6 1 (H) 01/25/2022     Recent Labs     01/25/22  1832 01/25/22  2103 01/26/22  0021 01/26/22  0602   POCGLU 98 121 133 126       Blood Sugar Average: Last 72 hrs:  · (P) 119 5   · Check hemoglobin A1c    · Takes Trulicity and metformin at home  · Accu-Cheks q 6 hours while NPO, SSI coverage  · Hold Metformin, Trulicity

## 2022-01-26 NOTE — PLAN OF CARE
Problem: PAIN - ADULT  Goal: Verbalizes/displays adequate comfort level or baseline comfort level  Description: Interventions:  - Encourage patient to monitor pain and request assistance  - Assess pain using appropriate pain scale  - Administer analgesics based on type and severity of pain and evaluate response  - Implement non-pharmacological measures as appropriate and evaluate response  - Consider cultural and social influences on pain and pain management  - Notify physician/advanced practitioner if interventions unsuccessful or patient reports new pain  Outcome: Progressing     Problem: INFECTION - ADULT  Goal: Absence or prevention of progression during hospitalization  Description: INTERVENTIONS:  - Assess and monitor for signs and symptoms of infection  - Monitor lab/diagnostic results  - Monitor all insertion sites, i e  indwelling lines, tubes, and drains  - Monitor endotracheal if appropriate and nasal secretions for changes in amount and color  - [place] appropriate cooling/warming therapies per order  - Administer medications as ordered  - Instruct and encourage patient and family to use good hand hygiene technique  - Identify and instruct in appropriate isolation precautions for identified infection/condition  Outcome: Progressing  Goal: Absence of fever/infection during neutropenic period  Description: INTERVENTIONS:  - Monitor WBC    Outcome: Progressing     Problem: SAFETY ADULT  Goal: Patient will remain free of falls  Description: INTERVENTIONS:  - Educate patient/family on patient safety including physical limitations  - Instruct patient to call for assistance with activity   - Consult OT/PT to assist with strengthening/mobility   - Keep Call bell within reach  - Keep bed low and locked with side rails adjusted as appropriate  - Keep care items and personal belongings within reach  - Initiate and maintain comfort rounds  - Make Fall Risk Sign visible to staff  - Apply yellow socks and bracelet for high fall risk patients  - Consider moving patient to room near nurses station  Outcome: Progressing  Goal: Maintain or return to baseline ADL function  Description: INTERVENTIONS:  -  Assess patient's ability to carry out ADLs; assess patient's baseline for ADL function and identify physical deficits which impact ability to perform ADLs (bathing, care of mouth/teeth, toileting, grooming, dressing, etc )  - Assess/evaluate cause of self-care deficits   - Assess range of motion  - Assess patient's mobility; develop plan if impaired  - Assess patient's need for assistive devices and provide as appropriate  - Encourage maximum independence but intervene and supervise when necessary  - Involve family in performance of ADLs  - Assess for home care needs following discharge   - Consider OT consult to assist with ADL evaluation and planning for discharge  - Provide patient education as appropriate  Outcome: Progressing  Goal: Maintains/Returns to pre admission functional level  Description: INTERVENTIONS:  - Perform BMAT or MOVE assessment daily    - Set and communicate daily mobility goal to care team and patient/family/caregiver     - Collaborate with rehabilitation services on mobility goals if consulted  - Out of bed for toileting  - Record patient progress and toleration of activity level   Outcome: Progressing     Problem: DISCHARGE PLANNING  Goal: Discharge to home or other facility with appropriate resources  Description: INTERVENTIONS:  - Identify barriers to discharge w/patient and caregiver  - Arrange for needed discharge resources and transportation as appropriate  - Identify discharge learning needs (meds, wound care, etc )  - Arrange for interpretive services to assist at discharge as needed  - Refer to Case Management Department for coordinating discharge planning if the patient needs post-hospital services based on physician/advanced practitioner order or complex needs related to functional status, cognitive ability, or social support system  Outcome: Progressing     Problem: Knowledge Deficit  Goal: Patient/family/caregiver demonstrates understanding of disease process, treatment plan, medications, and discharge instructions  Description: Complete learning assessment and assess knowledge base  Interventions:  - Provide teaching at level of understanding  - Provide teaching via preferred learning methods  Outcome: Progressing     Problem: Nutrition/Hydration-ADULT  Goal: Nutrient/Hydration intake appropriate for improving, restoring or maintaining nutritional needs  Description: Monitor and assess patient's nutrition/hydration status for malnutrition  Collaborate with interdisciplinary team and initiate plan and interventions as ordered  Monitor patient's weight and dietary intake as ordered or per policy  Utilize nutrition screening tool and intervene as necessary  Determine patient's food preferences and provide high-protein, high-caloric foods as appropriate       INTERVENTIONS:  - Monitor oral intake, urinary output, labs, and treatment plans  - Assess nutrition and hydration status and recommend course of action  - Evaluate amount of meals eaten  - Assist patient with eating if necessary   - Allow adequate time for meals  - Recommend/ encourage appropriate diets, oral nutritional supplements, and vitamin/mineral supplements  - Order, calculate, and assess calorie counts as needed  - Recommend, monitor, and adjust tube feedings and TPN/PPN based on assessed needs  - Assess need for intravenous fluids  - Provide specific nutrition/hydration education as appropriate  - Include patient/family/caregiver in decisions related to nutrition  Outcome: Progressing

## 2022-01-26 NOTE — ASSESSMENT & PLAN NOTE
· POA, presented with abdominal pain which started at 3:00 AM this morning  Woke patient from sleep  · CT of the abdomen/pelvis in the ED showed findings consistent with acute pancreatitis involving the head and proximal body of the pancreas  · He is status post cholecystectomy 1993  No ductal dilatation noted     · Triglycerides normal  · Suspect medication induced - Trulicity/Lipitor  · Symptoms unimproved   · Continue Fluids, Bowel Rest, Antiemetics, Pain control   · Attempt to advance diet tomorrow

## 2022-01-26 NOTE — PLAN OF CARE
Problem: PAIN - ADULT  Goal: Verbalizes/displays adequate comfort level or baseline comfort level  Description: Interventions:  - Encourage patient to monitor pain and request assistance  - Assess pain using appropriate pain scale  - Administer analgesics based on type and severity of pain and evaluate response  - Implement non-pharmacological measures as appropriate and evaluate response  - Consider cultural and social influences on pain and pain management  - Notify physician/advanced practitioner if interventions unsuccessful or patient reports new pain  Outcome: Progressing     Problem: INFECTION - ADULT  Goal: Absence or prevention of progression during hospitalization  Description: INTERVENTIONS:  - Assess and monitor for signs and symptoms of infection  - Monitor lab/diagnostic results  - Monitor all insertion sites, i e  indwelling lines, tubes, and drains  - Monitor endotracheal if appropriate and nasal secretions for changes in amount and color  - [place] appropriate cooling/warming therapies per order  - Administer medications as ordered  - Instruct and encourage patient and family to use good hand hygiene technique  - Identify and instruct in appropriate isolation precautions for identified infection/condition  Outcome: Progressing  Goal: Absence of fever/infection during neutropenic period  Description: INTERVENTIONS:  - Monitor WBC    Outcome: Progressing     Goal: Maintain or return to baseline ADL function  Description: INTERVENTIONS:  -  Assess patient's ability to carry out ADLs; assess patient's baseline for ADL function and identify physical deficits which impact ability to perform ADLs (bathing, care of mouth/teeth, toileting, grooming, dressing, etc )  - Assess/evaluate cause of self-care deficits   - Assess range of motion  - Assess patient's mobility; develop plan if impaired  - Assess patient's need for assistive devices and provide as appropriate  - Encourage maximum independence but intervene and supervise when necessary  - Involve family in performance of ADLs  - Assess for home care needs following discharge   - Consider OT consult to assist with ADL evaluation and planning for discharge  - Provide patient education as appropriate  Outcome: Progressing    Problem: DISCHARGE PLANNING  Goal: Discharge to home or other facility with appropriate resources  Description: INTERVENTIONS:  - Identify barriers to discharge w/patient and caregiver  - Arrange for needed discharge resources and transportation as appropriate  - Identify discharge learning needs (meds, wound care, etc )  - Arrange for interpretive services to assist at discharge as needed  - Refer to Case Management Department for coordinating discharge planning if the patient needs post-hospital services based on physician/advanced practitioner order or complex needs related to functional status, cognitive ability, or social support system  Outcome: Progressing     Problem: Knowledge Deficit  Goal: Patient/family/caregiver demonstrates understanding of disease process, treatment plan, medications, and discharge instructions  Description: Complete learning assessment and assess knowledge base  Interventions:  - Provide teaching at level of understanding  - Provide teaching via preferred learning methods  Outcome: Progressing     Problem: Nutrition/Hydration-ADULT  Goal: Nutrient/Hydration intake appropriate for improving, restoring or maintaining nutritional needs  Description: Monitor and assess patient's nutrition/hydration status for malnutrition  Collaborate with interdisciplinary team and initiate plan and interventions as ordered  Monitor patient's weight and dietary intake as ordered or per policy  Utilize nutrition screening tool and intervene as necessary  Determine patient's food preferences and provide high-protein, high-caloric foods as appropriate       INTERVENTIONS:  - Monitor oral intake, urinary output, labs, and treatment plans  - Assess nutrition and hydration status and recommend course of action  - Evaluate amount of meals eaten  - Assist patient with eating if necessary   - Allow adequate time for meals  - Recommend/ encourage appropriate diets, oral nutritional supplements, and vitamin/mineral supplements  - Order, calculate, and assess calorie counts as needed  - Recommend, monitor, and adjust tube feedings and TPN/PPN based on assessed needs  - Assess need for intravenous fluids  - Provide specific nutrition/hydration education as appropriate  - Include patient/family/caregiver in decisions related to nutrition  Outcome: Progressing

## 2022-01-27 ENCOUNTER — APPOINTMENT (INPATIENT)
Dept: NON INVASIVE DIAGNOSTICS | Facility: HOSPITAL | Age: 62
DRG: 439 | End: 2022-01-27
Payer: MEDICARE

## 2022-01-27 ENCOUNTER — APPOINTMENT (INPATIENT)
Dept: CT IMAGING | Facility: HOSPITAL | Age: 62
DRG: 439 | End: 2022-01-27
Payer: MEDICARE

## 2022-01-27 PROBLEM — R65.10 SIRS (SYSTEMIC INFLAMMATORY RESPONSE SYNDROME) (HCC): Status: ACTIVE | Noted: 2022-01-27

## 2022-01-27 PROBLEM — I48.91 NEW ONSET ATRIAL FIBRILLATION (HCC): Status: ACTIVE | Noted: 2022-01-27

## 2022-01-27 LAB
ALBUMIN SERPL BCP-MCNC: 3.5 G/DL (ref 3.5–5)
ALP SERPL-CCNC: 87 U/L (ref 46–116)
ALT SERPL W P-5'-P-CCNC: 12 U/L (ref 12–78)
ANION GAP SERPL CALCULATED.3IONS-SCNC: 10 MMOL/L (ref 4–13)
AORTIC ROOT: 4.1 CM
APTT PPP: 30 SECONDS (ref 23–37)
APTT PPP: 34 SECONDS (ref 23–37)
APTT PPP: 45 SECONDS (ref 23–37)
AST SERPL W P-5'-P-CCNC: 21 U/L (ref 5–45)
ATRIAL RATE: 288 BPM
BASOPHILS # BLD AUTO: 0.04 THOUSANDS/ΜL (ref 0–0.1)
BASOPHILS NFR BLD AUTO: 0 % (ref 0–1)
BILIRUB SERPL-MCNC: 2.21 MG/DL (ref 0.2–1)
BUN SERPL-MCNC: 15 MG/DL (ref 5–25)
CALCIUM SERPL-MCNC: 9.3 MG/DL (ref 8.3–10.1)
CHLORIDE SERPL-SCNC: 95 MMOL/L (ref 100–108)
CO2 SERPL-SCNC: 28 MMOL/L (ref 21–32)
CREAT SERPL-MCNC: 1.04 MG/DL (ref 0.6–1.3)
EOSINOPHIL # BLD AUTO: 0.01 THOUSAND/ΜL (ref 0–0.61)
EOSINOPHIL NFR BLD AUTO: 0 % (ref 0–6)
ERYTHROCYTE [DISTWIDTH] IN BLOOD BY AUTOMATED COUNT: 13.9 % (ref 11.6–15.1)
FRACTIONAL SHORTENING: 28 % (ref 28–44)
GFR SERPL CREATININE-BSD FRML MDRD: 76 ML/MIN/1.73SQ M
GLUCOSE SERPL-MCNC: 105 MG/DL (ref 65–140)
GLUCOSE SERPL-MCNC: 120 MG/DL (ref 65–140)
GLUCOSE SERPL-MCNC: 143 MG/DL (ref 65–140)
GLUCOSE SERPL-MCNC: 143 MG/DL (ref 65–140)
GLUCOSE SERPL-MCNC: 152 MG/DL (ref 65–140)
GLUCOSE SERPL-MCNC: 153 MG/DL (ref 65–140)
GLUCOSE SERPL-MCNC: 160 MG/DL (ref 65–140)
HCT VFR BLD AUTO: 46.1 % (ref 36.5–49.3)
HGB BLD-MCNC: 14.3 G/DL (ref 12–17)
IMM GRANULOCYTES # BLD AUTO: 0.19 THOUSAND/UL (ref 0–0.2)
IMM GRANULOCYTES NFR BLD AUTO: 1 % (ref 0–2)
INR PPP: 1.23 (ref 0.84–1.19)
INTERVENTRICULAR SEPTUM IN DIASTOLE (PARASTERNAL SHORT AXIS VIEW): 1 CM (ref 0.61–1.14)
LACTATE SERPL-SCNC: 1.6 MMOL/L (ref 0.5–2)
LACTATE SERPL-SCNC: 2.1 MMOL/L (ref 0.5–2)
LEFT ATRIUM AREA SYSTOLE SINGLE PLANE A4C: 21.7 CM2
LEFT ATRIUM SIZE: 3.8 CM
LEFT INTERNAL DIMENSION IN SYSTOLE: 3.3 CM (ref 2.1–4)
LEFT VENTRICULAR INTERNAL DIMENSION IN DIASTOLE: 4.6 CM (ref 30.41–45.36)
LEFT VENTRICULAR POSTERIOR WALL IN END DIASTOLE: 1.2 CM (ref 0.59–1.13)
LEFT VENTRICULAR STROKE VOLUME: 53 ML
LYMPHOCYTES # BLD AUTO: 0.79 THOUSANDS/ΜL (ref 0.6–4.47)
LYMPHOCYTES NFR BLD AUTO: 4 % (ref 14–44)
MAGNESIUM SERPL-MCNC: 2.3 MG/DL (ref 1.6–2.6)
MCH RBC QN AUTO: 24.1 PG (ref 26.8–34.3)
MCHC RBC AUTO-ENTMCNC: 31 G/DL (ref 31.4–37.4)
MCV RBC AUTO: 78 FL (ref 82–98)
MONOCYTES # BLD AUTO: 2.26 THOUSAND/ΜL (ref 0.17–1.22)
MONOCYTES NFR BLD AUTO: 10 % (ref 4–12)
NEUTROPHILS # BLD AUTO: 18.63 THOUSANDS/ΜL (ref 1.85–7.62)
NEUTS SEG NFR BLD AUTO: 85 % (ref 43–75)
NRBC BLD AUTO-RTO: 0 /100 WBCS
P AXIS: 85 DEGREES
PLATELET # BLD AUTO: 299 THOUSANDS/UL (ref 149–390)
PMV BLD AUTO: 9.1 FL (ref 8.9–12.7)
POTASSIUM SERPL-SCNC: 4.1 MMOL/L (ref 3.5–5.3)
PROT SERPL-MCNC: 7.9 G/DL (ref 6.4–8.2)
PROTHROMBIN TIME: 15.5 SECONDS (ref 11.6–14.5)
QRS AXIS: -27 DEGREES
QRSD INTERVAL: 98 MS
QT INTERVAL: 402 MS
QTC INTERVAL: 554 MS
RBC # BLD AUTO: 5.94 MILLION/UL (ref 3.88–5.62)
RIGHT ATRIUM AREA SYSTOLE A4C: 21.9 CM2
SL CV LV EF: 60
SL CV PED ECHO LEFT VENTRICLE DIASTOLIC VOLUME (MOD BIPLANE) 2D: 99 ML
SL CV PED ECHO LEFT VENTRICLE SYSTOLIC VOLUME (MOD BIPLANE) 2D: 46 ML
SODIUM SERPL-SCNC: 133 MMOL/L (ref 136–145)
T WAVE AXIS: 26 DEGREES
T4 FREE SERPL-MCNC: 1.29 NG/DL (ref 0.76–1.46)
TSH SERPL DL<=0.05 MIU/L-ACNC: 0.34 UIU/ML (ref 0.36–3.74)
VENTRICULAR RATE: 114 BPM
WBC # BLD AUTO: 21.92 THOUSAND/UL (ref 4.31–10.16)
Z-SCORE OF INTERVENTRICULAR SEPTUM IN END DIASTOLE: 0.92
Z-SCORE OF LEFT VENTRICULAR DIMENSION IN END SYSTOLE: -20.89
Z-SCORE OF LEFT VENTRICULAR POSTERIOR WALL IN END DIASTOLE: 2.5

## 2022-01-27 PROCEDURE — C9113 INJ PANTOPRAZOLE SODIUM, VIA: HCPCS | Performed by: INTERNAL MEDICINE

## 2022-01-27 PROCEDURE — 74177 CT ABD & PELVIS W/CONTRAST: CPT

## 2022-01-27 PROCEDURE — 93306 TTE W/DOPPLER COMPLETE: CPT | Performed by: INTERNAL MEDICINE

## 2022-01-27 PROCEDURE — 85730 THROMBOPLASTIN TIME PARTIAL: CPT | Performed by: INTERNAL MEDICINE

## 2022-01-27 PROCEDURE — C8929 TTE W OR WO FOL WCON,DOPPLER: HCPCS

## 2022-01-27 PROCEDURE — 99232 SBSQ HOSP IP/OBS MODERATE 35: CPT | Performed by: PHYSICIAN ASSISTANT

## 2022-01-27 PROCEDURE — 82948 REAGENT STRIP/BLOOD GLUCOSE: CPT

## 2022-01-27 PROCEDURE — 85730 THROMBOPLASTIN TIME PARTIAL: CPT | Performed by: NURSE PRACTITIONER

## 2022-01-27 PROCEDURE — 93005 ELECTROCARDIOGRAM TRACING: CPT

## 2022-01-27 PROCEDURE — 87040 BLOOD CULTURE FOR BACTERIA: CPT | Performed by: PHYSICIAN ASSISTANT

## 2022-01-27 PROCEDURE — 84439 ASSAY OF FREE THYROXINE: CPT | Performed by: PHYSICIAN ASSISTANT

## 2022-01-27 PROCEDURE — 83605 ASSAY OF LACTIC ACID: CPT | Performed by: PHYSICIAN ASSISTANT

## 2022-01-27 PROCEDURE — 83735 ASSAY OF MAGNESIUM: CPT | Performed by: PHYSICIAN ASSISTANT

## 2022-01-27 PROCEDURE — 84443 ASSAY THYROID STIM HORMONE: CPT | Performed by: PHYSICIAN ASSISTANT

## 2022-01-27 PROCEDURE — 85025 COMPLETE CBC W/AUTO DIFF WBC: CPT | Performed by: PHYSICIAN ASSISTANT

## 2022-01-27 PROCEDURE — 71260 CT THORAX DX C+: CPT

## 2022-01-27 PROCEDURE — 93010 ELECTROCARDIOGRAM REPORT: CPT | Performed by: INTERNAL MEDICINE

## 2022-01-27 PROCEDURE — 99223 1ST HOSP IP/OBS HIGH 75: CPT | Performed by: INTERNAL MEDICINE

## 2022-01-27 PROCEDURE — G1004 CDSM NDSC: HCPCS

## 2022-01-27 PROCEDURE — 80053 COMPREHEN METABOLIC PANEL: CPT | Performed by: PHYSICIAN ASSISTANT

## 2022-01-27 PROCEDURE — 85610 PROTHROMBIN TIME: CPT | Performed by: NURSE PRACTITIONER

## 2022-01-27 RX ORDER — METOPROLOL TARTRATE 50 MG/1
50 TABLET, FILM COATED ORAL EVERY 12 HOURS SCHEDULED
Status: DISCONTINUED | OUTPATIENT
Start: 2022-01-27 | End: 2022-01-28 | Stop reason: HOSPADM

## 2022-01-27 RX ORDER — HEPARIN SODIUM 1000 [USP'U]/ML
4000 INJECTION, SOLUTION INTRAVENOUS; SUBCUTANEOUS
Status: DISCONTINUED | OUTPATIENT
Start: 2022-01-27 | End: 2022-01-28

## 2022-01-27 RX ORDER — HEPARIN SODIUM 1000 [USP'U]/ML
2000 INJECTION, SOLUTION INTRAVENOUS; SUBCUTANEOUS
Status: DISCONTINUED | OUTPATIENT
Start: 2022-01-27 | End: 2022-01-28

## 2022-01-27 RX ADMIN — GABAPENTIN 100 MG: 100 CAPSULE ORAL at 16:20

## 2022-01-27 RX ADMIN — IOHEXOL 50 ML: 240 INJECTION, SOLUTION INTRATHECAL; INTRAVASCULAR; INTRAVENOUS; ORAL at 10:43

## 2022-01-27 RX ADMIN — METOPROLOL TARTRATE 25 MG: 25 TABLET, FILM COATED ORAL at 09:00

## 2022-01-27 RX ADMIN — OXYCODONE HYDROCHLORIDE 10 MG: 10 TABLET ORAL at 04:08

## 2022-01-27 RX ADMIN — GABAPENTIN 100 MG: 100 CAPSULE ORAL at 08:59

## 2022-01-27 RX ADMIN — TORSEMIDE 20 MG: 20 TABLET ORAL at 09:00

## 2022-01-27 RX ADMIN — LISINOPRIL 20 MG: 20 TABLET ORAL at 08:59

## 2022-01-27 RX ADMIN — OXYBUTYNIN CHLORIDE 5 MG: 5 TABLET, EXTENDED RELEASE ORAL at 09:00

## 2022-01-27 RX ADMIN — B-COMPLEX W/ C & FOLIC ACID TAB 1 TABLET: TAB at 09:00

## 2022-01-27 RX ADMIN — ASPIRIN 81 MG: 81 TABLET, COATED ORAL at 09:00

## 2022-01-27 RX ADMIN — POLYETHYLENE GLYCOL 3350 17 G: 17 POWDER, FOR SOLUTION ORAL at 08:59

## 2022-01-27 RX ADMIN — HYDROMORPHONE HYDROCHLORIDE 0.5 MG: 1 INJECTION, SOLUTION INTRAMUSCULAR; INTRAVENOUS; SUBCUTANEOUS at 06:43

## 2022-01-27 RX ADMIN — METOPROLOL TARTRATE 25 MG: 25 TABLET, FILM COATED ORAL at 11:13

## 2022-01-27 RX ADMIN — CARBIDOPA AND LEVODOPA 1 TABLET: 25; 100 TABLET ORAL at 09:00

## 2022-01-27 RX ADMIN — PANTOPRAZOLE SODIUM 40 MG: 40 INJECTION, POWDER, FOR SOLUTION INTRAVENOUS at 09:00

## 2022-01-27 RX ADMIN — HEPARIN SODIUM 2000 UNITS: 1000 INJECTION INTRAVENOUS; SUBCUTANEOUS at 21:35

## 2022-01-27 RX ADMIN — GABAPENTIN 100 MG: 100 CAPSULE ORAL at 20:57

## 2022-01-27 RX ADMIN — PERFLUTREN 0.4 ML/MIN: 6.52 INJECTION, SUSPENSION INTRAVENOUS at 13:15

## 2022-01-27 RX ADMIN — METOPROLOL TARTRATE 50 MG: 50 TABLET, FILM COATED ORAL at 20:58

## 2022-01-27 RX ADMIN — CARBIDOPA AND LEVODOPA 1 TABLET: 25; 100 TABLET ORAL at 16:20

## 2022-01-27 RX ADMIN — HEPARIN SODIUM 11.1 UNITS/KG/HR: 10000 INJECTION, SOLUTION INTRAVENOUS; SUBCUTANEOUS at 07:57

## 2022-01-27 RX ADMIN — OXYCODONE HYDROCHLORIDE 10 MG: 10 TABLET ORAL at 17:26

## 2022-01-27 RX ADMIN — CARBIDOPA AND LEVODOPA 1 TABLET: 25; 100 TABLET ORAL at 20:58

## 2022-01-27 RX ADMIN — INSULIN LISPRO 2 UNITS: 100 INJECTION, SOLUTION INTRAVENOUS; SUBCUTANEOUS at 01:00

## 2022-01-27 RX ADMIN — SPIRONOLACTONE 25 MG: 25 TABLET ORAL at 08:59

## 2022-01-27 RX ADMIN — INSULIN LISPRO 2 UNITS: 100 INJECTION, SOLUTION INTRAVENOUS; SUBCUTANEOUS at 06:43

## 2022-01-27 RX ADMIN — TRAZODONE HYDROCHLORIDE 150 MG: 100 TABLET ORAL at 21:00

## 2022-01-27 RX ADMIN — ATORVASTATIN CALCIUM 20 MG: 20 TABLET, FILM COATED ORAL at 08:59

## 2022-01-27 RX ADMIN — HEPARIN SODIUM 4000 UNITS: 1000 INJECTION INTRAVENOUS; SUBCUTANEOUS at 15:05

## 2022-01-27 RX ADMIN — CARIPRAZINE 3 MG: 3 CAPSULE, GELATIN COATED ORAL at 09:07

## 2022-01-27 RX ADMIN — CLONIDINE HYDROCHLORIDE 0.1 MG: 0.1 TABLET ORAL at 20:58

## 2022-01-27 RX ADMIN — SODIUM CHLORIDE 100 ML/HR: 9 INJECTION, SOLUTION INTRAVENOUS at 15:55

## 2022-01-27 RX ADMIN — IOHEXOL 100 ML: 350 INJECTION, SOLUTION INTRAVENOUS at 12:13

## 2022-01-27 RX ADMIN — SENNOSIDES AND DOCUSATE SODIUM 2 TABLET: 50; 8.6 TABLET ORAL at 08:59

## 2022-01-27 RX ADMIN — CLONIDINE HYDROCHLORIDE 0.1 MG: 0.1 TABLET ORAL at 08:59

## 2022-01-27 RX ADMIN — HYDROMORPHONE HYDROCHLORIDE 0.5 MG: 1 INJECTION, SOLUTION INTRAMUSCULAR; INTRAVENOUS; SUBCUTANEOUS at 15:50

## 2022-01-27 RX ADMIN — BUPROPION HYDROCHLORIDE 300 MG: 150 TABLET, EXTENDED RELEASE ORAL at 09:00

## 2022-01-27 NOTE — ASSESSMENT & PLAN NOTE
Lab Results   Component Value Date    HGBA1C 6 1 (H) 01/25/2022     Recent Labs     01/27/22  0022 01/27/22  0406 01/27/22  0609 01/27/22  1154   POCGLU 153* 160* 152* 143*       Blood Sugar Average: Last 72 hrs:  · (P) 134 6   · A1c and BG acceptable   · Takes Trulicity and metformin at home  · Accu-Cheks q 6 hours while NPO, SSI coverage  · Hold Metformin, Trulicity

## 2022-01-27 NOTE — ASSESSMENT & PLAN NOTE
· POA, CT of the abdomen/pelvis showed moderate amount of feces in the colon  · He follows outpatient with GI and was recently seen on 1/10/22  · Apparently has chronic constipation and was prescribed MiraLax daily  · Will continue with bowel regimen  · Add Citroma   · Recheck CT abdomen pelvis

## 2022-01-27 NOTE — ASSESSMENT & PLAN NOTE
· Early morning 1/27 patient went into A  Fib  New onset  [id number] = 3  Suspect pancreatitis vs other underlying intra-abdominal etiology driving  Doubt heart failure exacerbation as appears relatively at baseline for this     · TSH noted to be mildly low, doubt uncontrolled hyperthyroid   · K and Mag acceptable   · Cardiology input appreciated   · Started Metoprolol 25 mg BID, increased to 50 mg BID   · Follow up echo   · Monitor electrolytes   · Start Heparin drip

## 2022-01-27 NOTE — PROGRESS NOTES
[facility]  Progress Note Lisa Mujicacelestina 1960, 59 y o  male MRN: [id number]  Unit/Bed#: S -[place] Encounter: [id number]  Primary Care Provider: Ita Orozco DO   Date and time admitted to hospital: 1/25/2022 11:05 AM    * Acute pancreatitis without infection or necrosis  Assessment & Plan  · POA, presented with abdominal pain which started at 3:00 AM this morning  Woke patient from sleep  · CT of the abdomen/pelvis in the ED showed findings consistent with acute pancreatitis involving the head and proximal body of the pancreas  · He is status post cholecystectomy 1993  No ductal dilatation noted  · Triglycerides normal  · Suspect medication induced - Trulicity/Lipitor  · Symptoms unimproved, bowel sounds decreased   · Continue Fluids, Bowel Rest, Antiemetics, Pain control   · Continue NPO  · Recheck CT abdomen pelvis with contrast       Chronic congestive heart failure [facility])  Assessment & Plan  Wt Readings from Last 3 Encounters:   01/25/22 (!) 152 kg (335 lb 12 2 oz)   01/10/22 (!) 153 kg (338 lb)   12/23/20 (!) 167 kg (368 lb 12 8 oz)     · No acute exacerbation at this time  · Continue home medications  · Monitor with daily weights, I's and O's  · Low-salt diet once cleared for oral intake  · Update echo with new onset A  Fib     SIRS (systemic inflammatory response syndrome) (HCC)  Assessment & Plan  · Early morning 1/27 patient met SIRS criteria of tachycardia, leukocytosis  At this time, no infectious process identified  Mildly elevated lactic acid of 2 1  · Check CT Chest, Abdomen, Pelvis with contrast, follow up   · Continue IVF  · Monitor off antibiotics   · Check blood cultures   · Trend fevers, CBC, BMP    New onset atrial fibrillation with RVR  Assessment & Plan  · Early morning 1/27 patient went into A  Fib  New onset  [id number] = 3  Suspect pancreatitis vs other underlying intra-abdominal etiology driving   Doubt heart failure exacerbation as appears relatively at baseline for this  · TSH noted to be mildly low, doubt uncontrolled hyperthyroid   · K and Mag acceptable   · Cardiology input appreciated   · Started Metoprolol 25 mg BID, increased to 50 mg BID   · Follow up echo   · Monitor electrolytes   · Start Heparin drip     Other constipation  Assessment & Plan  · POA, CT of the abdomen/pelvis showed moderate amount of feces in the colon  · He follows outpatient with GI and was recently seen on 1/10/22  · Apparently has chronic constipation and was prescribed MiraLax daily  · Will continue with bowel regimen  · Add Citroma   · Recheck CT abdomen pelvis     Type 2 diabetes mellitus without complication, without long-term current use of insulin [facility])  Assessment & Plan  Lab Results   Component Value Date    HGBA1C 6 1 (H) 01/25/2022     Recent Labs     01/27/22  0022 01/27/22  0406 01/27/22  0609 01/27/22  1154   POCGLU 153* 160* 152* 143*       Blood Sugar Average: Last 72 hrs:  · (P) 134 6   · A1c and BG acceptable   · Takes Trulicity and metformin at home  · Accu-Cheks q 6 hours while NPO, SSI coverage  · Hold Metformin, Trulicity     Sleep apnea  Assessment & Plan  · On QHS CPAP    Benign essential hypertension  Assessment & Plan  · Blood pressure acceptable at 145/68  · Continue home medications      VTE Pharmacologic Prophylaxis: VTE Score: 2 Heparin GTT    Patient Centered Rounds: I performed bedside rounds with nursing staff today  Discussions with Specialists or Other Care Team Provider: Discussed with Cardiology, RN, CM    Education and Discussions with Family / Patient: Updated  (wife) via phone  Time Spent for Care: 30 minutes  More than 50% of total time spent on counseling and coordination of care as described above      Current Length of Stay: 2 day(s)  Current Patient Status: Inpatient   Certification Statement: The patient will continue to require additional inpatient hospital stay due to on going management of above   Discharge Plan: Anticipate discharge in 48-72 hrs to home  Code Status: Level 1 - Full Code    Subjective:   Patient reports that he has on going abdominal pain that is unchanged from yesterday  Continues to deny bowel movements or passing gas  Reports feeling that his heart was racing overnight  Denies increased shortness of breath, leg swelling or chest pain  Objective:     Vitals:   Temp (24hrs), Av °F (37 2 °C), Min:98 4 °F (36 9 °C), Max:100 2 °F (37 9 °C)    Temp:  [98 4 °F (36 9 °C)-100 2 °F (37 9 °C)] 98 4 °F (36 9 °C)  HR:  [] 91  Resp:  [18] 18  BP: (109-151)/(72-87) 151/79  SpO2:  [91 %-95 %] 95 %  Body mass index is 49 58 kg/m²  Input and Output Summary (last 24 hours):      Intake/Output Summary (Last 24 hours) at 2022 1248  Last data filed at 2022 1101  Gross per 24 hour   Intake 2460 ml   Output 1875 ml   Net 585 ml       Physical Exam:   Physical Exam     Additional Data:     Labs:  Results from last 7 days   Lab Units 22  0536   WBC Thousand/uL 21 92*   HEMOGLOBIN g/dL 14 3   HEMATOCRIT % 46 1   PLATELETS Thousands/uL 299   NEUTROS PCT % 85*   LYMPHS PCT % 4*   MONOS PCT % 10   EOS PCT % 0     Results from last 7 days   Lab Units 22  0536   SODIUM mmol/L 133*   POTASSIUM mmol/L 4 1   CHLORIDE mmol/L 95*   CO2 mmol/L 28   BUN mg/dL 15   CREATININE mg/dL 1 04   ANION GAP mmol/L 10   CALCIUM mg/dL 9 3   ALBUMIN g/dL 3 5   TOTAL BILIRUBIN mg/dL 2 21*   ALK PHOS U/L 87   ALT U/L 12   AST U/L 21   GLUCOSE RANDOM mg/dL 143*     Results from last 7 days   Lab Units 22  0817   INR  1 23*     Results from last 7 days   Lab Units 22  1154 22  0609 22  0406 22  0022 22  1705 22  1138 22  0602 22  0021 22  2103 22  1832   POC GLUCOSE mg/dl 143* 152* 160* 153* 123 137 126 133 121 98     Results from last 7 days   Lab Units 22  1214   HEMOGLOBIN A1C % 6 1*     Results from last 7 days   Lab Units 22  1000 LACTIC ACID mmol/L 2 1*       Lines/Drains:  Invasive Devices  Report    Peripheral Intravenous Line            Peripheral IV 01/25/22 Right Antecubital 2 days    Long-Dwell Peripheral IV (Midline) 39/64/38 Right Basilic <1 day    Peripheral IV 01/27/22 Left Forearm <1 day                  Telemetry:  Telemetry Orders (From admission, onward)             48 Hour Telemetry Monitoring  Continuous x 48 hours        References:    Telemetry Guidelines   Question:  Reason for 48 Hour Telemetry  Answer:  Arrhythmias Requiring Medical Therapy (eg  SVT, Vtach/fib, Bradycardia, Uncontrolled A-fib)                 Telemetry Reviewed: Atrial fibrillation  HR averaging 100-140  Indication for Continued Telemetry Use: Arrthymias requiring medical therapy             Imaging: No pertinent imaging reviewed      Recent Cultures (last 7 days):         Last 24 Hours Medication List:   Current Facility-Administered Medications   Medication Dose Route Frequency Provider Last Rate    acetaminophen  650 mg Oral Q4H PRN Caitlin Do MD      aspirin  81 mg Oral Daily Kirsty Pro MD      atorvastatin  20 mg Oral Daily Kirsty Pro MD      buPROPion  300 mg Oral Daily Kirsty Pro MD      carbidopa-levodopa  1 tablet Oral TID Caitlin Do MD      cariprazine  3 mg Oral Daily Kirsty Pro MD      cloNIDine  0 1 mg Oral Q12H [facility] Kirsty Pro MD      gabapentin  100 mg Oral TID Caitlin Do MD      heparin (porcine)  3-20 Units/kg/hr (Order-Specific) Intravenous Titrated SELVIN [place] 11 1 Units/kg/hr (01/27/22 0757)    heparin (porcine)  2,000 Units Intravenous Q1H PRN SELVIN Corona      heparin (porcine)  4,000 Units Intravenous Q1H PRN SELVIN Corona      HYDROmorphone  0 5 mg Intravenous Q4H PRN Caitlin Do MD      insulin lispro  2-12 Units Subcutaneous Q6H [facility] Kirsty Pro MD      lisinopril  20 mg Oral Daily Kirsty Caceres MD      metoprolol tartrate  50 mg Oral Q12H Robi Yang MD      multivitamin stress formula  1 tablet Oral Daily Kirsty Caceres MD      naloxone  0 04 mg Intravenous Q1MIN PRN Facundo Jones MD      oxybutynin  5 mg Oral Daily Kirsty Caceres MD      oxyCODONE  10 mg Oral Q4H PRN Facundo Jones MD      oxyCODONE  5 mg Oral Q4H PRN Facundo Jones MD      pantoprazole  40 mg Intravenous Q24H [address] Kirsty Caceres MD      polyethylene glycol  17 g Oral Daily Kirsty Caceres MD      senna-docusate sodium  2 tablet Oral Daily Kirsty Caceres MD      sodium chloride  100 mL/hr Intravenous Continuous Facundo Jones  mL/hr (01/27/22 0600)    spironolactone  25 mg Oral Daily Kirsty Caceres MD      torsemide  20 mg Oral Daily Kirsty Caceres MD      traZODone  150 mg Oral HS Facundo Jones MD          Today, Patient Was Seen By: Luis Felipe Orozco PA-C    **Please Note: This note may have been constructed using a voice recognition system  **

## 2022-01-27 NOTE — ASSESSMENT & PLAN NOTE
· POA, presented with abdominal pain which started at 3:00 AM this morning  Woke patient from sleep  · CT of the abdomen/pelvis in the ED showed findings consistent with acute pancreatitis involving the head and proximal body of the pancreas  · He is status post cholecystectomy 1993  No ductal dilatation noted     · Triglycerides normal  · Suspect medication induced - Trulicity/Lipitor  · Symptoms unimproved, bowel sounds decreased   · Continue Fluids, Bowel Rest, Antiemetics, Pain control   · Continue NPO  · Recheck CT abdomen pelvis with contrast

## 2022-01-27 NOTE — ASSESSMENT & PLAN NOTE
· Early morning 1/27 patient met SIRS criteria of tachycardia, leukocytosis  At this time, no infectious process identified   Mildly elevated lactic acid of 2 1  · Check CT Chest, Abdomen, Pelvis with contrast, follow up   · Continue IVF  · Monitor off antibiotics   · Check blood cultures   · Trend fevers, CBC, BMP

## 2022-01-27 NOTE — ASSESSMENT & PLAN NOTE
Wt Readings from Last 3 Encounters:   01/25/22 (!) 152 kg (335 lb 12 2 oz)   01/10/22 (!) 153 kg (338 lb)   12/23/20 (!) 167 kg (368 lb 12 8 oz)     · No acute exacerbation at this time  · Continue home medications  · Monitor with daily weights, I's and O's  · Low-salt diet once cleared for oral intake  · Update echo with new onset A   Fib

## 2022-01-27 NOTE — CONSULTS
Consultation - Cardiology Team 1  [facility] 59 y o  male MRN: [id number]  Unit/Bed#: S -01 Encounter: [id number]  01/27/22  11:47 AM    Assessment/ Plan:  1  New onset of atrial fibrillation with RVR  · Patient on admission with acute abdominal pain consistent with acute pancreatitis on admission  · Patient experienced a new onset of AFib this morning with RVR  Patient was stable  Patient with past medical history of chronic CHF, diabetes mellitus  · Patient was started on metoprolol 25 mg b i d  For heart rate control  · EKG was consistent with atrial flutter  With   Patient denied palpitation, dizziness, chest pain  · New echo was ordered  Pending results    Plan:  · Increase metoprolol from 25 mg to 50 mg b i d   · Patient received 25 mg metoprolol in the morning  Ordered an extra dose of 25 mg to complete the total of 50 mg   · Continue close monitoring on tele and blood pressure  · Patient on multiple anti hypertensive medications  Will discontinue spironolactone and torsemide to prevent a blood pressure drop  2  Congestive heart failure  · Patient with a history of CHF  Patient not fluid overloaded  No signs of acute exacerbation  · Last echo done 2019 is consistent with preserved EF with mild valvular changes  · Continue low-sodium diet  · Spironolactone and torsemide discontinued  · Continue with lisinopril and metoprolol    3   Essential hypertension  · Patient on clonidine, ramipril, spirolactone and torsemide at home  · Continue clonidine 01  mg bid  · Ramipril 5 mg daily was switched to lisinopril 20 mg daily  · Started Metoprolol 50 mg bid  · Will hold Spironolactone and Torsemide for now  4 Acute pancreatitis without infection or necrosis  · Patient admitted with acute abdominal pain  Patient s/p cholecystectomy 1993  · CT of the abdomen in the ED remarkable for acute pancreatitis  · Continue management as per primary team    5  Type 2 Diabetes mellitius   HG A1C 6 1 Continue regiment per primary team   6  Sleep apnea    using CPAP q h s  History of Present Illness   Physician Requesting Consult: Hipolito Jeffries MD    Reason for Consult / Principal Problem:  New onset A fib    HPI: Toni Jesus is a 59y o  year old male who presents with acute abdominal that started the morning prior admission  In the ED the CT was consistent with acute pancreatitis  The next morning after admission  patient developed new onset of AFib with RVR  Patient stable normotensive with heart rate 110s- 120s  EKG remarkable for atrial flutter  Patient was started on metoprolol 25 mg b i d  and [facility] with [facility]  Cardiology was consulted  Inpatient consult to Cardiology  Consult performed by: Breann Muñoz MD  Consult ordered by: SELVIN Mar          EKG:  Atrial flutter with heart rate 114, QT prolongation    Review of Systems   Constitutional: Positive for activity change, appetite change and fatigue  Negative for chills, diaphoresis and fever  Respiratory: Negative for cough, choking, chest tightness, shortness of breath and wheezing  Cardiovascular: Positive for leg swelling  Negative for chest pain and palpitations  Gastrointestinal: Positive for abdominal pain  Negative for abdominal distention, constipation, diarrhea, nausea and vomiting  Genitourinary: Negative for difficulty urinating and dysuria  Musculoskeletal: Negative  Skin: Negative  Neurological: Negative  Hematological: Negative  Psychiatric/Behavioral: Negative          Historical Information   Past Medical History:   Diagnosis Date    Arthritis     Asthma     CPAP (continuous positive airway pressure) dependence     Edema     left leg    GERD (gastroesophageal reflux disease)     History of echocardiogram 07/2017    History of Holter monitoring 09/2014    History of stress test 08/2014    Hypertension     Myocardial infarct [facility])     2011    Myocardial infarction ([facility] [address] )     Obesity     Sleep apnea     Thrombophlebitis      Past Surgical History:   Procedure Laterality Date    CARDIAC CATHETERIZATION  03/2012    [id number]    CHOLECYSTECTOMY      COLONOSCOPY      COLONOSCOPY N/A 10/30/2017    Procedure: COLONOSCOPY;  Surgeon: Sarai Lau MD;  Location: [place] [address] GI LAB; Service: Gastroenterology    ENDOVENOUS ABLATION SAPHENOUS VEIN W/ LASER      each addit vein    ERCP      ESOPHAGOGASTRODUODENOSCOPY N/A 10/30/2017    Procedure: ESOPHAGOGASTRODUODENOSCOPY (EGD); Surgeon: Sarai Lau MD;  Location: [facility] GI [facility];   Service: Gastroenterology    GASTRIC BYPASS      2001    GASTRIC BYPASS      HERNIA REPAIR      paraesophageal hiatus hernia    HERNIA REPAIR      x5 last one 2011    JOINT REPLACEMENT      KNEE ARTHROPLASTY Right     2102    REPLACEMENT TOTAL KNEE Right 02/2011     Social History     Substance and Sexual Activity   Alcohol Use Not Currently    Comment: rarely - denied per allscripts      Social History     Substance and Sexual Activity   Drug Use No     Social History     Tobacco Use   Smoking Status Former Smoker    Packs/day: 1 00    Years: 3 00    Pack years: 3 00    Types: Cigarettes   Smokeless Tobacco Former User   Tobacco Comment    never a smoker per allscripts - as per [employer]       Family History:   Family History   Problem Relation Age of Onset    Uterine cancer Mother     Prostate cancer Father     Diabetes Father     Heart failure Father     Stroke Family         syndrome    Aneurysm Family         aoritc    Diabetes Brother     Other Brother         amputation-    Diabetes Paternal Grandmother     Diabetes Paternal Grandfather        Meds/Allergies   current meds:   Current Facility-Administered Medications   Medication Dose Route Frequency    acetaminophen (TYLENOL) tablet 650 mg  650 mg Oral Q4H PRN    aspirin (ECOTRIN LOW STRENGTH) EC tablet 81 mg  81 mg Oral Daily    atorvastatin (LIPITOR) tablet 20 mg 20 mg Oral Daily    buPROPion (WELLBUTRIN XL) 24 hr tablet 300 mg  300 mg Oral Daily    carbidopa-levodopa (SINEMET)  mg per tablet 1 tablet  1 tablet Oral TID    cariprazine (VRAYLAR) capsule 3 mg  3 mg Oral Daily    cloNIDine (CATAPRES) tablet 0 1 mg  0 1 mg Oral Q12H [facility]    gabapentin (NEURONTIN) capsule 100 mg  100 mg Oral TID    heparin (porcine) 25,000 Units in sodium chloride 0 45 % 250 mL infusion  3-20 Units/kg/hr (Order-Specific) Intravenous Titrated    heparin (porcine) injection 2,000 Units  2,000 Units Intravenous Q1H PRN    heparin (porcine) injection 4,000 Units  4,000 Units Intravenous Q1H PRN    HYDROmorphone (DILAUDID) injection 0 5 mg  0 5 mg Intravenous Q4H PRN    insulin lispro (HumaLOG) 100 units/mL subcutaneous injection 2-12 Units  2-12 Units Subcutaneous Q6H [address]    lisinopril (ZESTRIL) tablet 20 mg  20 mg Oral Daily    metoprolol tartrate (LOPRESSOR) tablet 50 mg  50 mg Oral Q12H [facility]    multivitamin stress formula tablet 1 tablet  1 tablet Oral Daily    naloxone (NARCAN) 0 04 mg/mL syringe 0 04 mg  0 04 mg Intravenous Q1MIN PRN    oxybutynin (DITROPAN-XL) 24 hr tablet 5 mg  5 mg Oral Daily    oxyCODONE (ROXICODONE) immediate release tablet 10 mg  10 mg Oral Q4H PRN    oxyCODONE (ROXICODONE) IR tablet 5 mg  5 mg Oral Q4H PRN    pantoprazole (PROTONIX) injection 40 mg  40 mg Intravenous Q24H [facility]    polyethylene glycol (MIRALAX) packet 17 g  17 g Oral Daily    senna-docusate sodium (SENOKOT S) 8 6-50 mg per tablet 2 tablet  2 tablet Oral Daily    sodium chloride 0 9 % infusion  100 mL/hr Intravenous Continuous    traZODone (DESYREL) tablet 150 mg  150 mg Oral HS     Allergies   Allergen Reactions    Gluten Meal - Food Allergy     Adhesive [Medical Tape] Rash       Objective   Vitals: Blood pressure 151/79, pulse 91, temperature 98 4 °F (36 9 °C), temperature source Oral, resp  rate 18, height 5' 9" (1 753 m), weight (!) 152 kg (335 lb 12 2 oz), SpO2 95 %  , Body mass index is 49 58 kg/m² ,   Orthostatic Blood Pressures      Most Recent Value   Blood Pressure 151/79 filed at 01/27/2022 0906   Patient Position - Orthostatic VS Lying filed at 01/26/2022 1240          Systolic (64XFK), MADISYN:603 , Min:109 , PJA:472     Diastolic (83VPX), NLA:42, Min:72, Max:87        Intake/Output Summary (Last 24 hours) at 1/27/2022 1147  Last data filed at 1/27/2022 1101  Gross per 24 hour   Intake 2460 ml   Output 1875 ml   Net 585 ml       Invasive Devices  Report    Peripheral Intravenous Line            Peripheral IV 01/25/22 Right Antecubital 1 day    Long-Dwell Peripheral IV (Midline) 64/93/02 Right Basilic <1 day    Peripheral IV 01/27/22 Left Forearm <1 day                  Physical Exam:  GEN: Alert and oriented x 3, in no acute distress  Obese  HEENT: Sclera anicteric, conjunctivae pink, mucous membranes moist  Oropharynx clear  NECK: Supple, no carotid bruits, no significant JVD  Trachea midline, no thyromegaly  HEART: Irregular rhythm with HR above 371, no murmurs, clicks, gallops or rubs  PMI nondisplaced, no thrills  LUNGS: Clear to auscultation bilaterally; no wheezes, rales, or rhonchi  No increased work of breathing or signs of respiratory distress  ABDOMEN: Abdomen obese, tender on palpation, nondistended, normoactive bowel sounds  EXTREMITIES: Leg edema but at his baseline Skin warm and well perfused, no clubbing or cyanosis  NEURO: No focal findings  Normal speech  Mood and affect normal    SKIN: Normal without suspicious lesions on exposed skin      Lab Results:   Troponins:     CBC with diff:   Results from last 7 days   Lab Units 01/27/22  0536 01/26/22  0518 01/25/22  1214   WBC Thousand/uL 21 92* 11 55* 12 83*   HEMOGLOBIN g/dL 14 3 12 8 13 9   HEMATOCRIT % 46 1 41 2 45 2   MCV fL 78* 78* 78*   PLATELETS Thousands/uL 299 277 319   MCH pg 24 1* 24 1* 23 9*   MCHC g/dL 31 0* 31 1* 30 8*   RDW % 13 9 14 0 14 1   MPV fL 9 1 9 5 9 3   NRBC AUTO /100 WBCs 0  -- 0     CMP:   Results from last 7 days   Lab Units 01/27/22  0536 01/26/22  0518 01/25/22  1214   POTASSIUM mmol/L 4 1 4 0 4 2   CHLORIDE mmol/L 95* 99* 101   CO2 mmol/L 28 24 27   BUN mg/dL 15 10 12   CREATININE mg/dL 1 04 0 77 0 83   CALCIUM mg/dL 9 3 8 8 8 7   AST U/L 21 15 20   ALT U/L 12 7* 9*   ALK PHOS U/L 87 82 93   EGFR ml/min/1 73sq m 76 96 93

## 2022-01-27 NOTE — APP STUDENT NOTE
S: Patient feels the same today  He is experiencing 8/10 abdominal pain this morning with some episodes throughout the night of 10/10 abdominal pain  He is nausea but has not vomited  He has chronic shortness of breath and feels SOB today that is slightly worse than his baseline  Denies chest pain, and palpitations  Patient has still not had a bowel movement and has not passed gas  O:     VS: , BP: 151/79, oxygen saturation 95%  Temp 36 9 elevated to 100 2 overnight    General: patient is laying in the bed and appears to be in discomfort secondary to abdominal pain  Does not appear to be in respiratory distress  Alert and coherent     CV: irregular rate and irregular rhythm, no murmurs, rubs or gallops    Pulm: lungs clear to ascultation bilaterally, no wheezes, rales or rhonchi    Abdomen: Bowel sounds are diminished in all four quadrants, tender to palpation in the right upper quadrant  MSK: 1+ pitting edema in the lower extremities bilaterally     Labs:    1/27:   WBC elevated at 21 92  Sodium 133, potassium 4 1, chloride 95  Lactate 2 1    1/25 TG normal at 48, lipase elevated at 1,187    A/P:  1  Acute pancreatitis:  ·  patient presented with abdominal pain which started at 3 AM yesterday morning and awoke him from sleep   · CT of the abdomen and pelvs in the ED revealed findings consistent with acute pancreatitis involving the head and proximal body of the pancreas  · S/P cholecystectomy in 1993  No ductal dilation noted  · Triglycerides normal at 48   · Suspected medication induced (trulicity/lipitor)   · Symptoms unimproved: Continue fluids, bowel rest, antiemetics, pain control   · Patients meeting SIRs criteria with elevated WBC of 21 92 and tachycardia of 138  · Lactate elevated at 2 1   · Pending blood cultures   · UA unremarkable on admission   · Repeat CT abdomen and pelvis and chest     2  New onset Afib with RVR:  · Early morning on 1/27 went into new onset Afib   CHADSVasc=3    · TSH mildly low, unlikely uncontrolled hypothyroidism  · K and Mg within normal limits  · Per Cardiology: start metoprolol 25 mg BID increased to 50 mg BID  · ECHO pending  · Monitor electrolytes   · Start heparin drip    3  Chronic congestive heart failure:  · No acute exacerbation at this time   · Monitor daily weights and I&Os  · Low salt diet once cleared     4  Type 2 Diabetes Mellitus:  · HGBA1C: 6 1   · Fingerstick glucoses are within goal range   · Hold trulicity and metformin   · Accu-checks Q 6 hrs while NPO, SSI coverage    5  Constipation:  · CT of abdomen/pelvis showed moderate amount of feces in the colon   · Patient follows with outpatient GI and was recently seen on 1/10/22   · Has chronic constipation and was prescribed miralax daily   · Continue bowel regimen with Citroma  · Recheck CT abdomen and pelvis for potential obstruction or ileus     6   Benign essential HTN:  · Blood pressure 151/79  · Continue home medications     Zaynab CALVERT

## 2022-01-27 NOTE — PROGRESS NOTES
Patient in bed  Patients IV's placed on hold  Patient had midline placed and new IV for fluids  Patient given medication as ordered  Patient taken down to have CT of the chest completed  Patient had BC drawn and Lactic acid  Lactic acid resulted 2 1  Waiting on patient to return to his room  Will continue to monitor

## 2022-01-27 NOTE — CASE MANAGEMENT
Case Management Assessment & Discharge Planning Note    Patient name Austen Melendez  Location S /S -01 MRN [id number]  : 1960 Date 2022       Current Admission Date: 2022  Current Admission Diagnosis:Acute pancreatitis without infection or necrosis   Patient Active Problem List    Diagnosis Date Noted    New onset atrial fibrillation with RVR 2022    SIRS (systemic inflammatory response syndrome) ([place] [address] ) 2022    Acute pancreatitis without infection or necrosis 2022    Type 2 diabetes mellitus without complication, without long-term current use of insulin ([place] [address] ) 2022    Other constipation 2022    Chronic diastolic congestive heart failure ([place] [address] ) 2020    Vitamin D deficiency 2019    Benign essential hypertension 2019    Hypokalemia 2019    Urgency of urination 2019    Hypertension 2019    Depression 2019    Chronic congestive heart failure ([place] [address] ) 2019    Morbid obesity with BMI of 45 0-49 9, adult ([place] [address] ) 2019    Rosacea 10/25/2018    Acute bronchitis 10/04/2018    Chronic left hip pain 2018    Chronic pain of left knee 2018    GERD without esophagitis 2018    Dyspnea on exertion 2018    Chest pain, atypical 2018    Morbid obesity with body mass index of 50 or higher ([place] [address] ) 2018    Hematospermia 2018    Erectile dysfunction 2017    BPH (benign prostatic hyperplasia) 2017    Sleep apnea 2017    Chronic obstructive pulmonary disease ([place] [address] ) 2013    Asthma 2013      LOS (days): 2  Geometric Mean LOS (GMLOS) (days): 3 10  Days to GMLOS:1     OBJECTIVE:    Risk of Unplanned Readmission Score: 18         Current admission status: Inpatient       Preferred Pharmacy:   Britta MARTINE[facility], [address] [place] [address] [address] [facility] [address]  Phone: [phone] Fax: [address] [place] [address]  Phone: [phone] Fax: [phone]    [facility] [address] [address]  Phone: [phone] Fax: [address] [facility] [address] [facility], [address] [place] [address]  Phone: [phone] Fax: [phone]    Primary Care Provider: Kirt Fulton DO    Primary Insurance: MEDICARE  Secondary Insurance: [employer]    ASSESSMENT:  Active Health Care Agents    There are no active Health Care Agents on file  Patient Information  Admitted from[de-identified] Home  Mental Status: Alert  During Assessment patient was accompanied by: Not accompanied during assessment  Assessment information provided by[de-identified] Patient  Primary Caregiver: Self  Support Systems: Spouse/significant other,Daughter  Home entry access options   Select all that apply : Stairs  Number of steps to enter home : 1 (large step to get in)  Type of Current Residence: [facility] (set of steps to the basement)  In the last 12 months, how many places have you lived?: 1  In the last 12 months, was there a time when you did not have a steady place to sleep or slept in a shelter (including now)?: No  Homeless/housing insecurity resource given?: No  Living Arrangements: Lives w/ Spouse/significant other,Lives w/ Daughter (daughter at home is autistic)  Is patient a ?: No    Activities of Daily Living Prior to Admission  Functional Status: Independent  Completes ADLs independently?: Yes  Ambulates independently?: Yes  Does patient use assisted devices?: Yes  Assisted Devices (DME) used: Tylor Gonzalez  Does patient currently own DME?: Yes  What DME does the patient currently own?: Darleen Matt  Does patient have a history of Outpatient Therapy (PT/OT)?: Yes  Does the patient have a history of Short-Term Rehab?: No  Does patient have a history of HHC?: No  Does patient currently have Nathan Gates?: No         Patient Information Continued  Income Source: Pension/longterm  Does patient have prescription coverage?: Yes  Food insecurity resource given?: N/A  Does patient receive dialysis treatments?: No  Does patient have a history of substance abuse?: No  Does patient have a history of Mental Health Diagnosis?: No    PHQ 2/9 Screening   Reviewed PHQ 2/9 Depression Screening Score?: No    Means of Transportation  Means of Transport to Appts[de-identified] Drives Self  In the past 12 months, has lack of transportation kept you from medical appointments or from getting medications?: No  In the past 12 months, has lack of transportation kept you from meetings, work, or from getting things needed for daily living?: No  Was application for public transport provided?: N/A        DISCHARGE DETAILS:    Discharge planning discussed with[de-identified] patient at bedside        CM contacted family/caregiver?: No- see comments  Were Treatment Team discharge recommendations reviewed with patient/caregiver?: Yes  Did patient/caregiver verbalize understanding of patient care needs?: Yes  Were patient/caregiver advised of the risks associated with not following Treatment Team discharge recommendations?: Yes    Contacts  Patient Contacts: spouse, Deanna Hearn  Relationship to Patient[de-identified] Family  Contact Method: Phone  Reason/Outcome: Emergency Contact,Discharge Planning                   Would you like to participate in our [address] service program?  : No - Declined          Transport at Discharge : Family,Automobile                             IMM Given (Date):: 01/25/22        Additional Comments: Patient admitted due to acute pancreatitis; transferred to  from  for cardiac monitoring  Met with patient at bedside to complete assessment  Patient reports that he lives at home with his wife and 29year-old autistic daughter  Reports that he usually ambulates with canes, walker or rollator  States that he sleeps in a recliner as he has difficulty laying flat  Has been to out-patient rehab in the past, but denies any home care services or in-patient rehab  Reports that he had been driving, but spouse will pick-up at d/c  Patient's preferred pharmacy is [address] [place] [address]  Patient confirmed he has Rx coverage  PCP is Dr Yossi Gonzalez  Patient complaining of severe abdominal pain (10/10); RN notified of same  Will continue to follow for d/c planning needs

## 2022-01-28 VITALS
SYSTOLIC BLOOD PRESSURE: 111 MMHG | RESPIRATION RATE: 20 BRPM | WEIGHT: 315 LBS | TEMPERATURE: 97.6 F | HEIGHT: 69 IN | BODY MASS INDEX: 46.65 KG/M2 | HEART RATE: 68 BPM | OXYGEN SATURATION: 94 % | DIASTOLIC BLOOD PRESSURE: 63 MMHG

## 2022-01-28 PROBLEM — R65.10 SIRS (SYSTEMIC INFLAMMATORY RESPONSE SYNDROME) (HCC): Status: RESOLVED | Noted: 2022-01-27 | Resolved: 2022-01-28

## 2022-01-28 LAB
APTT PPP: 41 SECONDS (ref 23–37)
BASOPHILS # BLD AUTO: 0.07 THOUSANDS/ΜL (ref 0–0.1)
BASOPHILS NFR BLD AUTO: 0 % (ref 0–1)
EOSINOPHIL # BLD AUTO: 0.47 THOUSAND/ΜL (ref 0–0.61)
EOSINOPHIL NFR BLD AUTO: 3 % (ref 0–6)
ERYTHROCYTE [DISTWIDTH] IN BLOOD BY AUTOMATED COUNT: 14 % (ref 11.6–15.1)
GLUCOSE SERPL-MCNC: 100 MG/DL (ref 65–140)
GLUCOSE SERPL-MCNC: 109 MG/DL (ref 65–140)
HCT VFR BLD AUTO: 39 % (ref 36.5–49.3)
HGB BLD-MCNC: 12.5 G/DL (ref 12–17)
IMM GRANULOCYTES # BLD AUTO: 0.15 THOUSAND/UL (ref 0–0.2)
IMM GRANULOCYTES NFR BLD AUTO: 1 % (ref 0–2)
LYMPHOCYTES # BLD AUTO: 1.23 THOUSANDS/ΜL (ref 0.6–4.47)
LYMPHOCYTES NFR BLD AUTO: 6 % (ref 14–44)
MCH RBC QN AUTO: 25.1 PG (ref 26.8–34.3)
MCHC RBC AUTO-ENTMCNC: 32.1 G/DL (ref 31.4–37.4)
MCV RBC AUTO: 78 FL (ref 82–98)
MONOCYTES # BLD AUTO: 2.05 THOUSAND/ΜL (ref 0.17–1.22)
MONOCYTES NFR BLD AUTO: 11 % (ref 4–12)
NEUTROPHILS # BLD AUTO: 15.14 THOUSANDS/ΜL (ref 1.85–7.62)
NEUTS SEG NFR BLD AUTO: 79 % (ref 43–75)
NRBC BLD AUTO-RTO: 0 /100 WBCS
PLATELET # BLD AUTO: 283 THOUSANDS/UL (ref 149–390)
PMV BLD AUTO: 9.3 FL (ref 8.9–12.7)
RBC # BLD AUTO: 4.99 MILLION/UL (ref 3.88–5.62)
WBC # BLD AUTO: 19.11 THOUSAND/UL (ref 4.31–10.16)

## 2022-01-28 PROCEDURE — 99232 SBSQ HOSP IP/OBS MODERATE 35: CPT | Performed by: INTERNAL MEDICINE

## 2022-01-28 PROCEDURE — 85730 THROMBOPLASTIN TIME PARTIAL: CPT | Performed by: INTERNAL MEDICINE

## 2022-01-28 PROCEDURE — C9113 INJ PANTOPRAZOLE SODIUM, VIA: HCPCS | Performed by: INTERNAL MEDICINE

## 2022-01-28 PROCEDURE — 82948 REAGENT STRIP/BLOOD GLUCOSE: CPT

## 2022-01-28 PROCEDURE — 85025 COMPLETE CBC W/AUTO DIFF WBC: CPT | Performed by: PHYSICIAN ASSISTANT

## 2022-01-28 PROCEDURE — 99239 HOSP IP/OBS DSCHRG MGMT >30: CPT | Performed by: PHYSICIAN ASSISTANT

## 2022-01-28 RX ORDER — PANTOPRAZOLE SODIUM 40 MG/1
40 TABLET, DELAYED RELEASE ORAL DAILY
Status: DISCONTINUED | OUTPATIENT
Start: 2022-01-29 | End: 2022-01-28 | Stop reason: HOSPADM

## 2022-01-28 RX ORDER — METOPROLOL TARTRATE 50 MG/1
50 TABLET, FILM COATED ORAL EVERY 12 HOURS SCHEDULED
Qty: 60 TABLET | Refills: 0 | Status: SHIPPED | OUTPATIENT
Start: 2022-01-28

## 2022-01-28 RX ADMIN — PANTOPRAZOLE SODIUM 40 MG: 40 INJECTION, POWDER, FOR SOLUTION INTRAVENOUS at 08:21

## 2022-01-28 RX ADMIN — CARBIDOPA AND LEVODOPA 1 TABLET: 25; 100 TABLET ORAL at 08:19

## 2022-01-28 RX ADMIN — OXYCODONE HYDROCHLORIDE 5 MG: 5 TABLET ORAL at 03:30

## 2022-01-28 RX ADMIN — B-COMPLEX W/ C & FOLIC ACID TAB 1 TABLET: TAB at 08:19

## 2022-01-28 RX ADMIN — POLYETHYLENE GLYCOL 3350 17 G: 17 POWDER, FOR SOLUTION ORAL at 08:21

## 2022-01-28 RX ADMIN — BUPROPION HYDROCHLORIDE 300 MG: 150 TABLET, EXTENDED RELEASE ORAL at 08:19

## 2022-01-28 RX ADMIN — ATORVASTATIN CALCIUM 20 MG: 20 TABLET, FILM COATED ORAL at 08:19

## 2022-01-28 RX ADMIN — ACETAMINOPHEN 650 MG: 325 TABLET, FILM COATED ORAL at 00:34

## 2022-01-28 RX ADMIN — APIXABAN 5 MG: 5 TABLET, FILM COATED ORAL at 10:16

## 2022-01-28 RX ADMIN — GABAPENTIN 100 MG: 100 CAPSULE ORAL at 08:19

## 2022-01-28 RX ADMIN — METOPROLOL TARTRATE 50 MG: 50 TABLET, FILM COATED ORAL at 08:21

## 2022-01-28 RX ADMIN — CARIPRAZINE 3 MG: 3 CAPSULE, GELATIN COATED ORAL at 08:21

## 2022-01-28 RX ADMIN — HEPARIN SODIUM 17.1 UNITS/KG/HR: 10000 INJECTION, SOLUTION INTRAVENOUS; SUBCUTANEOUS at 03:53

## 2022-01-28 RX ADMIN — CLONIDINE HYDROCHLORIDE 0.1 MG: 0.1 TABLET ORAL at 08:19

## 2022-01-28 RX ADMIN — HEPARIN SODIUM 4000 UNITS: 1000 INJECTION INTRAVENOUS; SUBCUTANEOUS at 05:07

## 2022-01-28 RX ADMIN — SENNOSIDES AND DOCUSATE SODIUM 2 TABLET: 50; 8.6 TABLET ORAL at 08:21

## 2022-01-28 RX ADMIN — OXYCODONE HYDROCHLORIDE 5 MG: 5 TABLET ORAL at 08:20

## 2022-01-28 RX ADMIN — ASPIRIN 81 MG: 81 TABLET, COATED ORAL at 08:19

## 2022-01-28 RX ADMIN — OXYBUTYNIN CHLORIDE 5 MG: 5 TABLET, EXTENDED RELEASE ORAL at 08:20

## 2022-01-28 RX ADMIN — HYDROMORPHONE HYDROCHLORIDE 0.5 MG: 1 INJECTION, SOLUTION INTRAMUSCULAR; INTRAVENOUS; SUBCUTANEOUS at 05:12

## 2022-01-28 RX ADMIN — LISINOPRIL 20 MG: 20 TABLET ORAL at 08:19

## 2022-01-28 NOTE — ASSESSMENT & PLAN NOTE
· Early morning 1/27 patient went into A  Fib  New onset  [id number] = 3  Suspect pancreatitis vs other underlying intra-abdominal etiology driving  Doubt heart failure exacerbation as appears relatively at baseline for this     · TSH noted to be mildly low, T4 normal   · K and Mag acceptable   · He converted to NSR  · Cardiology input appreciated   · Continue Metoprolol 50 mg BID   · Eliquis 5 mg BID at discharge   · Follow up with [facility] Cardiology

## 2022-01-28 NOTE — DISCHARGE INSTR - AVS FIRST PAGE
Dear Steffi Keith,     It was our pleasure to care for you here at [facility]  It is our hope that we were always able to exceed the expected standards for your care during your stay  You were hospitalized due to Pancreatitis, New Onset A  Fib  You were cared for on the 3rd floor by Jeanne Mart PA-C under the service of Sabina Ac MD with the [facility] who covers for your primary care physician (PCP), Karie Lewis DO, while you were hospitalized  If you have any questions or concerns related to this hospitalization, you may contact us at [phone]  For follow up as well as any medication refills, we recommend that you follow up with your primary care physician  A registered nurse will reach out to you by phone within a few days after your discharge to answer any additional questions that you may have after going home  However, at this time we provide for you here, the most important instructions / recommendations at discharge:     Notable Medication Adjustments -   START the following medications:  Metoprolol (Lopressor) 50 mg Tablets - Take 1 tablet by mouth twice daily   Apixaban (Eliquis) 5 mg Tablets - Take 1 tablet by mouth twice daily   STOP the following medications: Torsemide (Demedex)  Spironolactone (Aldactone)  Continue the rest of your home medications   Testing Required after Discharge -   None  Important follow up information -   Follow up with your outpatient Cardiologist   Other Instructions -   Continue to eat a low fat diet for the next 1-2 weeks  This will prevent your pancreatitis from recurring  Continue low fat diet for weight loss  Please review this entire after visit summary as additional general instructions including medication list, appointments, activity, diet, any pertinent wound care, and other additional recommendations from your care team that may be provided for you        Sincerely,     Denys MARTINI Vanessa Everett PA-C

## 2022-01-28 NOTE — ASSESSMENT & PLAN NOTE
· POA, presented with abdominal pain which started at 3:00 AM this morning  Woke patient from sleep  · CT of the abdomen/pelvis in the ED showed findings consistent with acute pancreatitis involving the head and proximal body of the pancreas  · He is status post cholecystectomy 1993  No ductal dilatation noted     · Triglycerides normal  · Suspect medication induced - Trulicity/Lipitor  · Symptoms improved today, tolerated a diet   · Stable for discharge   · Advance diet slowly, advised low fat diet   · No signs of infection

## 2022-01-28 NOTE — ASSESSMENT & PLAN NOTE
· POA, CT of the abdomen/pelvis showed moderate amount of feces in the colon  · He follows outpatient with GI and was recently seen on 1/10/22  · Apparently has chronic constipation and was prescribed MiraLax daily  · No obstruction or ileus on CT scan   · Will continue with bowel regimen

## 2022-01-28 NOTE — PROGRESS NOTES
Cardiology Progress Note - Team 1  [facility] 59 y o  male MRN: [id number]  Unit/Bed#: S -01 Encounter: [id number]      Assessment/Plan:  1  New onset of atrial fibrillation with RVR  · Patient on admission with acute abdominal pain consistent with acute pancreatitis on admission  · Patient experienced a new onset of AFib this morning with RVR  Patient was stable  Patient with past medical history of chronic CHF, diabetes mellitus  · Patient was started on metoprolol 25 mg b i d  For heart rate control  · EKG was consistent with atrial flutter  With   Patient denied palpitation, dizziness, chest pain  · TTE done on 01/27/2022 shows EF 24%, systolic and diastolic normal function  Patient converted back to sinus rhythm with heart rate in 60s  Blood pressure acceptable  Patient clinically stable from Cardiology standpoint      Plan:  · Continue metoprolol 50 mg b i d   · Continue close monitoring on tele and the blood pressure  · Agree with Eliquis 5 mg b i d  at discharge  · Follow-up with cardiologist as outpatient          2  Congestive heart failure  · Patient with a history of CHF  Patient not fluid overloaded  No signs of acute exacerbation  · Last echo done 2019 is consistent with preserved EF with mild valvular changes  · Continue low-sodium diet  · Spironolactone and torsemide discontinued  · Continue with lisinopril and metoprolol     3   Essential hypertension  · Patient on clonidine, ramipril, spirolactone and torsemide at home  · Continue clonidine 01  mg bid  · Ramipril 5 mg daily was switched to lisinopril 20 mg daily  · Continue metoprolol 50 mg b i d   · Continue holding spironolactone and torsemide after discharge  · Follow-up with the cardiologist as outpatient    4  Acute pancreatitis without infection or necrosis  · Patient admitted with acute abdominal pain  Patient s/p cholecystectomy 1993  · CT of the abdomen in the ED remarkable for acute pancreatitis    · Patient was able to tolerate food this morning  Patient clinical improvement      5  Type 2 Diabetes mellitius    6  Sleep apnea    using CPAP q h s  Subjective:   Patient seen and examined  No significant events overnight  Patient reported improvement in his symptoms  Denied acute abdominal pain, nausea, vomiting, dizziness, chest pain, shortness of breath, palpitation  Objective:     Vitals: Blood pressure 111/63, pulse 68, temperature 97 6 °F (36 4 °C), temperature source Oral, resp  rate 20, height 5' 9" (1 753 m), weight (!) 152 kg (335 lb), SpO2 94 %  , Body mass index is 49 47 kg/m² ,   Orthostatic Blood Pressures      Most Recent Value   Blood Pressure 111/63 filed at 01/28/2022 4233   Patient Position - Orthostatic VS Lying filed at 01/28/2022 0755            Intake/Output Summary (Last 24 hours) at 1/28/2022 1107  Last data filed at 1/28/2022 1001  Gross per 24 hour   Intake 1480 ml   Output 400 ml   Net 1080 ml       Physical Exam:  GEN: Jani Goodell appears well, alert and oriented x 3, pleasant and cooperative   HEENT: pupils equal, round, and reactive to light; extraocular muscles intact  NECK: supple, no carotid bruits   HEART: regular rhythm, normal S1 and S2, no murmurs, clicks, gallops or rubs   LUNGS: clear to auscultation bilaterally; no wheezes, rales, or rhonchi   ABDOMEN:  Obese,  Epigastric tenderness normal bowel sounds, soft, no tenderness, no distention  EXTREMITIES:  Peripheral edema present at baseline, peripheral pulses normal; no clubbing, cyanosis      Medications:      Current Facility-Administered Medications:     acetaminophen (TYLENOL) tablet 650 mg, 650 mg, Oral, Q4H PRN, Franco Love MD, 650 mg at 01/28/22 0034    aspirin (ECOTRIN LOW STRENGTH) EC tablet 81 mg, 81 mg, Oral, Daily, Franco Love MD, 81 mg at 01/28/22 0819    atorvastatin (LIPITOR) tablet 20 mg, 20 mg, Oral, Daily, Franco Love MD, 20 mg at 01/28/22 0819    buPROPion (WELLBUTRIN XL) 24 hr tablet 300 mg, 300 mg, Oral, Daily, Kirsty Sorto MD, 300 mg at 01/28/22 0819    carbidopa-levodopa (SINEMET)  mg per tablet 1 tablet, 1 tablet, Oral, TID, Mami Dangelo MD, 1 tablet at 01/28/22 0819    cariprazine (VRAYLAR) capsule 3 mg, 3 mg, Oral, Daily, Mami Dangelo MD, 3 mg at 01/28/22 3087    cloNIDine (CATAPRES) tablet 0 1 mg, 0 1 mg, Oral, Q12H [facility], Mami Dangelo MD, 0 1 mg at 01/28/22 0819    gabapentin (NEURONTIN) capsule 100 mg, 100 mg, Oral, TID, Mami Dangelo MD, 100 mg at 01/28/22 0819    HYDROmorphone (DILAUDID) injection 0 5 mg, 0 5 mg, Intravenous, Q4H PRN, Mami Dangelo MD, 0 5 mg at 01/28/22 0512    insulin lispro (HumaLOG) 100 units/mL subcutaneous injection 2-12 Units, 2-12 Units, Subcutaneous, Q6H [facility], 2 Units at 01/27/22 0643 **AND** Fingerstick Glucose (POCT), , , Q6H, Kirsty Sorto MD    lisinopril (ZESTRIL) tablet 20 mg, 20 mg, Oral, Daily, Kirsty Sorto MD, 20 mg at 01/28/22 0819    metoprolol tartrate (LOPRESSOR) tablet 50 mg, 50 mg, Oral, Q12H [facility], Olga Lidia Sims MD, 50 mg at 01/28/22 5936    multivitamin stress formula tablet 1 tablet, 1 tablet, Oral, Daily, Mami Dangelo MD, 1 tablet at 01/28/22 0819    naloxone (NARCAN) 0 04 mg/mL syringe 0 04 mg, 0 04 mg, Intravenous, Q1MIN PRN, Mami Dangelo MD    oxybutynin (DITROPAN-XL) 24 hr tablet 5 mg, 5 mg, Oral, Daily, Kirsty Sorto MD, 5 mg at 01/28/22 0820    oxyCODONE (ROXICODONE) immediate release tablet 10 mg, 10 mg, Oral, Q4H PRN, Mami Dangelo MD, 10 mg at 01/27/22 1726    oxyCODONE (ROXICODONE) IR tablet 5 mg, 5 mg, Oral, Q4H PRN, Mami Dangelo MD, 5 mg at 01/28/22 0820    [START ON 1/29/2022] pantoprazole (PROTONIX) EC tablet 40 mg, 40 mg, Oral, Daily, Kirsty Sorto MD    polyethylene glycol (MIRALAX) packet 17 g, 17 g, Oral, Daily, Mami Dangelo MD, 17 g at 01/28/22 0865   senna-docusate sodium (SENOKOT S) 8 6-50 mg per tablet 2 tablet, 2 tablet, Oral, Daily, Lori Houston MD, 2 tablet at 01/28/22 4405    sodium chloride 0 9 % infusion, 50 mL/hr, Intravenous, Continuous, Denys Nguyen PA-C, Last Rate: 50 mL/hr at 01/28/22 0820, 50 mL/hr at 01/28/22 0820    traZODone (DESYREL) tablet 150 mg, 150 mg, Oral, HS, Lori Houston MD, 150 mg at 01/27/22 2100     Labs & Results:      Results from last 7 days   Lab Units 01/28/22  0732 01/27/22  0536 01/26/22  0518   WBC Thousand/uL 19 11* 21 92* 11 55*   HEMOGLOBIN g/dL 12 5 14 3 12 8   HEMATOCRIT % 39 0 46 1 41 2   PLATELETS Thousands/uL 283 299 277     Results from last 7 days   Lab Units 01/25/22  1214   TRIGLYCERIDES mg/dL 48     Results from last 7 days   Lab Units 01/27/22  0536 01/26/22  0518 01/25/22  1214   POTASSIUM mmol/L 4 1 4 0 4 2   CHLORIDE mmol/L 95* 99* 101   CO2 mmol/L 28 24 27   BUN mg/dL 15 10 12   CREATININE mg/dL 1 04 0 77 0 83   CALCIUM mg/dL 9 3 8 8 8 7   ALK PHOS U/L 87 82 93   ALT U/L 12 7* 9*   AST U/L 21 15 20     Results from last 7 days   Lab Units 01/28/22  0351 01/27/22  2112 01/27/22  1408 01/27/22  0817 01/27/22  0817   INR   --   --   --   --  1 23*   PTT seconds 41* 45* 34   < > 30    < > = values in this interval not displayed       Results from last 7 days   Lab Units 01/27/22  0536   MAGNESIUM mg/dL 2 3

## 2022-01-28 NOTE — CASE MANAGEMENT
Case Management Discharge Planning Note    Patient name Georgina Galdamez  Location S /S -01 MRN [id number]  : 1960 Date 2022       Current Admission Date: 2022  Current Admission Diagnosis:Acute pancreatitis without infection or necrosis   Patient Active Problem List    Diagnosis Date Noted    New onset atrial fibrillation with RVR 2022    Acute pancreatitis without infection or necrosis 2022    Type 2 diabetes mellitus without complication, without long-term current use of insulin ([place] [address] ) 2022    Other constipation 2022    Chronic diastolic congestive heart failure ([place] [address] ) 2020    Vitamin D deficiency 2019    Benign essential hypertension 2019    Hypokalemia 2019    Urgency of urination 2019    Hypertension 2019    Depression 2019    Chronic congestive heart failure ([place] [address] ) 2019    Morbid obesity with BMI of 45 0-49 9, adult ([place] [address] ) 2019    Rosacea 10/25/2018    Acute bronchitis 10/04/2018    Chronic left hip pain 2018    Chronic pain of left knee 2018    GERD without esophagitis 2018    Dyspnea on exertion 2018    Chest pain, atypical 2018    Morbid obesity with body mass index of 50 or higher ([place] [address] ) 2018    Hematospermia 2018    Erectile dysfunction 2017    BPH (benign prostatic hyperplasia) 2017    Sleep apnea 2017    Chronic obstructive pulmonary disease ([place] [address] ) 2013    Asthma 2013      LOS (days): 3  Geometric Mean LOS (GMLOS) (days): 3 10  Days to GMLOS:0 2     OBJECTIVE:  Risk of Unplanned Readmission Score: 18         Current admission status: Inpatient   Preferred Pharmacy:   [facility] [address] [facility], [address] [place], [place] [address]  Phone: [phone] Fax: [phone], [address] [facility] [address]  Phone: [phone] Fax: [phone]    [facility] [address] - [place], [place] - [address] [facility]  [address] [facility] [address]  Phone: [phone] Fax: [address], [address] [address]  Phone: [phone] Fax: [phone]    Primary Care Provider: Jenna Huang DO    Primary Insurance: MEDICARE  Secondary Insurance: [employer]    DISCHARGE DETAILS:    Discharge planning discussed with[de-identified] patient and spouse at bedside  Freedom of Choice: Yes (re: home care)  Comments - Freedom of Choice: no preference; agreeable for blanket referral to be sent  CM contacted family/caregiver?: Yes (spouse at bedside)  Were Treatment Team discharge recommendations reviewed with patient/caregiver?: Yes  Did patient/caregiver verbalize understanding of patient care needs?: Yes  Were patient/caregiver advised of the risks associated with not following Treatment Team discharge recommendations?: Yes    Contacts  Patient Contacts: spouse, Jena Alexander  Relationship to Patient[de-identified] Family  Contact Method:  In Person  Reason/Outcome: Emergency [address]         Is the patient interested in [facility] at discharge?: Yes  [address] requested[de-identified] [address] Name[de-identified] Other ([address] [place] [address])  [address] [place] [address] Provider[de-identified] PCP  Home Health Services Needed[de-identified] Evaluate Functional Status and Safety,Gait/ADL Training,Diabetes Management,Heart Failure Management,Strengthening/Theraputic Exercises to Improve Function  Homebound Criteria Met[de-identified] Uses an Assist Device (i e  cane, walker, etc),Requires the Assistance of Another Person for Safe Ambulation or to Leave the Home  Supporting Clincal Findings[de-identified] Limited Endurance,Fatigues Easliy in Short Distances         Other Referral/Resources/Interventions Provided:  Interventions: [facility]  Referral Comments: Patient's spouse to room to pick-up patient and now inquiring about home care services  Reviewed information on home care for home nursing, PT/OT  TT with PA who confirmed that referrals can be placed for same  Freedom of choice discussed and patient/spouse deny any preference for provider  Referrals sent and offers received from multiple agencies; reviewed options and patient and spouse elected [address] as their home care provider  Information added to AVS and order for home care placed by PA  AVS updated and provided to patient/spouse by nurse      Would you like to participate in our [address] service program?  : No - Declined    Treatment Team Recommendation: Home with [address]  Discharge Destination Plan[de-identified] Home with [address] ([address])  Transport at Discharge : Family,Automobile                             IMM Given (Date):: 01/28/22  IMM Given to[de-identified] Patient

## 2022-01-28 NOTE — DISCHARGE SUMMARY
[facility]  Discharge- Sissy Orozco 1960, 59 y o  male MRN: [id number]  Unit/Bed#: S -01 Encounter: [id number]  Primary Care Provider: Herbert Dancer, DO   Date and time admitted to hospital: 1/25/2022 11:05 AM    * Acute pancreatitis without infection or necrosis  Assessment & Plan  · POA, presented with abdominal pain which started at 3:00 AM this morning  Woke patient from sleep  · CT of the abdomen/pelvis in the ED showed findings consistent with acute pancreatitis involving the head and proximal body of the pancreas  · He is status post cholecystectomy 1993  No ductal dilatation noted  · Triglycerides normal  · Suspect medication induced - Trulicity/Lipitor  · Symptoms improved today, tolerated a diet   · Stable for discharge   · Advance diet slowly, advised low fat diet   · No signs of infection       Chronic congestive heart failure (HCC)  Assessment & Plan  Wt Readings from Last 3 Encounters:   01/27/22 (!) 152 kg (335 lb)   01/10/22 (!) 153 kg (338 lb)   12/23/20 (!) 167 kg (368 lb 12 8 oz)     · No acute exacerbation at this time  · Echocardiogram with normal systolic and diastolic function   · Continue home medications  · Demedex and Aldactone stopped with addition of Metoprolol by Cardiology   · Monitor with daily weights, I's and O's  · Low-salt diet once cleared for oral intake      New onset atrial fibrillation with RVR  Assessment & Plan  · Early morning 1/27 patient went into A  Fib  New onset  [id number] = 3  Suspect pancreatitis vs other underlying intra-abdominal etiology driving  Doubt heart failure exacerbation as appears relatively at baseline for this     · TSH noted to be mildly low, T4 normal   · K and Mag acceptable   · He converted to NSR  · Cardiology input appreciated   · Continue Metoprolol 50 mg BID   · Eliquis 5 mg BID at discharge   · Follow up with [facility] Cardiology     SIRS (systemic inflammatory response syndrome) (HCC)-resolved as of 1/28/2022  Assessment & Plan  · Early morning 1/27 patient met SIRS criteria of tachycardia, leukocytosis  At this time, no infectious process identified   Mildly elevated lactic acid of 2 1  · Lactic resolved, HR improved, Leukocytes trending down   · CT C/A/P without signs of infection   · No further work up needed       Other constipation  Assessment & Plan  · POA, CT of the abdomen/pelvis showed moderate amount of feces in the colon  · He follows outpatient with GI and was recently seen on 1/10/22  · Apparently has chronic constipation and was prescribed MiraLax daily  · No obstruction or ileus on CT scan   · Will continue with bowel regimen      Type 2 diabetes mellitus without complication, without long-term current use of insulin [facility])  Assessment & Plan  Lab Results   Component Value Date    HGBA1C 6 1 (H) 01/25/2022     Recent Labs     01/27/22  1618 01/27/22  1829 01/28/22  0031 01/28/22  0626   POCGLU 120 105 109 100       Blood Sugar Average: Last 72 hrs:  · (P) 127 [id number]   · A1c and BG acceptable   · Restart home regimen     Sleep apnea  Assessment & Plan  · On QHS CPAP    Benign essential hypertension  Assessment & Plan  · Blood pressure acceptable at 145/68  · Started Lopressor  · Stop Demedex, Aldactone   · Continue other medications     Morbid obesity with BMI of 45 0-49 9, adult (HCC)  Assessment & Plan  · BMI noted   · Diet and lifestyle modifications needed       Medical Problems             Resolved Problems  Date Reviewed: 1/28/2022          Resolved    SIRS (systemic inflammatory response syndrome) ([facility] [address] ) 1/28/2022     Resolved by  Marc Blas PA-C              Discharging Physician / Practitioner: Marc Blas PA-C  PCP: Jenna Huang DO  Admission Date:   Admission Orders (From admission, onward)     Ordered        01/25/22 1522  INPATIENT ADMISSION  Once                      Discharge Date: 01/28/22    Consultations During Hospital Stay:  · Cardiology - Dr Mann Standard     Procedures Performed:   · CT Abdomen Pelvis with contrast   · CT chest, abdomen, pelvis with contrast   · Echo    Significant Findings / Test Results:   · CT abdomen pelvis with contrast: Findings consistent with acute pancreatitis involving the head and proximal body of pancreas  · CT chest, abdomen, pelvis with contrast: Slightly increased inflammatory changes related to acute pancreatitis without fluid collection or necrosis  No acute abnormality in the chest  · Echo:  EF = 95%, normal systolic and diastolic function     Incidental Findings:   · None     Test Results Pending at Discharge (will require follow up): · Blood Cultures      Outpatient Tests Requested:  · None    Complications:  None    Reason for Admission: Pancreatitis, A  Fib     Hospital Course:   Sandra Guadarrama is a 59 y o  male patient who originally presented to the hospital on 1/25/2022 due to abdominal pain  In the ER the patient had a CT scan that was consistent with acute pancreatitis  He was admitted, started on gentle fluids, bowel rest, and pain control  His symptoms were slow to improve initially and he started to meet SIRS criteria with tachycardia, increased leukocytosis, and a lactic acid  His repeat CT scan showed no signs of infection, but stable pancreatitis changes  He did well off antibiotics  However, with his tachycardia he was found to be in new A  Fib  He was started on PO Metoprolol for rate control and Cardiology was consulted  Echo was updated that was stable  With Metoprolol PO he was able to convert to NSR  He was transitioned from Heparin Drip to Eliquis for anticoagulation  He was deemed stable for discharge home  He was instructed to follow a low fat diet to follow up with his outpatient cardiologist through [facility]  Please see above list of diagnoses and related plan for additional information  Condition at Discharge: stable    Discharge Day Visit / Exam:   Subjective:  Patient reports feeling better today   He states that he tolerated eating well  Reports that he is now passing gas, but no bowel movement  Denies fevers or chills  Vitals: Blood Pressure: 111/63 (01/28/22 0755)  Pulse: 68 (01/28/22 0755)  Temperature: 97 6 °F (36 4 °C) (01/28/22 0755)  Temp Source: Oral (01/28/22 0755)  Respirations: 20 (01/28/22 0755)  Height: 5' 9" (175 3 cm) (01/27/22 1300)  Weight - Scale: (!) 152 kg (335 lb) (01/27/22 1300)  SpO2: 94 % (01/28/22 0755)  Exam:   Physical Exam  Constitutional:       General: He is not in acute distress  Appearance: Normal appearance  He is obese  He is not ill-appearing or diaphoretic  HENT:      Head: Normocephalic and atraumatic  Mouth/Throat:      Mouth: Mucous membranes are moist    Eyes:      General: No scleral icterus  Pupils: Pupils are equal, round, and reactive to light  Cardiovascular:      Rate and Rhythm: Normal rate and regular rhythm  Pulses: Normal pulses  Heart sounds: Normal heart sounds, S1 normal and S2 normal  No murmur heard  No systolic murmur is present  No diastolic murmur is present  No gallop  No S3 or S4 sounds  Pulmonary:      Effort: Pulmonary effort is normal  No accessory muscle usage or respiratory distress  Breath sounds: Normal breath sounds  No stridor  No decreased breath sounds, wheezing, rhonchi or rales  Chest:      Chest wall: No tenderness  Abdominal:      General: Bowel sounds are normal  There is no distension  Palpations: Abdomen is soft  Tenderness: There is no abdominal tenderness  There is no guarding  Musculoskeletal:      Right lower leg: No edema  Left lower leg: No edema  Skin:     General: Skin is warm and dry  Coloration: Skin is not jaundiced  Neurological:      General: No focal deficit present  Mental Status: He is alert  Mental status is at baseline  Motor: No tremor or seizure activity  Psychiatric:         Behavior: Behavior is cooperative            Discussion with Family: Updated  (wife) via phone  Discharge instructions/Information to patient and family:   See after visit summary for information provided to patient and family  Provisions for Follow-Up Care:  See after visit summary for information related to follow-up care and any pertinent home health orders  Disposition:   Home    Planned Readmission: None     Discharge Statement:  I spent 45 minutes discharging the patient  This time was spent on the day of discharge  I had direct contact with the patient on the day of discharge  Greater than 50% of the total time was spent examining patient, answering all patient questions, arranging and discussing plan of care with patient as well as directly providing post-discharge instructions  Additional time then spent on discharge activities  Discharge Medications:  See after visit summary for reconciled discharge medications provided to patient and/or family        **Please Note: This note may have been constructed using a voice recognition system**

## 2022-01-28 NOTE — ASSESSMENT & PLAN NOTE
Lab Results   Component Value Date    HGBA1C 6 1 (H) 01/25/2022     Recent Labs     01/27/22  1618 01/27/22  1829 01/28/22  0031 01/28/22  0626   POCGLU 120 105 109 100       Blood Sugar Average: Last 72 hrs:  · (P) 127 [id number]   · A1c and BG acceptable   · Restart home regimen

## 2022-01-28 NOTE — ASSESSMENT & PLAN NOTE
· Blood pressure acceptable at 145/68  · Started Lopressor  · Stop Demedex, Aldactone   · Continue other medications

## 2022-01-28 NOTE — APP STUDENT NOTE
S: Patient feeling better today  His abdominal pain is a 4/10 this morning  He is eating jello and denies any worsening abdominal pain, nausea or vomiting and wants to advance his diet  He still has not had a bowel movement, but he is now passing gas  He has not ambulated yet  Denies SOB, chest pain, or palpitations  O:    VS: 36 9, HR 84, RR 20, /90, O2 saturation 94% RA    General: patient is obese, pleasantly laying in bed and eating jello  Appears to be in new acute respiratory distress or pain  Alert and coherent     CV: RRR, no murmurs, rubs or gallops    Pulm: Mild wheezing in right and left lower lung fields, no rales or rhonchi noted    Abdomen: bowel sounds present in all four quadrants  Upper abdomen is mildly tender to palpation  MSK: 1+ edema in the lower extremities bilaterally     Labs:     WBC: elevated 19 11, trending down from 21 92 from 1/27  APTT: 41   Fingerstick glucose: 100    Imaging:    ECHO: LV ejection fraction 92%, systolic and diastolic function normal     CT chest, abdomen and pelvis: slightly increased inflammatory changes related to acute pancreatitis without fluid collection or necrosis    A/P:  1  Acute pancreatitis without infection or necrosis:  · patient presented with abdominal pain which started at 3 AM yesterday morning and awoke him from sleep   · CT of the abdomen and pelvs in the ED revealed findings consistent with acute pancreatitis involving the head and proximal body of the pancreas  · S/P cholecystectomy in 1993  No ductal dilation noted    · Triglycerides normal at 48   · Suspected medication induced (trulicity/lipitor)   · Symptoms unimproved: Continue fluids, bowel rest, antiemetics, pain control   · Patients meeting SIRs criteria with elevated WBC of 21 92 and tachycardia of 138  · Lactate elevated at 2 1   · Pending blood cultures   · UA unremarkable on admission  · Repeat CT abdomen and pelvis and chest: slightly increased inflammatory changes related to acute pancreatitis without fluid collection or necrosis  · This morning patient has improved abdominal pain and tolerated jello with no nausea, vomiting or increased abdominal pain  Good to resume normal diet with low fat for the next 1-2 weeks  · Patient could be discharged today     2  New onset Afib with RVR:  · Early morning on 1/27 went into new onset Afib  CHADSVasc=3    · TSH mildly low, unlikely uncontrolled hypothyroidism  · K and Mg within normal limits  · Per Cardiology: start metoprolol 25 mg BID increased to 50 mg BID  · ECHO pending  · Monitor electrolytes   · Heparin drip transitioned to Eliquis 5 mg BID     3  Chronic congestive heart failure:  · No acute exacerbation at this time   · Monitor daily weights and I&Os  · Low salt diet      4  Type 2 Diabetes Mellitus:  · HGBA1C: 6 1   · Fingerstick glucoses are within goal range   · Hold trulicity and metformin   · Accu-checks Q 6 hrs while NPO, SSI coverage    5  Constipation:  · CT of abdomen/pelvis showed moderate amount of feces in the colon   · Patient follows with outpatient GI and was recently seen on 1/10/22   · Has chronic constipation and was prescribed miralax daily   · Continue bowel regimen with Citroma  ·  CT abdomen and pelvis negative for potential obstruction or ileus   · Today patient is passing gas but still has not had a bowel movement      6   Benign essential HTN:  · Blood pressure 153/90  · Continue home medications      Yuan CALVERT

## 2022-01-28 NOTE — PLAN OF CARE
Problem: PAIN - ADULT  Goal: Verbalizes/displays adequate comfort level or baseline comfort level  Description: Interventions:  - Encourage patient to monitor pain and request assistance  - Assess pain using appropriate pain scale  - Administer analgesics based on type and severity of pain and evaluate response  - Implement non-pharmacological measures as appropriate and evaluate response  - Consider cultural and social influences on pain and pain management  - Notify physician/advanced practitioner if interventions unsuccessful or patient reports new pain  Outcome: Progressing     Problem: INFECTION - ADULT  Goal: Absence or prevention of progression during hospitalization  Description: INTERVENTIONS:  - Assess and monitor for signs and symptoms of infection  - Monitor lab/diagnostic results  - Monitor all insertion sites, i e  indwelling lines, tubes, and drains  - Monitor endotracheal if appropriate and nasal secretions for changes in amount and color  - [place] appropriate cooling/warming therapies per order  - Administer medications as ordered  - Instruct and encourage patient and family to use good hand hygiene technique  - Identify and instruct in appropriate isolation precautions for identified infection/condition  Outcome: Progressing  Goal: Absence of fever/infection during neutropenic period  Description: INTERVENTIONS:  - Monitor WBC    Outcome: Progressing     Problem: SAFETY ADULT  Goal: Patient will remain free of falls  Description: INTERVENTIONS:  - Educate patient/family on patient safety including physical limitations  - Instruct patient to call for assistance with activity   - Consult OT/PT to assist with strengthening/mobility   - Keep Call bell within reach  - Keep bed low and locked with side rails adjusted as appropriate  - Keep care items and personal belongings within reach  - Initiate and maintain comfort rounds  - Make Fall Risk Sign visible to staff  - Offer Toileting every  Hours, in advance of need  - Initiate/Maintain alarm  - Obtain necessary fall risk management equipment:   - Apply yellow socks and bracelet for high fall risk patients  - Consider moving patient to room near nurses station  Outcome: Progressing  Goal: Maintain or return to baseline ADL function  Description: INTERVENTIONS:  -  Assess patient's ability to carry out ADLs; assess patient's baseline for ADL function and identify physical deficits which impact ability to perform ADLs (bathing, care of mouth/teeth, toileting, grooming, dressing, etc )  - Assess/evaluate cause of self-care deficits   - Assess range of motion  - Assess patient's mobility; develop plan if impaired  - Assess patient's need for assistive devices and provide as appropriate  - Encourage maximum independence but intervene and supervise when necessary  - Involve family in performance of ADLs  - Assess for home care needs following discharge   - Consider OT consult to assist with ADL evaluation and planning for discharge  - Provide patient education as appropriate  Outcome: Progressing  Goal: Maintains/Returns to pre admission functional level  Description: INTERVENTIONS:  - Perform BMAT or MOVE assessment daily    - Set and communicate daily mobility goal to care team and patient/family/caregiver  - Collaborate with rehabilitation services on mobility goals if consulted  - Perform Range of Motion  times a day  - Reposition patient every  hours    - Dangle patient  times a day  - Stand patient  times a day  - Ambulate patient  times a day  - Out of bed to chair  times a day   - Out of bed for meals times a day  - Out of bed for toileting  - Record patient progress and toleration of activity level   Outcome: Progressing     Problem: DISCHARGE PLANNING  Goal: Discharge to home or other facility with appropriate resources  Description: INTERVENTIONS:  - Identify barriers to discharge w/patient and caregiver  - Arrange for needed discharge resources and transportation as appropriate  - Identify discharge learning needs (meds, wound care, etc )  - Arrange for interpretive services to assist at discharge as needed  - Refer to Case Management Department for coordinating discharge planning if the patient needs post-hospital services based on physician/advanced practitioner order or complex needs related to functional status, cognitive ability, or social support system  Outcome: Progressing     Problem: Knowledge Deficit  Goal: Patient/family/caregiver demonstrates understanding of disease process, treatment plan, medications, and discharge instructions  Description: Complete learning assessment and assess knowledge base  Interventions:  - Provide teaching at level of understanding  - Provide teaching via preferred learning methods  Outcome: Progressing     Problem: Nutrition/Hydration-ADULT  Goal: Nutrient/Hydration intake appropriate for improving, restoring or maintaining nutritional needs  Description: Monitor and assess patient's nutrition/hydration status for malnutrition  Collaborate with interdisciplinary team and initiate plan and interventions as ordered  Monitor patient's weight and dietary intake as ordered or per policy  Utilize nutrition screening tool and intervene as necessary  Determine patient's food preferences and provide high-protein, high-caloric foods as appropriate       INTERVENTIONS:  - Monitor oral intake, urinary output, labs, and treatment plans  - Assess nutrition and hydration status and recommend course of action  - Evaluate amount of meals eaten  - Assist patient with eating if necessary   - Allow adequate time for meals  - Recommend/ encourage appropriate diets, oral nutritional supplements, and vitamin/mineral supplements  - Order, calculate, and assess calorie counts as needed  - Recommend, monitor, and adjust tube feedings and TPN/PPN based on assessed needs  - Assess need for intravenous fluids  - Provide specific nutrition/hydration education as appropriate  - Include patient/family/caregiver in decisions related to nutrition  Outcome: Progressing

## 2022-01-28 NOTE — PLAN OF CARE
Problem: PAIN - ADULT  Goal: Verbalizes/displays adequate comfort level or baseline comfort level  Description: Interventions:  - Encourage patient to monitor pain and request assistance  - Assess pain using appropriate pain scale  - Administer analgesics based on type and severity of pain and evaluate response  - Implement non-pharmacological measures as appropriate and evaluate response  - Consider cultural and social influences on pain and pain management  - Notify physician/advanced practitioner if interventions unsuccessful or patient reports new pain  Outcome: Progressing     Problem: INFECTION - ADULT  Goal: Absence or prevention of progression during hospitalization  Description: INTERVENTIONS:  - Assess and monitor for signs and symptoms of infection  - Monitor lab/diagnostic results  - Monitor all insertion sites, i e  indwelling lines, tubes, and drains  - Monitor endotracheal if appropriate and nasal secretions for changes in amount and color  - [place] appropriate cooling/warming therapies per order  - Administer medications as ordered  - Instruct and encourage patient and family to use good hand hygiene technique  - Identify and instruct in appropriate isolation precautions for identified infection/condition  Outcome: Progressing  Goal: Absence of fever/infection during neutropenic period  Description: INTERVENTIONS:  - Monitor WBC    Outcome: Progressing     Problem: SAFETY ADULT  Goal: Patient will remain free of falls  Description: INTERVENTIONS:  - Educate patient/family on patient safety including physical limitations  - Instruct patient to call for assistance with activity   - Consult OT/PT to assist with strengthening/mobility   - Keep Call bell within reach  - Keep bed low and locked with side rails adjusted as appropriate  - Keep care items and personal belongings within reach  - Initiate and maintain comfort rounds  - Make Fall Risk Sign visible to staff  - Offer Toileting every  Hours, in advance of need  - Initiate/Maintain alarm  - Obtain necessary fall risk management equipment:   - Apply yellow socks and bracelet for high fall risk patients  - Consider moving patient to room near nurses station  Outcome: Progressing  Goal: Maintain or return to baseline ADL function  Description: INTERVENTIONS:  -  Assess patient's ability to carry out ADLs; assess patient's baseline for ADL function and identify physical deficits which impact ability to perform ADLs (bathing, care of mouth/teeth, toileting, grooming, dressing, etc )  - Assess/evaluate cause of self-care deficits   - Assess range of motion  - Assess patient's mobility; develop plan if impaired  - Assess patient's need for assistive devices and provide as appropriate  - Encourage maximum independence but intervene and supervise when necessary  - Involve family in performance of ADLs  - Assess for home care needs following discharge   - Consider OT consult to assist with ADL evaluation and planning for discharge  - Provide patient education as appropriate  Outcome: Progressing  Goal: Maintains/Returns to pre admission functional level  Description: INTERVENTIONS:  - Perform BMAT or MOVE assessment daily    - Set and communicate daily mobility goal to care team and patient/family/caregiver  - Collaborate with rehabilitation services on mobility goals if consulted  - Perform Range of Motion  times a day  - Reposition patient every hours    - Dangle patient  times a day  - Stand patient  times a day  - Ambulate patient  times a day  - Out of bed to chair  times a day   - Out of bed for meals  times a day  - Out of bed for toileting  - Record patient progress and toleration of activity level   Outcome: Progressing     Problem: DISCHARGE PLANNING  Goal: Discharge to home or other facility with appropriate resources  Description: INTERVENTIONS:  - Identify barriers to discharge w/patient and caregiver  - Arrange for needed discharge resources and transportation as appropriate  - Identify discharge learning needs (meds, wound care, etc )  - Arrange for interpretive services to assist at discharge as needed  - Refer to Case Management Department for coordinating discharge planning if the patient needs post-hospital services based on physician/advanced practitioner order or complex needs related to functional status, cognitive ability, or social support system  Outcome: Progressing     Problem: Knowledge Deficit  Goal: Patient/family/caregiver demonstrates understanding of disease process, treatment plan, medications, and discharge instructions  Description: Complete learning assessment and assess knowledge base  Interventions:  - Provide teaching at level of understanding  - Provide teaching via preferred learning methods  Outcome: Progressing     Problem: Nutrition/Hydration-ADULT  Goal: Nutrient/Hydration intake appropriate for improving, restoring or maintaining nutritional needs  Description: Monitor and assess patient's nutrition/hydration status for malnutrition  Collaborate with interdisciplinary team and initiate plan and interventions as ordered  Monitor patient's weight and dietary intake as ordered or per policy  Utilize nutrition screening tool and intervene as necessary  Determine patient's food preferences and provide high-protein, high-caloric foods as appropriate       INTERVENTIONS:  - Monitor oral intake, urinary output, labs, and treatment plans  - Assess nutrition and hydration status and recommend course of action  - Evaluate amount of meals eaten  - Assist patient with eating if necessary   - Allow adequate time for meals  - Recommend/ encourage appropriate diets, oral nutritional supplements, and vitamin/mineral supplements  - Order, calculate, and assess calorie counts as needed  - Recommend, monitor, and adjust tube feedings and TPN/PPN based on assessed needs  - Assess need for intravenous fluids  - Provide specific nutrition/hydration education as appropriate  - Include patient/family/caregiver in decisions related to nutrition  Outcome: Progressing     Problem: MOBILITY - ADULT  Goal: Maintain or return to baseline ADL function  Description: INTERVENTIONS:  -  Assess patient's ability to carry out ADLs; assess patient's baseline for ADL function and identify physical deficits which impact ability to perform ADLs (bathing, care of mouth/teeth, toileting, grooming, dressing, etc )  - Assess/evaluate cause of self-care deficits   - Assess range of motion  - Assess patient's mobility; develop plan if impaired  - Assess patient's need for assistive devices and provide as appropriate  - Encourage maximum independence but intervene and supervise when necessary  - Involve family in performance of ADLs  - Assess for home care needs following discharge   - Consider OT consult to assist with ADL evaluation and planning for discharge  - Provide patient education as appropriate  Outcome: Progressing  Goal: Maintains/Returns to pre admission functional level  Description: INTERVENTIONS:  - Perform BMAT or MOVE assessment daily    - Set and communicate daily mobility goal to care team and patient/family/caregiver  - Collaborate with rehabilitation services on mobility goals if consulted  - Perform Range of Motion  times a day  - Reposition patient every  hours    - Dangle patient  times a day  - Stand patient  times a day  - Ambulate patient  times a day  - Out of bed to chair times a day   - Out of bed for meals  times a day  - Out of bed for toileting  - Record patient progress and toleration of activity level   Outcome: Progressing

## 2022-01-28 NOTE — ASSESSMENT & PLAN NOTE
· Early morning 1/27 patient met SIRS criteria of tachycardia, leukocytosis  At this time, no infectious process identified   Mildly elevated lactic acid of 2 1  · Lactic resolved, HR improved, Leukocytes trending down   · CT C/A/P without signs of infection   · No further work up needed

## 2022-01-28 NOTE — PROGRESS NOTES
The pantoprazole has / have been converted to [facility] for Adults as approved by the Pharmacy and Therapeutics Committee  The patient met all eligible criteria:  3 Age = 23years old   2) Received at least one dose of the IV form   3) Receiving at least one other scheduled oral/enteral medication   4) Tolerating an oral/enteral diet   and did not have any exclusions:   1) Critical care patient   2) Active GI bleed (IF assessing H2RAs or PPIs)   3) Continuous tube feeding (IF assessing cipro, doxycycline, levofloxacin, minocycline, rifampin, or voriconazole)   4) Receiving PO vancomycin (IF assessing metronidazole)   5) Persistent nausea and/or vomiting   6) Ileus or gastrointestinal obstruction   7) Leah/nasogastric tube set for continuous suction   8) Specific order not to automatically convert to PO (in the order's comments or if discussed in the most recent Infectious Disease or primary team's progress notes)

## 2022-01-28 NOTE — ASSESSMENT & PLAN NOTE
Wt Readings from Last 3 Encounters:   01/27/22 (!) 152 kg (335 lb)   01/10/22 (!) 153 kg (338 lb)   12/23/20 (!) 167 kg (368 lb 12 8 oz)     · No acute exacerbation at this time  · Echocardiogram with normal systolic and diastolic function   · Continue home medications  · Demedex and Aldactone stopped with addition of Metoprolol by Cardiology   · Monitor with daily weights, I's and O's  · Low-salt diet once cleared for oral intake

## 2022-02-01 LAB
BACTERIA BLD CULT: NORMAL
BACTERIA BLD CULT: NORMAL

## 2022-03-21 ENCOUNTER — ANESTHESIA EVENT (OUTPATIENT)
Dept: GASTROENTEROLOGY | Facility: HOSPITAL | Age: 62
End: 2022-03-21

## 2022-03-21 ENCOUNTER — ANESTHESIA (OUTPATIENT)
Dept: GASTROENTEROLOGY | Facility: HOSPITAL | Age: 62
End: 2022-03-21

## 2022-03-21 ENCOUNTER — HOSPITAL ENCOUNTER (OUTPATIENT)
Dept: GASTROENTEROLOGY | Facility: HOSPITAL | Age: 62
Setting detail: OUTPATIENT SURGERY
Discharge: HOME | End: 2022-03-21
Admitting: INTERNAL MEDICINE
Payer: MEDICARE

## 2022-03-21 VITALS
HEIGHT: 69 IN | HEART RATE: 50 BPM | BODY MASS INDEX: 46.65 KG/M2 | SYSTOLIC BLOOD PRESSURE: 116 MMHG | DIASTOLIC BLOOD PRESSURE: 59 MMHG | RESPIRATION RATE: 20 BRPM | OXYGEN SATURATION: 96 % | TEMPERATURE: 97.8 F | WEIGHT: 315 LBS

## 2022-03-21 DIAGNOSIS — K59.00 CONSTIPATION, UNSPECIFIED CONSTIPATION TYPE: ICD-10-CM

## 2022-03-21 DIAGNOSIS — R10.30 LOWER ABDOMINAL PAIN: ICD-10-CM

## 2022-03-21 DIAGNOSIS — Z12.11 COLON CANCER SCREENING: ICD-10-CM

## 2022-03-21 LAB — GLUCOSE SERPL-MCNC: 124 MG/DL (ref 65–140)

## 2022-03-21 PROCEDURE — 45385 COLONOSCOPY W/LESION REMOVAL: CPT | Performed by: INTERNAL MEDICINE

## 2022-03-21 PROCEDURE — 88305 TISSUE EXAM BY PATHOLOGIST: CPT | Performed by: PATHOLOGY

## 2022-03-21 PROCEDURE — 82948 REAGENT STRIP/BLOOD GLUCOSE: CPT

## 2022-03-21 RX ORDER — PROPOFOL 10 MG/ML
INJECTION, EMULSION INTRAVENOUS AS NEEDED
Status: DISCONTINUED | OUTPATIENT
Start: 2022-03-21 | End: 2022-03-21

## 2022-03-21 RX ORDER — LIDOCAINE HYDROCHLORIDE 20 MG/ML
INJECTION, SOLUTION EPIDURAL; INFILTRATION; INTRACAUDAL; PERINEURAL AS NEEDED
Status: DISCONTINUED | OUTPATIENT
Start: 2022-03-21 | End: 2022-03-21

## 2022-03-21 RX ORDER — SODIUM CHLORIDE, SODIUM LACTATE, POTASSIUM CHLORIDE, CALCIUM CHLORIDE 600; 310; 30; 20 MG/100ML; MG/100ML; MG/100ML; MG/100ML
INJECTION, SOLUTION INTRAVENOUS CONTINUOUS PRN
Status: DISCONTINUED | OUTPATIENT
Start: 2022-03-21 | End: 2022-03-21

## 2022-03-21 RX ADMIN — SODIUM CHLORIDE, SODIUM LACTATE, POTASSIUM CHLORIDE, AND CALCIUM CHLORIDE: .6; .31; .03; .02 INJECTION, SOLUTION INTRAVENOUS at 07:25

## 2022-03-21 RX ADMIN — PROPOFOL 30 MG: 10 INJECTION, EMULSION INTRAVENOUS at 07:42

## 2022-03-21 RX ADMIN — PROPOFOL 20 MG: 10 INJECTION, EMULSION INTRAVENOUS at 07:47

## 2022-03-21 RX ADMIN — PROPOFOL 20 MG: 10 INJECTION, EMULSION INTRAVENOUS at 07:38

## 2022-03-21 RX ADMIN — PROPOFOL 20 MG: 10 INJECTION, EMULSION INTRAVENOUS at 07:51

## 2022-03-21 RX ADMIN — PROPOFOL 50 MG: 10 INJECTION, EMULSION INTRAVENOUS at 07:33

## 2022-03-21 RX ADMIN — LIDOCAINE HYDROCHLORIDE 100 MG: 20 INJECTION, SOLUTION EPIDURAL; INFILTRATION; INTRACAUDAL; PERINEURAL at 07:32

## 2022-03-21 RX ADMIN — PROPOFOL 100 MG: 10 INJECTION, EMULSION INTRAVENOUS at 07:32

## 2022-03-21 RX ADMIN — PROPOFOL 20 MG: 10 INJECTION, EMULSION INTRAVENOUS at 07:55

## 2022-03-21 NOTE — ANESTHESIA PREPROCEDURE EVALUATION
Procedure:  COLONOSCOPY    Relevant Problems   CARDIO   (+) Benign essential hypertension   (+) Chest pain, atypical   (+) Chronic congestive heart failure (HCC)   (+) Chronic diastolic congestive heart failure (HCC)   (+) Dyspnea on exertion   (+) Hypertension   (+) New onset atrial fibrillation with RVR      ENDO   (+) Type 2 diabetes mellitus without complication, without long-term current use of insulin (HCC)      GI/HEPATIC   (+) Acute pancreatitis without infection or necrosis   (+) GERD without esophagitis      /RENAL   (+) BPH (benign prostatic hyperplasia)      NEURO/PSYCH   (+) Depression      PULMONARY   (+) Asthma   (+) Chronic obstructive pulmonary disease (HCC)   (+) Dyspnea on exertion   (+) Sleep apnea        Physical Exam    Airway    Mallampati score: II  TM Distance: >3 FB  Neck ROM: full     Dental   No notable dental hx     Cardiovascular  Rate: normal,     Pulmonary  Breath sounds clear to auscultation,     Other Findings        Anesthesia Plan  ASA Score- 3     Anesthesia Type- IV sedation with anesthesia with ASA Monitors  Additional Monitors:   Airway Plan:           Plan Factors-    Chart reviewed  Patient summary reviewed  Induction- intravenous  Postoperative Plan-     Informed Consent- Anesthetic plan and risks discussed with patient  I personally reviewed this patient with the CRNA  Discussed and agreed on the Anesthesia Plan with the CRNA  Sherin Cadena

## 2022-03-21 NOTE — H&P
History and Physical - [facility]  Gretel Banuelos 59 y o  male MRN: [id number]                  HPI: Gretel Banuelos is a 59y o  year old male who presents for colonoscopy last colonoscopy 2017  Has been having some lower abdominal discomfort as well as constipation  REVIEW OF SYSTEMS: Per the HPI, and otherwise unremarkable  Historical Information   Past Medical History:   Diagnosis Date    Arthritis     Asthma     Colon polyp     CPAP (continuous positive airway pressure) dependence     Edema     left leg    GERD (gastroesophageal reflux disease)     History of echocardiogram 07/2017    History of Holter monitoring 09/2014    History of stress test 08/2014    Hypertension     Myocardial infarct [facility])     2011    Myocardial infarction ([facility] [address] )     Obesity     Sleep apnea     Thrombophlebitis      Past Surgical History:   Procedure Laterality Date    CARDIAC CATHETERIZATION  03/2012    0845,8287    CHOLECYSTECTOMY      COLONOSCOPY      COLONOSCOPY N/A 10/30/2017    Procedure: COLONOSCOPY;  Surgeon: Sondra Price MD;  Location: [facility] GI LAB; Service: Gastroenterology    ENDOVENOUS ABLATION SAPHENOUS VEIN W/ LASER      each addit vein    ERCP      ESOPHAGOGASTRODUODENOSCOPY N/A 10/30/2017    Procedure: ESOPHAGOGASTRODUODENOSCOPY (EGD); Surgeon: Sondra Price MD;  Location: [facility] GI LAB;   Service: Gastroenterology    GASTRIC BYPASS      2001    GASTRIC BYPASS      HERNIA REPAIR      paraesophageal hiatus hernia    HERNIA REPAIR      x5 last one 2011    JOINT REPLACEMENT      KNEE ARTHROPLASTY Right     2102    REPLACEMENT TOTAL KNEE Right 02/2011     Social History   Social History     Substance and Sexual Activity   Alcohol Use Not Currently    Comment: rarely - denied per allscripts      Social History     Substance and Sexual Activity   Drug Use No     Social History     Tobacco Use   Smoking Status Former Smoker    Packs/day: 1 00    Years: 3 00    Pack years:  3 00    Types: Cigarettes   Smokeless Tobacco Former User   Tobacco Comment    never a smoker per allscripts - as per [place]     Family History   Problem Relation Age of Onset    Uterine cancer Mother     Prostate cancer Father     Diabetes Father     Heart failure Father     Cancer Father     Stroke Family         syndrome    Aneurysm Family         aoritc    Diabetes Brother     Other Brother         amputation-    Diabetes Paternal Grandmother     Diabetes Paternal Grandfather     Cancer Sister        Meds/Allergies       Current Outpatient Medications:     apixaban (Eliquis) 5 mg    atorvastatin (LIPITOR) 20 mg tablet    buPROPion (WELLBUTRIN XL) 300 mg 24 hr tablet    carbidopa-levodopa (SINEMET)  mg per tablet    cloNIDine (CATAPRES) 0 1 mg tablet    Dulaglutide 0 75 MG/0 5ML SOPN    metFORMIN (GLUMETZA) 500 MG (MOD) 24 hr tablet    metoprolol tartrate (LOPRESSOR) 50 mg tablet    Multiple Vitamin (MULTIVITAMIN) tablet    omeprazole (PriLOSEC) 40 MG capsule    Oyster Shell (OYSCO 500 PO)    ramipril (ALTACE) 5 mg capsule    traZODone (DESYREL) 150 mg tablet    Vraylar 3 MG capsule    aspirin (ECOTRIN LOW STRENGTH) 81 mg EC tablet    fluorouracil (EFUDEX) 5 % cream    gabapentin (NEURONTIN) 100 mg capsule    ipratropium-albuterol (DUO-NEB) 0 5-2 5 mg/3 mL nebulizer solution    Mirabegron ER 25 MG TB24    polyethylene glycol (GLYCOLAX) 17 GM/SCOOP powder    sildenafil (VIAGRA) 100 mg tablet    Allergies   Allergen Reactions    Gluten Meal - Food Allergy     Adhesive [Medical Tape] Rash       Objective     /61   Pulse (!) 53   Temp 97 6 °F (36 4 °C) (Temporal)   Resp 12   Ht 5' 9" (1 753 m)   Wt (!) 150 kg (331 lb 6 4 oz)   SpO2 97%   BMI 48 94 kg/m²       PHYSICAL EXAM    Gen: NAD  Head: NCAT  CV: RRR  CHEST: Clear  ABD: soft, NT/ND  EXT: no edema      ASSESSMENT/PLAN:  This is a 59y o  year old male here for colonoscopy, and he is stable and optimized for his procedure

## 2022-03-21 NOTE — ANESTHESIA POSTPROCEDURE EVALUATION
Post-Op Assessment Note    CV Status:  Stable    Pain management: adequate     Mental Status:  Sleepy   Hydration Status:  Euvolemic   PONV Controlled:  Controlled   Airway Patency:  Patent      Post Op Vitals Reviewed: Yes      Staff: CRNA         No complications documented      BP   118/61   Temp   97 3   Pulse 50   Resp   16   SpO2   100%

## 2022-03-30 NOTE — RESULT ENCOUNTER NOTE
Please inform patient that their biopsies were benign    Repeat colonoscopy 1 year due to prep being suboptimal

## 2022-04-07 ENCOUNTER — OFFICE VISIT (OUTPATIENT)
Dept: GASTROENTEROLOGY | Facility: CLINIC | Age: 62
End: 2022-04-07
Payer: MEDICARE

## 2022-04-07 VITALS
WEIGHT: 315 LBS | DIASTOLIC BLOOD PRESSURE: 91 MMHG | BODY MASS INDEX: 46.65 KG/M2 | HEART RATE: 59 BPM | HEIGHT: 69 IN | SYSTOLIC BLOOD PRESSURE: 124 MMHG

## 2022-04-07 DIAGNOSIS — Z86.010 HX OF ADENOMATOUS COLONIC POLYPS: ICD-10-CM

## 2022-04-07 DIAGNOSIS — E66.3 OVERWEIGHT: ICD-10-CM

## 2022-04-07 DIAGNOSIS — K21.9 GERD WITHOUT ESOPHAGITIS: ICD-10-CM

## 2022-04-07 DIAGNOSIS — K59.01 CONSTIPATION BY DELAYED COLONIC TRANSIT: Primary | ICD-10-CM

## 2022-04-07 PROBLEM — Z86.0101 HX OF ADENOMATOUS COLONIC POLYPS: Status: ACTIVE | Noted: 2022-04-07

## 2022-04-07 PROCEDURE — 99213 OFFICE O/P EST LOW 20 MIN: CPT | Performed by: INTERNAL MEDICINE

## 2022-04-07 NOTE — PATIENT INSTRUCTIONS
The polyp we removed was a pre cancerous adenoma  Your prep was suboptimal therefore repeat your colonoscopy in March of 2023  Asked them for the gallon prep next time you have your colonoscopy  For year bowels use a capful of Metamucil and a capful of MiraLax in water or juice daily

## 2022-04-07 NOTE — PROGRESS NOTES
[facility] - Outpatient Follow-up Note  Richard Hoyt 59 y o  male MRN: [id number]  Encounter: [id number]          ASSESSMENT AND PLAN:      1  Constipation by delayed colonic transit  Discussed using Metamucil and MiraLax  He has to limit his water intake due to his history of CHF  2  Hx of adenomatous colonic polyps  Recent colonoscopy suboptimal prep  An adenoma was removed  Repeat colonoscopy in March of 2023      3  GERD without esophagitis  No complaints of pyrosis no acid reducers on a regular basis  4  Overweight  Discussed portion control exercise avoidance of carbohydrates  Perhaps a [employer] diet may be of benefit since  He is waiting for weight loss to have his knees replaced  He will otherwise follow-up in 6 months     ______________________________________________________________________    SUBJECTIVE:  Very pleasant 57-year-old gentleman presents for follow-up after colonoscopy  We discussed his results he had an adenoma in a suboptimal prep  Repeat colonoscopy next year  Regarding his constipation we discussed Metamucil MiraLax  He will work on portion control and weight loss  REVIEW OF SYSTEMS IS OTHERWISE NEGATIVE        Historical Information   Past Medical History:   Diagnosis Date    Arthritis     Asthma     Colon polyp     CPAP (continuous positive airway pressure) dependence     Edema     left leg    GERD (gastroesophageal reflux disease)     History of echocardiogram 07/2017    History of Holter monitoring 09/2014    History of stress test 08/2014    Hypertension     [facility]ocardial infarct [facility])     2011    Myocardial infarction ([address] )     Obesity     Sleep apnea     Thrombophlebitis      Past Surgical History:   Procedure Laterality Date    CARDIAC CATHETERIZATION  03/2012    3085,8650    CHOLECYSTECTOMY      COLONOSCOPY      COLONOSCOPY N/A 10/30/2017    Procedure: COLONOSCOPY;  Surgeon: Maria De Jesus Sorto MD;  Location: [facility] LAB; Service: Gastroenterology    ENDOVENOUS ABLATION SAPHENOUS VEIN W/ LASER      each addit vein    ERCP      ESOPHAGOGASTRODUODENOSCOPY N/A 10/30/2017    Procedure: ESOPHAGOGASTRODUODENOSCOPY (EGD); Surgeon: Jesus Cooper MD;  Location: [facility] GI LAB;   Service: Gastroenterology    GASTRIC BYPASS      2001    GASTRIC BYPASS      HERNIA REPAIR      paraesophageal hiatus hernia    HERNIA REPAIR      x5 last one 2011    JOINT REPLACEMENT      KNEE ARTHROPLASTY Right     2102    REPLACEMENT TOTAL KNEE Right 02/2011     Social History   Social History     Substance and Sexual Activity   Alcohol Use Not Currently    Comment: rarely - denied per allscripts      Social History     Substance and Sexual Activity   Drug Use No     Social History     Tobacco Use   Smoking Status Former Smoker    Packs/day: 1 00    Years: 3 00    Pack years: 3 00    Types: Cigarettes   Smokeless Tobacco Former User   Tobacco Comment    never a smoker per allscripts - as per [place]     Family History   Problem Relation Age of Onset    Uterine cancer Mother     Prostate cancer Father     Diabetes Father     Heart failure Father     Cancer Father     Stroke Family         syndrome    Aneurysm Family         aoritc    Diabetes Brother     Other Brother         amputation-    Diabetes Paternal Grandmother     Diabetes Paternal Grandfather     Cancer Sister        Meds/Allergies       Current Outpatient Medications:     apixaban (Eliquis) 5 mg    atorvastatin (LIPITOR) 20 mg tablet    buPROPion (WELLBUTRIN XL) 300 mg 24 hr tablet    carbidopa-levodopa (SINEMET)  mg per tablet    cloNIDine (CATAPRES) 0 1 mg tablet    Dulaglutide 0 75 MG/0 5ML SOPN    fluorouracil (EFUDEX) 5 % cream    ipratropium-albuterol (DUO-NEB) 0 5-2 5 mg/3 mL nebulizer solution    metFORMIN (GLUMETZA) 500 MG (MOD) 24 hr tablet    metoprolol tartrate (LOPRESSOR) 50 mg tablet    Mirabegron ER 25 MG TB24    Multiple Vitamin (MULTIVITAMIN) tablet    omeprazole (PriLOSEC) 40 MG capsule    Oyster Shell (OYSCO 500 PO)    polyethylene glycol (GLYCOLAX) 17 GM/SCOOP powder    ramipril (ALTACE) 5 mg capsule    sildenafil (VIAGRA) 100 mg tablet    traZODone (DESYREL) 150 mg tablet    Vraylar 3 MG capsule    aspirin (ECOTRIN LOW STRENGTH) 81 mg EC tablet    gabapentin (NEURONTIN) 100 mg capsule    Allergies   Allergen Reactions    Gluten Meal - Food Allergy     Adhesive [Medical Tape] Rash           Objective     Blood pressure 124/91, pulse 59, height 5' 9" (1 753 m), weight (!) 150 kg (331 lb)  Body mass index is 48 88 kg/m²  PHYSICAL EXAM:      General Appearance:   Alert, cooperative, no distress   HEENT:   Normocephalic, atraumatic, anicteric      Neck:  Supple, symmetrical, trachea midline   Lungs:   Clear to auscultation bilaterally; no rales, rhonchi or wheezing; respirations unlabored    Heart[de-identified]   Regular rate and rhythm; no murmur, rub, or gallop  Abdomen:   Soft, non-tender, non-distended; normal bowel sounds; no masses, no organomegaly    Genitalia:   Deferred    Rectal:   Deferred    Extremities:  No cyanosis, clubbing or edema    Pulses:  2+ and symmetric    Skin:  No jaundice, rashes, or lesions    Lymph nodes:  No palpable cervical lymphadenopathy        Lab Results:   No visits with results within 1 Day(s) from this visit     Latest known visit with results is:   Hospital Outpatient Visit on 03/21/2022   Component Date Value    POC Glucose 03/21/2022 124     Case Report 03/21/2022                      Value:Surgical Pathology Report                         Case: [id number]                                   Authorizing Provider:  Gaby Tran MD    Collected:           03/21/2022 8355              Ordering Location:     [facility]   Received:            03/21/2022 1435                                     Endoscopy Pathologist:           Tracy Riley MD                                                           Specimen:    Polyp, Colorectal, descending                                                              Final Diagnosis 03/21/2022                      Value: This result contains rich text formatting which cannot be displayed here   Additional Information 03/21/2022                      Value: This result contains rich text formatting which cannot be displayed here   Synoptic Checklist 03/21/2022                      Value:                            COLON/RECTUM POLYP FORM - GI - A                                                                                     :    Adenoma(s)      Gross Description 03/21/2022                      Value: This result contains rich text formatting which cannot be displayed here   Clinical Information 03/21/2022                      Value:Cold snare polypectomy         Radiology Results:   Colonoscopy    Result Date: 3/21/2022  Narrative: [facility] [address] [id number] [facility] OF SERVICE: 3/21/22 PHYSICIAN(S): Eleni Eduardo MD Proceduralist INDICATION: Lower abdominal pain, Colon cancer screening, Constipation, unspecified constipation type POST-OP DIAGNOSIS: See the impression below  HISTORY: Prior colonoscopy: 5 years ago  BOWEL PREPARATION: Miralax/Dulcolax; Miralax/Magnesium Citrate PREPROCEDURE: Informed consent was obtained for the procedure, including sedation  Risks including but not limited to bleeding, infection, perforation, adverse drug reaction and aspiration were explained in detail  Also explained about less than 100% sensitivity with the exam and other alternatives  The patient was placed in the left lateral decubitus position  DETAILS OF PROCEDURE: Patient was taken to the procedure room where a time out was performed to confirm correct patient and correct procedure   The patient underwent monitored anesthesia care, which was administered by an anesthesia professional  The patient's blood pressure, heart rate, level of consciousness, oxygen and respirations were monitored throughout the procedure  A digital rectal exam was performed  The scope was introduced through the anus and advanced to the terminal ileum  Retroflexion was performed in the rectum  Bowel prep was not adequate  The patient experienced no blood loss  The procedure was moderately difficult due to patient physique and poor preparation  The patient tolerated the procedure well  There were no apparent complications  ANESTHESIA INFORMATION: ASA: III Anesthesia Type: IV Sedation with Anesthesia MEDICATIONS: No administrations occurring from 0729 to 0801 on 03/21/22 FINDINGS: Rectal exam no masses 6 mm flat polyp in the descending colon; completely removed en bloc by cold snare and retrieved specimen The remainder of a detailed exam was within normal limits including the appendix opening and distal terminal ileum, keep in mind moderately difficult colon due to body habitus and suboptimal prep EVENTS: Procedure Events Event Event Time ENDO CECUM REACHED 3/21/2022  7:45 AM ENDO SCOPE OUT TIME 3/21/2022  7:59 AM SPECIMENS: ID Type Source Tests Collected by Time Destination 1 : descending Tissue Polyp, Colorectal TISSUE EXAM Rafita Vazquez MD 3/21/2022  7:42 AM  EQUIPMENT: Colonoscope -     Impression: Change in bowel habits, constipation predominant, suboptimal prep, 1 polyp removed today RECOMMENDATION: Repeat colonoscopy in 1 year due to an inadequate bowel preparation  MiraLax and Metamucil daily  Patient will be called in 1-2 weeks with biopsy results  Amada Vazquez MD

## 2022-07-08 NOTE — OP NOTE
COLONOSCOPY    PROCEDURE: Colonoscopy    INDICATIONS: Screening for Colon Cancer    POST-OP DIAGNOSIS: See the impression below    SEDATION: Monitored anesthesia care, check anesthesia records    PHYSICAL EXAM:    /85   Pulse 77   Temp (!) 97 1 °F (36 2 °C) (Tympanic)   Resp 20   Ht 5' 9" (1 753 m)   Wt (!) 159 kg (350 lb)   SpO2 99%   BMI 51 69 kg/m²   Body mass index is 51 69 kg/m²  General: NAD  Heart: S1 & S2 normal, RRR  Lungs: CTA, No rales or rhonchi  Abdomen: Soft, nontender, nondistended, good bowel sounds    CONSENT:  Informed consent was obtained for the procedure, including sedation after explaining the risks and benefits of the procedure  Risks including but not limited to bleeding, perforation, infection, aspiration were discussed in detail  Also explained about less than 100%$ sensitivity with the exam and other alternatives  PREPARATION:   EKG tracing, pulse oximetry, blood pressure were monitored throughout the procedure  Patient was identified by myself both verbally and by visual inspection of ID band  DESCRIPTION:   Patient was placed in the left lateral decubitus position and was sedated with the above medication  Digital rectal examination was performed  The colonoscope was introduced in to the anal canal and advanced up to cecum, which was identified by the appendiceal orifice and IC valve  A careful inspection was made as the colonoscope was withdrawn, including a retroflexed view of the rectum; findings and interventions are described below  Appropriate photodocumentation was obtained  The quality of the colonic preparation was good      FINDINGS:    Normal colonoscopy with good prep and careful evaluation  Mild internal hemorrhoids on retroflexion           IMPRESSIONS:      Mild internal hemorrhoids, otherwise normal colonoscopy with good prep    RECOMMENDATIONS:    Discharge home  Resume regular diet  Resume home medications  Repeat colonoscopy in 10 years  Call with any abdominal pain, bleeding, fevers    COMPLICATIONS:  None; patient tolerated the procedure well      DISPOSITION: PACU           CONDITION: Stable Solaraze Pregnancy And Lactation Text: This medication is Pregnancy Category B and is considered safe. There is some data to suggest avoiding during the third trimester. It is unknown if this medication is excreted in breast milk.

## 2022-11-17 NOTE — ASSESSMENT & PLAN NOTE
- c/w home Wellbutrin and Cymbalta  -denies SI/HI Size Of Lesion In Cm (Optional): 0 Detail Level: Simple

## 2023-06-03 ENCOUNTER — APPOINTMENT (EMERGENCY)
Dept: RADIOLOGY | Facility: HOSPITAL | Age: 63
DRG: 439 | End: 2023-06-03
Payer: MEDICARE

## 2023-06-03 ENCOUNTER — HOSPITAL ENCOUNTER (INPATIENT)
Facility: HOSPITAL | Age: 63
LOS: 5 days | Discharge: NURSING, REHAB OR LONG-TERM CARE | DRG: 439 | End: 2023-06-08
Attending: EMERGENCY MEDICINE | Admitting: STUDENT IN AN ORGANIZED HEALTH CARE EDUCATION/TRAINING PROGRAM
Payer: MEDICARE

## 2023-06-03 ENCOUNTER — NURSE TRIAGE (OUTPATIENT)
Dept: OTHER | Facility: OTHER | Age: 63
End: 2023-06-03

## 2023-06-03 ENCOUNTER — APPOINTMENT (EMERGENCY)
Dept: CT IMAGING | Facility: HOSPITAL | Age: 63
DRG: 439 | End: 2023-06-03
Payer: MEDICARE

## 2023-06-03 DIAGNOSIS — R10.10 PAIN OF UPPER ABDOMEN: ICD-10-CM

## 2023-06-03 DIAGNOSIS — I48.0 PAROXYSMAL ATRIAL FIBRILLATION (HCC): ICD-10-CM

## 2023-06-03 DIAGNOSIS — I48.91 NEW ONSET ATRIAL FIBRILLATION (HCC): ICD-10-CM

## 2023-06-03 DIAGNOSIS — E83.42 HYPOMAGNESEMIA: ICD-10-CM

## 2023-06-03 DIAGNOSIS — K85.90 PANCREATITIS: Primary | ICD-10-CM

## 2023-06-03 DIAGNOSIS — E87.6 HYPOKALEMIA: ICD-10-CM

## 2023-06-03 DIAGNOSIS — R10.9 ABDOMINAL PAIN: ICD-10-CM

## 2023-06-03 DIAGNOSIS — N17.9 AKI (ACUTE KIDNEY INJURY) (HCC): ICD-10-CM

## 2023-06-03 DIAGNOSIS — E87.1 HYPONATREMIA: ICD-10-CM

## 2023-06-03 PROBLEM — K85.00 IDIOPATHIC ACUTE PANCREATITIS WITHOUT INFECTION OR NECROSIS: Status: ACTIVE | Noted: 2022-01-25

## 2023-06-03 LAB
2HR DELTA HS TROPONIN: 2 NG/L
ALBUMIN SERPL BCP-MCNC: 3.9 G/DL (ref 3.5–5)
ALBUMIN SERPL BCP-MCNC: 4.4 G/DL (ref 3.5–5)
ALP SERPL-CCNC: 76 U/L (ref 34–104)
ALT SERPL W P-5'-P-CCNC: 13 U/L (ref 7–52)
ANION GAP SERPL CALCULATED.3IONS-SCNC: 10 MMOL/L (ref 4–13)
ANION GAP SERPL CALCULATED.3IONS-SCNC: 12 MMOL/L (ref 4–13)
APTT PPP: 27 SECONDS (ref 23–37)
AST SERPL W P-5'-P-CCNC: 15 U/L (ref 13–39)
BACTERIA UR QL AUTO: NORMAL /HPF
BASOPHILS # BLD AUTO: 0.03 THOUSANDS/ÂΜL (ref 0–0.1)
BASOPHILS NFR BLD AUTO: 0 % (ref 0–1)
BILIRUB SERPL-MCNC: 1.75 MG/DL (ref 0.2–1)
BILIRUB UR QL STRIP: NEGATIVE
BNP SERPL-MCNC: 26 PG/ML (ref 0–100)
BUN SERPL-MCNC: 12 MG/DL (ref 5–25)
BUN SERPL-MCNC: 16 MG/DL (ref 5–25)
CALCIUM SERPL-MCNC: 8.8 MG/DL (ref 8.4–10.2)
CALCIUM SERPL-MCNC: 9.3 MG/DL (ref 8.4–10.2)
CARDIAC TROPONIN I PNL SERPL HS: 10 NG/L
CARDIAC TROPONIN I PNL SERPL HS: 12 NG/L
CHLORIDE SERPL-SCNC: 87 MMOL/L (ref 96–108)
CHLORIDE SERPL-SCNC: 91 MMOL/L (ref 96–108)
CHOLEST SERPL-MCNC: 117 MG/DL
CLARITY UR: CLEAR
CO2 SERPL-SCNC: 31 MMOL/L (ref 21–32)
CO2 SERPL-SCNC: 33 MMOL/L (ref 21–32)
COLOR UR: YELLOW
CREAT SERPL-MCNC: 1.16 MG/DL (ref 0.6–1.3)
CREAT SERPL-MCNC: 1.42 MG/DL (ref 0.6–1.3)
EOSINOPHIL # BLD AUTO: 0.04 THOUSAND/ÂΜL (ref 0–0.61)
EOSINOPHIL NFR BLD AUTO: 0 % (ref 0–6)
ERYTHROCYTE [DISTWIDTH] IN BLOOD BY AUTOMATED COUNT: 13.2 % (ref 11.6–15.1)
GFR SERPL CREATININE-BSD FRML MDRD: 51 ML/MIN/1.73SQ M
GFR SERPL CREATININE-BSD FRML MDRD: 65 ML/MIN/1.73SQ M
GLUCOSE SERPL-MCNC: 125 MG/DL (ref 65–140)
GLUCOSE SERPL-MCNC: 166 MG/DL (ref 65–140)
GLUCOSE SERPL-MCNC: 177 MG/DL (ref 65–140)
GLUCOSE SERPL-MCNC: 177 MG/DL (ref 65–140)
GLUCOSE SERPL-MCNC: 265 MG/DL (ref 65–140)
GLUCOSE UR STRIP-MCNC: ABNORMAL MG/DL
HCT VFR BLD AUTO: 43.6 % (ref 36.5–49.3)
HDLC SERPL-MCNC: 40 MG/DL
HGB BLD-MCNC: 14.5 G/DL (ref 12–17)
HGB UR QL STRIP.AUTO: ABNORMAL
IMM GRANULOCYTES # BLD AUTO: 0.04 THOUSAND/UL (ref 0–0.2)
IMM GRANULOCYTES NFR BLD AUTO: 0 % (ref 0–2)
INR PPP: 1.1 (ref 0.84–1.19)
KETONES UR STRIP-MCNC: ABNORMAL MG/DL
LACTATE SERPL-SCNC: 2 MMOL/L (ref 0.5–2)
LDLC SERPL CALC-MCNC: 64 MG/DL (ref 0–100)
LEUKOCYTE ESTERASE UR QL STRIP: NEGATIVE
LIPASE SERPL-CCNC: 76 U/L (ref 11–82)
LYMPHOCYTES # BLD AUTO: 0.9 THOUSANDS/ÂΜL (ref 0.6–4.47)
LYMPHOCYTES NFR BLD AUTO: 7 % (ref 14–44)
MAGNESIUM SERPL-MCNC: 1.8 MG/DL (ref 1.9–2.7)
MAGNESIUM SERPL-MCNC: 2 MG/DL (ref 1.9–2.7)
MCH RBC QN AUTO: 24.4 PG (ref 26.8–34.3)
MCHC RBC AUTO-ENTMCNC: 33.3 G/DL (ref 31.4–37.4)
MCV RBC AUTO: 73 FL (ref 82–98)
MONOCYTES # BLD AUTO: 1.12 THOUSAND/ÂΜL (ref 0.17–1.22)
MONOCYTES NFR BLD AUTO: 9 % (ref 4–12)
NEUTROPHILS # BLD AUTO: 10.35 THOUSANDS/ÂΜL (ref 1.85–7.62)
NEUTS SEG NFR BLD AUTO: 84 % (ref 43–75)
NITRITE UR QL STRIP: NEGATIVE
NON-SQ EPI CELLS URNS QL MICRO: NORMAL /HPF
NRBC BLD AUTO-RTO: 0 /100 WBCS
PH UR STRIP.AUTO: 6 [PH]
PHOSPHATE SERPL-MCNC: 2.5 MG/DL (ref 2.3–4.1)
PHOSPHATE SERPL-MCNC: 2.5 MG/DL (ref 2.3–4.1)
PLATELET # BLD AUTO: 258 THOUSANDS/UL (ref 149–390)
PMV BLD AUTO: 8.5 FL (ref 8.9–12.7)
POTASSIUM SERPL-SCNC: 2.5 MMOL/L (ref 3.5–5.3)
POTASSIUM SERPL-SCNC: 3.4 MMOL/L (ref 3.5–5.3)
PROT SERPL-MCNC: 7.7 G/DL (ref 6.4–8.4)
PROT UR STRIP-MCNC: NEGATIVE MG/DL
PROTHROMBIN TIME: 14.6 SECONDS (ref 11.6–14.5)
RBC # BLD AUTO: 5.95 MILLION/UL (ref 3.88–5.62)
RBC #/AREA URNS AUTO: NORMAL /HPF
SODIUM SERPL-SCNC: 132 MMOL/L (ref 135–147)
SODIUM SERPL-SCNC: 132 MMOL/L (ref 135–147)
SP GR UR STRIP.AUTO: <=1.005 (ref 1–1.03)
TRIGL SERPL-MCNC: 65 MG/DL
UROBILINOGEN UR QL STRIP.AUTO: 0.2 E.U./DL
WBC # BLD AUTO: 12.48 THOUSAND/UL (ref 4.31–10.16)
WBC #/AREA URNS AUTO: NORMAL /HPF

## 2023-06-03 PROCEDURE — 83880 ASSAY OF NATRIURETIC PEPTIDE: CPT | Performed by: EMERGENCY MEDICINE

## 2023-06-03 PROCEDURE — 83605 ASSAY OF LACTIC ACID: CPT | Performed by: EMERGENCY MEDICINE

## 2023-06-03 PROCEDURE — 99285 EMERGENCY DEPT VISIT HI MDM: CPT

## 2023-06-03 PROCEDURE — 85025 COMPLETE CBC W/AUTO DIFF WBC: CPT | Performed by: EMERGENCY MEDICINE

## 2023-06-03 PROCEDURE — 84100 ASSAY OF PHOSPHORUS: CPT | Performed by: STUDENT IN AN ORGANIZED HEALTH CARE EDUCATION/TRAINING PROGRAM

## 2023-06-03 PROCEDURE — 96361 HYDRATE IV INFUSION ADD-ON: CPT

## 2023-06-03 PROCEDURE — 83735 ASSAY OF MAGNESIUM: CPT | Performed by: EMERGENCY MEDICINE

## 2023-06-03 PROCEDURE — 93005 ELECTROCARDIOGRAM TRACING: CPT

## 2023-06-03 PROCEDURE — 96366 THER/PROPH/DIAG IV INF ADDON: CPT

## 2023-06-03 PROCEDURE — 83735 ASSAY OF MAGNESIUM: CPT | Performed by: STUDENT IN AN ORGANIZED HEALTH CARE EDUCATION/TRAINING PROGRAM

## 2023-06-03 PROCEDURE — 80069 RENAL FUNCTION PANEL: CPT | Performed by: STUDENT IN AN ORGANIZED HEALTH CARE EDUCATION/TRAINING PROGRAM

## 2023-06-03 PROCEDURE — 96375 TX/PRO/DX INJ NEW DRUG ADDON: CPT

## 2023-06-03 PROCEDURE — 71045 X-RAY EXAM CHEST 1 VIEW: CPT

## 2023-06-03 PROCEDURE — 96376 TX/PRO/DX INJ SAME DRUG ADON: CPT

## 2023-06-03 PROCEDURE — 80061 LIPID PANEL: CPT | Performed by: STUDENT IN AN ORGANIZED HEALTH CARE EDUCATION/TRAINING PROGRAM

## 2023-06-03 PROCEDURE — 36415 COLL VENOUS BLD VENIPUNCTURE: CPT | Performed by: EMERGENCY MEDICINE

## 2023-06-03 PROCEDURE — 96365 THER/PROPH/DIAG IV INF INIT: CPT

## 2023-06-03 PROCEDURE — 84484 ASSAY OF TROPONIN QUANT: CPT | Performed by: EMERGENCY MEDICINE

## 2023-06-03 PROCEDURE — 81001 URINALYSIS AUTO W/SCOPE: CPT | Performed by: EMERGENCY MEDICINE

## 2023-06-03 PROCEDURE — 85730 THROMBOPLASTIN TIME PARTIAL: CPT | Performed by: EMERGENCY MEDICINE

## 2023-06-03 PROCEDURE — 74177 CT ABD & PELVIS W/CONTRAST: CPT

## 2023-06-03 PROCEDURE — 82948 REAGENT STRIP/BLOOD GLUCOSE: CPT

## 2023-06-03 PROCEDURE — 80053 COMPREHEN METABOLIC PANEL: CPT | Performed by: EMERGENCY MEDICINE

## 2023-06-03 PROCEDURE — 85610 PROTHROMBIN TIME: CPT | Performed by: EMERGENCY MEDICINE

## 2023-06-03 PROCEDURE — 99223 1ST HOSP IP/OBS HIGH 75: CPT | Performed by: STUDENT IN AN ORGANIZED HEALTH CARE EDUCATION/TRAINING PROGRAM

## 2023-06-03 PROCEDURE — 83690 ASSAY OF LIPASE: CPT | Performed by: EMERGENCY MEDICINE

## 2023-06-03 RX ORDER — POTASSIUM CHLORIDE 14.9 MG/ML
20 INJECTION INTRAVENOUS ONCE
Status: COMPLETED | OUTPATIENT
Start: 2023-06-03 | End: 2023-06-04

## 2023-06-03 RX ORDER — LANOLIN ALCOHOL/MO/W.PET/CERES
800 CREAM (GRAM) TOPICAL 2 TIMES DAILY
Status: DISCONTINUED | OUTPATIENT
Start: 2023-06-03 | End: 2023-06-08 | Stop reason: HOSPADM

## 2023-06-03 RX ORDER — HYDROMORPHONE HCL IN WATER/PF 6 MG/30 ML
0.2 PATIENT CONTROLLED ANALGESIA SYRINGE INTRAVENOUS
Status: DISCONTINUED | OUTPATIENT
Start: 2023-06-03 | End: 2023-06-07

## 2023-06-03 RX ORDER — ATORVASTATIN CALCIUM 20 MG/1
20 TABLET, FILM COATED ORAL DAILY
Status: DISCONTINUED | OUTPATIENT
Start: 2023-06-03 | End: 2023-06-08 | Stop reason: HOSPADM

## 2023-06-03 RX ORDER — BUPROPION HYDROCHLORIDE 150 MG/1
300 TABLET ORAL DAILY
Status: DISCONTINUED | OUTPATIENT
Start: 2023-06-03 | End: 2023-06-08 | Stop reason: HOSPADM

## 2023-06-03 RX ORDER — SODIUM CHLORIDE, SODIUM LACTATE, POTASSIUM CHLORIDE, CALCIUM CHLORIDE 600; 310; 30; 20 MG/100ML; MG/100ML; MG/100ML; MG/100ML
100 INJECTION, SOLUTION INTRAVENOUS CONTINUOUS
Status: DISCONTINUED | OUTPATIENT
Start: 2023-06-03 | End: 2023-06-04

## 2023-06-03 RX ORDER — ACETAMINOPHEN 325 MG/1
650 TABLET ORAL EVERY 6 HOURS PRN
Status: DISCONTINUED | OUTPATIENT
Start: 2023-06-03 | End: 2023-06-08 | Stop reason: HOSPADM

## 2023-06-03 RX ORDER — GABAPENTIN 300 MG/1
300 CAPSULE ORAL DAILY
Status: DISCONTINUED | OUTPATIENT
Start: 2023-06-03 | End: 2023-06-08 | Stop reason: HOSPADM

## 2023-06-03 RX ORDER — CLONIDINE HYDROCHLORIDE 0.1 MG/1
0.2 TABLET ORAL EVERY 12 HOURS SCHEDULED
Status: DISCONTINUED | OUTPATIENT
Start: 2023-06-03 | End: 2023-06-05

## 2023-06-03 RX ORDER — METOPROLOL TARTRATE 50 MG/1
50 TABLET, FILM COATED ORAL EVERY 12 HOURS SCHEDULED
Status: DISCONTINUED | OUTPATIENT
Start: 2023-06-03 | End: 2023-06-03

## 2023-06-03 RX ORDER — OXYBUTYNIN CHLORIDE 5 MG/1
5 TABLET, EXTENDED RELEASE ORAL DAILY
Status: DISCONTINUED | OUTPATIENT
Start: 2023-06-03 | End: 2023-06-08 | Stop reason: HOSPADM

## 2023-06-03 RX ORDER — POTASSIUM CHLORIDE 20 MEQ/1
40 TABLET, EXTENDED RELEASE ORAL ONCE
Status: COMPLETED | OUTPATIENT
Start: 2023-06-03 | End: 2023-06-03

## 2023-06-03 RX ORDER — POTASSIUM CHLORIDE 14.9 MG/ML
20 INJECTION INTRAVENOUS ONCE
Status: COMPLETED | OUTPATIENT
Start: 2023-06-03 | End: 2023-06-03

## 2023-06-03 RX ORDER — INSULIN LISPRO 100 [IU]/ML
2-12 INJECTION, SOLUTION INTRAVENOUS; SUBCUTANEOUS EVERY 6 HOURS SCHEDULED
Status: DISCONTINUED | OUTPATIENT
Start: 2023-06-03 | End: 2023-06-05

## 2023-06-03 RX ORDER — OXYCODONE HYDROCHLORIDE 10 MG/1
10 TABLET ORAL EVERY 4 HOURS PRN
Status: DISCONTINUED | OUTPATIENT
Start: 2023-06-03 | End: 2023-06-07

## 2023-06-03 RX ORDER — OXYCODONE HYDROCHLORIDE 5 MG/1
5 TABLET ORAL EVERY 4 HOURS PRN
Status: DISCONTINUED | OUTPATIENT
Start: 2023-06-03 | End: 2023-06-07

## 2023-06-03 RX ORDER — MAGNESIUM SULFATE HEPTAHYDRATE 40 MG/ML
2 INJECTION, SOLUTION INTRAVENOUS ONCE
Status: COMPLETED | OUTPATIENT
Start: 2023-06-03 | End: 2023-06-04

## 2023-06-03 RX ORDER — MORPHINE SULFATE 4 MG/ML
4 INJECTION, SOLUTION INTRAMUSCULAR; INTRAVENOUS ONCE
Status: COMPLETED | OUTPATIENT
Start: 2023-06-03 | End: 2023-06-03

## 2023-06-03 RX ORDER — TORSEMIDE 20 MG/1
40 TABLET ORAL DAILY
Status: DISCONTINUED | OUTPATIENT
Start: 2023-06-03 | End: 2023-06-05

## 2023-06-03 RX ORDER — PANTOPRAZOLE SODIUM 40 MG/1
40 TABLET, DELAYED RELEASE ORAL
Status: DISCONTINUED | OUTPATIENT
Start: 2023-06-03 | End: 2023-06-08 | Stop reason: HOSPADM

## 2023-06-03 RX ORDER — FAMOTIDINE 10 MG/ML
20 INJECTION, SOLUTION INTRAVENOUS ONCE
Status: COMPLETED | OUTPATIENT
Start: 2023-06-03 | End: 2023-06-03

## 2023-06-03 RX ORDER — MORPHINE SULFATE 10 MG/ML
6 INJECTION, SOLUTION INTRAMUSCULAR; INTRAVENOUS ONCE
Status: DISCONTINUED | OUTPATIENT
Start: 2023-06-03 | End: 2023-06-03

## 2023-06-03 RX ADMIN — POTASSIUM CHLORIDE 20 MEQ: 14.9 INJECTION, SOLUTION INTRAVENOUS at 07:56

## 2023-06-03 RX ADMIN — CARBIDOPA AND LEVODOPA 1 TABLET: 25; 100 TABLET ORAL at 14:43

## 2023-06-03 RX ADMIN — INSULIN LISPRO 6 UNITS: 100 INJECTION, SOLUTION INTRAVENOUS; SUBCUTANEOUS at 17:30

## 2023-06-03 RX ADMIN — OXYBUTYNIN CHLORIDE 5 MG: 5 TABLET, EXTENDED RELEASE ORAL at 14:41

## 2023-06-03 RX ADMIN — CARBIDOPA AND LEVODOPA 1 TABLET: 25; 100 TABLET ORAL at 17:34

## 2023-06-03 RX ADMIN — POTASSIUM & SODIUM PHOSPHATES POWDER PACK 280-160-250 MG 2 PACKET: 280-160-250 PACK at 14:40

## 2023-06-03 RX ADMIN — FAMOTIDINE 20 MG: 10 INJECTION, SOLUTION INTRAVENOUS at 07:04

## 2023-06-03 RX ADMIN — INSULIN LISPRO 2 UNITS: 100 INJECTION, SOLUTION INTRAVENOUS; SUBCUTANEOUS at 14:42

## 2023-06-03 RX ADMIN — PANTOPRAZOLE SODIUM 40 MG: 40 TABLET, DELAYED RELEASE ORAL at 14:41

## 2023-06-03 RX ADMIN — POTASSIUM CHLORIDE 40 MEQ: 1500 TABLET, EXTENDED RELEASE ORAL at 07:59

## 2023-06-03 RX ADMIN — ATORVASTATIN CALCIUM 20 MG: 20 TABLET, FILM COATED ORAL at 14:41

## 2023-06-03 RX ADMIN — APIXABAN 5 MG: 5 TABLET, FILM COATED ORAL at 17:35

## 2023-06-03 RX ADMIN — SODIUM CHLORIDE, SODIUM LACTATE, POTASSIUM CHLORIDE, AND CALCIUM CHLORIDE 250 ML/HR: .6; .31; .03; .02 INJECTION, SOLUTION INTRAVENOUS at 11:28

## 2023-06-03 RX ADMIN — TORSEMIDE 40 MG: 20 TABLET ORAL at 14:41

## 2023-06-03 RX ADMIN — MORPHINE SULFATE 2 MG: 2 INJECTION, SOLUTION INTRAMUSCULAR; INTRAVENOUS at 10:03

## 2023-06-03 RX ADMIN — Medication 800 MG: at 17:34

## 2023-06-03 RX ADMIN — OXYCODONE HYDROCHLORIDE 10 MG: 10 TABLET ORAL at 11:23

## 2023-06-03 RX ADMIN — BUPROPION HYDROCHLORIDE 300 MG: 150 TABLET, EXTENDED RELEASE ORAL at 14:40

## 2023-06-03 RX ADMIN — IOHEXOL 100 ML: 350 INJECTION, SOLUTION INTRAVENOUS at 09:15

## 2023-06-03 RX ADMIN — MAGNESIUM SULFATE HEPTAHYDRATE 2 G: 40 INJECTION, SOLUTION INTRAVENOUS at 11:23

## 2023-06-03 RX ADMIN — POTASSIUM CHLORIDE 20 MEQ: 14.9 INJECTION, SOLUTION INTRAVENOUS at 17:29

## 2023-06-03 RX ADMIN — APIXABAN 5 MG: 5 TABLET, FILM COATED ORAL at 14:43

## 2023-06-03 RX ADMIN — METOPROLOL TARTRATE 25 MG: 25 TABLET, FILM COATED ORAL at 14:40

## 2023-06-03 RX ADMIN — POTASSIUM CHLORIDE 20 MEQ: 14.9 INJECTION, SOLUTION INTRAVENOUS at 11:27

## 2023-06-03 RX ADMIN — POTASSIUM CHLORIDE 40 MEQ: 1500 TABLET, EXTENDED RELEASE ORAL at 11:27

## 2023-06-03 RX ADMIN — Medication 800 MG: at 14:39

## 2023-06-03 RX ADMIN — TRAZODONE HYDROCHLORIDE 150 MG: 100 TABLET ORAL at 21:16

## 2023-06-03 RX ADMIN — ACETAMINOPHEN 650 MG: 325 TABLET ORAL at 20:07

## 2023-06-03 RX ADMIN — GABAPENTIN 300 MG: 300 CAPSULE ORAL at 14:41

## 2023-06-03 RX ADMIN — CARBIDOPA AND LEVODOPA 1 TABLET: 25; 100 TABLET ORAL at 21:16

## 2023-06-03 RX ADMIN — CLONIDINE HYDROCHLORIDE 0.2 MG: 0.1 TABLET ORAL at 14:40

## 2023-06-03 RX ADMIN — MORPHINE SULFATE 4 MG: 4 INJECTION INTRAVENOUS at 07:04

## 2023-06-03 RX ADMIN — SODIUM CHLORIDE, SODIUM LACTATE, POTASSIUM CHLORIDE, AND CALCIUM CHLORIDE 1000 ML: .6; .31; .03; .02 INJECTION, SOLUTION INTRAVENOUS at 11:24

## 2023-06-03 RX ADMIN — SODIUM CHLORIDE 500 ML: 0.9 INJECTION, SOLUTION INTRAVENOUS at 07:05

## 2023-06-03 NOTE — ASSESSMENT & PLAN NOTE
Patient presents with acute abdominal pain  Lipase 76  CT showing Acute interstitial edematous pancreatitis involving the pancreatic tail with no pancreatic or peripancreatic cyst or fluid collection seen  Will give 1 L bolus in the ER  Placed on 250 cc an hour  Monitor for fluid overload  GI consulted  ?   If this is due to dulaglutide for his diabetes  NPO

## 2023-06-03 NOTE — TELEPHONE ENCOUNTER
"    Reason for Disposition  • [1] SEVERE pain AND [2] age > 60 years  • [1] SEVERE pain (e g , excruciating) AND [2] present > 1 hour    Answer Assessment - Initial Assessment Questions  1  LOCATION: \"Where does it hurt? \"       Entire abdomen   2  RADIATION: \"Does the pain shoot anywhere else? \" (e g , chest, back)      Chest   3  ONSET: \"When did the pain begin? \" (Minutes, hours or days ago)      Chronic for a month, worsening today  4  SUDDEN: \"Gradual or sudden onset? \"     gradual   5  PATTERN \"Does the pain come and go, or is it constant? \"     - If constant: \"Is it getting better, staying the same, or worsening? \"       (Note: Constant means the pain never goes away completely; most serious pain is constant and it progresses)      - If intermittent: \"How long does it last?\" \"Do you have pain now? \"      (Note: Intermittent means the pain goes away completely between bouts)      Constant   6  SEVERITY: \"How bad is the pain? \"  (e g , Scale 1-10; mild, moderate, or severe)     - MILD (1-3): doesn't interfere with normal activities, abdomen soft and not tender to touch      - MODERATE (4-7): interferes with normal activities or awakens from sleep, tender to touch      - SEVERE (8-10): excruciating pain, doubled over, unable to do any normal activities      10/10 constant all night   7  RECURRENT SYMPTOM: \"Have you ever had this type of stomach pain before? \" If Yes, ask: \"When was the last time? \" and \"What happened that time? \"      Confirms for bleeding ulcers  8  CAUSE: \"What do you think is causing the stomach pain? \"    Ulcers   9  RELIEVING/AGGRAVATING FACTORS: \"What makes it better or worse? \" (e g , movement, antacids, bowel movement)     Unsure   10  OTHER SYMPTOMS: \"Has there been any vomiting, diarrhea, constipation, or urine problems? \"    Chest pain, sweating, clammy    Protocols used: ABDOMINAL PAIN - MALE-ADULT-AH      "

## 2023-06-03 NOTE — PLAN OF CARE
Problem: PAIN - ADULT  Goal: Verbalizes/displays adequate comfort level or baseline comfort level  Description: Interventions:  - Encourage patient to monitor pain and request assistance  - Assess pain using appropriate pain scale  - Administer analgesics based on type and severity of pain and evaluate response  - Implement non-pharmacological measures as appropriate and evaluate response  - Consider cultural and social influences on pain and pain management  - Notify physician/advanced practitioner if interventions unsuccessful or patient reports new pain  Outcome: Progressing     Problem: INFECTION - ADULT  Goal: Absence or prevention of progression during hospitalization  Description: INTERVENTIONS:  - Assess and monitor for signs and symptoms of infection  - Monitor lab/diagnostic results  - Monitor all insertion sites, i e  indwelling lines, tubes, and drains  - Monitor endotracheal if appropriate and nasal secretions for changes in amount and color  - [place] appropriate cooling/warming therapies per order  - Administer medications as ordered  - Instruct and encourage patient and family to use good hand hygiene technique  - Identify and instruct in appropriate isolation precautions for identified infection/condition  Outcome: Progressing  Goal: Absence of fever/infection during neutropenic period  Description: INTERVENTIONS:  - Monitor WBC    Outcome: Progressing     Problem: SAFETY ADULT  Goal: Patient will remain free of falls  Description: INTERVENTIONS:  - Educate patient/family on patient safety including physical limitations  - Instruct patient to call for assistance with activity   - Consult OT/PT to assist with strengthening/mobility   - Keep Call bell within reach  - Keep bed low and locked with side rails adjusted as appropriate  - Keep care items and personal belongings within reach  - Initiate and maintain comfort rounds  - Make Fall Risk Sign visible to staff  - Offer Toileting every 2 Hours, in advance of need  - Initiate/Maintain bedalarm  - Obtain necessary fall risk management equipment:   - Apply yellow socks and bracelet for high fall risk patients  - Consider moving patient to room near nurses station  Outcome: Progressing  Goal: Maintain or return to baseline ADL function  Description: INTERVENTIONS:  -  Assess patient's ability to carry out ADLs; assess patient's baseline for ADL function and identify physical deficits which impact ability to perform ADLs (bathing, care of mouth/teeth, toileting, grooming, dressing, etc )  - Assess/evaluate cause of self-care deficits   - Assess range of motion  - Assess patient's mobility; develop plan if impaired  - Assess patient's need for assistive devices and provide as appropriate  - Encourage maximum independence but intervene and supervise when necessary  - Involve family in performance of ADLs  - Assess for home care needs following discharge   - Consider OT consult to assist with ADL evaluation and planning for discharge  - Provide patient education as appropriate  Outcome: Progressing  Goal: Maintains/Returns to pre admission functional level  Description: INTERVENTIONS:  - Perform BMAT or MOVE assessment daily    - Set and communicate daily mobility goal to care team and patient/family/caregiver  - Collaborate with rehabilitation services on mobility goals if consulted  - Perform Range of Motion 2 times a day  - Reposition patient every 2 hours    - Dangle patient 2 times a day  - Stand patient 2 times a day  - Ambulate patient 2 times a day  - Out of bed to chair 2 times a day   - Out of bed for meals 2 times a day  - Out of bed for toileting  - Record patient progress and toleration of activity level   Outcome: Progressing     Problem: DISCHARGE PLANNING  Goal: Discharge to home or other facility with appropriate resources  Description: INTERVENTIONS:  - Identify barriers to discharge w/patient and caregiver  - Arrange for needed discharge resources and transportation as appropriate  - Identify discharge learning needs (meds, wound care, etc )  - Arrange for interpretive services to assist at discharge as needed  - Refer to Case Management Department for coordinating discharge planning if the patient needs post-hospital services based on physician/advanced practitioner order or complex needs related to functional status, cognitive ability, or social support system  Outcome: Progressing     Problem: Knowledge Deficit  Goal: Patient/family/caregiver demonstrates understanding of disease process, treatment plan, medications, and discharge instructions  Description: Complete learning assessment and assess knowledge base    Interventions:  - Provide teaching at level of understanding  - Provide teaching via preferred learning methods  Outcome: Progressing

## 2023-06-03 NOTE — ED CARE HANDOFF
Emergency Department Sign Out Note        Sign out and transfer of care from Dr Annemarie Severs  See Separate Emergency Department note  The patient, Yair Rajput, was evaluated by the previous provider for abdominal pain  Workup Completed:  Labs Reviewed   CBC AND DIFFERENTIAL - Abnormal       Result Value Ref Range Status    WBC 12 48 (*) 4 31 - 10 16 Thousand/uL Final    RBC 5 95 (*) 3 88 - 5 62 Million/uL Final    Hemoglobin 14 5  12 0 - 17 0 g/dL Final    Hematocrit 43 6  36 5 - 49 3 % Final    MCV 73 (*) 82 - 98 fL Final    MCH 24 4 (*) 26 8 - 34 3 pg Final    MCHC 33 3  31 4 - 37 4 g/dL Final    RDW 13 2  11 6 - 15 1 % Final    MPV 8 5 (*) 8 9 - 12 7 fL Final    Platelets 410  717 - 390 Thousands/uL Final    nRBC 0  /100 WBCs Final    Neutrophils Relative 84 (*) 43 - 75 % Final    Immat GRANS % 0  0 - 2 % Final    Lymphocytes Relative 7 (*) 14 - 44 % Final    Monocytes Relative 9  4 - 12 % Final    Eosinophils Relative 0  0 - 6 % Final    Basophils Relative 0  0 - 1 % Final    Neutrophils Absolute 10 35 (*) 1 85 - 7 62 Thousands/µL Final    Immature Grans Absolute 0 04  0 00 - 0 20 Thousand/uL Final    Lymphocytes Absolute 0 90  0 60 - 4 47 Thousands/µL Final    Monocytes Absolute 1 12  0 17 - 1 22 Thousand/µL Final    Eosinophils Absolute 0 04  0 00 - 0 61 Thousand/µL Final    Basophils Absolute 0 03  0 00 - 0 10 Thousands/µL Final   COMPREHENSIVE METABOLIC PANEL - Abnormal    Sodium 132 (*) 135 - 147 mmol/L Final    Potassium 2 5 (*) 3 5 - 5 3 mmol/L Final    Chloride 87 (*) 96 - 108 mmol/L Final    CO2 33 (*) 21 - 32 mmol/L Final    ANION GAP 12  4 - 13 mmol/L Final    BUN 16  5 - 25 mg/dL Final    Creatinine 1 42 (*) 0 60 - 1 30 mg/dL Final    Comment: Standardized to IDMS reference method    Glucose 166 (*) 65 - 140 mg/dL Final    Comment: If the patient is fasting, the ADA then defines impaired fasting glucose as > 100 mg/dL and diabetes as > or equal to 123 mg/dL      Calcium 9 3  8 4 - 10 2 mg/dL Final    AST 15  13 - 39 U/L Final    ALT 13  7 - 52 U/L Final    Comment: Specimen collection should occur prior to Sulfasalazine administration due to the potential for falsely depressed results  Alkaline Phosphatase 76  34 - 104 U/L Final    Total Protein 7 7  6 4 - 8 4 g/dL Final    Albumin 4 4  3 5 - 5 0 g/dL Final    Total Bilirubin 1 75 (*) 0 20 - 1 00 mg/dL Final    Comment: Use of this assay is not recommended for patients undergoing treatment with eltrombopag due to the potential for falsely elevated results  N-acetyl-p-benzoquinone imine (metabolite of Acetaminophen) will generate erroneously low results in samples for patients that have taken an overdose of Acetaminophen      eGFR 51  ml/min/1 73sq m Final    Narrative:     National Kid[employer] Foundation guidelines for Chronic Kidney Disease (CKD):   •  Stage 1 with normal or high GFR (GFR > 90 mL/min/1 73 square meters)  •  Stage 2 Mild CKD (GFR = 60-89 mL/min/1 73 square meters)  •  Stage 3A Moderate CKD (GFR = 45-59 mL/min/1 73 square meters)  •  Stage 3B Moderate CKD (GFR = 30-44 mL/min/1 73 square meters)  •  Stage 4 Severe CKD (GFR = 15-29 mL/min/1 73 square meters)  •  Stage 5 End Stage CKD (GFR <15 mL/min/1 73 square meters)  Note: GFR calculation is accurate only with a steady state creatinine   MAGNESIUM - Abnormal    Magnesium 1 8 (*) 1 9 - 2 7 mg/dL Final   PROTIME-INR - Abnormal    Protime 14 6 (*) 11 6 - 14 5 seconds Final    INR 1 10  0 84 - 1 19 Final   UA W REFLEX TO MICROSCOPIC WITH REFLEX TO CULTURE - Abnormal    Color, UA Yellow  Yellow Final    Clarity, UA Clear  Clear Final    Specific Gravity, UA <=1 005  1 001 - 1 030 Final    pH, UA 6 0  5 0, 5 5, 6 0, 6 5, 7 0, 7 5, 8 0 Final    Leukocytes, UA Negative  Negative Final    Nitrite, UA Negative  Negative Final    Protein, UA Negative  Negative, Interference- unable to analyze mg/dl Final    Glucose, UA 3+ (*) Negative mg/dl Final    Ketones, UA 15 (1+) (*) Negative mg/dl "Final    Urobilinogen, UA 0 2  0 2, 1 0 E U /dl E U /dl Final    Bilirubin, UA Negative  Negative Final    Occult Blood, UA Trace-Intact (*) Negative Final   LIPASE - Normal    Lipase 76  11 - 82 u/L Final   LACTIC ACID, PLASMA (W/REFLEX IF RESULT > 2 0) - Normal    LACTIC ACID 2 0  0 5 - 2 0 mmol/L Final    Narrative:     Result may be elevated if tourniquet was used during collection  APTT - Normal    PTT 27  23 - 37 seconds Final    Comment: Therapeutic Heparin Range =  60-90 seconds   HS TROPONIN I 0HR - Normal    hs TnI 0hr 10  \"Refer to ACS Flowchart\"- see link ng/L Final    Comment:                                              Initial (time 0) result  If >=50 ng/L, Myocardial injury suggested ;  Type of myocardial injury and treatment strategy  to be determined  If 5-49 ng/L, a delta result at 2 hours and or 4 hours will be needed to further evaluate  If <4 ng/L, and chest pain has been >3 hours since onset, patient may qualify for discharge based on the HEART score in the ED  If <5 ng/L and <3hours since onset of chest pain, a delta result at 2 hours will be needed to further evaluate  HS Troponin 99th Percentile URL of a Health Population=12 ng/L with a 95% Confidence Interval of 8-18 ng/L  Second Troponin (time 2 hours)  If calculated delta >= 20 ng/L,  Myocardial injury suggested ; Type of myocardial injury and treatment strategy to be determined  If 5-49 ng/L and the calculated delta is 5-19 ng/L, consult medical service for evaluation  Continue evaluation for ischemia on ecg and other possible etiology and repeat hs troponin at 4 hours  If delta is <5 ng/L at 2 hours, consider discharge based on risk stratification via the HEART score (if in ED), or DOMINIK risk score in IP/Observation      HS Troponin 99th Percentile URL of a Health Population=12 ng/L with a 95% Confidence Interval of 8-18 ng/L    B-TYPE NATRIURETIC PEPTIDE (BNP) - Normal    BNP 26  0 - 100 pg/mL Final   URINE MICROSCOPIC - " "Normal    RBC, UA None Seen  None Seen, 0-1, 1-2, 2-4, 0-5 /hpf Final    WBC, UA None Seen  None Seen, 0-1, 1-2, 0-5, 2-4 /hpf Final    Epithelial Cells None Seen  None Seen, Occasional /hpf Final    Bacteria, UA Occasional  None Seen, Occasional /hpf Final   HS TROPONIN I 2HR   HS TROPONIN I 4HR     CT abdomen pelvis with contrast   Final Result by Lolly Kurtz MD (06/03 0154)      Acute interstitial edematous pancreatitis involving the pancreatic tail with no pancreatic or peripancreatic cyst or fluid collection seen  This examination was marked \"immediate notification\" in Epic in order to begin the standard process by which the radiology reading room liaison alerts the referring practitioner  Workstation performed: [id number]         XR chest 1 view portable   ED Interpretation by Krysten Brink MD (06/03 6243)   No acute cardiopulmonary abnormality  Independently interpreted by me  ED Course / Workup Pending (followup):      ED Course as of 06/03/23 1208   Sat Jun 03, 2023   7592 SO: abdominal pain x 1 month worse yesterday; gastric bypass hx, some CP as well; Qtc prolonged    [ ] labs  [ ] CT  [ ] CXR   0710 CBC and differential(!)   0737 hs TnI 0hr: 10   0737 LACTIC ACID: 2 0   0737 BNP: 26   0737 POCT INR: 1 10   0741 Magnesium(!): 1 8   0741 Lipase: 76   0826 Patient re-evaluated  Still with abdominal pain  Updated on labs, pending CT  Plan for admission  Procedures  Medical Decision Making  77-year-old male presenting for evaluation of abdominal pain  Previously evaluated and signed out to me pending labs and CT  Labs significant for hypokalemia, hypomagnesemia, ISRA[place], hyponatremia  CT abdomen pelvis significant for acute edematous pancreatitis  Electrolytes repleted  Patient treated with analgesics and IV fluids  Discussed with Wilmar and admitted to their service      Abdominal pain: acute illness or injury  IS[place] (acute kidney injury) ([facility] [address] ): acute illness or " injury  Hypokalemia: acute illness or injury  Hypomagnesemia: acute illness or injury  Hyponatremia: acute illness or injury  Pancreatitis: acute illness or injury  Amount and/or Complexity of Data Reviewed  Labs: ordered  Decision-making details documented in ED Course  Radiology: ordered and independent interpretation performed  Risk  Prescription drug management  Decision regarding hospitalization  Disposition  Final diagnoses:   Abdominal pain   Hypokalemia   Hypomagnesemia   Pancreatitis   Hyponatremia   [place] (acute kidney injury) ([facility] [address] )     Time reflects when diagnosis was documented in both MDM as applicable and the Disposition within this note     Time User Action Codes Description Comment    6/3/2023  6:34 AM Earnesteen Shown Add [R10 9] Abdominal pain     6/3/2023  6:34 AM Earnesteen Shown Add [R07 9] Chest pain in adult     6/3/2023  6:38 AM Earnesteen Shown Remove [R07 9] Chest pain in adult     6/3/2023  7:46 AM Dann Cutting Add [R10 84] Generalized abdominal pain     6/3/2023  7:46 AM Dann Cutting Remove [R10 84] Generalized abdominal pain     6/3/2023  7:46 AM Dann Cutting Add [E87 6] Hypokalemia     6/3/2023  7:46 AM Dann Cutting Add [E83 42] Hypomagnesemia     6/3/2023 10:06 AM Dann Cutting Add [K85 90] Pancreatitis     6/3/2023 10:06 AM Dann Cutting Modify [R10 9] Abdominal pain     6/3/2023 10:06 AM Dann Cutting Modify [K85 90] Pancreatitis     6/3/2023 10:06 AM Dann Cutting Add [E87 1] Hyponatremia     6/3/2023 12:08 PM Dann Cutting Add [N17 9] ISRAEL (acute kidney injury) [facility])       ED Disposition     ED Disposition   Admit    Condition   Stable    Date/Time   Sat Papi 3, 2023 10:07 AM    Comment   Case was discussed with KILEY and the patient's admission status was agreed to be Admission Status: inpatient status to the service of Dr Aye Cho              Follow-up Information    None       Current Discharge Medication List      CONTINUE these medications which have NOT CHANGED    Details   apixaban (Eliquis) 5 mg Take 1 tablet (5 mg total) by mouth 2 (two) times a day  Qty: 60 tablet, Refills: 0    Associated Diagnoses: New onset atrial fibrillation (HCC)      aspirin (ECOTRIN LOW STRENGTH) 81 mg EC tablet Take 81 mg by mouth daily      atorvastatin (LIPITOR) 20 mg tablet Take 20 mg by mouth daily      buPROPion (WELLBUTRIN XL) 300 mg 24 hr tablet Take 300 mg by mouth daily      carbidopa-levodopa (SINEMET)  mg per tablet TAKE 1 TABLET BY MOUTH THREE TIMES DAILY   INCREASE SLOWLY ACCORDING TO SEPARATE SCHEDULE      cloNIDine (CATAPRES) 0 1 mg tablet Take 2 tablets (0 2 mg total) by mouth every 12 (twelve) hours  Qty: 180 tablet, Refills: 3    Associated Diagnoses: Essential hypertension      Dulaglutide 0 75 MG/0 5ML SOPN Inject 1 5 mg under the skin Once a week      fluorouracil (EFUDEX) 5 % cream       gabapentin (NEURONTIN) 100 mg capsule Take 300 mg by mouth      ipratropium-albuterol (DUO-NEB) 0 5-2 5 mg/3 mL nebulizer solution Take 1 vial (3 mL total) by nebulization 4 (four) times a day  Qty: 3 vial, Refills: 5    Associated Diagnoses: Congestive heart failure, unspecified HF chronicity, unspecified heart failure type (HCC)      metFORMIN (GLUMETZA) 500 MG (MOD) 24 hr tablet Take 500 mg by mouth 2 (two) times a day with meals      metoprolol tartrate (LOPRESSOR) 50 mg tablet Take 1 tablet (50 mg total) by mouth every 12 (twelve) hours  Qty: 60 tablet, Refills: 0    Associated Diagnoses: New onset atrial fibrillation (HCC)      Mirabegron ER 25 MG TB24 Take 25 mg by mouth daily at bedtime  Qty: 90 tablet, Refills: 3    Associated Diagnoses: Detrusor instability; Urgency of urination      Multiple Vitamin (MULTIVITAMIN) tablet Take 1 tablet by mouth daily      omeprazole (PriLOSEC) 40 MG capsule Take 1 capsule (40 mg total) by mouth daily  Qty: 90 capsule, Refills: 3    Associated Diagnoses: Gastroesophageal reflux disease without esophagitis      Oyster Shell (OYSCO 500 PO) Take 500 mg by mouth daily      polyethylene glycol (GLYCOLAX) 17 GM/SCOOP powder Take 17 g by mouth daily  Qty: 289 g, Refills: 1    Associated Diagnoses: Constipation, unspecified constipation type; Lower abdominal pain; Colon cancer screening      ramipril (ALTACE) 5 mg capsule Take 5 mg by mouth daily      sildenafil (VIAGRA) 100 mg tablet Take 100 mg by mouth daily as needed      traZODone (DESYREL) 150 mg tablet Take 150 mg by mouth daily at bedtime      Vraylar 3 MG capsule Take 3 mg by mouth daily           No discharge procedures on file         ED Provider  Electronically Signed by     Velvet Villarreal MD  06/03/23 0643

## 2023-06-03 NOTE — ED PROVIDER NOTES
History  Chief Complaint   Patient presents with   • Abdominal Pain     Pt c/o excruciating abdomen pain x 1 month  Patient is a 27-year-old male seen in the emergency department with concern for excruciating generalized abdominal pain over approximately the past month  Patient states that pain became significantly worse today  Patient notes no nausea, vomiting, or diarrhea  Patient states that he did not take any medication for symptom control today prior to evaluation in the emergency department  Patient notes no definite clear exacerbating or alleviating factors for the pain  Patient notes no radiation of the pain  Patient notes history of gastric bypass in 2001  Patient also states that he has history of ulcers  Patient notes no fever, shortness of breath, weakness, numbness, tingling  Patient notes some associated chest pain  Patient also notes decreased appetite  Prior to Admission Medications   Prescriptions Last Dose Informant Patient Reported? Taking? Dulaglutide 0 75 MG/0 5ML SOPN  Self Yes No   Sig: Inject 1 5 mg under the skin Once a week   Mirabegron ER 25 MG TB24  Self No No   Sig: Take 25 mg by mouth daily at bedtime   Multiple Vitamin (MULTIVITAMIN) tablet  Self Yes No   Sig: Take 1 tablet by mouth daily   Oyster Shell (OYSCO 500 PO)  Self Yes No   Sig: Take 500 mg by mouth daily   Vraylar 3 MG capsule  Self Yes No   Sig: Take 3 mg by mouth daily   apixaban (Eliquis) 5 mg 6/2/2023 Self No Yes   Sig: Take 1 tablet (5 mg total) by mouth 2 (two) times a day   aspirin (ECOTRIN LOW STRENGTH) 81 mg EC tablet  Self Yes No   Sig: Take 81 mg by mouth daily   atorvastatin (LIPITOR) 20 mg tablet  Self Yes No   Sig: Take 20 mg by mouth daily   buPROPion (WELLBUTRIN XL) 300 mg 24 hr tablet  Self Yes No   Sig: Take 300 mg by mouth daily   carbidopa-levodopa (SINEMET)  mg per tablet  Self Yes No   Sig: TAKE 1 TABLET BY MOUTH THREE TIMES DAILY   INCREASE SLOWLY ACCORDING TO SEPARATE SCHEDULE   cloNIDine (CATAPRES) 0 1 mg tablet  Self No No   Sig: Take 2 tablets (0 2 mg total) by mouth every 12 (twelve) hours   fluorouracil (EFUDEX) 5 % cream  Self Yes No   gabapentin (NEURONTIN) 100 mg capsule  Self Yes No   Sig: Take 300 mg by mouth   ipratropium-albuterol (DUO-NEB) 0 5-2 5 mg/3 mL nebulizer solution  Self No No   Sig: Take 1 vial (3 mL total) by nebulization 4 (four) times a day   metFORMIN (GLUMETZA) 500 MG (MOD) 24 hr tablet  Self Yes No   Sig: Take 500 mg by mouth 2 (two) times a day with meals   metoprolol tartrate (LOPRESSOR) 50 mg tablet  Self No No   Sig: Take 1 tablet (50 mg total) by mouth every 12 (twelve) hours   Patient taking differently: Take 25 mg by mouth every 12 (twelve) hours     omeprazole (PriLOSEC) 40 MG capsule  Self No No   Sig: Take 1 capsule (40 mg total) by mouth daily   polyethylene glycol (GLYCOLAX) 17 GM/SCOOP powder  Self No No   Sig: Take 17 g by mouth daily   ramipril (ALTACE) 5 mg capsule  Self Yes No   Sig: Take 5 mg by mouth daily   sildenafil (VIAGRA) 100 mg tablet  Self Yes No   Sig: Take 100 mg by mouth daily as needed   traZODone (DESYREL) 150 mg tablet  Self Yes No   Sig: Take 150 mg by mouth daily at bedtime      Facility-Administered Medications: None       Past Medical History:   Diagnosis Date   • Arthritis    • Asthma    • Colon polyp    • CPAP (continuous positive airway pressure) dependence    • Edema     left leg   • GERD (gastroesophageal reflux disease)    • History of echocardiogram 07/2017   • History of Holter monitoring 09/2014   • History of stress test 08/2014   • Hypertension    • Myocardial infarct [facility])     2011   • Myocardial infarction [facility])    • Obesity    • Sleep apnea    • Thrombophlebitis        Past Surgical History:   Procedure Laterality Date   • CARDIAC CATHETERIZATION  03/2012 2065,2470   • CHOLECYSTECTOMY     • COLONOSCOPY     • COLONOSCOPY N/A 10/30/2017    Procedure: COLONOSCOPY;  Surgeon: Sherry Cannon MD;  Location: [facility] GI LAB; Service: Gastroenterology   • ENDOVENOUS ABLATION SAPHENOUS VEIN W/ LASER      each addit vein   • ERCP     • ESOPHAGOGASTRODUODENOSCOPY N/A 10/30/2017    Procedure: ESOPHAGOGASTRODUODENOSCOPY (EGD); Surgeon: Dean Nugent MD;  Location: [facility] GI LAB; Service: Gastroenterology   • GASTRIC BYPASS      2001   • GASTRIC BYPASS     • HERNIA REPAIR      paraesophageal hiatus hernia   • HERNIA REPAIR      x5 last one 2011   • JOINT REPLACEMENT     • KNEE ARTHROPLASTY Right     2102   • REPLACEMENT TOTAL KNEE Right 02/2011       Family History   Problem Relation Age of Onset   • Uterine cancer Mother    • Prostate cancer Father    • Diabetes Father    • Heart failure Father    • Cancer Father    • Stroke Family         syndrome   • Aneurysm Family         aoritc   • Diabetes Brother    • Other Brother         amputation-   • Diabetes Paternal Grandmother    • Diabetes Paternal Grandfather    • Cancer Sister      I have reviewed and agree with the history as documented  E-Cigarette/Vaping   • E-Cigarette Use Never User      E-Cigarette/Vaping Substances   • Nicotine No    • THC No    • CBD No    • Flavoring No    • Other No    • Unknown No      Social History     Tobacco Use   • Smoking status: Former     Packs/day: 1 00     Years: 3 00     Total pack years: 3 00     Types: Cigarettes   • Smokeless tobacco: Former   • Tobacco comments:     never a smoker per allscripts - as per [employer] Use   • Vaping Use: Never used   Substance Use Topics   • Alcohol use: Not Currently     Comment: rarely - denied per allscripts    • Drug use: No       Review of Systems   Constitutional: Positive for appetite change  Negative for chills and fever  HENT: Negative for ear pain and sore throat  Eyes: Negative for pain and visual disturbance  Respiratory: Negative for cough and shortness of breath  Cardiovascular: Positive for chest pain  Negative for palpitations     Gastrointestinal: Positive for abdominal pain  Negative for diarrhea, nausea and vomiting  Genitourinary: Negative for decreased urine volume and difficulty urinating  Musculoskeletal: Negative for gait problem and neck stiffness  Skin: Negative for color change and rash  Neurological: Negative for seizures and syncope  Psychiatric/Behavioral: Negative for agitation and confusion  All other systems reviewed and are negative  Physical Exam  Physical Exam  Vitals and nursing note reviewed  Constitutional:       Appearance: He is well-developed  Comments: appears uncomfortable   HENT:      Head: Normocephalic and atraumatic  Right Ear: External ear normal       Left Ear: External ear normal       Nose: Nose normal       Mouth/Throat:      Pharynx: Oropharynx is clear  Eyes:      General: No scleral icterus  Conjunctiva/sclera: Conjunctivae normal    Cardiovascular:      Rate and Rhythm: Regular rhythm  Tachycardia present  Heart sounds: No murmur heard  Pulmonary:      Effort: Pulmonary effort is normal  No respiratory distress  Breath sounds: Normal breath sounds  Abdominal:      General: There is distension  Palpations: Abdomen is soft  Tenderness: There is abdominal tenderness  Comments: mild tenderness to palpation diffusely   Musculoskeletal:         General: No deformity or signs of injury  Cervical back: Normal range of motion and neck supple  Skin:     General: Skin is warm and dry  Neurological:      General: No focal deficit present  Mental Status: He is alert  Cranial Nerves: No cranial nerve deficit  Sensory: No sensory deficit  Psychiatric:         Mood and Affect: Mood normal          Thought Content:  Thought content normal          Vital Signs  ED Triage Vitals [06/03/23 0638]   Temp Pulse Respirations Blood Pressure SpO2   -- (!) 107 19 143/77 96 %      Temp src Heart Rate Source Patient Position - Orthostatic VS BP Location FiO2 (%)   -- Monitor Sitting Right arm --      Pain Score       --           Vitals:    06/03/23 0638   BP: 143/77   Pulse: (!) 107   Patient Position - Orthostatic VS: Sitting         Visual Acuity      ED Medications  Medications   sodium chloride 0 9 % bolus 500 mL (has no administration in time range)   morphine injection 4 mg (has no administration in time range)   Famotidine (PF) (PEPCID) injection 20 mg (has no administration in time range)       Diagnostic Studies  Results Reviewed     Procedure Component Value Units Date/Time    CBC and differential [[id number]]     Lab Status: No result Specimen: Blood     Comprehensive metabolic panel [[id number]]     Lab Status: No result Specimen: Blood     Magnesium [[id number]]     Lab Status: No result Specimen: Blood     Lipase [[id number]]     Lab Status: No result Specimen: Blood     Lactic acid, plasma (w/reflex if result > 2 0) [[id number]]     Lab Status: No result Specimen: Blood     APTT [[id number]]     Lab Status: No result Specimen: Blood     Protime-INR [[id number]]     Lab Status: No result Specimen: Blood     HS Troponin 0hr (reflex protocol) [[id number]]     Lab Status: No result Specimen: Blood     B-Type Natriuretic Peptide(BNP) [[id number]]     Lab Status: No result Specimen: Blood                  XR chest 1 view portable    (Results Pending)   CT abdomen pelvis with contrast    (Results Pending)              Procedures  ECG 12 Lead Documentation Only    Date/Time: 6/3/2023 6:38 AM    Performed by: Nickolas Fonseca MD  Authorized by: Nickolas Fonseca MD    Indications / Diagnosis:  Chest pain  ECG reviewed by me, the ED Provider: yes    Patient location:  ED  Rate:     ECG rate:  92    ECG rate assessment: normal    QRS:     QRS axis:  Left  ST segments:     ST segments:  Non-specific  T waves:     T waves: non-specific    Comments:      Sinus rhythm at 82, left axis deviation, , , QTc 504, nonspecific ST-T wave abnormality, first-degree AV block, no definite evidence of acute ischemia             ED Course                               SBIRT 20yo+    Flowsheet Row Most Recent Value   Initial Alcohol Screen: US AUDIT-C     1  How often do you have a drink containing alcohol? 0 Filed at: 06/03/2023 0631   2  How many drinks containing alcohol do you have on a typical day you are drinking? 0 Filed at: 06/03/2023 0631   3a  Male UNDER 65: How often do you have five or more drinks on one occasion? 0 Filed at: 06/03/2023 0631   3b  FEMALE Any Age, or MALE 65+: How often do you have 4 or more drinks on one occassion? 0 Filed at: 06/03/2023 0631   Audit-C Score 0 Filed at: 06/03/2023 6591   KODY: How many times in the past year have you    Used an illegal drug or used a prescription medication for non-medical reasons? Never Filed at: 06/03/2023 5195                    Medical Decision Making  Patient is a 75-year-old male seen in the emergency department with concern for abdominal pain  Differential diagnosis includes appendicitis, diverticulitis, pancreatitis, cholecystitis, small bowel obstruction, gastritis, peptic ulcer disease  EKG was obtained and noted  Chest x-ray was ordered to evaluate for infiltrate or pneumothorax  Patient was treated with medication for symptom control  Laboratory studies were ordered  CT abdomen/pelvis was ordered to evaluate for acute abnormality  Patient is to be signed out to my colleague at change of shift, with studies pending, plan for patient reevaluation  Abdominal pain: acute illness or injury  Amount and/or Complexity of Data Reviewed  Labs: ordered  Decision-making details documented in ED Course  Radiology: ordered and independent interpretation performed  Decision-making details documented in ED Course  ECG/medicine tests: ordered and independent interpretation performed  Decision-making details documented in ED Course  Risk  Prescription drug management    Decision regarding hospitalization  Disposition  Final diagnoses:   Abdominal pain     Time reflects when diagnosis was documented in both MDM as applicable and the Disposition within this note     Time User Action Codes Description Comment    6/3/2023  6:34 AM Anselm Herb Add [R10 9] Abdominal pain     6/3/2023  6:34 AM Anselm Herb Add [R07 9] Chest pain in adult     6/3/2023  6:38 AM Susi Monreal [R07 9] Chest pain in adult       ED Disposition     None      Follow-up Information    None         Patient's Medications   Discharge Prescriptions    No medications on file       No discharge procedures on file      PDMP Review     None          ED Provider  Electronically Signed by           Lj Dee MD  06/03/23 5571       Lj Dee MD  06/03/23 [id number] Carson Kramer MD  06/03/23 4279

## 2023-06-03 NOTE — TELEPHONE ENCOUNTER
c/o abdominal (entire) pain for past moment  Worsening today to constant severe 10/10 pain, radiating to chest  Also reports sweating and clammy skin  States he has bleeing ulcers  En-route to [facility] ED  On call Provider contacted and informed of patient’s concerns and status

## 2023-06-03 NOTE — ASSESSMENT & PLAN NOTE
Wt Readings from Last 3 Encounters:   04/07/22 (!) 150 kg (331 lb)   03/21/22 (!) 150 kg (331 lb 6 4 oz)   01/27/22 (!) 152 kg (335 lb)     • No acute exacerbation at this time  • Echocardiogram with normal systolic and diastolic function   ? Continue home medications  - Demedex and Aldactone stopped with addition of Metoprolol 50 twice daily by Cardiology   ? Monitor with daily weights, I's and O's  ?  Low-salt diet once cleared for oral intake

## 2023-06-03 NOTE — H&P
"[facility]  H&P  Name: Jamari Rodriguez 70 y o  male I MRN: [id number]  Unit/Bed#: [facility] I Date of Admission: 6/3/2023   Date of Service: 6/3/2023 I Hospital Day: 0      Assessment/Plan   * Idiopathic acute pancreatitis without infection or necrosis  Assessment & Plan  Patient presents with acute abdominal pain  Lipase 76  CT showing Acute interstitial edematous pancreatitis involving the pancreatic tail with no pancreatic or peripancreatic cyst or fluid collection seen  Due to hx of CHF will only give 1 L bolus in the ER  Placed on 100 cc an hour  Monitor for fluid overload  GI consulted  ? If this is due to dulaglutide for his diabetes  NPO  F/U Triglycerides    Hypokalemia  Assessment & Plan  Potassium on admission noted to be low at 2 5  Telemetry  Status post 20 potassium chloride IV and 40 p o  in the ER  We will give another 40 IV and 40 p o   2 g of IV magnesium  Repeat BMP      Paroxysmal atrial fibrillation (HCC)  Assessment & Plan  Continue metoprolol tartrate 50 mg bid    Type 2 diabetes mellitus without complication, without long-term current use of insulin (HCC)  Assessment & Plan  Lab Results   Component Value Date    HGBA1C 6 4 (H) 01/15/2023       No results for input(s): \"POCGLU\" in the last 72 hours  Blood Sugar Average: Last 72 hrs:     ISS q6h while NPO    ISRAEL (acute kidney injury) ([facility] [address] )  Assessment & Plan  Cr on admission 1 42  Baseline 0 7-1  Most likely pre-renal Will give IVF    Sleep apnea  Assessment & Plan  QHS cpap    Chronic congestive heart failure [facility])  Assessment & Plan  Wt Readings from Last 3 Encounters:   04/07/22 (!) 150 kg (331 lb)   03/21/22 (!) 150 kg (331 lb 6 4 oz)   01/27/22 (!) 152 kg (335 lb)     • No acute exacerbation at this time  • Echocardiogram with normal systolic and diastolic function   ?  Continue home medications  - Patient states he is still on torsemide 40 mg daily and aldactone 25 mg daily  - States he's also on metoprolol 25mg " bid  ? Monitor with daily weights, I's and O's  ? Low-salt diet once cleared for oral intake        BPH (benign prostatic hyperplasia)  Assessment & Plan  Continue formulary equivalent oxybutynin 5 mg        VTE Pharmacologic Prophylaxis:   Moderate Risk (Score 3-4) - Pharmacological DVT Prophylaxis Ordered: heparin  Code Status: Level 1 - Full Code   Discussion with family: Updated  (wife) via phone  Anticipated Length of Stay: Patient will be admitted on an inpatient basis with an anticipated length of stay of greater than 2 midnights secondary to pancreatitis  Total Time Spent on Date of Encounter in care of patient: 55 minutes This time was spent on one or more of the following: performing physical exam; counseling and coordination of care; obtaining or reviewing history; documenting in the medical record; reviewing/ordering tests, medications or procedures; communicating with other healthcare professionals and discussing with patient's family/caregivers  Chief Complaint: Abdominal pain    History of Present Illness:  Jonny Vazquez is a 70 y o  male with a PMH of hypertension, hyperlipidemia, GERD, diabetes, A-fib, who presents with generalized abdominal pain for about 1 month  Patient's abdominal pain became significantly worse today and thus presented to the ER for evaluation  Aside from abdominal pain patient has no other complaints denies any nausea vomiting or diarrhea  CT abdomen pelvis with contrast is showing acute interstitial edematous pancreatitis involving the pancreatic tail with no pancreatic or peripancreatic cyst or fluid collection  Patient states that his symptoms are alleviated with pain medication and worsened with palpation/movement  Review of Systems:  Review of Systems   Constitutional: Negative for chills and fever  HENT: Negative for ear pain and sore throat  Eyes: Negative for pain and visual disturbance     Respiratory: Negative for cough and shortness of breath  Cardiovascular: Negative for chest pain and palpitations  Gastrointestinal: Positive for abdominal pain  Negative for vomiting  Genitourinary: Negative for dysuria and hematuria  Musculoskeletal: Negative for arthralgias and back pain  Skin: Negative for color change and rash  Neurological: Negative for seizures and syncope  All other systems reviewed and are negative  Past Medical and Surgical History:   Past Medical History:   Diagnosis Date   • Arthritis    • Asthma    • Colon polyp    • CPAP (continuous positive airway pressure) dependence    • Edema     left leg   • GERD (gastroesophageal reflux disease)    • History of echocardiogram 07/2017   • History of Holter monitoring 09/2014   • History of stress test 08/2014   • Hypertension    • Myocardial infarct [facility])     2011   • Myocardial infarction [facility])    • Obesity    • Sleep apnea    • Thrombophlebitis        Past Surgical History:   Procedure Laterality Date   • CARDIAC CATHETERIZATION  03/2012    [id number]   • CHOLECYSTECTOMY     • COLONOSCOPY     • COLONOSCOPY N/A 10/30/2017    Procedure: COLONOSCOPY;  Surgeon: Eduardo Marte MD;  Location: [facility] GI LAB; Service: Gastroenterology   • ENDOVENOUS ABLATION SAPHENOUS VEIN W/ LASER      each addit vein   • ERCP     • ESOPHAGOGASTRODUODENOSCOPY N/A 10/30/2017    Procedure: ESOPHAGOGASTRODUODENOSCOPY (EGD); Surgeon: Eduardo Marte MD;  Location: [facility] GI LAB; Service: Gastroenterology   • GASTRIC BYPASS      2001   • GASTRIC BYPASS     • HERNIA REPAIR      paraesophageal hiatus hernia   • HERNIA REPAIR      x5 last one 2011   • JOINT REPLACEMENT     • KNEE ARTHROPLASTY Right     2102   • REPLACEMENT TOTAL KNEE Right 02/2011       Meds/Allergies:  Prior to Admission medications    Medication Sig Start Date End Date Taking?  Authorizing Provider   apixaban (Eliquis) 5 mg Take 1 tablet (5 mg total) by mouth 2 (two) times a day 1/28/22  Yes Denys Hays PA-C   aspirin (ECOTRIN LOW STRENGTH) 81 mg EC tablet Take 81 mg by mouth daily 1/14/21 1/14/22  Historical Provider, MD   atorvastatin (LIPITOR) 20 mg tablet Take 20 mg by mouth daily 10/27/21 10/27/22  Historical Provider, MD   buPROPion (WELLBUTRIN XL) 300 mg 24 hr tablet Take 300 mg by mouth daily 12/18/20   Historical Provider, MD   carbidopa-levodopa (SINEMET)  mg per tablet TAKE 1 TABLET BY MOUTH THREE TIMES DAILY   INCREASE SLOWLY ACCORDING TO SEPARATE SCHEDULE 12/14/20   Historical Provider, MD   cloNIDine (CATAPRES) 0 1 mg tablet Take 2 tablets (0 2 mg total) by mouth every 12 (twelve) hours 9/20/18   Petra Chacon DO   Dulaglutide 0 75 MG/0 5ML SOPN Inject 1 5 mg under the skin Once a week 12/22/20   Historical Provider, MD   fluorouracil (EFUDEX) 5 % cream  10/18/21   Historical Provider, MD   gabapentin (NEURONTIN) 100 mg capsule Take 300 mg by mouth 2/23/21 2/23/22  Historical Provider, MD   ipratropium-albuterol (DUO-NEB) 0 5-2 5 mg/3 mL nebulizer solution Take 1 vial (3 mL total) by nebulization 4 (four) times a day 2/20/19   Petra Chacon DO   metFORMIN (GLUMETZA) 500 MG (MOD) 24 hr tablet Take 500 mg by mouth 2 (two) times a day with meals    Historical Provider, MD   metoprolol tartrate (LOPRESSOR) 50 mg tablet Take 1 tablet (50 mg total) by mouth every 12 (twelve) hours  Patient taking differently: Take 25 mg by mouth every 12 (twelve) hours   1/28/22   Denys Olguin PA-C   Mirabegron ER 25 MG TB24 Take 25 mg by mouth daily at bedtime 1/6/20   Soledad Rueda MD   Multiple Vitamin (MULTIVITAMIN) tablet Take 1 tablet by mouth daily    Historical Provider, MD   omeprazole (PriLOSEC) 40 MG capsule Take 1 capsule (40 mg total) by mouth daily 9/20/18   Petra Chacon DO   Oyster Shell (OYSCO 500 PO) Take 500 mg by mouth daily    Historical Provider, MD   polyethylene glycol (GLYCOLAX) 17 GM/SCOOP powder Take 17 g by mouth daily 1/10/22   SELVIN Owen   ramipril (ALTACE) 5 mg capsule Take 5 mg by mouth daily    Historical Provider, MD   sildenafil (VIAGRA) 100 mg tablet Take 100 mg by mouth daily as needed 10/5/21   Historical Provider, MD   traZODone (DESYREL) 150 mg tablet Take 150 mg by mouth daily at bedtime 12/18/20   Historical Provider, MD   Vraylar 3 MG capsule Take 3 mg by mouth daily 12/18/20   Historical Provider, MD GARCIA have reviewed home medications with patient personally  Allergies:    Allergies   Allergen Reactions   • Gluten Meal - Food Allergy    • Adhesive [Medical Tape] Rash       Social History:  Marital Status: /Civil Union   Occupation: disabled  Patient Pre-hospital Living Situation: Home  Patient Pre-hospital Level of Mobility: unable to be assessed at time of evaluation  Patient Pre-hospital Diet Restrictions: diabetic diet  Substance Use History:   Social History     Substance and Sexual Activity   Alcohol Use Not Currently    Comment: rarely - denied per allscripts      Social History     Tobacco Use   Smoking Status Former   • Packs/day: 1 00   • Years: 3 00   • Total pack years: 3 00   • Types: Cigarettes   Smokeless Tobacco Former   Tobacco Comments    never a smoker per allscripts - as per [place]     Social History     Substance and Sexual Activity   Drug Use No       Family History:  Family History   Problem Relation Age of Onset   • Uterine cancer Mother    • Prostate cancer Father    • Diabetes Father    • Heart failure Father    • Cancer Father    • Stroke Family         syndrome   • Aneurysm Family         aoritc   • Diabetes Brother    • Other Brother         amputation-   • Diabetes Paternal Grandmother    • Diabetes Paternal Grandfather    • Cancer Sister        Physical Exam:     Vitals:   Blood Pressure: 143/77 (06/03/23 3256)  Pulse: (!) 107 (06/03/23 0638)  Temperature: 98 8 °F (37 1 °C) (06/03/23 0701)  Temp Source: Oral (06/03/23 0701)  Respirations: 19 (06/03/23 0638)  SpO2: 96 % (06/03/23 3841)    Physical Exam  Vitals and nursing note reviewed  Constitutional:       General: He is not in acute distress  Appearance: He is well-developed  HENT:      Head: Normocephalic and atraumatic  Eyes:      Conjunctiva/sclera: Conjunctivae normal    Cardiovascular:      Rate and Rhythm: Normal rate and regular rhythm  Heart sounds: No murmur heard  Pulmonary:      Effort: Pulmonary effort is normal  No respiratory distress  Breath sounds: Normal breath sounds  Abdominal:      Palpations: Abdomen is soft  Tenderness: There is abdominal tenderness  There is no guarding or rebound  Musculoskeletal:      Cervical back: Neck supple  Right lower leg: Edema present  Left lower leg: Edema present  Skin:     General: Skin is warm and dry  Capillary Refill: Capillary refill takes less than 2 seconds  Neurological:      Mental Status: He is alert     Psychiatric:         Mood and Affect: Mood normal           Additional Data:     Lab Results:  Results from last 7 days   Lab Units 06/03/23  0702   EOS PCT % 0   HEMATOCRIT % 43 6   HEMOGLOBIN g/dL 14 5   LYMPHS PCT % 7*   MONOS PCT % 9   NEUTROS PCT % 84*   PLATELETS Thousands/uL 258   WBC Thousand/uL 12 48*     Results from last 7 days   Lab Units 06/03/23  0702   ANION GAP mmol/L 12   ALBUMIN g/dL 4 4   ALK PHOS U/L 76   ALT U/L 13   AST U/L 15   BUN mg/dL 16   CALCIUM mg/dL 9 3   CHLORIDE mmol/L 87*   CO2 mmol/L 33*   CREATININE mg/dL 1 42*   GLUCOSE RANDOM mg/dL 166*   POTASSIUM mmol/L 2 5*   SODIUM mmol/L 132*   TOTAL BILIRUBIN mg/dL 1 75*     Results from last 7 days   Lab Units 06/03/23  0702   INR  1 10             Results from last 7 days   Lab Units 06/03/23  0702   LACTIC ACID mmol/L 2 0       Lines/Drains:  Invasive Devices     Peripheral Intravenous Line  Duration           Peripheral IV 06/03/23 Right Antecubital <1 day                    Imaging: Reviewed radiology reports from this admission including: abdominal/pelvic CT  CT abdomen pelvis with contrast "  Final Result by Dionicio Beasley MD (06/03 7796)      Acute interstitial edematous pancreatitis involving the pancreatic tail with no pancreatic or peripancreatic cyst or fluid collection seen  This examination was marked \"immediate notification\" in Epic in order to begin the standard process by which the radiology reading room liaison alerts the referring practitioner  Workstation performed: [id number]         XR chest 1 view portable   ED Interpretation by Michelle Oakley MD (06/03 1373)   No acute cardiopulmonary abnormality  Independently interpreted by me  EKG and Other Studies Reviewed on Admission:   · EKG: No EKG obtained  ** Please Note: This note has been constructed using a voice recognition system   **    "

## 2023-06-03 NOTE — ASSESSMENT & PLAN NOTE
Potassium on admission noted to be low at 2 5  Telemetry  Status post 20 potassium chloride IV and 40 p o  in the ER  We will give another 40 IV and 40 p o   2 g of IV magnesium  Repeat BMP

## 2023-06-03 NOTE — ASSESSMENT & PLAN NOTE
"Lab Results   Component Value Date    HGBA1C 6 4 (H) 01/15/2023       No results for input(s): \"POCGLU\" in the last 72 hours      Blood Sugar Average: Last 72 hrs:     ISS q6h while NPO  "

## 2023-06-04 PROBLEM — I50.32 CHRONIC HEART FAILURE WITH PRESERVED EJECTION FRACTION (HCC): Status: ACTIVE | Noted: 2019-01-24

## 2023-06-04 LAB
ALBUMIN SERPL BCP-MCNC: 3.7 G/DL (ref 3.5–5)
ALP SERPL-CCNC: 62 U/L (ref 34–104)
ALT SERPL W P-5'-P-CCNC: 4 U/L (ref 7–52)
ANION GAP SERPL CALCULATED.3IONS-SCNC: 10 MMOL/L (ref 4–13)
AST SERPL W P-5'-P-CCNC: 11 U/L (ref 13–39)
ATRIAL RATE: 92 BPM
BILIRUB DIRECT SERPL-MCNC: 0.99 MG/DL (ref 0–0.2)
BILIRUB SERPL-MCNC: 2.89 MG/DL (ref 0.2–1)
BUN SERPL-MCNC: 14 MG/DL (ref 5–25)
CALCIUM SERPL-MCNC: 8.6 MG/DL (ref 8.4–10.2)
CHLORIDE SERPL-SCNC: 93 MMOL/L (ref 96–108)
CO2 SERPL-SCNC: 31 MMOL/L (ref 21–32)
CREAT SERPL-MCNC: 1.21 MG/DL (ref 0.6–1.3)
ERYTHROCYTE [DISTWIDTH] IN BLOOD BY AUTOMATED COUNT: 13.6 % (ref 11.6–15.1)
GFR SERPL CREATININE-BSD FRML MDRD: 62 ML/MIN/1.73SQ M
GLUCOSE SERPL-MCNC: 130 MG/DL (ref 65–140)
GLUCOSE SERPL-MCNC: 137 MG/DL (ref 65–140)
GLUCOSE SERPL-MCNC: 137 MG/DL (ref 65–140)
GLUCOSE SERPL-MCNC: 141 MG/DL (ref 65–140)
GLUCOSE SERPL-MCNC: 173 MG/DL (ref 65–140)
GLUCOSE SERPL-MCNC: 197 MG/DL (ref 65–140)
GLUCOSE SERPL-MCNC: 203 MG/DL (ref 65–140)
HCT VFR BLD AUTO: 41.6 % (ref 36.5–49.3)
HGB BLD-MCNC: 13.5 G/DL (ref 12–17)
IGG SERPL-MCNC: 970 MG/DL (ref 700–1600)
MAGNESIUM SERPL-MCNC: 2.4 MG/DL (ref 1.9–2.7)
MCH RBC QN AUTO: 24.7 PG (ref 26.8–34.3)
MCHC RBC AUTO-ENTMCNC: 32.5 G/DL (ref 31.4–37.4)
MCV RBC AUTO: 76 FL (ref 82–98)
P AXIS: 48 DEGREES
PHOSPHATE SERPL-MCNC: 2.2 MG/DL (ref 2.3–4.1)
PLATELET # BLD AUTO: 246 THOUSANDS/UL (ref 149–390)
PMV BLD AUTO: 9.1 FL (ref 8.9–12.7)
POTASSIUM SERPL-SCNC: 3.2 MMOL/L (ref 3.5–5.3)
PR INTERVAL: 214 MS
PROCALCITONIN SERPL-MCNC: 0.79 NG/ML
PROT SERPL-MCNC: 6.7 G/DL (ref 6.4–8.4)
QRS AXIS: -53 DEGREES
QRSD INTERVAL: 116 MS
QT INTERVAL: 408 MS
QTC INTERVAL: 504 MS
RBC # BLD AUTO: 5.47 MILLION/UL (ref 3.88–5.62)
SODIUM SERPL-SCNC: 134 MMOL/L (ref 135–147)
T WAVE AXIS: 46 DEGREES
TRIGL SERPL-MCNC: 54 MG/DL
VENTRICULAR RATE: 92 BPM
WBC # BLD AUTO: 15.87 THOUSAND/UL (ref 4.31–10.16)

## 2023-06-04 PROCEDURE — 87040 BLOOD CULTURE FOR BACTERIA: CPT | Performed by: STUDENT IN AN ORGANIZED HEALTH CARE EDUCATION/TRAINING PROGRAM

## 2023-06-04 PROCEDURE — 93010 ELECTROCARDIOGRAM REPORT: CPT | Performed by: INTERNAL MEDICINE

## 2023-06-04 PROCEDURE — 82248 BILIRUBIN DIRECT: CPT | Performed by: NURSE PRACTITIONER

## 2023-06-04 PROCEDURE — 99223 1ST HOSP IP/OBS HIGH 75: CPT | Performed by: INTERNAL MEDICINE

## 2023-06-04 PROCEDURE — 85027 COMPLETE CBC AUTOMATED: CPT | Performed by: STUDENT IN AN ORGANIZED HEALTH CARE EDUCATION/TRAINING PROGRAM

## 2023-06-04 PROCEDURE — 82948 REAGENT STRIP/BLOOD GLUCOSE: CPT

## 2023-06-04 PROCEDURE — 99232 SBSQ HOSP IP/OBS MODERATE 35: CPT | Performed by: NURSE PRACTITIONER

## 2023-06-04 PROCEDURE — 84145 PROCALCITONIN (PCT): CPT | Performed by: NURSE PRACTITIONER

## 2023-06-04 PROCEDURE — 80053 COMPREHEN METABOLIC PANEL: CPT | Performed by: STUDENT IN AN ORGANIZED HEALTH CARE EDUCATION/TRAINING PROGRAM

## 2023-06-04 PROCEDURE — 84100 ASSAY OF PHOSPHORUS: CPT | Performed by: STUDENT IN AN ORGANIZED HEALTH CARE EDUCATION/TRAINING PROGRAM

## 2023-06-04 PROCEDURE — 83735 ASSAY OF MAGNESIUM: CPT | Performed by: STUDENT IN AN ORGANIZED HEALTH CARE EDUCATION/TRAINING PROGRAM

## 2023-06-04 PROCEDURE — 82784 ASSAY IGA/IGD/IGG/IGM EACH: CPT | Performed by: STUDENT IN AN ORGANIZED HEALTH CARE EDUCATION/TRAINING PROGRAM

## 2023-06-04 PROCEDURE — 84478 ASSAY OF TRIGLYCERIDES: CPT | Performed by: STUDENT IN AN ORGANIZED HEALTH CARE EDUCATION/TRAINING PROGRAM

## 2023-06-04 RX ORDER — CEFTRIAXONE 2 G/50ML
2000 INJECTION, SOLUTION INTRAVENOUS EVERY 24 HOURS
Status: DISCONTINUED | OUTPATIENT
Start: 2023-06-04 | End: 2023-06-04

## 2023-06-04 RX ORDER — CEFTRIAXONE 1 G/50ML
1000 INJECTION, SOLUTION INTRAVENOUS EVERY 24 HOURS
Status: DISCONTINUED | OUTPATIENT
Start: 2023-06-04 | End: 2023-06-04

## 2023-06-04 RX ORDER — METRONIDAZOLE 500 MG/100ML
500 INJECTION, SOLUTION INTRAVENOUS EVERY 8 HOURS
Status: DISCONTINUED | OUTPATIENT
Start: 2023-06-04 | End: 2023-06-05

## 2023-06-04 RX ORDER — SODIUM CHLORIDE, SODIUM LACTATE, POTASSIUM CHLORIDE, CALCIUM CHLORIDE 600; 310; 30; 20 MG/100ML; MG/100ML; MG/100ML; MG/100ML
200 INJECTION, SOLUTION INTRAVENOUS CONTINUOUS
Status: DISPENSED | OUTPATIENT
Start: 2023-06-05 | End: 2023-06-05

## 2023-06-04 RX ORDER — SACCHAROMYCES BOULARDII 250 MG
250 CAPSULE ORAL 2 TIMES DAILY
Status: DISCONTINUED | OUTPATIENT
Start: 2023-06-04 | End: 2023-06-08 | Stop reason: HOSPADM

## 2023-06-04 RX ORDER — METRONIDAZOLE 500 MG/100ML
500 INJECTION, SOLUTION INTRAVENOUS EVERY 8 HOURS
Status: DISCONTINUED | OUTPATIENT
Start: 2023-06-04 | End: 2023-06-04

## 2023-06-04 RX ORDER — CEFTRIAXONE 2 G/50ML
2000 INJECTION, SOLUTION INTRAVENOUS EVERY 24 HOURS
Status: DISCONTINUED | OUTPATIENT
Start: 2023-06-04 | End: 2023-06-07

## 2023-06-04 RX ADMIN — BUPROPION HYDROCHLORIDE 300 MG: 150 TABLET, EXTENDED RELEASE ORAL at 09:40

## 2023-06-04 RX ADMIN — ACETAMINOPHEN 650 MG: 325 TABLET ORAL at 17:00

## 2023-06-04 RX ADMIN — METOPROLOL TARTRATE 25 MG: 25 TABLET, FILM COATED ORAL at 09:40

## 2023-06-04 RX ADMIN — GABAPENTIN 300 MG: 300 CAPSULE ORAL at 09:40

## 2023-06-04 RX ADMIN — Medication 250 MG: at 17:08

## 2023-06-04 RX ADMIN — PANTOPRAZOLE SODIUM 40 MG: 40 TABLET, DELAYED RELEASE ORAL at 05:27

## 2023-06-04 RX ADMIN — APIXABAN 5 MG: 5 TABLET, FILM COATED ORAL at 17:00

## 2023-06-04 RX ADMIN — OXYBUTYNIN CHLORIDE 5 MG: 5 TABLET, EXTENDED RELEASE ORAL at 09:39

## 2023-06-04 RX ADMIN — INSULIN LISPRO 2 UNITS: 100 INJECTION, SOLUTION INTRAVENOUS; SUBCUTANEOUS at 03:19

## 2023-06-04 RX ADMIN — TORSEMIDE 40 MG: 20 TABLET ORAL at 09:39

## 2023-06-04 RX ADMIN — INSULIN LISPRO 2 UNITS: 100 INJECTION, SOLUTION INTRAVENOUS; SUBCUTANEOUS at 17:20

## 2023-06-04 RX ADMIN — CEFTRIAXONE 2000 MG: 2 INJECTION, SOLUTION INTRAVENOUS at 17:03

## 2023-06-04 RX ADMIN — Medication 250 MG: at 14:20

## 2023-06-04 RX ADMIN — CARBIDOPA AND LEVODOPA 1 TABLET: 25; 100 TABLET ORAL at 20:41

## 2023-06-04 RX ADMIN — Medication 800 MG: at 09:40

## 2023-06-04 RX ADMIN — CARBIDOPA AND LEVODOPA 1 TABLET: 25; 100 TABLET ORAL at 17:00

## 2023-06-04 RX ADMIN — CARBIDOPA AND LEVODOPA 1 TABLET: 25; 100 TABLET ORAL at 09:39

## 2023-06-04 RX ADMIN — ATORVASTATIN CALCIUM 20 MG: 20 TABLET, FILM COATED ORAL at 09:39

## 2023-06-04 RX ADMIN — APIXABAN 5 MG: 5 TABLET, FILM COATED ORAL at 09:39

## 2023-06-04 RX ADMIN — CLONIDINE HYDROCHLORIDE 0.2 MG: 0.1 TABLET ORAL at 09:39

## 2023-06-04 RX ADMIN — Medication 800 MG: at 17:00

## 2023-06-04 RX ADMIN — TRAZODONE HYDROCHLORIDE 150 MG: 100 TABLET ORAL at 21:12

## 2023-06-04 RX ADMIN — POTASSIUM PHOSPHATE, MONOBASIC AND POTASSIUM PHOSPHATE, DIBASIC 12 MMOL: 224; 236 INJECTION, SOLUTION, CONCENTRATE INTRAVENOUS at 09:40

## 2023-06-04 RX ADMIN — METRONIDAZOLE 500 MG: 500 INJECTION, SOLUTION INTRAVENOUS at 16:40

## 2023-06-04 NOTE — ASSESSMENT & PLAN NOTE
Wt Readings from Last 3 Encounters:   06/04/23 (!) 143 kg (315 lb 0 6 oz)   04/07/22 (!) 150 kg (331 lb)   03/21/22 (!) 150 kg (331 lb 6 4 oz)     • No acute exacerbation at this time  • Echocardiogram with normal systolic and diastolic function   ? Continue home medications  - Demedex and Aldactone stopped with addition of Metoprolol 50 twice daily by Cardiology   ? Monitor with daily weights, I's and O's  ?  Low-salt diet once cleared for oral intake

## 2023-06-04 NOTE — ASSESSMENT & PLAN NOTE
Recent admission in January with pancreatitis suspected secondary to medication trulicity  At that time was monitored off abx and advised to follow with GI as outpatient    - CT showing Acute interstitial edematous pancreatitis involving the pancreatic tail with no pancreatic or peripancreatic cyst or fluid collection seen    - S/p 1 L bolus in the ER  - Lipase 76  - Triglycerides 54   - Denies alcohol abuse   Continue NPO and IVF and monitor for signs of overload  GI consulted

## 2023-06-04 NOTE — CONSULTS
Consultation - [facility]  Tiffanie Seen Ernesto 70 y o  male MRN: [id number]  Unit/Bed#: -01 Encounter: [id number]        Inpatient consult to gastroenterology  Consult performed by: Sherly Zuniga MD  Consult ordered by: Raleigh Frost DO          ASSESSMENT/PLAN:     1  Acute interstitial pancreatitis-possibly passed CBD stone given mild elevation of total bilirubin, patient is status post cholecystectomy  Triglycerides are normal   Calcium normal   Third episode, last episode was in January 2022   -Aggressive IV fluid resuscitation   -Recommend right upper quadrant ultrasound to assess the CBD  -Pain control as you are doing   -Recommend MRI/MRCP in 6 weeks to rule out any underlying pancreatic pathology  -Monitor hematocrit and BUN to assess adequate response to resuscitation   -Check JOE and IgG4 to assess for autoimmune pancreatitis given recurrent episodes  2   Diarrhea-reports had 1 day of loose stools which has resolved  Reports that this occurred prior to admission  ______________________________________________________________________    Reason for Consult / Principal Problem: Idiopathic acute pancreatitis without infection or necrosis    HPI: Manuel Plasencia is a 70y o  year old male who presents with Idiopathic acute pancreatitis without infection or necrosis   This is a 67-year-old male with history of cholecystectomy, Justino-en-Y gastric bypass anatomy, hypertension, hyperlipidemia, GERD, diabetes, A-fib, who presented to the hospital yesterday with generalized abdominal pain for the past 1 month  He reports having 1 day of loose stools that has now subsided  He denies any blood in the stool  He denies vomiting  He reports that this is his third episode of pancreatitis  Last being several years ago  No family history of pancreatic cancer  Denies any alcohol use  Reports that abdominal pain is much improved at this time    No longer having diarrhea, fever, cough     CAT scan of abdomen and pelvis with contrast was notable for interstitial edematous pancreatitis involving the pancreatic tail  Patient lipase of 326, WBC of 12, hematocrit 43, platelets 410  Liver enzymes notable for AST and ALT of 11 and 4, total bilirubin of 2 89  Direct not available  Review of Systems: The remainder of the review of systems was negative except for the pertinent positives noted in HPI  Historical Information   Past Medical History:   Diagnosis Date   • Arthritis    • Asthma    • Colon polyp    • CPAP (continuous positive airway pressure) dependence    • Edema     left leg   • GERD (gastroesophageal reflux disease)    • History of echocardiogram 07/2017   • History of Holter monitoring 09/2014   • History of stress test 08/2014   • Hypertension    • Myocardial infarct [facility])     2011   • Myocardial infarction [facility])    • Obesity    • Sleep apnea    • Thrombophlebitis      Past Surgical History:   Procedure Laterality Date   • CARDIAC CATHETERIZATION  03/2012    [id number]   • CHOLECYSTECTOMY     • COLONOSCOPY     • COLONOSCOPY N/A 10/30/2017    Procedure: COLONOSCOPY;  Surgeon: Belen Polo MD;  Location: [facility] GI LAB; Service: Gastroenterology   • ENDOVENOUS ABLATION SAPHENOUS VEIN W/ LASER      each addit vein   • ERCP     • ESOPHAGOGASTRODUODENOSCOPY N/A 10/30/2017    Procedure: ESOPHAGOGASTRODUODENOSCOPY (EGD); Surgeon: Belen Polo MD;  Location: [facility] GI LAB;   Service: Gastroenterology   • GASTRIC BYPASS      2001   • GASTRIC BYPASS     • HERNIA REPAIR      paraesophageal hiatus hernia   • HERNIA REPAIR      x5 last one 2011   • JOINT REPLACEMENT     • KNEE ARTHROPLASTY Right     2102   • REPLACEMENT TOTAL KNEE Right 02/2011     Social History   Social History     Substance and Sexual Activity   Alcohol Use Not Currently    Comment: rarely - denied per allscripts      Social History     Substance and Sexual Activity   Drug Use No     Social History     Tobacco Use Smoking Status Former   • Packs/day: 1 00   • Years: 3 00   • Total pack years: 3 00   • Types: Cigarettes   Smokeless Tobacco Former   Tobacco Comments    never a smoker per allscripts - as per [place]     Family History   Problem Relation Age of Onset   • Uterine cancer Mother    • Prostate cancer Father    • Diabetes Father    • Heart failure Father    • Cancer Father    • Stroke Family         syndrome   • Aneurysm Family         aoritc   • Diabetes Brother    • Other Brother         amputation-   • Diabetes Paternal Grandmother    • Diabetes Paternal Grandfather    • Cancer Sister        Meds/Allergies     Medications Prior to Admission   Medication   • apixaban (Eliquis) 5 mg   • aspirin (ECOTRIN LOW STRENGTH) 81 mg EC tablet   • atorvastatin (LIPITOR) 20 mg tablet   • buPROPion (WELLBUTRIN XL) 300 mg 24 hr tablet   • carbidopa-levodopa (SINEMET)  mg per tablet   • cloNIDine (CATAPRES) 0 1 mg tablet   • Dulaglutide 0 75 MG/0 5ML SOPN   • fluorouracil (EFUDEX) 5 % cream   • gabapentin (NEURONTIN) 100 mg capsule   • ipratropium-albuterol (DUO-NEB) 0 5-2 5 mg/3 mL nebulizer solution   • metFORMIN (GLUMETZA) 500 MG (MOD) 24 hr tablet   • metoprolol tartrate (LOPRESSOR) 50 mg tablet   • Mirabegron ER 25 MG TB24   • Multiple Vitamin (MULTIVITAMIN) tablet   • omeprazole (PriLOSEC) 40 MG capsule   • Oyster Shell (OYSCO 500 PO)   • polyethylene glycol (GLYCOLAX) 17 GM/SCOOP powder   • ramipril (ALTACE) 5 mg capsule   • sildenafil (VIAGRA) 100 mg tablet   • traZODone (DESYREL) 150 mg tablet   • Vraylar 3 MG capsule     Current Facility-Administered Medications   Medication Dose Route Frequency   • acetaminophen (TYLENOL) tablet 650 mg  650 mg Oral Q6H PRN   • apixaban (ELIQUIS) tablet 5 mg  5 mg Oral BID   • atorvastatin (LIPITOR) tablet 20 mg  20 mg Oral Daily   • buPROPion (WELLBUTRIN XL) 24 hr tablet 300 mg  300 mg Oral Daily   • carbidopa-levodopa (SINEMET)  mg per tablet 1 tablet  1 tablet Oral TID   • "cloNIDine (CATAPRES) tablet 0 2 mg  0 2 mg Oral Q12H [address]   • gabapentin (NEURONTIN) capsule 300 mg  300 mg Oral Daily   • HYDROmorphone HCl (DILAUDID) injection 0 2 mg  0 2 mg Intravenous Q3H PRN   • insulin lispro (HumaLOG) 100 units/mL subcutaneous injection 2-12 Units  2-12 Units Subcutaneous Q6H [address]   • lactated ringers infusion  100 mL/hr Intravenous Continuous   • magnesium Oxide (MAG-OX) tablet 800 mg  800 mg Oral BID   • metoprolol tartrate (LOPRESSOR) tablet 25 mg  25 mg Oral Q12H [address]   • oxybutynin (DITROPAN-XL) 24 hr tablet 5 mg  5 mg Oral Daily   • oxyCODONE (ROXICODONE) immediate release tablet 10 mg  10 mg Oral Q4H PRN   • oxyCODONE (ROXICODONE) IR tablet 5 mg  5 mg Oral Q4H PRN   • pantoprazole (PROTONIX) EC tablet 40 mg  40 mg Oral Early Morning   • potassium phosphates 12 mmol in sodium chloride 0 9 % 250 mL infusion  12 mmol Intravenous Once   • torsemide (DEMADEX) tablet 40 mg  40 mg Oral Daily   • traZODone (DESYREL) tablet 150 mg  150 mg Oral HS       Allergies   Allergen Reactions   • Gluten Meal - Food Allergy    • Adhesive [Medical Tape] Rash       Objective     Blood pressure 116/67, pulse 89, temperature 99 1 °F (37 3 °C), temperature source Oral, resp  rate 20, height 5' 9\" (1 753 m), weight (!) 143 kg (315 lb 0 6 oz), SpO2 94 %        Intake/Output Summary (Last 24 hours) at 6/4/2023 1102  Last data filed at 6/4/2023 1001  Gross per 24 hour   Intake 473 ml   Output 1300 ml   Net -827 ml       PHYSICAL EXAM     GEN: well nourished, well developed, no acute distress  HEENT: anicteric, MMM, no cervical or supraclavicular lymphadenopathy  CV: RRR, no m/r/g  CHEST: CTA b/l, no WRR  ABD: +BS, soft, NT/ND, no hepatosplenomegaly  EXT: no c/c/e  SKIN: no rashes,  NEURO: aaox3    Lab Results:   Admission on 06/03/2023   Component Date Value   • WBC 06/03/2023 12 48 (H)    • RBC 06/03/2023 5 95 (H)    • Hemoglobin 06/03/2023 14 5    • Hematocrit 06/03/2023 43 6    • MCV 06/03/2023 73 (L)    • MCH " 06/03/2023 24 4 (L)    • MCHC 06/03/2023 33 3    • RDW 06/03/2023 13 2    • MPV 06/03/2023 8 5 (L)    • Platelets 97/81/0482 258    • nRBC 06/03/2023 0    • Neutrophils Relative 06/03/2023 84 (H)    • Immat GRANS % 06/03/2023 0    • Lymphocytes Relative 06/03/2023 7 (L)    • Monocytes Relative 06/03/2023 9    • Eosinophils Relative 06/03/2023 0    • Basophils Relative 06/03/2023 0    • Neutrophils Absolute 06/03/2023 10 35 (H)    • Immature Grans Absolute 06/03/2023 0 04    • Lymphocytes Absolute 06/03/2023 0 90    • Monocytes Absolute 06/03/2023 1 12    • Eosinophils Absolute 06/03/2023 0 04    • Basophils Absolute 06/03/2023 0 03    • Sodium 06/03/2023 132 (L)    • Potassium 06/03/2023 2 5 (LL)    • Chloride 06/03/2023 87 (L)    • CO2 06/03/2023 33 (H)    • ANION GAP 06/03/2023 12    • BUN 06/03/2023 16    • Creatinine 06/03/2023 1 42 (H)    • Glucose 06/03/2023 166 (H)    • Calcium 06/03/2023 9 3    • AST 06/03/2023 15    • ALT 06/03/2023 13    • Alkaline Phosphatase 06/03/2023 76    • Total Protein 06/03/2023 7 7    • Albumin 06/03/2023 4 4    • Total Bilirubin 06/03/2023 1 75 (H)    • eGFR 06/03/2023 51    • Magnesium 06/03/2023 1 8 (L)    • Lipase 06/03/2023 76    • LACTIC ACID 06/03/2023 2 0    • PTT 06/03/2023 27    • Protime 06/03/2023 14 6 (H)    • INR 06/03/2023 1 10    • hs TnI 0hr 06/03/2023 10    • BNP 06/03/2023 26    • Color, UA 06/03/2023 Yellow    • Clarity, UA 06/03/2023 Clear    • Specific Gravity, UA 06/03/2023 <=1 005    • pH, UA 06/03/2023 6 0    • Leukocytes, UA 06/03/2023 Negative    • Nitrite, UA 06/03/2023 Negative    • Protein, UA 06/03/2023 Negative    • Glucose, UA 06/03/2023 3+ (A)    • Ketones, UA 06/03/2023 15 (1+) (A)    • Urobilinogen, UA 06/03/2023 0 2    • Bilirubin, UA 06/03/2023 Negative    • Occult Blood, UA 06/03/2023 Trace-Intact (A)    • hs TnI 2hr 06/03/2023 12    • Delta 2hr hsTnI 06/03/2023 2    • RBC, UA 06/03/2023 None Seen    • WBC, UA 06/03/2023 None Seen    • Epithelial Cells 06/03/2023 None Seen    • Bacteria, UA 06/03/2023 Occasional    • POC Glucose 06/03/2023 177 (H)    • Phosphorus 06/03/2023 2 5    • Albumin 06/03/2023 3 9    • Calcium 06/03/2023 8 8    • Phosphorus 06/03/2023 2 5    • Glucose 06/03/2023 125    • BUN 06/03/2023 12    • Creatinine 06/03/2023 1 16    • Sodium 06/03/2023 132 (L)    • Potassium 06/03/2023 3 4 (L)    • Chloride 06/03/2023 91 (L)    • CO2 06/03/2023 31    • ANION GAP 06/03/2023 10    • eGFR 06/03/2023 65    • Magnesium 06/03/2023 2 0    • POC Glucose 06/03/2023 265 (H)    • Cholesterol 06/03/2023 117    • Triglycerides 06/03/2023 65    • HDL, Direct 06/03/2023 40    • LDL Calculated 06/03/2023 64    • POC Glucose 06/03/2023 177 (H)    • POC Glucose 06/03/2023 130    • Triglycerides 06/04/2023 54    • WBC 06/04/2023 15 87 (H)    • RBC 06/04/2023 5 47    • Hemoglobin 06/04/2023 13 5    • Hematocrit 06/04/2023 41 6    • MCV 06/04/2023 76 (L)    • MCH 06/04/2023 24 7 (L)    • MCHC 06/04/2023 32 5    • RDW 06/04/2023 13 6    • Platelets 46/58/4976 246    • MPV 06/04/2023 9 1    • Sodium 06/04/2023 134 (L)    • Potassium 06/04/2023 3 2 (L)    • Chloride 06/04/2023 93 (L)    • CO2 06/04/2023 31    • ANION GAP 06/04/2023 10    • BUN 06/04/2023 14    • Creatinine 06/04/2023 1 21    • Glucose 06/04/2023 137    • Calcium 06/04/2023 8 6    • AST 06/04/2023 11 (L)    • ALT 06/04/2023 4 (L)    • Alkaline Phosphatase 06/04/2023 62    • Total Protein 06/04/2023 6 7    • Albumin 06/04/2023 3 7    • Total Bilirubin 06/04/2023 2 89 (H)    • eGFR 06/04/2023 62    • Magnesium 06/04/2023 2 4    • Phosphorus 06/04/2023 2 2 (L)    • POC Glucose 06/04/2023 137    • POC Glucose 06/04/2023 141 (H)    • Ventricular Rate 06/03/2023 92    • Atrial Rate 06/03/2023 92    • GA Interval 06/03/2023 214    • QRSD Interval 06/03/2023 116    • QT Interval 06/03/2023 408    • QTC Interval 06/03/2023 504    • P Axis 06/03/2023 48    • QRS Axis 06/03/2023 -53    • T Wave Axis 06/03/2023 46      Imaging Studies: I have personally reviewed pertinent films in PACS

## 2023-06-04 NOTE — ASSESSMENT & PLAN NOTE
Lab Results   Component Value Date    HGBA1C 6 4 (H) 01/15/2023       Recent Labs     06/03/23  2043 06/03/23  2337 06/04/23  0624 06/04/23  0735   POCGLU 177* 130 137 141*       Blood Sugar Average: Last 72 hrs:  (P) 455 [id number]   ISS q6h while NPO

## 2023-06-04 NOTE — NURSING NOTE
One set of blood cultures collected at 1624 from right arm  Unable to obtain second set due to hard stick   MD made aware

## 2023-06-04 NOTE — PLAN OF CARE
Problem: PAIN - ADULT  Goal: Verbalizes/displays adequate comfort level or baseline comfort level  Description: Interventions:  - Encourage patient to monitor pain and request assistance  - Assess pain using appropriate pain scale  - Administer analgesics based on type and severity of pain and evaluate response  - Implement non-pharmacological measures as appropriate and evaluate response  - Consider cultural and social influences on pain and pain management  - Notify physician/advanced practitioner if interventions unsuccessful or patient reports new pain  Outcome: Progressing     Problem: INFECTION - ADULT  Goal: Absence or prevention of progression during hospitalization  Description: INTERVENTIONS:  - Assess and monitor for signs and symptoms of infection  - Monitor lab/diagnostic results  - Monitor all insertion sites, i e  indwelling lines, tubes, and drains  - Monitor endotracheal if appropriate and nasal secretions for changes in amount and color  - Clymer appropriate cooling/warming therapies per order  - Administer medications as ordered  - Instruct and encourage patient and family to use good hand hygiene technique  - Identify and instruct in appropriate isolation precautions for identified infection/condition  Outcome: Progressing  Goal: Absence of fever/infection during neutropenic period  Description: INTERVENTIONS:  - Monitor WBC    Outcome: Progressing     Problem: SAFETY ADULT  Goal: Patient will remain free of falls  Description: INTERVENTIONS:  - Educate patient/family on patient safety including physical limitations  - Instruct patient to call for assistance with activity   - Consult OT/PT to assist with strengthening/mobility   - Keep Call bell within reach  - Keep bed low and locked with side rails adjusted as appropriate  - Keep care items and personal belongings within reach  - Initiate and maintain comfort rounds  - Make Fall Risk Sign visible to staff  - Apply yellow socks and bracelet for high fall risk patients  - Consider moving patient to room near nurses station  Outcome: Progressing  Goal: Maintain or return to baseline ADL function  Description: INTERVENTIONS:  -  Assess patient's ability to carry out ADLs; assess patient's baseline for ADL function and identify physical deficits which impact ability to perform ADLs (bathing, care of mouth/teeth, toileting, grooming, dressing, etc )  - Assess/evaluate cause of self-care deficits   - Assess range of motion  - Assess patient's mobility; develop plan if impaired  - Assess patient's need for assistive devices and provide as appropriate  - Encourage maximum independence but intervene and supervise when necessary  - Involve family in performance of ADLs  - Assess for home care needs following discharge   - Consider OT consult to assist with ADL evaluation and planning for discharge  - Provide patient education as appropriate  Outcome: Progressing  Goal: Maintains/Returns to pre admission functional level  Description: INTERVENTIONS:  - Perform BMAT or MOVE assessment daily    - Set and communicate daily mobility goal to care team and patient/family/caregiver     - Collaborate with rehabilitation services on mobility goals if consulted  - Out of bed for toileting  - Record patient progress and toleration of activity level   Outcome: Progressing     Problem: DISCHARGE PLANNING  Goal: Discharge to home or other facility with appropriate resources  Description: INTERVENTIONS:  - Identify barriers to discharge w/patient and caregiver  - Arrange for needed discharge resources and transportation as appropriate  - Identify discharge learning needs (meds, wound care, etc )  - Arrange for interpretive services to assist at discharge as needed  - Refer to Case Management Department for coordinating discharge planning if the patient needs post-hospital services based on physician/advanced practitioner order or complex needs related to functional status, cognitive ability, or social support system  Outcome: Progressing     Problem: Knowledge Deficit  Goal: Patient/family/caregiver demonstrates understanding of disease process, treatment plan, medications, and discharge instructions  Description: Complete learning assessment and assess knowledge base  Interventions:  - Provide teaching at level of understanding  - Provide teaching via preferred learning methods  Outcome: Progressing     Problem: MOBILITY - ADULT  Goal: Maintain or return to baseline ADL function  Description: INTERVENTIONS:  -  Assess patient's ability to carry out ADLs; assess patient's baseline for ADL function and identify physical deficits which impact ability to perform ADLs (bathing, care of mouth/teeth, toileting, grooming, dressing, etc )  - Assess/evaluate cause of self-care deficits   - Assess range of motion  - Assess patient's mobility; develop plan if impaired  - Assess patient's need for assistive devices and provide as appropriate  - Encourage maximum independence but intervene and supervise when necessary  - Involve family in performance of ADLs  - Assess for home care needs following discharge   - Consider OT consult to assist with ADL evaluation and planning for discharge  - Provide patient education as appropriate  Outcome: Progressing  Goal: Maintains/Returns to pre admission functional level  Description: INTERVENTIONS:  - Perform BMAT or MOVE assessment daily    - Set and communicate daily mobility goal to care team and patient/family/caregiver     - Collaborate with rehabilitation services on mobility goals if consulted  - Out of bed for toileting  - Record patient progress and toleration of activity level   Outcome: Progressing

## 2023-06-04 NOTE — ASSESSMENT & PLAN NOTE
Potassium on admission noted to be low at 2 5  Status post 20 potassium chloride IV and 40 p o  in the ER  Potassium today 3 2 and phosphorus 2 2 will replete with Kphos   Recheck CMP in the a m

## 2023-06-04 NOTE — ASSESSMENT & PLAN NOTE
Cr on admission 1 42  Baseline 0 7-1  Most likely pre-renal Will give IVF  Cr trending down today 1 21 continue IVF   Hold nephrotoxic agents and avoid hypotension

## 2023-06-04 NOTE — PROGRESS NOTES
Tammy U  64   Progress Note  Name: Jodi Bledsoe I  MRN: [id number]  Unit/Bed#: -01 I Date of Admission: 6/3/2023   Date of Service: 6/4/2023 I Hospital Day: 1    Assessment/Plan   * Idiopathic acute pancreatitis without infection or necrosis  Assessment & Plan  Recent admission in January with pancreatitis suspected secondary to medication trulicity  At that time was monitored off abx and advised to follow with GI as outpatient    - CT showing Acute interstitial edematous pancreatitis involving the pancreatic tail with no pancreatic or peripancreatic cyst or fluid collection seen  - S/p 1 L bolus in the ER  - Lipase 76  - Triglycerides 54   - Denies alcohol abuse   Continue NPO and IVF and monitor for signs of overload  GI consulted     Chronic heart failure with preserved ejection fraction [facility])  Assessment & Plan  Wt Readings from Last 3 Encounters:   06/04/23 (!) 143 kg (315 lb 0 6 oz)   04/07/22 (!) 150 kg (331 lb)   03/21/22 (!) 150 kg (331 lb 6 4 oz)     • No acute exacerbation at this time  • Echocardiogram with normal systolic and diastolic function   ? Continue home medications  - Demedex and Aldactone stopped with addition of Metoprolol 50 twice daily by Cardiology   ? Monitor with daily weights, I's and O's  ?  Low-salt diet once cleared for oral intake        ISRAEL (acute kidney injury) ([facility] [address] )  Assessment & Plan  Cr on admission 1 42  Baseline 0 7-1  Most likely pre-renal Will give IVF  Cr trending down today 1 21 continue IVF   Hold nephrotoxic agents and avoid hypotension    Paroxysmal atrial fibrillation (HCC)  Assessment & Plan  Continue home medications metoprolol tartrate 50 mg bid and Eliquis    Hyponatremia  Assessment & Plan  Hyponatremia in the setting of acute pancreatitis  Na 134  Monitor CMP    Hypokalemia  Assessment & Plan  Potassium on admission noted to be low at 2 5  Status post 20 potassium chloride IV and 40 p o  in the ER  Potassium today 3 2 and phosphorus 2 2 will replete with [facility] [place]   Recheck CMP in the a m  Type 2 diabetes mellitus without complication, without long-term current use of insulin [facility])  Assessment & Plan  Lab Results   Component Value Date    HGBA1C 6 4 (H) 01/15/2023       Recent Labs     06/03/23  2043 06/03/23  2337 06/04/23  0624 06/04/23  0735   POCGLU 177* 130 137 141*       Blood Sugar Average: Last 72 hrs:  (P) 034 [id number]   ISS q6h while NPO    BPH (benign prostatic hyperplasia)  Assessment & Plan  Continue formulary equivalent oxybutynin 5 mg    Sleep apnea  Assessment & Plan  Continue Cpap at bedtime              VTE Pharmacologic Prophylaxis: VTE Score: 5 High Risk (Score >/= 5) - Pharmacological DVT Prophylaxis Ordered: apixaban (Eliquis)  Sequential Compression Devices Ordered  Patient Centered Rounds: I performed bedside rounds with nursing staff today  Discussions with Specialists or Other Care Team Provider: Multidisciplinary rounds    Education and Discussions with Family / Patient: Attempted to update  (wife) via phone  Left voicemail  Total Time Spent on Date of Encounter in care of patient: 45 minutes This time was spent on one or more of the following: performing physical exam; counseling and coordination of care; obtaining or reviewing history; documenting in the medical record; reviewing/ordering tests, medications or procedures; communicating with other healthcare professionals and discussing with patient's family/caregivers  Current Length of Stay: 1 day(s)  Current Patient Status: Inpatient   Certification Statement: The patient will continue to require additional inpatient hospital stay due to intravenous fluids and lab monitoring   Discharge Plan: Anticipate discharge in 48 hrs to home  Code Status: Level 1 - Full Code    Subjective:   Patient evaluated at bedside  The patient appears comfortable in no acute distress   Patient reports abdominal pain improving since admission  Pain is exacerbated with movement and palpation  Patient denies any nausea, vomiting, diarrhea, cp, sob, and  light headedness  Pt reports formed bm 2 days ago and is passing gas  Objective:     Vitals:   Temp (24hrs), Av 4 °F (37 4 °C), Min:98 6 °F (37 °C), Max:100 4 °F (38 °C)    Temp:  [98 6 °F (37 °C)-100 4 °F (38 °C)] 99 1 °F (37 3 °C)  HR:  [69-89] 89  Resp:  [18-20] 20  BP: (101-163)/(57-69) 116/67  SpO2:  [94 %-98 %] 94 %  Body mass index is 46 52 kg/m²  Input and Output Summary (last 24 hours): Intake/Output Summary (Last 24 hours) at 2023 1037  Last data filed at 2023 1001  Gross per 24 hour   Intake 473 ml   Output 1300 ml   Net -827 ml       Physical Exam:   Physical Exam  Constitutional:       General: He is awake  He is not in acute distress  Appearance: He is obese  HENT:      Head: Normocephalic and atraumatic  Cardiovascular:      Rate and Rhythm: Normal rate and regular rhythm  Heart sounds: S1 normal and S2 normal  No murmur heard  Pulmonary:      Effort: Pulmonary effort is normal  No respiratory distress  Breath sounds: Decreased air movement present  No stridor  No wheezing or rales  Abdominal:      General: Bowel sounds are decreased  There is distension  Palpations: Abdomen is soft  There is no mass  Tenderness: There is generalized abdominal tenderness  There is no guarding or rebound  Genitourinary:     Comments: incontinent  Musculoskeletal:      Right lower leg: Edema (trace) present  Left lower leg: Edema (trace) present  Skin:     General: Skin is warm and dry  Neurological:      Mental Status: He is alert and oriented to person, place, and time     Psychiatric:         Mood and Affect: Mood normal          Behavior: Behavior normal          Additional Data:     Labs:  Results from last 7 days   Lab Units 23  0757 23  0702   EOS PCT %  --  0   HEMATOCRIT % 41 6 43 6   HEMOGLOBIN g/dL 13 5 14 5 LYMPHS PCT %  --  7*   MONOS PCT %  --  9   NEUTROS PCT %  --  84*   PLATELETS Thousands/uL 246 258   WBC Thousand/uL 15 87* 12 48*     Results from last 7 days   Lab Units 06/04/23  0757   ANION GAP mmol/L 10   ALBUMIN g/dL 3 7   ALK PHOS U/L 62   ALT U/L 4*   AST U/L 11*   BUN mg/dL 14   CALCIUM mg/dL 8 6   CHLORIDE mmol/L 93*   CO2 mmol/L 31   CREATININE mg/dL 1 21   GLUCOSE RANDOM mg/dL 137   POTASSIUM mmol/L 3 2*   SODIUM mmol/L 134*   TOTAL BILIRUBIN mg/dL 2 89*     Results from last 7 days   Lab Units 06/03/23  0702   INR  1 10     Results from last 7 days   Lab Units 06/04/23  0735 06/04/23  0624 06/03/23  2337 06/03/23  2043 06/03/23  1524 06/03/23  1120   POC GLUCOSE mg/dl 141* 137 130 177* 265* 177*         Results from last 7 days   Lab Units 06/03/23  0702   LACTIC ACID mmol/L 2 0       Lines/Drains:  Invasive Devices     Peripheral Intravenous Line  Duration           Peripheral IV 06/03/23 Right Antecubital 1 day                      Imaging: Reviewed radiology reports from this admission including: chest xray and abdominal/pelvic CT    Recent Cultures (last 7 days):         Last 24 Hours Medication List:   Current Facility-Administered Medications   Medication Dose Route Frequency Provider Last Rate   • acetaminophen  650 mg Oral Q6H PRN Yumiko Towillie, DO     • apixaban  5 mg Oral BID Yumiko Towillie, DO     • atorvastatin  20 mg Oral Daily Yumiko Towillie, DO     • buPROPion  300 mg Oral Daily Yumiko Towillie, DO     • carbidopa-levodopa  1 tablet Oral TID Yumiko Tojanye, DO     • cloNIDine  0 2 mg Oral Q12H [facility] Nahed Towillie, DO     • gabapentin  300 mg Oral Daily Yumiko Tojanye, DO     • HYDROmorphone  0 2 mg Intravenous Q3H PRN Yumiko Tojanye, DO     • insulin lispro  2-12 Units Subcutaneous Q6H [facility] Nahed Towillie, DO     • lactated ringers  100 mL/hr Intravenous Continuous Nahedi Tojanye,  mL/hr (06/03/23 1232)   • magnesium Oxide  800 mg Oral BID Yumiko Cui DO     • metoprolol tartrate  25 mg Oral Q12H [facility] Yumiko Todhe, DO     • oxybutynin  5 mg Oral Daily Pandi Todhe, DO     • oxyCODONE  10 mg Oral Q4H PRN Pandi Todhe, DO     • oxyCODONE  5 mg Oral Q4H PRN Pandi Todhe, DO     • pantoprazole  40 mg Oral Early Morning Pandi Todhe, DO     • potassium phosphate  12 mmol Intravenous Once SELVIN Hood 12 mmol (06/04/23 0940)   • torsemide  40 mg Oral Daily Pandi Todhe, DO     • traZODone  150 mg Oral HS Pandi Todhe, DO          Today, Patient Was Seen By: SELVIN Hood    **Please Note: This note may have been constructed using a voice recognition system  **

## 2023-06-05 LAB
ALBUMIN SERPL BCP-MCNC: 3.4 G/DL (ref 3.5–5)
ALP SERPL-CCNC: 55 U/L (ref 34–104)
ALT SERPL W P-5'-P-CCNC: 5 U/L (ref 7–52)
ANA SER QL IA: NEGATIVE
ANION GAP SERPL CALCULATED.3IONS-SCNC: 6 MMOL/L (ref 4–13)
ANION GAP SERPL CALCULATED.3IONS-SCNC: 8 MMOL/L (ref 4–13)
ANION GAP SERPL CALCULATED.3IONS-SCNC: 9 MMOL/L (ref 4–13)
AST SERPL W P-5'-P-CCNC: 13 U/L (ref 13–39)
BACTERIA UR QL AUTO: ABNORMAL /HPF
BILIRUB SERPL-MCNC: 1.63 MG/DL (ref 0.2–1)
BILIRUB UR QL STRIP: NEGATIVE
BUN SERPL-MCNC: 12 MG/DL (ref 5–25)
BUN SERPL-MCNC: 13 MG/DL (ref 5–25)
BUN SERPL-MCNC: 9 MG/DL (ref 5–25)
CALCIUM ALBUM COR SERPL-MCNC: 9 MG/DL (ref 8.3–10.1)
CALCIUM SERPL-MCNC: 8.3 MG/DL (ref 8.4–10.2)
CALCIUM SERPL-MCNC: 8.5 MG/DL (ref 8.4–10.2)
CALCIUM SERPL-MCNC: 8.6 MG/DL (ref 8.4–10.2)
CHLORIDE SERPL-SCNC: 88 MMOL/L (ref 96–108)
CHLORIDE SERPL-SCNC: 89 MMOL/L (ref 96–108)
CHLORIDE SERPL-SCNC: 92 MMOL/L (ref 96–108)
CLARITY UR: CLEAR
CO2 SERPL-SCNC: 31 MMOL/L (ref 21–32)
CO2 SERPL-SCNC: 31 MMOL/L (ref 21–32)
CO2 SERPL-SCNC: 32 MMOL/L (ref 21–32)
COLOR UR: YELLOW
CREAT SERPL-MCNC: 0.9 MG/DL (ref 0.6–1.3)
CREAT SERPL-MCNC: 0.99 MG/DL (ref 0.6–1.3)
CREAT SERPL-MCNC: 1.2 MG/DL (ref 0.6–1.3)
ERYTHROCYTE [DISTWIDTH] IN BLOOD BY AUTOMATED COUNT: 13.4 % (ref 11.6–15.1)
GFR SERPL CREATININE-BSD FRML MDRD: 63 ML/MIN/1.73SQ M
GFR SERPL CREATININE-BSD FRML MDRD: 79 ML/MIN/1.73SQ M
GFR SERPL CREATININE-BSD FRML MDRD: 89 ML/MIN/1.73SQ M
GLUCOSE SERPL-MCNC: 133 MG/DL (ref 65–140)
GLUCOSE SERPL-MCNC: 134 MG/DL (ref 65–140)
GLUCOSE SERPL-MCNC: 141 MG/DL (ref 65–140)
GLUCOSE SERPL-MCNC: 144 MG/DL (ref 65–140)
GLUCOSE SERPL-MCNC: 150 MG/DL (ref 65–140)
GLUCOSE SERPL-MCNC: 150 MG/DL (ref 65–140)
GLUCOSE SERPL-MCNC: 165 MG/DL (ref 65–140)
GLUCOSE UR STRIP-MCNC: ABNORMAL MG/DL
HCT VFR BLD AUTO: 38.4 % (ref 36.5–49.3)
HGB BLD-MCNC: 11.9 G/DL (ref 12–17)
HGB UR QL STRIP.AUTO: ABNORMAL
KETONES UR STRIP-MCNC: NEGATIVE MG/DL
LEUKOCYTE ESTERASE UR QL STRIP: NEGATIVE
LIPASE SERPL-CCNC: 13 U/L (ref 11–82)
MAGNESIUM SERPL-MCNC: 2.2 MG/DL (ref 1.9–2.7)
MCH RBC QN AUTO: 24.1 PG (ref 26.8–34.3)
MCHC RBC AUTO-ENTMCNC: 31 G/DL (ref 31.4–37.4)
MCV RBC AUTO: 78 FL (ref 82–98)
NITRITE UR QL STRIP: NEGATIVE
NON-SQ EPI CELLS URNS QL MICRO: ABNORMAL /HPF
OSMOLALITY UR: 279 MMOL/KG
PH UR STRIP.AUTO: 8 [PH]
PHOSPHATE SERPL-MCNC: 2.4 MG/DL (ref 2.3–4.1)
PLATELET # BLD AUTO: 227 THOUSANDS/UL (ref 149–390)
PMV BLD AUTO: 8.8 FL (ref 8.9–12.7)
POTASSIUM SERPL-SCNC: 3 MMOL/L (ref 3.5–5.3)
POTASSIUM SERPL-SCNC: 3.2 MMOL/L (ref 3.5–5.3)
POTASSIUM SERPL-SCNC: 3.5 MMOL/L (ref 3.5–5.3)
PROCALCITONIN SERPL-MCNC: 0.98 NG/ML
PROT SERPL-MCNC: 6.6 G/DL (ref 6.4–8.4)
PROT UR STRIP-MCNC: NEGATIVE MG/DL
RBC # BLD AUTO: 4.94 MILLION/UL (ref 3.88–5.62)
RBC #/AREA URNS AUTO: ABNORMAL /HPF
SODIUM 24H UR-SCNC: 36 MOL/L
SODIUM SERPL-SCNC: 128 MMOL/L (ref 135–147)
SODIUM SERPL-SCNC: 129 MMOL/L (ref 135–147)
SODIUM SERPL-SCNC: 129 MMOL/L (ref 135–147)
SP GR UR STRIP.AUTO: 1.01 (ref 1–1.03)
T4 FREE SERPL-MCNC: 1.36 NG/DL (ref 0.61–1.12)
TSH SERPL DL<=0.05 MIU/L-ACNC: 0.26 UIU/ML (ref 0.45–4.5)
URATE SERPL-MCNC: 6.4 MG/DL (ref 3.5–8.5)
UROBILINOGEN UR QL STRIP.AUTO: 0.2 E.U./DL
WBC # BLD AUTO: 13.73 THOUSAND/UL (ref 4.31–10.16)
WBC #/AREA URNS AUTO: ABNORMAL /HPF

## 2023-06-05 PROCEDURE — 94660 CPAP INITIATION&MGMT: CPT

## 2023-06-05 PROCEDURE — 83935 ASSAY OF URINE OSMOLALITY: CPT | Performed by: PHYSICIAN ASSISTANT

## 2023-06-05 PROCEDURE — 83735 ASSAY OF MAGNESIUM: CPT | Performed by: STUDENT IN AN ORGANIZED HEALTH CARE EDUCATION/TRAINING PROGRAM

## 2023-06-05 PROCEDURE — 81001 URINALYSIS AUTO W/SCOPE: CPT | Performed by: PHYSICIAN ASSISTANT

## 2023-06-05 PROCEDURE — 84100 ASSAY OF PHOSPHORUS: CPT | Performed by: STUDENT IN AN ORGANIZED HEALTH CARE EDUCATION/TRAINING PROGRAM

## 2023-06-05 PROCEDURE — 85027 COMPLETE CBC AUTOMATED: CPT | Performed by: STUDENT IN AN ORGANIZED HEALTH CARE EDUCATION/TRAINING PROGRAM

## 2023-06-05 PROCEDURE — 84145 PROCALCITONIN (PCT): CPT | Performed by: NURSE PRACTITIONER

## 2023-06-05 PROCEDURE — 86038 ANTINUCLEAR ANTIBODIES: CPT | Performed by: STUDENT IN AN ORGANIZED HEALTH CARE EDUCATION/TRAINING PROGRAM

## 2023-06-05 PROCEDURE — 84439 ASSAY OF FREE THYROXINE: CPT | Performed by: PHYSICIAN ASSISTANT

## 2023-06-05 PROCEDURE — 80048 BASIC METABOLIC PNL TOTAL CA: CPT | Performed by: PHYSICIAN ASSISTANT

## 2023-06-05 PROCEDURE — 99232 SBSQ HOSP IP/OBS MODERATE 35: CPT | Performed by: INTERNAL MEDICINE

## 2023-06-05 PROCEDURE — 84443 ASSAY THYROID STIM HORMONE: CPT | Performed by: PHYSICIAN ASSISTANT

## 2023-06-05 PROCEDURE — 80053 COMPREHEN METABOLIC PANEL: CPT | Performed by: STUDENT IN AN ORGANIZED HEALTH CARE EDUCATION/TRAINING PROGRAM

## 2023-06-05 PROCEDURE — 82948 REAGENT STRIP/BLOOD GLUCOSE: CPT

## 2023-06-05 PROCEDURE — 99232 SBSQ HOSP IP/OBS MODERATE 35: CPT | Performed by: PHYSICIAN ASSISTANT

## 2023-06-05 PROCEDURE — 84550 ASSAY OF BLOOD/URIC ACID: CPT | Performed by: PHYSICIAN ASSISTANT

## 2023-06-05 PROCEDURE — 83690 ASSAY OF LIPASE: CPT | Performed by: PHYSICIAN ASSISTANT

## 2023-06-05 PROCEDURE — 94760 N-INVAS EAR/PLS OXIMETRY 1: CPT

## 2023-06-05 PROCEDURE — 84300 ASSAY OF URINE SODIUM: CPT | Performed by: PHYSICIAN ASSISTANT

## 2023-06-05 RX ORDER — POTASSIUM CHLORIDE 20 MEQ/1
40 TABLET, EXTENDED RELEASE ORAL ONCE
Status: COMPLETED | OUTPATIENT
Start: 2023-06-05 | End: 2023-06-05

## 2023-06-05 RX ORDER — POTASSIUM CHLORIDE 14.9 MG/ML
20 INJECTION INTRAVENOUS ONCE
Status: COMPLETED | OUTPATIENT
Start: 2023-06-05 | End: 2023-06-05

## 2023-06-05 RX ORDER — INSULIN LISPRO 100 [IU]/ML
2-12 INJECTION, SOLUTION INTRAVENOUS; SUBCUTANEOUS
Status: DISCONTINUED | OUTPATIENT
Start: 2023-06-05 | End: 2023-06-08 | Stop reason: HOSPADM

## 2023-06-05 RX ORDER — INSULIN LISPRO 100 [IU]/ML
1-6 INJECTION, SOLUTION INTRAVENOUS; SUBCUTANEOUS
Status: DISCONTINUED | OUTPATIENT
Start: 2023-06-05 | End: 2023-06-08 | Stop reason: HOSPADM

## 2023-06-05 RX ORDER — METRONIDAZOLE 500 MG/1
500 TABLET ORAL EVERY 8 HOURS SCHEDULED
Status: DISCONTINUED | OUTPATIENT
Start: 2023-06-05 | End: 2023-06-07

## 2023-06-05 RX ORDER — SODIUM CHLORIDE, SODIUM LACTATE, POTASSIUM CHLORIDE, CALCIUM CHLORIDE 600; 310; 30; 20 MG/100ML; MG/100ML; MG/100ML; MG/100ML
125 INJECTION, SOLUTION INTRAVENOUS CONTINUOUS
Status: DISCONTINUED | OUTPATIENT
Start: 2023-06-05 | End: 2023-06-05

## 2023-06-05 RX ADMIN — METRONIDAZOLE 500 MG: 500 INJECTION, SOLUTION INTRAVENOUS at 00:14

## 2023-06-05 RX ADMIN — GABAPENTIN 300 MG: 300 CAPSULE ORAL at 09:06

## 2023-06-05 RX ADMIN — POTASSIUM CHLORIDE 20 MEQ: 14.9 INJECTION, SOLUTION INTRAVENOUS at 08:56

## 2023-06-05 RX ADMIN — Medication 800 MG: at 09:06

## 2023-06-05 RX ADMIN — SODIUM CHLORIDE, SODIUM LACTATE, POTASSIUM CHLORIDE, AND CALCIUM CHLORIDE 200 ML/HR: .6; .31; .03; .02 INJECTION, SOLUTION INTRAVENOUS at 00:05

## 2023-06-05 RX ADMIN — INSULIN LISPRO 2 UNITS: 100 INJECTION, SOLUTION INTRAVENOUS; SUBCUTANEOUS at 00:19

## 2023-06-05 RX ADMIN — SODIUM CHLORIDE, SODIUM LACTATE, POTASSIUM CHLORIDE, AND CALCIUM CHLORIDE 200 ML/HR: .6; .31; .03; .02 INJECTION, SOLUTION INTRAVENOUS at 06:47

## 2023-06-05 RX ADMIN — CEFTRIAXONE 2000 MG: 2 INJECTION, SOLUTION INTRAVENOUS at 16:07

## 2023-06-05 RX ADMIN — INSULIN LISPRO 2 UNITS: 100 INJECTION, SOLUTION INTRAVENOUS; SUBCUTANEOUS at 12:42

## 2023-06-05 RX ADMIN — POTASSIUM CHLORIDE 20 MEQ: 14.9 INJECTION, SOLUTION INTRAVENOUS at 12:35

## 2023-06-05 RX ADMIN — Medication 250 MG: at 09:06

## 2023-06-05 RX ADMIN — INSULIN LISPRO 2 UNITS: 100 INJECTION, SOLUTION INTRAVENOUS; SUBCUTANEOUS at 16:26

## 2023-06-05 RX ADMIN — ATORVASTATIN CALCIUM 20 MG: 20 TABLET, FILM COATED ORAL at 09:05

## 2023-06-05 RX ADMIN — APIXABAN 5 MG: 5 TABLET, FILM COATED ORAL at 17:49

## 2023-06-05 RX ADMIN — CARBIDOPA AND LEVODOPA 1 TABLET: 25; 100 TABLET ORAL at 21:30

## 2023-06-05 RX ADMIN — METRONIDAZOLE 500 MG: 500 TABLET ORAL at 21:30

## 2023-06-05 RX ADMIN — Medication 250 MG: at 17:49

## 2023-06-05 RX ADMIN — CARBIDOPA AND LEVODOPA 1 TABLET: 25; 100 TABLET ORAL at 16:07

## 2023-06-05 RX ADMIN — OXYBUTYNIN CHLORIDE 5 MG: 5 TABLET, EXTENDED RELEASE ORAL at 09:06

## 2023-06-05 RX ADMIN — POTASSIUM CHLORIDE 40 MEQ: 1500 TABLET, EXTENDED RELEASE ORAL at 09:06

## 2023-06-05 RX ADMIN — Medication 800 MG: at 17:49

## 2023-06-05 RX ADMIN — ACETAMINOPHEN 650 MG: 325 TABLET ORAL at 06:47

## 2023-06-05 RX ADMIN — METRONIDAZOLE 500 MG: 500 TABLET ORAL at 09:06

## 2023-06-05 RX ADMIN — METRONIDAZOLE 500 MG: 500 TABLET ORAL at 13:50

## 2023-06-05 RX ADMIN — CARBIDOPA AND LEVODOPA 1 TABLET: 25; 100 TABLET ORAL at 09:06

## 2023-06-05 RX ADMIN — POTASSIUM CHLORIDE 40 MEQ: 1500 TABLET, EXTENDED RELEASE ORAL at 16:06

## 2023-06-05 RX ADMIN — TRAZODONE HYDROCHLORIDE 150 MG: 100 TABLET ORAL at 21:30

## 2023-06-05 RX ADMIN — BUPROPION HYDROCHLORIDE 300 MG: 150 TABLET, EXTENDED RELEASE ORAL at 09:06

## 2023-06-05 RX ADMIN — PANTOPRAZOLE SODIUM 40 MG: 40 TABLET, DELAYED RELEASE ORAL at 05:45

## 2023-06-05 RX ADMIN — APIXABAN 5 MG: 5 TABLET, FILM COATED ORAL at 09:06

## 2023-06-05 NOTE — PROGRESS NOTES
The metronidazole has been converted to Oral per [facility] IV-to-PO Auto-Conversion Protocol for Adults as approved by the Pharmacy and Therapeutics Committee  The patient met all eligible criteria:  3 Age = 23years old   2) Received at least one dose of the IV form   3) Receiving at least one other scheduled oral/enteral medication   4) Tolerating an oral/enteral diet   and did not have any exclusions:   1) Critical care patient   2) Active GI bleed (IF assessing H2RAs or PPIs)   3) Continuous tube feeding (IF assessing cipro, doxycycline, levofloxacin, minocycline, rifampin, or voriconazole)   4) Receiving PO vancomycin (IF assessing metronidazole)   5) Persistent nausea and/or vomiting   6) Ileus or gastrointestinal obstruction   7) Leah/nasogastric tube set for continuous suction   8) Specific order not to automatically convert to PO (in the order's comments or if discussed in the most recent Infectious Disease or primary team's progress notes)

## 2023-06-05 NOTE — PLAN OF CARE
Problem: PAIN - ADULT  Goal: Verbalizes/displays adequate comfort level or baseline comfort level  Description: Interventions:  - Encourage patient to monitor pain and request assistance  - Assess pain using appropriate pain scale  - Administer analgesics based on type and severity of pain and evaluate response  - Implement non-pharmacological measures as appropriate and evaluate response  - Consider cultural and social influences on pain and pain management  - Notify physician/advanced practitioner if interventions unsuccessful or patient reports new pain  Outcome: Progressing     Problem: INFECTION - ADULT  Goal: Absence or prevention of progression during hospitalization  Description: INTERVENTIONS:  - Assess and monitor for signs and symptoms of infection  - Monitor lab/diagnostic results  - Monitor all insertion sites, i e  indwelling lines, tubes, and drains  - Monitor endotracheal if appropriate and nasal secretions for changes in amount and color  - [place] appropriate cooling/warming therapies per order  - Administer medications as ordered  - Instruct and encourage patient and family to use good hand hygiene technique  - Identify and instruct in appropriate isolation precautions for identified infection/condition  Outcome: Progressing     Problem: SAFETY ADULT  Goal: Patient will remain free of falls  Description: INTERVENTIONS:  - Educate patient/family on patient safety including physical limitations  - Instruct patient to call for assistance with activity   - Consult OT/PT to assist with strengthening/mobility   - Keep Call bell within reach  - Keep bed low and locked with side rails adjusted as appropriate  - Keep care items and personal belongings within reach  - Initiate and maintain comfort rounds  - Make Fall Risk Sign visible to staff  - Apply yellow socks and bracelet for high fall risk patients  - Consider moving patient to room near nurses station  Outcome: Progressing

## 2023-06-05 NOTE — ASSESSMENT & PLAN NOTE
As evidenced by fever (temp 101), and leukocytosis (WBCs 15) on 6/4/2023    · Suspect related to pancreatitis, as no necrosis or abscess seen on imaging and no suspected infectious source  · CXR with no acute findings   · Procal: 0 79->0 98  · BC x 1 obtained, follow results  · No lactic was drawn, no suspected source for infection  · S/p IVFs as above with acute pancreatitis  · IV Rocephin/Flagyl, consider D/C with clinical improvement and pending US results  · Monitor CBC/temperature curve

## 2023-06-05 NOTE — SEPSIS NOTE
Sepsis Note   Greig Brunner 70 y o  male MRN: [id number]  Unit/Bed#: -01 Encounter: [id number]       Initial Sepsis Screening     Row Name 06/05/23 0056                Is the patient's history suggestive of a new or worsening infection? No  -LD              User Key  (r) = Recorded By, (t) = Taken By, (c) = Cosigned By    [address] Name Provider Type    VELIA Ayala PA-C Physician Assistant                    Body mass index is 46 52 kg/m²  Wt Readings from Last 1 Encounters:   06/04/23 (!) 143 kg (315 lb 0 6 oz)        Ideal body weight: 70 7 kg (155 lb 13 8 oz)  Adjusted ideal body weight: 99 6 kg (219 lb 8 5 oz)         Patient with fever at 1600 and WBC elevated 15 today  Fever resolved with tylenol once  BCX x 1 drawn  Procal was elevated this AM 0 79 - trend  Rocephin was administered  BP is soft but no overt hypotension  He has been getting his clonidine, lopressor and demadex  Hold these for now  CT a/p yesterday (6/3/2023) with Acute interstitial edematous pancreatitis involving the pancreatic tail with no pancreatic or peripancreatic cyst or fluid collection seen  Doubt sepsis infection  Prefer pancreatitis as etiology for SIRS 6/4/2023  Both SLIM and GI write the need for IVF in their progress notes from 6/4/2023  He appears euvolemic/hypovolemic on my encounter tonight  He does not have significant abd pain  He appears clinically well  Prior echo from Jan 2023 without any dysfunction  Start pancreatitis IVFs 200 ml/hr tonight and trend AM labs CBC & CMP  Monitor VS and resume BP medications once IVF is weaned and BP acceptable  Pending the LFTs trend and RUQ US evaluation of the CBD can consider dc the Abx

## 2023-06-05 NOTE — ASSESSMENT & PLAN NOTE
Lab Results   Component Value Date    HGBA1C 6 4 (H) 01/15/2023       Recent Labs     06/04/23  1709 06/04/23  2317 06/05/23  0642 06/05/23  1146   POCGLU 203* 173* 150* 165*       Blood Sugar Average: Last 72 hrs:  (P) 174 [id number]   · Hold home oral diabetic medications while inpatient   · SSI coverage with Accu-Cheks ACHS  · Likely will indefinitely hold Trulicity

## 2023-06-05 NOTE — ASSESSMENT & PLAN NOTE
Creatinine elevated at 1 42 on admission   Baseline Cr around 0 7-1 0   · Most likely pre-renal with pancreatitis, dehydration   · S/p IVFs as above  · Renal function back to baseline  · Hold nephrotoxic agents and avoid hypotension

## 2023-06-05 NOTE — ASSESSMENT & PLAN NOTE
· Hypokalemia on admission, K 2 5 in ED  · Persistent hypokalemia, K 3-3 4  · Replete with K-Dur, IVPB potassium chloride  · Monitor BMP daily

## 2023-06-05 NOTE — PROGRESS NOTES
[facility]  Progress Note  Name: Yuliana Marlow I  MRN: [id number]  Unit/Bed#: -01 I Date of Admission: 6/3/2023   Date of Service: 6/5/2023 I Hospital Day: 2    Assessment/Plan   * Idiopathic acute pancreatitis without infection or necrosis  Assessment & Plan  Presented with generalized abdominal pain X 1 month, significantly worsened day of arrival  Denies nausea/vomiting  Recent admission in January with pancreatitis suspected 2/2 Trulicity, at the time was monitored off antibiotics and advised to follow-up with GI outpatient  Denies alcohol abuse, new medications  · CT a/p: Acute interstitial edematous pancreatitis involving the pancreatic tail with no pancreatic or peripancreatic cyst or fluid collection seen  · Lipase: 76->13  Triglycerides WNL, 54   · S/p 500cc IVF bolus in ED, initially placed on [email or web address]  · S/p [email or web address] x 9 hrs -> [email or web address] x 2 hours today  · Hold further IVFs with hyponatremia  · GI following:  · IgG, JOE negative  · Check RUQ US  · Continue holding Trulicity  · Clear liquid diet, advance as tolerated  · Pain management, antiemetics    SIRS (systemic inflammatory response syndrome) (HCC)  Assessment & Plan  As evidenced by fever (temp 101), and leukocytosis (WBCs 15) on 6/4/2023  · Suspect related to pancreatitis, as no necrosis or abscess seen on imaging and no suspected infectious source  · CXR with no acute findings   · Procal: 0 79->0 98  · BC x 1 obtained, follow results  · No lactic was drawn, no suspected source for infection  · S/p IVFs as above with acute pancreatitis  · IV Rocephin/Flagyl, consider D/C with clinical improvement and pending US results  · Monitor CBC/temperature curve    Hyponatremia  Assessment & Plan  Hyponatremia on admission, Na 132 in ED    · Possibly in setting of acute pancreatitis, consider IVF-induced  · Na decreased to 128 today, although euvolemic  · D/C IVFs with LR for now, may need to initiate fluid restriction  · Check uric acid, TSH, urine/serum osmolality  · Nephrology consult  · Monitor BMP daily    Chronic heart failure with preserved ejection fraction [facility])  Assessment & Plan  Wt Readings from Last 3 Encounters:   06/04/23 (!) 143 kg (315 lb 0 6 oz)   04/07/22 (!) 150 kg (331 lb)   03/21/22 (!) 150 kg (331 lb 6 4 oz)     No acute exacerbation at this time  Demadex, Aldactone recently stopped with addition of Lopressor by cardiologist outpatient  • Echo (1/2023): EF 60 to 41%, normal systolic and diastolic function  • Hold Lopressor with soft BP  • Daily weights, I/Os  • Low-salt diet once cleared for oral intake    ISRAEL (acute kidney injury) ([facility] [address] )  Assessment & Plan  Creatinine elevated at 1 42 on admission   Baseline Cr around 0 7-1 0   · Most likely pre-renal with pancreatitis, dehydration   · S/p IVFs as above  · Renal function back to baseline  · Hold nephrotoxic agents and avoid hypotension    Paroxysmal atrial fibrillation (HCC)  Assessment & Plan  · Currently rate controlled  · Continue Lopressor, Eliquis    Type 2 diabetes mellitus without complication, without long-term current use of insulin [facility])  Assessment & Plan  Lab Results   Component Value Date    HGBA1C 6 4 (H) 01/15/2023       Recent Labs     06/04/23  1709 06/04/23  2317 06/05/23  0642 06/05/23  1146   POCGLU 203* 173* 150* 165*       Blood Sugar Average: Last 72 hrs:  (P) 174 [id number]   · Hold home oral diabetic medications while inpatient   · SSI coverage with Accu-Cheks ACHS  · Likely will indefinitely hold Trulicity     Hypokalemia  Assessment & Plan  · Hypokalemia on admission, K 2 5 in ED  · Persistent hypokalemia, K 3-3 4  · Replete with K-Dur, IVPB potassium chloride  · Monitor BMP daily    Sleep apnea  Assessment & Plan  · Continue CPAP qHS     BPH (benign prostatic hyperplasia)  Assessment & Plan  · Continue formulary equivalent oxybutynin 5 mg           VTE Pharmacologic Prophylaxis: VTE Score: 5 High Risk (Score >/= 5) - Pharmacological DVT Prophylaxis Ordered: apixaban (Eliquis)  Sequential Compression Devices Ordered  Patient Centered Rounds: I performed bedside rounds with nursing staff today  Discussions with Specialists or Other Care Team Provider: GI, nephrology, case management    Education and Discussions with Family / Patient: Updated  (wife) at bedside  Total Time Spent on Date of Encounter in care of patient: 45 minutes This time was spent on one or more of the following: performing physical exam; counseling and coordination of care; obtaining or reviewing history; documenting in the medical record; reviewing/ordering tests, medications or procedures; communicating with other healthcare professionals and discussing with patient's family/caregivers  Current Length of Stay: 2 day(s)  Current Patient Status: Inpatient   Certification Statement: The patient will continue to require additional inpatient hospital stay due to Hyponatremia, pancreatitis, EGD  Discharge Plan: Anticipate discharge in 48-72 hrs to home  Code Status: Level 1 - Full Code    Subjective:   Patient is seen at bedside this a m , reports persistent generalized abdominal pain, 8 out of 10 but not in acute distress on exam   Denies nausea/vomiting, diarrhea, tolerating clear liquid diet well  Noted to have abnormal movements of tongue, patient's wife at bedside stating he has had this for years  Objective:     Vitals:   Temp (24hrs), Av 6 °F (37 °C), Min:97 5 °F (36 4 °C), Max:99 4 °F (37 4 °C)    Temp:  [97 5 °F (36 4 °C)-99 4 °F (37 4 °C)] 99 4 °F (37 4 °C)  HR:  [68-84] 80  Resp:  [18-22] 18  BP: ()/(59-72) 110/67  SpO2:  [94 %-98 %] 97 %  Body mass index is 47 55 kg/m²  Input and Output Summary (last 24 hours):      Intake/Output Summary (Last 24 hours) at 2023 1716  Last data filed at 2023 1616  Gross per 24 hour   Intake 3425 42 ml   Output 1725 ml   Net 1700 42 ml       Physical Exam: Physical Exam  Constitutional:       General: He is not in acute distress  Appearance: He is obese  He is not ill-appearing, toxic-appearing or diaphoretic  Cardiovascular:      Rate and Rhythm: Normal rate and regular rhythm  Pulses: Normal pulses  Heart sounds: Normal heart sounds  Pulmonary:      Effort: Pulmonary effort is normal  No respiratory distress  Comments: Diffusely decreased breath sounds across lung fields  Abdominal:      General: There is no distension  Palpations: Abdomen is soft  Tenderness: There is no abdominal tenderness  There is no guarding  Comments: Slightly hypoactive bowel sounds  No grimacing of face with palpation of abdomen with distraction  Musculoskeletal:         General: No swelling or tenderness  Skin:     General: Skin is warm  Neurological:      General: No focal deficit present  Mental Status: He is alert and oriented to person, place, and time  Comments: Involuntary tongue movements   Psychiatric:         Mood and Affect: Mood normal          Behavior: Behavior normal           Additional Data:     Labs:  Results from last 7 days   Lab Units 06/05/23  0515 06/04/23  0757 06/03/23  0702   EOS PCT %  --   --  0   HEMATOCRIT % 38 4   < > 43 6   HEMOGLOBIN g/dL 11 9*   < > 14 5   LYMPHS PCT %  --   --  7*   MONOS PCT %  --   --  9   NEUTROS PCT %  --   --  84*   PLATELETS Thousands/uL 227   < > 258   WBC Thousand/uL 13 73*   < > 12 48*    < > = values in this interval not displayed       Results from last 7 days   Lab Units 06/05/23  1100 06/05/23  0515   ANION GAP mmol/L 8 9   ALBUMIN g/dL  --  3 4*   ALK PHOS U/L  --  55   ALT U/L  --  5*   AST U/L  --  13   BUN mg/dL 12 13   CALCIUM mg/dL 8 3* 8 5   CHLORIDE mmol/L 89* 88*   CO2 mmol/L 31 32   CREATININE mg/dL 0 99 1 20   GLUCOSE RANDOM mg/dL 144* 134   POTASSIUM mmol/L 3 2* 3 0*   SODIUM mmol/L 128* 129*   TOTAL BILIRUBIN mg/dL  --  1 63*     Results from last 7 days Lab Units 06/03/23  0702   INR  1 10     Results from last 7 days   Lab Units 06/05/23  1559 06/05/23  1146 06/05/23  6119 06/04/23  2317 06/04/23  1709 06/04/23  1322 06/04/23  0735 06/04/23  0624 06/03/23  2337 06/03/23  2043 06/03/23  1524 06/03/23  1120   POC GLUCOSE mg/dl 150* 165* 150* 173* 203* 197* 141* 137 130 177* 265* 177*         Results from last 7 days   Lab Units 06/05/23  0510 06/04/23  0757 06/03/23  0702   LACTIC ACID mmol/L  --   --  2 0   PROCALCITONIN ng/ml 0 98* 0 79*  --        Lines/Drains:  Invasive Devices     Peripheral Intravenous Line  Duration           Peripheral IV 06/03/23 Right Antecubital 2 days                      Imaging: Reviewed radiology reports from this admission including: chest xray and abdominal/pelvic CT    Recent Cultures (last 7 days):   Results from last 7 days   Lab Units 06/04/23  1647   BLOOD CULTURE  Received in Microbiology Lab  Culture in Progress         Last 24 Hours Medication List:   Current Facility-Administered Medications   Medication Dose Route Frequency Provider Last Rate   • acetaminophen  650 mg Oral Q6H PRN Pandi Todhe, DO     • apixaban  5 mg Oral BID Pandi Todhe, DO     • atorvastatin  20 mg Oral Daily Pandi Todhe, DO     • buPROPion  300 mg Oral Daily Pandi Todhe, DO     • carbidopa-levodopa  1 tablet Oral TID Pandi Todhe, DO     • cefTRIAXone  2,000 mg Intravenous Q24H Pandi Todhe, DO 2,000 mg (06/05/23 1607)   • gabapentin  300 mg Oral Daily Pandi Todhe, DO     • HYDROmorphone  0 2 mg Intravenous Q3H PRN Pandi Todhe, DO     • insulin lispro  1-6 Units Subcutaneous HS Tami Handley PA-C     • insulin lispro  2-12 Units Subcutaneous TID AC Tami Handley PA-C     • magnesium Oxide  800 mg Oral BID Pandi Todhe, DO     • metroNIDAZOLE  500 mg Oral Q8H [facility] Pandi Todhe, DO     • oxybutynin  5 mg Oral Daily Pandi Todhe, DO     • oxyCODONE  10 mg Oral Q4H PRN Yumiko Cui DO     • oxyCODONE  5 mg Oral Q4H PRN Yumiko Cui DO     • pantoprazole  40 mg Oral Early Morning Yumiko Cui DO     • saccharomyces boulardii  250 mg Oral BID SELVIN Hickman     • traZODone  150 mg Oral HS Yumiko Cui DO          Today, Patient Was Seen By: Osiel Alegre PA-C    **Please Note: This note may have been constructed using a voice recognition system  **

## 2023-06-05 NOTE — PROGRESS NOTES
"Progress Note - [facility]  Aristides Baird 70 y o  male MRN: [id number]    Unit/Bed#: -01 Encounter: [id number]      Assessment/Plan:  24-year-old male with history of cholecystectomy, Justino-en-Y gastric bypass anatomy, hypertension, hyperlipidemia, GERD, diabetes, A-fib, who presented to the hospital yesterday with generalized abdominal pain for the past 1 month  1   Acute intestinal pancreatitis  Patient reported 1 month history of generalized abdominal pain  CT abdomen on admission showed acute intestinal edematous pancreatitis involving the pancreatic tail with no pancreatic or peripancreatic cyst or fluid collection seen  Lipase within normal limits  Triglycerides normal   Calcium normal   Patient reports third episode since January 2022  LFTs within normal limits except total bilirubin 1 63 and trending down  Direct bilirubin 0 99  White blood count 13 73 and trending down  Pancreatitis may be idiopathic, biliary stone, autoimmune, or secondary to Trulicity  -Recommend holding Trulicity   -Ultrasound of abdomen pending  -Continue IV fluid resuscitation  -Pain management per attending team  -IgG within normal limits  -JOE pending     -Obtain IgG4   -Continue clear liquid diet may advance to low-fat when pain is controlled   -Antibiotics per attending team    2  Diarrhea  Patient reported 1 day history of loose stools which resolved upon admission  No report of diarrhea since admission     -Monitor stools    3  GERD  Patient has history of GERD  -Continue pantoprazole    Subjective:   OOB sitting in chair no acute distress  Tolerating clear liquid diet denies nausea or vomiting  Patient reports he continues with pain in upper abdomen 7/10 which is controlled with pain medication  Objective:     Vitals: Blood pressure 105/60, pulse 84, temperature 98 2 °F (36 8 °C), temperature source Tympanic, resp  rate 18, height 5' 9\" (1 753 m), weight (!) 146 kg (322 lb), SpO2 96 %  ,Body mass " index is 47 55 kg/m²  Intake/Output Summary (Last 24 hours) at 6/5/2023 0905  Last data filed at 6/5/2023 9391  Gross per 24 hour   Intake 3673 ml   Output 850 ml   Net 2823 ml       Physical Exam: Physical Exam  Vitals and nursing note reviewed  Constitutional:       General: He is not in acute distress  Cardiovascular:      Rate and Rhythm: Normal rate and regular rhythm  Pulses: Normal pulses  Heart sounds: Normal heart sounds  Pulmonary:      Effort: Pulmonary effort is normal  No respiratory distress  Breath sounds: Normal breath sounds  No stridor  No wheezing, rhonchi or rales  Abdominal:      General: Bowel sounds are normal  There is no distension  Palpations: Abdomen is soft  There is no mass  Tenderness: There is abdominal tenderness  There is no guarding or rebound  Hernia: No hernia is present  Comments: Mild abdominal tenderness throughout with palpation  Musculoskeletal:      Right lower leg: No edema  Left lower leg: No edema  Skin:     General: Skin is warm and dry  Capillary Refill: Capillary refill takes less than 2 seconds  Coloration: Skin is not jaundiced or pale  Neurological:      Mental Status: He is alert and oriented to person, place, and time           Invasive Devices     Peripheral Intravenous Line  Duration           Peripheral IV 06/03/23 Right Antecubital 2 days                Current Facility-Administered Medications:   •  acetaminophen (TYLENOL) tablet 650 mg, 650 mg, Oral, Q6H PRN, Yumiko Cui, DO, 650 mg at 06/05/23 5269  •  apixaban (ELIQUIS) tablet 5 mg, 5 mg, Oral, BID, Yumiko Burnhame, DO, 5 mg at 06/04/23 1700  •  atorvastatin (LIPITOR) tablet 20 mg, 20 mg, Oral, Daily, Yumiko Burnhame, DO, 20 mg at 06/04/23 2977  •  buPROPion (WELLBUTRIN XL) 24 hr tablet 300 mg, 300 mg, Oral, Daily, Yumiko Tojanye, DO, 300 mg at 06/04/23 0940  •  carbidopa-levodopa (SINEMET)  mg per tablet 1 tablet, 1 tablet, Oral, TID, Yumiko DO See, 1 tablet at 06/04/23 2041  •  cefTRIAXone (ROCEPHIN) IVPB (premix in dextrose) 2,000 mg 50 mL, 2,000 mg, Intravenous, Q24H, Yumiko ToDO willie, Last Rate: 100 mL/hr at 06/04/23 1703, 2,000 mg at 06/04/23 1703  •  gabapentin (NEURONTIN) capsule 300 mg, 300 mg, Oral, Daily, Yumiko TojanyeDO, 300 mg at 06/04/23 0940  •  HYDROmorphone HCl (DILAUDID) injection 0 2 mg, 0 2 mg, Intravenous, Q3H PRN, Yumiko Todhe DO  •  insulin lispro (HumaLOG) 100 units/mL subcutaneous injection 2-12 Units, 2-12 Units, Subcutaneous, Q6H [address], 2 Units at 06/05/23 0019 **AND** Fingerstick Glucose (POCT), , , Q6H, Yumiko Tojanye DO  •  magnesium Oxide (MAG-OX) tablet 800 mg, 800 mg, Oral, BID, Yumiko Tojanye DO, 800 mg at 06/04/23 1700  •  metroNIDAZOLE (FLAGYL) tablet 500 mg, 500 mg, Oral, Q8H [address], Yumiko Tojanye DO  •  oxybutynin (DITROPAN-XL) 24 hr tablet 5 mg, 5 mg, Oral, Daily, Yumiko Tojanye DO, 5 mg at 06/04/23 7580  •  oxyCODONE (ROXICODONE) immediate release tablet 10 mg, 10 mg, Oral, Q4H PRN, Yumiko Tojanye DO, 10 mg at 06/03/23 1123  •  oxyCODONE (ROXICODONE) IR tablet 5 mg, 5 mg, Oral, Q4H PRN, Yumiko Tojanye DO  •  pantoprazole (PROTONIX) EC tablet 40 mg, 40 mg, Oral, Early Morning, Yumiko TojanyeDO, 40 mg at 06/05/23 0545  •  potassium chloride (K-DUR,KLOR-CON) CR tablet 40 mEq, 40 mEq, Oral, Once, Tami Handley PA-C  •  potassium chloride 20 mEq IVPB (premix), 20 mEq, Intravenous, Once **FOLLOWED BY** potassium chloride 20 mEq IVPB (premix), 20 mEq, Intravenous, Once, Tami Handley PA-C  •  saccharomyces boulardii (FLORASTOR) capsule 250 mg, 250 mg, Oral, BID, Addy Poke, CRNP, 250 mg at 06/04/23 1708  •  traZODone (DESYREL) tablet 150 mg, 150 mg, Oral, HS, Nahedi Tojanye, DO, 150 mg at 06/04/23 2112    Lab Results:   Recent Results (from the past 24 hour(s))   Fingerstick Glucose (POCT)    Collection Time: 06/04/23  1:22 PM   Result Value Ref Range    POC Glucose 197 (H) 65 - 140 mg/dl   Blood culture    Collection Time: 06/04/23  4:47 PM    Specimen: Arm, Right; Blood   Result Value Ref Range    Blood Culture Received in Microbiology Lab  Culture in Progress      Fingerstick Glucose (POCT)    Collection Time: 06/04/23  5:09 PM   Result Value Ref Range    POC Glucose 203 (H) 65 - 140 mg/dl   Fingerstick Glucose (POCT)    Collection Time: 06/04/23 11:17 PM   Result Value Ref Range    POC Glucose 173 (H) 65 - 140 mg/dl   Procalcitonin    Collection Time: 06/05/23  5:10 AM   Result Value Ref Range    Procalcitonin 0 98 (H) <=0 25 ng/ml   CBC    Collection Time: 06/05/23  5:15 AM   Result Value Ref Range    WBC 13 73 (H) 4 31 - 10 16 Thousand/uL    RBC 4 94 3 88 - 5 62 Million/uL    Hemoglobin 11 9 (L) 12 0 - 17 0 g/dL    Hematocrit 38 4 36 5 - 49 3 %    MCV 78 (L) 82 - 98 fL    MCH 24 1 (L) 26 8 - 34 3 pg    MCHC 31 0 (L) 31 4 - 37 4 g/dL    RDW 13 4 11 6 - 15 1 %    Platelets 436 231 - 489 Thousands/uL    MPV 8 8 (L) 8 9 - 12 7 fL   Comprehensive metabolic panel    Collection Time: 06/05/23  5:15 AM   Result Value Ref Range    Sodium 129 (L) 135 - 147 mmol/L    Potassium 3 0 (L) 3 5 - 5 3 mmol/L    Chloride 88 (L) 96 - 108 mmol/L    CO2 32 21 - 32 mmol/L    ANION GAP 9 4 - 13 mmol/L    BUN 13 5 - 25 mg/dL    Creatinine 1 20 0 60 - 1 30 mg/dL    Glucose 134 65 - 140 mg/dL    Calcium 8 5 8 4 - 10 2 mg/dL    Corrected Calcium 9 0 8 3 - 10 1 mg/dL    AST 13 13 - 39 U/L    ALT 5 (L) 7 - 52 U/L    Alkaline Phosphatase 55 34 - 104 U/L    Total Protein 6 6 6 4 - 8 4 g/dL    Albumin 3 4 (L) 3 5 - 5 0 g/dL    Total Bilirubin 1 63 (H) 0 20 - 1 00 mg/dL    eGFR 63 ml/min/1 73sq m   Magnesium    Collection Time: 06/05/23  5:15 AM   Result Value Ref Range    Magnesium 2 2 1 9 - 2 7 mg/dL   Phosphorus    Collection Time: 06/05/23  5:15 AM   Result Value Ref Range    Phosphorus 2 4 2 3 - 4 1 mg/dL   Fingerstick Glucose (POCT)    Collection Time: 06/05/23  6:42 AM   Result Value Ref Range    POC Glucose 150 (H) 65 - 140 mg/dl             Imaging Studies: XR chest 1 view portable    Result Date: 6/3/2023  Narrative: CHEST INDICATION:   chest pain  COMPARISON: CXR 6/18/2019, abdomen CT 6/3/2023, chest CT 1/27/2022  EXAM PERFORMED/VIEWS:  XR CHEST PORTABLE  FINDINGS: Cardiomediastinal silhouette normal  Lungs clear  No effusion or pneumothorax  Upper abdomen normal  Bones normal for age  Impression: No acute cardiopulmonary disease  Workstation performed: [id number]     CT abdomen pelvis with contrast    Result Date: 6/3/2023  Narrative: CT ABDOMEN AND PELVIS WITH IV CONTRAST INDICATION:   abdominal pain, history of gastric bypass  COMPARISON: January 27, 2022 TECHNIQUE:  CT examination of the abdomen and pelvis was performed  In addition to portal venous phase postcontrast scanning through the abdomen and pelvis, delayed phase postcontrast scanning was performed through the upper abdominal viscera  Multiplanar 2D reformatted images were created from the source data  This examination, like all CT scans performed in the [facility], was performed utilizing techniques to minimize radiation dose exposure, including the use of iterative reconstruction and automated exposure control  Radiation dose length product (DLP) for this visit:  2627 mGy-cm IV Contrast:  100 mL of iohexol (OMNIPAQUE) Enteric Contrast:  Enteric contrast was administered  FINDINGS: ABDOMEN LOWER CHEST:  No clinically significant abnormality identified in the visualized lower chest  LIVER/BILIARY TREE:  Unremarkable  GALLBLADDER:  Gallbladder is surgically absent  SPLEEN:  Unremarkable  PANCREAS: Focal thickening throughout the pancreatic tail with peripancreatic inflammatory edema consistent with acute interstitial edematous pancreatitis  No pancreatic or peripancreatic cyst or fluid collection  ADRENAL GLANDS:  Unremarkable  KIDNEYS/URETERS: Bilateral renal cortical cysts, largest in the medial upper pole of the left kidney, 4 7 cm   No solid renal mass, urinary tract "calculus, or hydronephrosis  STOMACH AND BOWEL: Surgical changes of prior Justino-en-Y gastric bypass  No abnormal bowel wall thickening or bowel obstruction  APPENDIX: A normal appendix was visualized  ABDOMINOPELVIC CAVITY:  No ascites  No pneumoperitoneum  No lymphadenopathy  VESSELS:  Unremarkable for patient's age  PELVIS REPRODUCTIVE ORGANS:  Unremarkable for patient's age  URINARY BLADDER:  Unremarkable  ABDOMINAL WALL/INGUINAL REGIONS: Surgical changes of left inguinal hernia repair, similar in appearance from previous examination  OSSEOUS STRUCTURES: Chronic bilateral L5 corticated spondylolysis with grade 2 L5 relative to S1 spondylolisthesis and L5-S1 degenerative change, similar from prior examination  No acute fracture or destructive osseous lesion  Impression: Acute interstitial edematous pancreatitis involving the pancreatic tail with no pancreatic or peripancreatic cyst or fluid collection seen  This examination was marked \"immediate notification\" in Epic in order to begin the standard process by which the radiology reading room liaison alerts the referring practitioner  Workstation performed: [id number]           SELVIN Ruby      Please Note: \"This note has been constructed using a voice recognition system  Therefore there may be syntax, spelling, and/or grammatical errors  Please call if you have any questions   \"**  "

## 2023-06-05 NOTE — ASSESSMENT & PLAN NOTE
Wt Readings from Last 3 Encounters:   06/04/23 (!) 143 kg (315 lb 0 6 oz)   04/07/22 (!) 150 kg (331 lb)   03/21/22 (!) 150 kg (331 lb 6 4 oz)     No acute exacerbation at this time  Demadex, Aldactone recently stopped with addition of Lopressor by cardiologist outpatient  • Echo (1/2023): EF 60 to 94%, normal systolic and diastolic function    • Hold Lopressor with soft BP  • Daily weights, I/Os  • Low-salt diet once cleared for oral intake

## 2023-06-05 NOTE — PLAN OF CARE
Problem: PAIN - ADULT  Goal: Verbalizes/displays adequate comfort level or baseline comfort level  Description: Interventions:  - Encourage patient to monitor pain and request assistance  - Assess pain using appropriate pain scale  - Administer analgesics based on type and severity of pain and evaluate response  - Implement non-pharmacological measures as appropriate and evaluate response  - Consider cultural and social influences on pain and pain management  - Notify physician/advanced practitioner if interventions unsuccessful or patient reports new pain  Outcome: Progressing     Problem: INFECTION - ADULT  Goal: Absence or prevention of progression during hospitalization  Description: INTERVENTIONS:  - Assess and monitor for signs and symptoms of infection  - Monitor lab/diagnostic results  - Monitor all insertion sites, i e  indwelling lines, tubes, and drains  - Monitor endotracheal if appropriate and nasal secretions for changes in amount and color  - [place] appropriate cooling/warming therapies per order  - Administer medications as ordered  - Instruct and encourage patient and family to use good hand hygiene technique  - Identify and instruct in appropriate isolation precautions for identified infection/condition  Outcome: Progressing  Goal: Absence of fever/infection during neutropenic period  Description: INTERVENTIONS:  - Monitor WBC    Outcome: Progressing     Problem: SAFETY ADULT  Goal: Patient will remain free of falls  Description: INTERVENTIONS:  - Educate patient/family on patient safety including physical limitations  - Instruct patient to call for assistance with activity   - Consult OT/PT to assist with strengthening/mobility   - Keep Call bell within reach  - Keep bed low and locked with side rails adjusted as appropriate  - Keep care items and personal belongings within reach  - Initiate and maintain comfort rounds  - Make Fall Risk Sign visible to staff  - Offer Toileting every 2  Hours, in advance of need  - Initiate/Maintain 2 alarm  - Obtain necessary fall risk management equipment: BED  - Apply yellow socks and bracelet for high fall risk patients  - Consider moving patient to room near nurses station  Outcome: Progressing  Goal: Maintain or return to baseline ADL function  Description: INTERVENTIONS:  -  Assess patient's ability to carry out ADLs; assess patient's baseline for ADL function and identify physical deficits which impact ability to perform ADLs (bathing, care of mouth/teeth, toileting, grooming, dressing, etc )  - Assess/evaluate cause of self-care deficits   - Assess range of motion  - Assess patient's mobility; develop plan if impaired  - Assess patient's need for assistive devices and provide as appropriate  - Encourage maximum independence but intervene and supervise when necessary  - Involve family in performance of ADLs  - Assess for home care needs following discharge   - Consider OT consult to assist with ADL evaluation and planning for discharge  - Provide patient education as appropriate  Outcome: Progressing     Problem: MOBILITY - ADULT  Goal: Maintain or return to baseline ADL function  Description: INTERVENTIONS:  -  Assess patient's ability to carry out ADLs; assess patient's baseline for ADL function and identify physical deficits which impact ability to perform ADLs (bathing, care of mouth/teeth, toileting, grooming, dressing, etc )  - Assess/evaluate cause of self-care deficits   - Assess range of motion  - Assess patient's mobility; develop plan if impaired  - Assess patient's need for assistive devices and provide as appropriate  - Encourage maximum independence but intervene and supervise when necessary  - Involve family in performance of ADLs  - Assess for home care needs following discharge   - Consider OT consult to assist with ADL evaluation and planning for discharge  - Provide patient education as appropriate  Outcome: Progressing

## 2023-06-06 ENCOUNTER — APPOINTMENT (INPATIENT)
Dept: ULTRASOUND IMAGING | Facility: HOSPITAL | Age: 63
DRG: 439 | End: 2023-06-06
Payer: MEDICARE

## 2023-06-06 ENCOUNTER — APPOINTMENT (INPATIENT)
Dept: GASTROENTEROLOGY | Facility: HOSPITAL | Age: 63
DRG: 439 | End: 2023-06-06
Payer: MEDICARE

## 2023-06-06 ENCOUNTER — ANESTHESIA EVENT (INPATIENT)
Dept: GASTROENTEROLOGY | Facility: HOSPITAL | Age: 63
DRG: 439 | End: 2023-06-06
Payer: MEDICARE

## 2023-06-06 ENCOUNTER — ANESTHESIA (INPATIENT)
Dept: GASTROENTEROLOGY | Facility: HOSPITAL | Age: 63
DRG: 439 | End: 2023-06-06
Payer: MEDICARE

## 2023-06-06 LAB
ALBUMIN SERPL BCP-MCNC: 3.2 G/DL (ref 3.5–5)
ALP SERPL-CCNC: 64 U/L (ref 34–104)
ALT SERPL W P-5'-P-CCNC: <3 U/L (ref 7–52)
ANION GAP SERPL CALCULATED.3IONS-SCNC: 9 MMOL/L (ref 4–13)
AST SERPL W P-5'-P-CCNC: 16 U/L (ref 13–39)
BILIRUB SERPL-MCNC: 0.83 MG/DL (ref 0.2–1)
BUN SERPL-MCNC: 9 MG/DL (ref 5–25)
CALCIUM ALBUM COR SERPL-MCNC: 9.4 MG/DL (ref 8.3–10.1)
CALCIUM SERPL-MCNC: 8.8 MG/DL (ref 8.4–10.2)
CHLORIDE SERPL-SCNC: 94 MMOL/L (ref 96–108)
CO2 SERPL-SCNC: 29 MMOL/L (ref 21–32)
CREAT SERPL-MCNC: 0.83 MG/DL (ref 0.6–1.3)
ERYTHROCYTE [DISTWIDTH] IN BLOOD BY AUTOMATED COUNT: 13.2 % (ref 11.6–15.1)
GFR SERPL CREATININE-BSD FRML MDRD: 92 ML/MIN/1.73SQ M
GLUCOSE SERPL-MCNC: 129 MG/DL (ref 65–140)
GLUCOSE SERPL-MCNC: 130 MG/DL (ref 65–140)
GLUCOSE SERPL-MCNC: 134 MG/DL (ref 65–140)
GLUCOSE SERPL-MCNC: 136 MG/DL (ref 65–140)
GLUCOSE SERPL-MCNC: 230 MG/DL (ref 65–140)
HCT VFR BLD AUTO: 37.7 % (ref 36.5–49.3)
HGB BLD-MCNC: 11.6 G/DL (ref 12–17)
MAGNESIUM SERPL-MCNC: 2.4 MG/DL (ref 1.9–2.7)
MCH RBC QN AUTO: 24.1 PG (ref 26.8–34.3)
MCHC RBC AUTO-ENTMCNC: 30.8 G/DL (ref 31.4–37.4)
MCV RBC AUTO: 78 FL (ref 82–98)
OSMOLALITY UR/SERPL-RTO: 282 MMOL/KG (ref 282–298)
PHOSPHATE SERPL-MCNC: 2.3 MG/DL (ref 2.3–4.1)
PLATELET # BLD AUTO: 259 THOUSANDS/UL (ref 149–390)
PMV BLD AUTO: 9.5 FL (ref 8.9–12.7)
POTASSIUM SERPL-SCNC: 3.8 MMOL/L (ref 3.5–5.3)
PROT SERPL-MCNC: 6.5 G/DL (ref 6.4–8.4)
RBC # BLD AUTO: 4.81 MILLION/UL (ref 3.88–5.62)
SODIUM SERPL-SCNC: 132 MMOL/L (ref 135–147)
WBC # BLD AUTO: 9.67 THOUSAND/UL (ref 4.31–10.16)

## 2023-06-06 PROCEDURE — 82784 ASSAY IGA/IGD/IGG/IGM EACH: CPT | Performed by: NURSE PRACTITIONER

## 2023-06-06 PROCEDURE — 99223 1ST HOSP IP/OBS HIGH 75: CPT | Performed by: INTERNAL MEDICINE

## 2023-06-06 PROCEDURE — 80053 COMPREHEN METABOLIC PANEL: CPT | Performed by: PHYSICIAN ASSISTANT

## 2023-06-06 PROCEDURE — 82948 REAGENT STRIP/BLOOD GLUCOSE: CPT

## 2023-06-06 PROCEDURE — 84100 ASSAY OF PHOSPHORUS: CPT | Performed by: PHYSICIAN ASSISTANT

## 2023-06-06 PROCEDURE — 83735 ASSAY OF MAGNESIUM: CPT | Performed by: PHYSICIAN ASSISTANT

## 2023-06-06 PROCEDURE — 43235 EGD DIAGNOSTIC BRUSH WASH: CPT | Performed by: INTERNAL MEDICINE

## 2023-06-06 PROCEDURE — 76705 ECHO EXAM OF ABDOMEN: CPT

## 2023-06-06 PROCEDURE — 85027 COMPLETE CBC AUTOMATED: CPT | Performed by: PHYSICIAN ASSISTANT

## 2023-06-06 PROCEDURE — 82787 IGG 1 2 3 OR 4 EACH: CPT | Performed by: NURSE PRACTITIONER

## 2023-06-06 PROCEDURE — 83930 ASSAY OF BLOOD OSMOLALITY: CPT

## 2023-06-06 PROCEDURE — 99232 SBSQ HOSP IP/OBS MODERATE 35: CPT | Performed by: STUDENT IN AN ORGANIZED HEALTH CARE EDUCATION/TRAINING PROGRAM

## 2023-06-06 PROCEDURE — 0DJ08ZZ INSPECTION OF UPPER INTESTINAL TRACT, VIA NATURAL OR ARTIFICIAL OPENING ENDOSCOPIC: ICD-10-PCS | Performed by: INTERNAL MEDICINE

## 2023-06-06 RX ORDER — PROPOFOL 10 MG/ML
INJECTION, EMULSION INTRAVENOUS AS NEEDED
Status: DISCONTINUED | OUTPATIENT
Start: 2023-06-06 | End: 2023-06-06

## 2023-06-06 RX ORDER — LIDOCAINE HYDROCHLORIDE 10 MG/ML
INJECTION, SOLUTION EPIDURAL; INFILTRATION; INTRACAUDAL; PERINEURAL AS NEEDED
Status: DISCONTINUED | OUTPATIENT
Start: 2023-06-06 | End: 2023-06-06

## 2023-06-06 RX ORDER — PROPOFOL 10 MG/ML
INJECTION, EMULSION INTRAVENOUS CONTINUOUS PRN
Status: DISCONTINUED | OUTPATIENT
Start: 2023-06-06 | End: 2023-06-06

## 2023-06-06 RX ORDER — SODIUM CHLORIDE, SODIUM LACTATE, POTASSIUM CHLORIDE, CALCIUM CHLORIDE 600; 310; 30; 20 MG/100ML; MG/100ML; MG/100ML; MG/100ML
INJECTION, SOLUTION INTRAVENOUS CONTINUOUS PRN
Status: DISCONTINUED | OUTPATIENT
Start: 2023-06-06 | End: 2023-06-06

## 2023-06-06 RX ADMIN — ACETAMINOPHEN 650 MG: 325 TABLET ORAL at 06:34

## 2023-06-06 RX ADMIN — METRONIDAZOLE 500 MG: 500 TABLET ORAL at 22:35

## 2023-06-06 RX ADMIN — ACETAMINOPHEN 650 MG: 325 TABLET ORAL at 19:45

## 2023-06-06 RX ADMIN — METRONIDAZOLE 500 MG: 500 TABLET ORAL at 15:38

## 2023-06-06 RX ADMIN — TRAZODONE HYDROCHLORIDE 150 MG: 100 TABLET ORAL at 21:37

## 2023-06-06 RX ADMIN — PROPOFOL 50 MG: 10 INJECTION, EMULSION INTRAVENOUS at 14:19

## 2023-06-06 RX ADMIN — CARBIDOPA AND LEVODOPA 1 TABLET: 25; 100 TABLET ORAL at 15:39

## 2023-06-06 RX ADMIN — CEFTRIAXONE 2000 MG: 2 INJECTION, SOLUTION INTRAVENOUS at 15:38

## 2023-06-06 RX ADMIN — CARBIDOPA AND LEVODOPA 1 TABLET: 25; 100 TABLET ORAL at 09:30

## 2023-06-06 RX ADMIN — GABAPENTIN 300 MG: 300 CAPSULE ORAL at 09:30

## 2023-06-06 RX ADMIN — CARBIDOPA AND LEVODOPA 1 TABLET: 25; 100 TABLET ORAL at 21:37

## 2023-06-06 RX ADMIN — PROPOFOL 50 MG: 10 INJECTION, EMULSION INTRAVENOUS at 14:17

## 2023-06-06 RX ADMIN — LIDOCAINE HYDROCHLORIDE 50 MG: 10 INJECTION, SOLUTION EPIDURAL; INFILTRATION; INTRACAUDAL; PERINEURAL at 14:17

## 2023-06-06 RX ADMIN — PROPOFOL 30 MG: 10 INJECTION, EMULSION INTRAVENOUS at 14:21

## 2023-06-06 RX ADMIN — OXYBUTYNIN CHLORIDE 5 MG: 5 TABLET, EXTENDED RELEASE ORAL at 09:30

## 2023-06-06 RX ADMIN — METRONIDAZOLE 500 MG: 500 TABLET ORAL at 06:34

## 2023-06-06 RX ADMIN — Medication 800 MG: at 09:30

## 2023-06-06 RX ADMIN — Medication 250 MG: at 09:30

## 2023-06-06 RX ADMIN — BUPROPION HYDROCHLORIDE 300 MG: 150 TABLET, EXTENDED RELEASE ORAL at 09:30

## 2023-06-06 RX ADMIN — PROPOFOL 140 MCG/KG/MIN: 10 INJECTION, EMULSION INTRAVENOUS at 14:17

## 2023-06-06 RX ADMIN — INSULIN LISPRO 4 UNITS: 100 INJECTION, SOLUTION INTRAVENOUS; SUBCUTANEOUS at 12:22

## 2023-06-06 RX ADMIN — APIXABAN 5 MG: 5 TABLET, FILM COATED ORAL at 17:40

## 2023-06-06 RX ADMIN — PANTOPRAZOLE SODIUM 40 MG: 40 TABLET, DELAYED RELEASE ORAL at 06:34

## 2023-06-06 RX ADMIN — ATORVASTATIN CALCIUM 20 MG: 20 TABLET, FILM COATED ORAL at 09:30

## 2023-06-06 RX ADMIN — Medication 250 MG: at 17:40

## 2023-06-06 RX ADMIN — Medication 800 MG: at 17:40

## 2023-06-06 RX ADMIN — SODIUM CHLORIDE, SODIUM LACTATE, POTASSIUM CHLORIDE, AND CALCIUM CHLORIDE: .6; .31; .03; .02 INJECTION, SOLUTION INTRAVENOUS at 14:13

## 2023-06-06 NOTE — CONSULTS
[address] [facility] 70 y o  male MRN: [id number]  Unit/Bed#: -01 Encounter: [id number]    ASSESSMENT and PLAN:    78-year-old male with a history of obesity, hypertension, coronary artery disease admitted for abdominal pain and found to have acute pancreatitis  Nephrology consult for hyponatremia  Hyponatremia  -- Admission sodium 132 and dropped as low as 128  -- Had been getting aggressive intravenous fluids for management of acute pancreatitis while also being maintained n p o   -- In the setting of acute pancreatitis with poor effective circulating volume and third spacing along with intravenous fluids and decreased oral intake, poor solute intake  -- Currently off intravenous fluids  -- Sodium level has improved to 132  -- Appropriate rate of correction  -- Can check daily BMP  -- Currently remains n p o   -- No indication for diuretic at this time    Hypokalemia--resolved    Acute pancreatitis  -- Exact etiology unknown thought to be related to Trulicity  -- Planning for right upper quadrant ultrasound and an EGD  -- Currently off intravenous fluids  -- Remains n p o     SUMMARY OF RECOMMENDATIONS:    Please see above    HISTORY OF PRESENT ILLNESS:  Requesting Physician: Ash Rodríguez MD  Reason for Consult: Hyponatremia    Yair Rajput is a 70 y o  male who was admitted to [address] [place] [address] One in hospital after presenting with abdominal pain for x1 month  A renal consultation is requested today for assistance in the management of hyponatremia  Patient admitted with a serum sodium of 132 which subsequently decreased prompting a nephrology consultation  Patient was diagnosed with pancreatitis acute thought to be secondary to Trulicity but still exact etiology is being ascertained  He is planned for further imaging studies  He was given aggressive volume resuscitation in the setting of management for pancreatitis and his sodium started to drop in that setting    No nausea no vomiting still having some abdominal pain he remains n p o  PAST MEDICAL HISTORY:  Past Medical History:   Diagnosis Date   • Arthritis    • Asthma    • Colon polyp    • CPAP (continuous positive airway pressure) dependence    • Edema     left leg   • GERD (gastroesophageal reflux disease)    • History of echocardiogram 07/2017   • History of Holter monitoring 09/2014   • History of stress test 08/2014   • Hypertension    • Myocardial infarct [facility])     2011   • Myocardial infarction [facility])    • Obesity    • Sleep apnea    • Thrombophlebitis        PAST SURGICAL HISTORY:  Past Surgical History:   Procedure Laterality Date   • CARDIAC CATHETERIZATION  03/2012    [id number]   • CHOLECYSTECTOMY     • COLONOSCOPY     • COLONOSCOPY N/A 10/30/2017    Procedure: COLONOSCOPY;  Surgeon: Callie Hutchinson MD;  Location: [place] [address] GI LAB; Service: Gastroenterology   • ENDOVENOUS ABLATION SAPHENOUS VEIN W/ LASER      each addit vein   • ERCP     • ESOPHAGOGASTRODUODENOSCOPY N/A 10/30/2017    Procedure: ESOPHAGOGASTRODUODENOSCOPY (EGD); Surgeon: Callie Hutchinson MD;  Location: [facility] GI LAB;   Service: Gastroenterology   • GASTRIC BYPASS      2001   • GASTRIC BYPASS     • HERNIA REPAIR      paraesophageal hiatus hernia   • HERNIA REPAIR      x5 last one 2011   • JOINT REPLACEMENT     • KNEE ARTHROPLASTY Right     2102   • REPLACEMENT TOTAL KNEE Right 02/2011       ALLERGIES:  Allergies   Allergen Reactions   • Gluten Meal - Food Allergy    • Adhesive [Medical Tape] Rash       SOCIAL HISTORY:  Social History     Substance and Sexual Activity   Alcohol Use Not Currently    Comment: rarely - denied per allscripts      Social History     Substance and Sexual Activity   Drug Use No     Social History     Tobacco Use   Smoking Status Former   • Packs/day: 1 00   • Years: 3 00   • Total pack years: 3 00   • Types: Cigarettes   Smokeless Tobacco Former   Tobacco Comments    never a smoker per allscripts - as per Viji Damian HISTORY:  Family History   Problem Relation Age of Onset   • Uterine cancer Mother    • Prostate cancer Father    • Diabetes Father    • Heart failure Father    • Cancer Father    • Stroke Family         syndrome   • Aneurysm Family         aoritc   • Diabetes Brother    • Other Brother         amputation-   • Diabetes Paternal Grandmother    • Diabetes Paternal Grandfather    • Cancer Sister        MEDICATIONS:    Current Facility-Administered Medications:   •  acetaminophen (TYLENOL) tablet 650 mg, 650 mg, Oral, Q6H PRN, Pandi Tojanye, DO, 650 mg at 06/06/23 0264  •  apixaban (ELIQUIS) tablet 5 mg, 5 mg, Oral, BID, Pandi Todhe, DO, 5 mg at 06/05/23 1749  •  atorvastatin (LIPITOR) tablet 20 mg, 20 mg, Oral, Daily, Pandi Todhe, DO, 20 mg at 06/06/23 0930  •  buPROPion (WELLBUTRIN XL) 24 hr tablet 300 mg, 300 mg, Oral, Daily, Nahedi Tojanye, DO, 300 mg at 06/06/23 0930  •  carbidopa-levodopa (SINEMET)  mg per tablet 1 tablet, 1 tablet, Oral, TID, Yumiko Tojanye, DO, 1 tablet at 06/06/23 0930  •  cefTRIAXone (ROCEPHIN) IVPB (premix in dextrose) 2,000 mg 50 mL, 2,000 mg, Intravenous, Q24H, Yumiko Towillie, DO, Last Rate: 100 mL/hr at 06/05/23 1607, 2,000 mg at 06/05/23 1607  •  gabapentin (NEURONTIN) capsule 300 mg, 300 mg, Oral, Daily, Yumiko Towillie, DO, 300 mg at 06/06/23 0930  •  HYDROmorphone HCl (DILAUDID) injection 0 2 mg, 0 2 mg, Intravenous, Q3H PRN, Yumiko Towillie DO  •  insulin lispro (HumaLOG) 100 units/mL subcutaneous injection 1-6 Units, 1-6 Units, Subcutaneous, HS, Tami Handley PA-C  •  insulin lispro (HumaLOG) 100 units/mL subcutaneous injection 2-12 Units, 2-12 Units, Subcutaneous, TID AC, 2 Units at 06/05/23 1626 **AND** Fingerstick Glucose (POCT), , , TID AC, Tami Handley PA-C  •  magnesium Oxide (MAG-OX) tablet 800 mg, 800 mg, Oral, BID, Yumiko Cui DO, 800 mg at 06/06/23 0930  •  metroNIDAZOLE (FLAGYL) tablet 500 mg, 500 mg, Oral, Q8H NELLY, Yumiko Cui DO, 500 mg at 06/06/23 0871  •  oxybutynin (DITROPAN-XL) 24 hr tablet 5 mg, 5 mg, Oral, Daily, Pandi Todhe, DO, 5 mg at 06/06/23 0930  •  oxyCODONE (ROXICODONE) immediate release tablet 10 mg, 10 mg, Oral, Q4H PRN, Pandi Todhe, DO, 10 mg at 06/03/23 1123  •  oxyCODONE (ROXICODONE) IR tablet 5 mg, 5 mg, Oral, Q4H PRN, Pandi Todhe, DO  •  pantoprazole (PROTONIX) EC tablet 40 mg, 40 mg, Oral, Early Morning, Pandi Todhe, DO, 40 mg at 06/06/23 6717  •  saccharomyces boulardii (FLORASTOR) capsule 250 mg, 250 mg, Oral, BID, Bevelyn Edinters, CRNP, 250 mg at 06/06/23 0930  •  traZODone (DESYREL) tablet 150 mg, 150 mg, Oral, HS, Pandi Todhe, DO, 150 mg at 06/05/23 2130    REVIEW OF SYSTEMS:  Constitutional: Negative for fatigue, anorexia, fever, chills, diaphoresis  HENT: Negative for postnasal drip  Eyes: Negative for visual disturbance  Respiratory: Negative for cough, shortness of breath and wheezing  Cardiovascular: Negative for chest pain, palpitations and leg swelling  Gastrointestinal: Negative for abdominal pain, constipation, diarrhea, nausea and vomiting  Genitourinary: No dysuria, hematuria  Endocrine: Negative for polyuria  Musculoskeletal: Negative for arthralgias, back pain and joint swelling  Skin: Negative for rash  Neurological: Negative for focal weakness, headaches, dizziness  Hematological: Negative for easy bruising or bleeding  Psychiatric/Behavioral: Negative for confusion and sleep disturbance  All the systems were reviewed and were negative except as documented on the HPI      PHYSICAL EXAM:  Current Weight: Weight - Scale: (!) 147 kg (323 lb)  First Weight: Weight - Scale: (!) 145 kg (319 lb)  Vitals:    06/06/23 0158 06/06/23 0600 06/06/23 0738 06/06/23 0749   BP: 116/54   130/71   BP Location: Right arm      Pulse: 79  71    Resp: 18  18    Temp: 98 1 °F (36 7 °C)  97 9 °F (36 6 °C)    TempSrc: Oral  Oral    SpO2: 95%  97%    Weight:  (!) 147 kg (323 lb)     Height:           Intake/Output Summary (Last 24 hours) at 6/6/2023 1006  Last data filed at 6/6/2023 0630  Gross per 24 hour   Intake 695 42 ml   Output 2625 ml   Net -1929 58 ml     Physical Exam  Vitals and nursing note reviewed  Constitutional:       General: He is not in acute distress  Appearance: He is well-developed  He is obese  HENT:      Head: Normocephalic and atraumatic  Eyes:      General: No scleral icterus  Conjunctiva/sclera: Conjunctivae normal       Pupils: Pupils are equal, round, and reactive to light  Cardiovascular:      Rate and Rhythm: Normal rate and regular rhythm  Heart sounds: S1 normal and S2 normal  No murmur heard  No friction rub  No gallop  Pulmonary:      Effort: Pulmonary effort is normal  No respiratory distress  Breath sounds: Normal breath sounds  No wheezing or rales  Abdominal:      General: Bowel sounds are normal       Palpations: Abdomen is soft  Tenderness: There is no abdominal tenderness  There is no rebound  Musculoskeletal:         General: Normal range of motion  Cervical back: Normal range of motion and neck supple  Skin:     Findings: No rash  Neurological:      Mental Status: He is alert and oriented to person, place, and time     Psychiatric:         Behavior: Behavior normal            Invasive Devices:      Lab Results:   Results from last 7 days   Lab Units 06/06/23  0438 06/05/23  1940 06/05/23  1100 06/05/23  0515 06/04/23  0757   ALK PHOS U/L 64  --   --  55 62   ALT U/L <3*  --   --  5* 4*   AST U/L 16  --   --  13 11*   BUN mg/dL 9 9 12 13 14   CALCIUM mg/dL 8 8 8 6 8 3* 8 5 8 6   CHLORIDE mmol/L 94* 92* 89* 88* 93*   CO2 mmol/L 29 31 31 32 31   CREATININE mg/dL 0 83 0 90 0 99 1 20 1 21   HEMATOCRIT % 37 7  --   --  38 4 41 6   HEMOGLOBIN g/dL 11 6*  --   --  11 9* 13 5   POTASSIUM mmol/L 3 8 3 5 3 2* 3 0* 3 2*   MAGNESIUM mg/dL 2 4  --   --  2 2 2 4   PHOSPHORUS mg/dL 2 3  --   --  2 4 2 2*   PLATELETS Thousands/uL 259  --   --  227 246   WBC Thousand/uL 9 67  -- --  13 73* 15 87*

## 2023-06-06 NOTE — ASSESSMENT & PLAN NOTE
Presented with generalized abdominal pain X 1 month, significantly worsened day of arrival  Denies nausea/vomiting  Recent admission in January with pancreatitis suspected 2/2 Trulicity, at the time was monitored off antibiotics and advised to follow-up with GI outpatient  Denies alcohol abuse, new medications  · CT a/p: Acute interstitial edematous pancreatitis involving the pancreatic tail with no pancreatic or peripancreatic cyst or fluid collection seen  · Lipase: 76->13   Triglycerides WNL, 54   · GI following, recommending EGD  · RUQ pending  · Continue IV abx, await RUQ and discontinue if no further indications

## 2023-06-06 NOTE — ASSESSMENT & PLAN NOTE
As evidenced by fever (temp 101), and leukocytosis (WBCs 15) on 6/4/2023    · Suspect related to pancreatitis, as no necrosis or abscess seen on imaging and no suspected infectious source  · IV Rocephin/Flagyl, consider D/C with clinical improvement and pending US results  · Monitor CBC/temperature curve

## 2023-06-06 NOTE — PLAN OF CARE
Problem: INFECTION - ADULT  Goal: Absence or prevention of progression during hospitalization  Description: INTERVENTIONS:  - Assess and monitor for signs and symptoms of infection  - Monitor lab/diagnostic results  - Monitor all insertion sites, i e  indwelling lines, tubes, and drains  - Monitor endotracheal if appropriate and nasal secretions for changes in amount and color  - [place] appropriate cooling/warming therapies per order  - Administer medications as ordered  - Instruct and encourage patient and family to use good hand hygiene technique  - Identify and instruct in appropriate isolation precautions for identified infection/condition  Outcome: Not Progressing  Goal: Absence of fever/infection during neutropenic period  Description: INTERVENTIONS:  - Monitor WBC    Outcome: Not Progressing     Problem: SAFETY ADULT  Goal: Maintain or return to baseline ADL function  Description: INTERVENTIONS:  -  Assess patient's ability to carry out ADLs; assess patient's baseline for ADL function and identify physical deficits which impact ability to perform ADLs (bathing, care of mouth/teeth, toileting, grooming, dressing, etc )  - Assess/evaluate cause of self-care deficits   - Assess range of motion  - Assess patient's mobility; develop plan if impaired  - Assess patient's need for assistive devices and provide as appropriate  - Encourage maximum independence but intervene and supervise when necessary  - Involve family in performance of ADLs  - Assess for home care needs following discharge   - Consider OT consult to assist with ADL evaluation and planning for discharge  - Provide patient education as appropriate  Outcome: Not Progressing  Goal: Maintains/Returns to pre admission functional level  Description: INTERVENTIONS:  - Perform BMAT or MOVE assessment daily    - Set and communicate daily mobility goal to care team and patient/family/caregiver     - Collaborate with rehabilitation services on mobility goals if consulted  - Perform Range of Motion prn times a day  - Reposition patient every self hours  - Dangle patient prn times a day  - Stand patient prn times a day  - Ambulate patient prn times a day  - Out of bed to chair prn times a day   - Out of bed for meals prn times a day  - Out of bed for toileting  - Record patient progress and toleration of activity level   Outcome: Not Progressing     Problem: DISCHARGE PLANNING  Goal: Discharge to home or other facility with appropriate resources  Description: INTERVENTIONS:  - Identify barriers to discharge w/patient and caregiver  - Arrange for needed discharge resources and transportation as appropriate  - Identify discharge learning needs (meds, wound care, etc )  - Arrange for interpretive services to assist at discharge as needed  - Refer to Case Management Department for coordinating discharge planning if the patient needs post-hospital services based on physician/advanced practitioner order or complex needs related to functional status, cognitive ability, or social support system  Outcome: Not Progressing     Problem: Knowledge Deficit  Goal: Patient/family/caregiver demonstrates understanding of disease process, treatment plan, medications, and discharge instructions  Description: Complete learning assessment and assess knowledge base    Interventions:  - Provide teaching at level of understanding  - Provide teaching via preferred learning methods  Outcome: Not Progressing     Problem: MOBILITY - ADULT  Goal: Maintain or return to baseline ADL function  Description: INTERVENTIONS:  -  Assess patient's ability to carry out ADLs; assess patient's baseline for ADL function and identify physical deficits which impact ability to perform ADLs (bathing, care of mouth/teeth, toileting, grooming, dressing, etc )  - Assess/evaluate cause of self-care deficits   - Assess range of motion  - Assess patient's mobility; develop plan if impaired  - Assess patient's need for assistive devices and provide as appropriate  - Encourage maximum independence but intervene and supervise when necessary  - Involve family in performance of ADLs  - Assess for home care needs following discharge   - Consider OT consult to assist with ADL evaluation and planning for discharge  - Provide patient education as appropriate  Outcome: Not Progressing  Goal: Maintains/Returns to pre admission functional level  Description: INTERVENTIONS:  - Perform BMAT or MOVE assessment daily    - Set and communicate daily mobility goal to care team and patient/family/caregiver  - Collaborate with rehabilitation services on mobility goals if consulted  - Perform Range of Motion prn times a day  - Reposition patient every self hours    - Dangle patient prn times a day  - Stand patient prn times a day  - Ambulate patient prn times a day  - Out of bed to chair prn times a day   - Out of bed for meals prn times a day  - Out of bed for toileting  - Record patient progress and toleration of activity level   Outcome: Not Progressing

## 2023-06-06 NOTE — ANESTHESIA POSTPROCEDURE EVALUATION
Post-Op Assessment Note    There were no known notable events for this encounter      BP      Temp     Pulse     Resp      SpO2

## 2023-06-06 NOTE — ASSESSMENT & PLAN NOTE
Wt Readings from Last 3 Encounters:   06/06/23 (!) 147 kg (323 lb)   04/07/22 (!) 150 kg (331 lb)   03/21/22 (!) 150 kg (331 lb 6 4 oz)     No acute exacerbation at this time  Demadex, Aldactone recently stopped with addition of Lopressor by cardiologist outpatient  • Echo (1/2023): EF 60 to 50%, normal systolic and diastolic function    • Hold Lopressor with soft BP  • Daily weights, I/Os  • Low-salt diet once cleared for oral intake

## 2023-06-06 NOTE — ANESTHESIA PREPROCEDURE EVALUATION
Procedure:  EGD    Relevant Problems   CARDIO   (+) Benign essential hypertension   (+) Chest pain, atypical   (+) Chronic diastolic congestive heart failure (HCC)   (+) Dyspnea on exertion   (+) Hypertension   (+) Paroxysmal atrial fibrillation (HCC)      ENDO   (+) Type 2 diabetes mellitus without complication, without long-term current use of insulin (HCC)      GI/HEPATIC   (+) GERD without esophagitis   (+) Idiopathic acute pancreatitis without infection or necrosis      /RENAL   (+) [place] (acute kidney injury) (HCC)   (+) BPH (benign prostatic hyperplasia)      NEURO/PSYCH   (+) Depression      PULMONARY   (+) Asthma   (+) Chronic obstructive pulmonary disease (HCC)   (+) Dyspnea on exertion   (+) Sleep apnea      Cardiovascular and Mediastinum   (+) Chronic heart failure with preserved ejection fraction (HCC)      Other   (+) Hyponatremia   (+) Morbid obesity with BMI of 45 0-49 9, adult (HCC)   (+) [facility] response [facility] ([facility]     1/13/2023   Left Ventricle: Left ventricle is normal in size  Wall thickness is   normal  Systolic function is normal with an ejection fraction of 60-65%  There is normal diastolic function  •  Left Atrium: Left atrium cavity size is normal    •  Right Ventricle: Right ventricle cavity is mildly dilated  Systolic   function is mildly reduced  •  IVC/SVC: The inferior vena cava demonstrates a diameter of <=21 mm and   collapses >50%; therefore, the right atrial pressure is estimated at 0-5   mmHg  •  Pericardium: There is no pericardial effusion  •  Pulmonic Valve: The estimated pulmonary artery systolic pressure is   80 4 mmHg  Physical Exam    Airway    Mallampati score: III  TM Distance: >3 FB  Neck ROM: full     Dental   Comment: Multiple chipped and missing teeth   No loose,     Cardiovascular  Rhythm: regular, Rate: normal, Cardiovascular exam normal    Pulmonary  Pulmonary exam normal Breath sounds clear to auscultation,     Other Findings        Anesthesia Plan  ASA Score- 3     Anesthesia Type- IV sedation with anesthesia with ASA Monitors  Additional Monitors:   Airway Plan:           Plan Factors-Exercise tolerance (METS): <4 METS  Chart reviewed  EKG reviewed  Existing labs reviewed  Patient summary reviewed  Patient is not a current smoker  Induction-     Postoperative Plan-     Informed Consent- Anesthetic plan and risks discussed with patient  I personally reviewed this patient with the CRNA  Discussed and agreed on the Anesthesia Plan with the CRNA  Kellen Salinas

## 2023-06-06 NOTE — ASSESSMENT & PLAN NOTE
Creatinine elevated at 1 42 on admission   Baseline Cr around 0 7-1 0   · Most likely pre-renal with pancreatitis, dehydration   · S/p IVFs as above  · resolved

## 2023-06-06 NOTE — ANESTHESIA POSTPROCEDURE EVALUATION
Post-Op Assessment Note    CV Status:  Stable  Pain Score: 0    Pain management: adequate     Mental Status:  Alert and awake   Hydration Status:  Stable   PONV Controlled:  None   Airway Patency:  Patent      Post Op Vitals Reviewed: Yes      Staff: CRNA         There were no known notable events for this encounter      BP   138/62   Temp  97 6   Pulse  66   Resp  18    SpO2   98

## 2023-06-06 NOTE — CASE MANAGEMENT
Case Management Assessment & Discharge Planning Note    Patient name Carvin Nageotte  Location /-01 MRN [id number]  : 1960 Date 2023       Current Admission Date: 6/3/2023  Current Admission Diagnosis:Idiopathic acute pancreatitis without infection or necrosis   Patient Active Problem List    Diagnosis Date Noted   • Constipation by delayed colonic transit 2022   • Hx of adenomatous colonic polyps 2022   • Overweight 2022   • Paroxysmal atrial fibrillation ([place] [address] ) 2022   • SIRS (systemic inflammatory response syndrome) ([place] [address] ) 2022   • Idiopathic acute pancreatitis without infection or necrosis 2022   • Type 2 diabetes mellitus without complication, without long-term current use of insulin ([place] [address] ) 2022   • Other constipation 2022   • Chronic diastolic congestive heart failure ([place] [address] ) 2020   • Vitamin D deficiency 2019   • Benign essential hypertension 2019   • ISRAEL (acute kidney injury) ([place] [address] ) 2019   • Hypokalemia 2019   • Hyponatremia 2019   • Urgency of urination 2019   • Hypertension 2019   • Depression 2019   • Chronic heart failure with preserved ejection fraction ([place] [address] ) 2019   • Morbid obesity with BMI of 45 0-49 9, adult ([place] [address] ) 2019   • Rosacea 10/25/2018   • Acute bronchitis 10/04/2018   • Chronic left hip pain 2018   • Chronic pain of left knee 2018   • GERD without esophagitis 2018   • Dyspnea on exertion 2018   • Chest pain, atypical 2018   • Morbid obesity with body mass index of 50 or higher ([place] [address] ) 2018   • Hematospermia 2018   • Erectile dysfunction 2017   • BPH (benign prostatic hyperplasia) 2017   • Sleep apnea 2017   • Chronic obstructive pulmonary disease ([place] [address] ) 2013   • Asthma 2013      LOS (days): 3  Geometric Mean LOS (GMLOS) (days): 3 10  Days to GMLOS:0     OBJECTIVE:    Risk of Unplanned Readmission Score: 18 16         Current admission status: Inpatient       Preferred Pharmacy:   [facility] [address] [facility], [address] [place] [address] [address] [place] [id number]  Phone: [phone] Fax: [phone] [address] [facility] [address]  Phone: [phone] Fax: [phone]    [facility] [address] [address]  Phone: [phone] Fax: [phone] [facility] [address] [facility], [address] [place] [address]  Phone: [phone] Fax: [phone]    Primary Care Provider: Haily Cuevas DO    Primary Insurance: MEDICARE  Secondary Insurance:     ASSESSMENT:  [employer] [place] Proxies    There are no active Health Care Proxies on file  Readmission Root Cause  30 Day Readmission: No    Patient Information  Admitted from[de-identified] Home  Mental Status: Alert  During Assessment patient was accompanied by: Spouse  Assessment information provided by[de-identified] Patient, Spouse  Primary Caregiver: Spouse  Support Systems: Self, Spouse/significant other  Home entry access options  Select all that apply : Stairs  Number of steps to enter home  : 1  Do the steps have railings?: Yes  Type of Current Residence: [address] [place] [address] home  Upon entering residence, is there a bedroom on the main floor (no further steps)?: Yes  Upon entering residence, is there a bathroom on the main floor (no further steps)?: Yes  In the last 12 months, was there a time when you were not able to pay the mortgage or rent on time?: No  In the last 12 months, was there a time when you did not have a steady place to sleep or slept in a shelter (including now)?: No  Homeless/housing insecurity resource given?: N/A  Living Arrangements: Lives w/ Spouse/significant other    Activities of Daily Living Prior to Admission  Functional Status: Independent  Completes ADLs independently?: No  Level of ADL dependence: Assistance  Ambulates independently?: Yes  Does patient use assisted devices?: Yes  Assisted Devices (DME) used: Straight Cane, Nebulizer  Does patient currently own DME?: Yes  What DME does the patient currently own?: Allan Eloisa  Does patient have a history of Outpatient Therapy (PT/OT)?: No  Does the patient have a history of Short-Term Rehab?: Yes  Does patient have a history of HHC?: Yes  Does patient currently have [facility]?: No         Patient Information Continued  Income Source: Pension/FPC  Does patient have prescription coverage?: Yes  Within the past 12 months, you worried that your food would run out before you got the money to buy more : Never true  Within the past 12 months, the food you bought just didn't last and you didn't have money to get more : Never true  Food insecurity resource given?: N/A  Does patient receive dialysis treatments?: No  Does patient have a history of substance abuse?: No  Does patient have a history of Mental Health Diagnosis?: No         Means of Transportation  Means of Transport to Appts[de-identified] Drives Self  In the past 12 months, has lack of transportation kept you from medical appointments or from getting medications?: No  In the past 12 months, has lack of transportation kept you from meetings, work, or from getting things needed for daily living?: No  Was application for public transport provided?: N/A        DISCHARGE DETAILS:    Discharge planning discussed with[de-identified] patient  Freedom of Choice: Yes  Comments - Freedom of Choice: Cm met with patien to review role of Cm  Patient lives with wife in a ranch style home  Patient anticipated for dc back home when stable    CM contacted family/caregiver?: Yes  Were Treatment Team discharge recommendations reviewed with patient/caregiver?: Yes  Did patient/caregiver verbalize understanding of patient care needs?: Yes  Were patient/caregiver advised of the risks associated with not following Treatment Team discharge recommendations?: Yes    Contacts  Patient Contacts: spouse, Majo Roche  Relationship to Patient[de-identified] Family  Contact Method:  In Person  Reason/Outcome: Continuity of Care, Discharge Planning

## 2023-06-06 NOTE — PROGRESS NOTES
Tammy U  64   Progress Note  Name: Bea Muniz I  MRN: [id number]  Unit/Bed#: -01 I Date of Admission: 6/3/2023   Date of Service: 6/6/2023 I Hospital Day: 3    Assessment/Plan   SIRS (systemic inflammatory response syndrome) ([facility] )  Assessment & Plan  As evidenced by fever (temp 101), and leukocytosis (WBCs 15) on 6/4/2023  · Suspect related to pancreatitis, as no necrosis or abscess seen on imaging and no suspected infectious source  · IV Rocephin/Flagyl, consider D/C with clinical improvement and pending US results  · Monitor CBC/temperature curve    Paroxysmal atrial fibrillation (HCC)  Assessment & Plan  · Currently rate controlled  · Continue Lopressor, Eliquis    Type 2 diabetes mellitus without complication, without long-term current use of insulin [facility])  Assessment & Plan  Lab Results   Component Value Date    HGBA1C 6 4 (H) 01/15/2023       Recent Labs     06/05/23  1146 06/05/23  1559 06/05/23  2230 06/06/23  0706   POCGLU 165* 150* 133 130       Blood Sugar Average: Last 72 hrs:  (P) 166 [id number]   · Hold home oral diabetic medications while inpatient   · SSI coverage with Accu-Cheks ACHS  · Likely will indefinitely hold Trulicity     Hypokalemia  Assessment & Plan  · repleted    ISRAEL (acute kidney injury) ([facility] )  Assessment & Plan  Creatinine elevated at 1 42 on admission  Baseline Cr around 0 7-1 0   · Most likely pre-renal with pancreatitis, dehydration   · S/p IVFs as above  · resolved    Sleep apnea  Assessment & Plan  · Continue CPAP qHS     Chronic heart failure with preserved ejection fraction [facility])  Assessment & Plan  Wt Readings from Last 3 Encounters:   06/06/23 (!) 147 kg (323 lb)   04/07/22 (!) 150 kg (331 lb)   03/21/22 (!) 150 kg (331 lb 6 4 oz)     No acute exacerbation at this time  Demadex, Aldactone recently stopped with addition of Lopressor by cardiologist outpatient    • Echo (1/2023): EF 60 to 62%, normal systolic and diastolic function  • Hold Lopressor with soft BP  • Daily weights, I/Os  • Low-salt diet once cleared for oral intake    BPH (benign prostatic hyperplasia)  Assessment & Plan  · Continue formulary equivalent oxybutynin 5 mg    * Idiopathic acute pancreatitis without infection or necrosis  Assessment & Plan  Presented with generalized abdominal pain X 1 month, significantly worsened day of arrival  Denies nausea/vomiting  Recent admission in January with pancreatitis suspected 2/2 Trulicity, at the time was monitored off antibiotics and advised to follow-up with GI outpatient  Denies alcohol abuse, new medications  · CT a/p: Acute interstitial edematous pancreatitis involving the pancreatic tail with no pancreatic or peripancreatic cyst or fluid collection seen  · Lipase: 76->13  Triglycerides WNL, 54   · GI following, recommending EGD  · RUQ pending  · Continue IV abx, await RUQ and discontinue if no further indications             VTE Pharmacologic Prophylaxis:   Pharmacologic: Apixaban (Eliquis)  Mechanical VTE Prophylaxis in Place: Yes    Patient Centered Rounds: I have performed bedside rounds with nursing staff today  Current Length of Stay: 3 day(s)    Current Patient Status: Inpatient   Certification Statement: The patient will continue to require additional inpatient hospital stay due to pending EGD, RUQ    Discharge Plan: 24-48 hours    Code Status: Level 1 - Full Code      Subjective:   No events overnight  Denies fevers, chills, nausea or vomiting  Continues to have pain but appears improved  Objective:     Vitals:   Temp (24hrs), Av 1 °F (36 7 °C), Min:97 5 °F (36 4 °C), Max:99 4 °F (37 4 °C)    Temp:  [97 5 °F (36 4 °C)-99 4 °F (37 4 °C)] 97 9 °F (36 6 °C)  HR:  [71-84] 71  Resp:  [18-20] 18  BP: ()/(54-71) 130/71  SpO2:  [94 %-97 %] 97 %  Body mass index is 47 7 kg/m²  Input and Output Summary (last 24 hours):        Intake/Output Summary (Last 24 hours) at 2023 1028  Last data filed at 6/6/2023 0630  Gross per 24 hour   Intake 695 42 ml   Output 2625 ml   Net -1929 58 ml       Physical Exam:     Physical Exam  Constitutional:       General: He is not in acute distress  Appearance: He is obese  He is not ill-appearing  Eyes:      General: No scleral icterus  Conjunctiva/sclera: Conjunctivae normal    Cardiovascular:      Rate and Rhythm: Normal rate and regular rhythm  Pulmonary:      Effort: Pulmonary effort is normal  No respiratory distress  Comments: Decreased breath sounds  Abdominal:      General: There is no distension  Tenderness: There is abdominal tenderness ( mild)  Comments: Mild diffuse abdominal on palpation   Musculoskeletal:         General: No swelling or tenderness  Skin:     General: Skin is warm  Neurological:      Mental Status: He is alert  Mental status is at baseline  Psychiatric:         Mood and Affect: Mood normal          Behavior: Behavior normal        Additional Data:     Labs:    Results from last 7 days   Lab Units 06/06/23  0438 06/04/23  0757 06/03/23  0702   EOS PCT %  --   --  0   HEMATOCRIT % 37 7   < > 43 6   HEMOGLOBIN g/dL 11 6*   < > 14 5   LYMPHS PCT %  --   --  7*   MONOS PCT %  --   --  9   NEUTROS PCT %  --   --  84*   PLATELETS Thousands/uL 259   < > 258   WBC Thousand/uL 9 67   < > 12 48*    < > = values in this interval not displayed       Results from last 7 days   Lab Units 06/06/23  0438   ANION GAP mmol/L 9   ALBUMIN g/dL 3 2*   ALK PHOS U/L 64   ALT U/L <3*   AST U/L 16   BUN mg/dL 9   CALCIUM mg/dL 8 8   CHLORIDE mmol/L 94*   CO2 mmol/L 29   CREATININE mg/dL 0 83   GLUCOSE RANDOM mg/dL 129   POTASSIUM mmol/L 3 8   SODIUM mmol/L 132*   TOTAL BILIRUBIN mg/dL 0 83     Results from last 7 days   Lab Units 06/03/23  0702   INR  1 10     Results from last 7 days   Lab Units 06/06/23  0706 06/05/23  2230 06/05/23  1559 06/05/23  1146 06/05/23  4215 06/04/23  2317 06/04/23  1709 06/04/23  1322 06/04/23  0735 "06/04/23  0624 06/03/23  2337 06/03/23  2043   POC GLUCOSE mg/dl 130 133 150* 165* 150* 173* 203* 197* 141* 137 130 177*         Results from last 7 days   Lab Units 06/05/23  0510 06/04/23  0757 06/03/23  0702   LACTIC ACID mmol/L  --   --  2 0   PROCALCITONIN ng/ml 0 98* 0 79*  --            * I Have Reviewed All Lab Data Listed Above  * Additional Pertinent Lab Tests Reviewed: All Labs For Current Hospital Admission Reviewed    Imaging:    XR chest 1 view portable    Result Date: 6/3/2023  Impression: No acute cardiopulmonary disease  Workstation performed: [id number]     CT abdomen pelvis with contrast    Result Date: 6/3/2023  Impression: Acute interstitial edematous pancreatitis involving the pancreatic tail with no pancreatic or peripancreatic cyst or fluid collection seen  This examination was marked \"immediate notification\" in Epic in order to begin the standard process by which the radiology reading room liaison alerts the referring practitioner  Workstation performed: [id number]       Recent Cultures (last 7 days):     Results from last 7 days   Lab Units 06/04/23  1647   BLOOD CULTURE  No Growth at 24 hrs         Last 24 Hours Medication List:   Current Facility-Administered Medications   Medication Dose Route Frequency Provider Last Rate   • acetaminophen  650 mg Oral Q6H PRN Pandi Todhe, DO     • apixaban  5 mg Oral BID Pandi Todhe, DO     • atorvastatin  20 mg Oral Daily Pandi Todhe, DO     • buPROPion  300 mg Oral Daily Pandi Todhe, DO     • carbidopa-levodopa  1 tablet Oral TID Pandi Todhe, DO     • cefTRIAXone  2,000 mg Intravenous Q24H Pandi Todhe, DO 2,000 mg (06/05/23 1607)   • gabapentin  300 mg Oral Daily Pandi Todhe, DO     • HYDROmorphone  0 2 mg Intravenous Q3H PRN Pandi Todhe, DO     • insulin lispro  1-6 Units Subcutaneous HS Tami Handley PA-C     • insulin lispro  2-12 Units Subcutaneous TID AC Tami Handley PA-C     • magnesium Oxide  800 mg Oral BID Pandi Todhe, DO     • " metroNIDAZOLE  500 mg Oral Q8H [address] Pandi Todhe, DO     • oxybutynin  5 mg Oral Daily Pandi Todhe, DO     • oxyCODONE  10 mg Oral Q4H PRN Pandi Todhe, DO     • oxyCODONE  5 mg Oral Q4H PRN Pandi Todhe, DO     • pantoprazole  40 mg Oral Early Morning Pandi Todhe, DO     • saccharomyces boulardii  250 mg Oral BID SELVIN Hilliard     • traZODone  150 mg Oral HS Nahedi Tojanye, DO          Today, Patient Was Seen By: Ayana Singh MD    ** Please Note: Dictation voice to text software may have been used in the creation of this document   **

## 2023-06-06 NOTE — ASSESSMENT & PLAN NOTE
Lab Results   Component Value Date    HGBA1C 6 4 (H) 01/15/2023       Recent Labs     06/05/23  1146 06/05/23  1559 06/05/23  2230 06/06/23  0706   POCGLU 165* 150* 133 130       Blood Sugar Average: Last 72 hrs:  (P) 166 [id number]   · Hold home oral diabetic medications while inpatient   · SSI coverage with Accu-Cheks ACHS  · Likely will indefinitely hold Trulicity

## 2023-06-06 NOTE — PLAN OF CARE
Problem: PAIN - ADULT  Goal: Verbalizes/displays adequate comfort level or baseline comfort level  Description: Interventions:  - Encourage patient to monitor pain and request assistance  - Assess pain using appropriate pain scale  - Administer analgesics based on type and severity of pain and evaluate response  - Implement non-pharmacological measures as appropriate and evaluate response  - Consider cultural and social influences on pain and pain management  - Notify physician/advanced practitioner if interventions unsuccessful or patient reports new pain  Outcome: Progressing     Problem: INFECTION - ADULT  Goal: Absence or prevention of progression during hospitalization  Description: INTERVENTIONS:  - Assess and monitor for signs and symptoms of infection  - Monitor lab/diagnostic results  - Monitor all insertion sites, i e  indwelling lines, tubes, and drains  - Monitor endotracheal if appropriate and nasal secretions for changes in amount and color  - [place] appropriate cooling/warming therapies per order  - Administer medications as ordered  - Instruct and encourage patient and family to use good hand hygiene technique  - Identify and instruct in appropriate isolation precautions for identified infection/condition  Outcome: Progressing  Goal: Absence of fever/infection during neutropenic period  Description: INTERVENTIONS:  - Monitor WBC    Outcome: Progressing     Problem: SAFETY ADULT  Goal: Patient will remain free of falls  Description: INTERVENTIONS:  - Educate patient/family on patient safety including physical limitations  - Instruct patient to call for assistance with activity   - Consult OT/PT to assist with strengthening/mobility   - Keep Call bell within reach  - Keep bed low and locked with side rails adjusted as appropriate  - Keep care items and personal belongings within reach  - Initiate and maintain comfort rounds  - Make Fall Risk Sign visible to staff  - Apply yellow socks and bracelet for high fall risk patients  - Consider moving patient to room near nurses station  Outcome: Progressing  Goal: Maintain or return to baseline ADL function  Description: INTERVENTIONS:  -  Assess patient's ability to carry out ADLs; assess patient's baseline for ADL function and identify physical deficits which impact ability to perform ADLs (bathing, care of mouth/teeth, toileting, grooming, dressing, etc )  - Assess/evaluate cause of self-care deficits   - Assess range of motion  - Assess patient's mobility; develop plan if impaired  - Assess patient's need for assistive devices and provide as appropriate  - Encourage maximum independence but intervene and supervise when necessary  - Involve family in performance of ADLs  - Assess for home care needs following discharge   - Consider OT consult to assist with ADL evaluation and planning for discharge  - Provide patient education as appropriate  Outcome: Progressing  Goal: Maintains/Returns to pre admission functional level  Description: INTERVENTIONS:  - Perform BMAT or MOVE assessment daily    - Set and communicate daily mobility goal to care team and patient/family/caregiver     - Collaborate with rehabilitation services on mobility goals if consulted  - Out of bed for toileting  - Record patient progress and toleration of activity level   Outcome: Progressing     Problem: DISCHARGE PLANNING  Goal: Discharge to home or other facility with appropriate resources  Description: INTERVENTIONS:  - Identify barriers to discharge w/patient and caregiver  - Arrange for needed discharge resources and transportation as appropriate  - Identify discharge learning needs (meds, wound care, etc )  - Arrange for interpretive services to assist at discharge as needed  - Refer to Case Management Department for coordinating discharge planning if the patient needs post-hospital services based on physician/advanced practitioner order or complex needs related to functional status, cognitive ability, or social support system  Outcome: Progressing     Problem: Knowledge Deficit  Goal: Patient/family/caregiver demonstrates understanding of disease process, treatment plan, medications, and discharge instructions  Description: Complete learning assessment and assess knowledge base  Interventions:  - Provide teaching at level of understanding  - Provide teaching via preferred learning methods  Outcome: Progressing     Problem: MOBILITY - ADULT  Goal: Maintain or return to baseline ADL function  Description: INTERVENTIONS:  -  Assess patient's ability to carry out ADLs; assess patient's baseline for ADL function and identify physical deficits which impact ability to perform ADLs (bathing, care of mouth/teeth, toileting, grooming, dressing, etc )  - Assess/evaluate cause of self-care deficits   - Assess range of motion  - Assess patient's mobility; develop plan if impaired  - Assess patient's need for assistive devices and provide as appropriate  - Encourage maximum independence but intervene and supervise when necessary  - Involve family in performance of ADLs  - Assess for home care needs following discharge   - Consider OT consult to assist with ADL evaluation and planning for discharge  - Provide patient education as appropriate  Outcome: Progressing  Goal: Maintains/Returns to pre admission functional level  Description: INTERVENTIONS:  - Perform BMAT or MOVE assessment daily    - Set and communicate daily mobility goal to care team and patient/family/caregiver  - Collaborate with rehabilitation services on mobility goals if consulted  - Out of bed for toileting  - Record patient progress and toleration of activity level   Outcome: Progressing     Problem: Nutrition/Hydration-ADULT  Goal: Nutrient/Hydration intake appropriate for improving, restoring or maintaining nutritional needs  Description: Monitor and assess patient's nutrition/hydration status for malnutrition   Collaborate with interdisciplinary team and initiate plan and interventions as ordered  Monitor patient's weight and dietary intake as ordered or per policy  Utilize nutrition screening tool and intervene as necessary  Determine patient's food preferences and provide high-protein, high-caloric foods as appropriate       INTERVENTIONS:  - Monitor oral intake, urinary output, labs, and treatment plans  - Assess nutrition and hydration status and recommend course of action  - Evaluate amount of meals eaten  - Assist patient with eating if necessary   - Allow adequate time for meals  - Recommend/ encourage appropriate diets, oral nutritional supplements, and vitamin/mineral supplements  - Order, calculate, and assess calorie counts as needed  - Recommend, monitor, and adjust tube feedings and TPN/PPN based on assessed needs  - Assess need for intravenous fluids  - Provide specific nutrition/hydration education as appropriate  - Include patient/family/caregiver in decisions related to nutrition  Outcome: Progressing     Problem: Prexisting or High Potential for Compromised Skin Integrity  Goal: Skin integrity is maintained or improved  Description: INTERVENTIONS:  - Identify patients at risk for skin breakdown  - Assess and monitor skin integrity  - Assess and monitor nutrition and hydration status  - Monitor labs   - Assess for incontinence   - Turn and reposition patient  - Assist with mobility/ambulation  - Relieve pressure over bony prominences  - Avoid friction and shearing  - Provide appropriate hygiene as needed including keeping skin clean and dry  - Evaluate need for skin moisturizer/barrier cream  - Collaborate with interdisciplinary team   - Patient/family teaching  - Consider wound care consult   Outcome: Progressing

## 2023-06-07 PROBLEM — R26.2 AMBULATORY DYSFUNCTION: Status: ACTIVE | Noted: 2023-06-07

## 2023-06-07 PROBLEM — E87.6 HYPOKALEMIA: Status: RESOLVED | Noted: 2019-07-05 | Resolved: 2023-06-07

## 2023-06-07 LAB
ALBUMIN SERPL BCP-MCNC: 3.2 G/DL (ref 3.5–5)
ALP SERPL-CCNC: 60 U/L (ref 34–104)
ALT SERPL W P-5'-P-CCNC: 3 U/L (ref 7–52)
ANION GAP SERPL CALCULATED.3IONS-SCNC: 8 MMOL/L (ref 4–13)
AST SERPL W P-5'-P-CCNC: 13 U/L (ref 13–39)
ATRIAL RATE: 135 BPM
BASOPHILS # BLD AUTO: 0.05 THOUSANDS/ÂΜL (ref 0–0.1)
BASOPHILS NFR BLD AUTO: 1 % (ref 0–1)
BILIRUB SERPL-MCNC: 0.71 MG/DL (ref 0.2–1)
BUN SERPL-MCNC: 11 MG/DL (ref 5–25)
CALCIUM ALBUM COR SERPL-MCNC: 9.2 MG/DL (ref 8.3–10.1)
CALCIUM SERPL-MCNC: 8.6 MG/DL (ref 8.4–10.2)
CHLORIDE SERPL-SCNC: 97 MMOL/L (ref 96–108)
CO2 SERPL-SCNC: 27 MMOL/L (ref 21–32)
CREAT SERPL-MCNC: 0.76 MG/DL (ref 0.6–1.3)
EOSINOPHIL # BLD AUTO: 0.22 THOUSAND/ÂΜL (ref 0–0.61)
EOSINOPHIL NFR BLD AUTO: 3 % (ref 0–6)
ERYTHROCYTE [DISTWIDTH] IN BLOOD BY AUTOMATED COUNT: 13.6 % (ref 11.6–15.1)
GFR SERPL CREATININE-BSD FRML MDRD: 95 ML/MIN/1.73SQ M
GLUCOSE SERPL-MCNC: 121 MG/DL (ref 65–140)
GLUCOSE SERPL-MCNC: 126 MG/DL (ref 65–140)
GLUCOSE SERPL-MCNC: 133 MG/DL (ref 65–140)
GLUCOSE SERPL-MCNC: 156 MG/DL (ref 65–140)
GLUCOSE SERPL-MCNC: 162 MG/DL (ref 65–140)
HCT VFR BLD AUTO: 37.3 % (ref 36.5–49.3)
HGB BLD-MCNC: 12 G/DL (ref 12–17)
IMM GRANULOCYTES # BLD AUTO: 0.05 THOUSAND/UL (ref 0–0.2)
IMM GRANULOCYTES NFR BLD AUTO: 1 % (ref 0–2)
LYMPHOCYTES # BLD AUTO: 0.81 THOUSANDS/ÂΜL (ref 0.6–4.47)
LYMPHOCYTES NFR BLD AUTO: 10 % (ref 14–44)
MAGNESIUM SERPL-MCNC: 2.4 MG/DL (ref 1.9–2.7)
MCH RBC QN AUTO: 24.3 PG (ref 26.8–34.3)
MCHC RBC AUTO-ENTMCNC: 32.2 G/DL (ref 31.4–37.4)
MCV RBC AUTO: 76 FL (ref 82–98)
MONOCYTES # BLD AUTO: 0.9 THOUSAND/ÂΜL (ref 0.17–1.22)
MONOCYTES NFR BLD AUTO: 11 % (ref 4–12)
NEUTROPHILS # BLD AUTO: 6.05 THOUSANDS/ÂΜL (ref 1.85–7.62)
NEUTS SEG NFR BLD AUTO: 74 % (ref 43–75)
NRBC BLD AUTO-RTO: 0 /100 WBCS
P AXIS: 66 DEGREES
PLATELET # BLD AUTO: 284 THOUSANDS/UL (ref 149–390)
PMV BLD AUTO: 9.6 FL (ref 8.9–12.7)
POTASSIUM SERPL-SCNC: 3.8 MMOL/L (ref 3.5–5.3)
PR INTERVAL: 118 MS
PROT SERPL-MCNC: 6.6 G/DL (ref 6.4–8.4)
QRS AXIS: -67 DEGREES
QRSD INTERVAL: 130 MS
QT INTERVAL: 316 MS
QTC INTERVAL: 474 MS
RBC # BLD AUTO: 4.94 MILLION/UL (ref 3.88–5.62)
SODIUM SERPL-SCNC: 132 MMOL/L (ref 135–147)
T WAVE AXIS: -5 DEGREES
VENTRICULAR RATE: 135 BPM
WBC # BLD AUTO: 8.08 THOUSAND/UL (ref 4.31–10.16)

## 2023-06-07 PROCEDURE — 80053 COMPREHEN METABOLIC PANEL: CPT | Performed by: INTERNAL MEDICINE

## 2023-06-07 PROCEDURE — 83735 ASSAY OF MAGNESIUM: CPT | Performed by: INTERNAL MEDICINE

## 2023-06-07 PROCEDURE — 93005 ELECTROCARDIOGRAM TRACING: CPT

## 2023-06-07 PROCEDURE — 99232 SBSQ HOSP IP/OBS MODERATE 35: CPT | Performed by: NURSE PRACTITIONER

## 2023-06-07 PROCEDURE — 82948 REAGENT STRIP/BLOOD GLUCOSE: CPT

## 2023-06-07 PROCEDURE — 97163 PT EVAL HIGH COMPLEX 45 MIN: CPT

## 2023-06-07 PROCEDURE — 93010 ELECTROCARDIOGRAM REPORT: CPT | Performed by: INTERNAL MEDICINE

## 2023-06-07 PROCEDURE — 97167 OT EVAL HIGH COMPLEX 60 MIN: CPT

## 2023-06-07 PROCEDURE — 94760 N-INVAS EAR/PLS OXIMETRY 1: CPT

## 2023-06-07 PROCEDURE — 85025 COMPLETE CBC W/AUTO DIFF WBC: CPT | Performed by: INTERNAL MEDICINE

## 2023-06-07 PROCEDURE — 97116 GAIT TRAINING THERAPY: CPT

## 2023-06-07 PROCEDURE — 99232 SBSQ HOSP IP/OBS MODERATE 35: CPT | Performed by: PHYSICIAN ASSISTANT

## 2023-06-07 PROCEDURE — 94660 CPAP INITIATION&MGMT: CPT

## 2023-06-07 RX ORDER — OXYCODONE HYDROCHLORIDE 10 MG/1
10 TABLET ORAL EVERY 6 HOURS PRN
Status: DISCONTINUED | OUTPATIENT
Start: 2023-06-07 | End: 2023-06-08

## 2023-06-07 RX ORDER — OXYCODONE HYDROCHLORIDE 5 MG/1
5 TABLET ORAL EVERY 6 HOURS PRN
Status: DISCONTINUED | OUTPATIENT
Start: 2023-06-07 | End: 2023-06-08 | Stop reason: HOSPADM

## 2023-06-07 RX ADMIN — METOPROLOL TARTRATE 25 MG: 25 TABLET, FILM COATED ORAL at 21:22

## 2023-06-07 RX ADMIN — INSULIN LISPRO 2 UNITS: 100 INJECTION, SOLUTION INTRAVENOUS; SUBCUTANEOUS at 17:20

## 2023-06-07 RX ADMIN — ATORVASTATIN CALCIUM 20 MG: 20 TABLET, FILM COATED ORAL at 08:53

## 2023-06-07 RX ADMIN — CARBIDOPA AND LEVODOPA 1 TABLET: 25; 100 TABLET ORAL at 21:13

## 2023-06-07 RX ADMIN — GABAPENTIN 300 MG: 300 CAPSULE ORAL at 08:53

## 2023-06-07 RX ADMIN — OXYCODONE HYDROCHLORIDE 10 MG: 10 TABLET ORAL at 08:52

## 2023-06-07 RX ADMIN — Medication 800 MG: at 08:53

## 2023-06-07 RX ADMIN — METOPROLOL TARTRATE 25 MG: 25 TABLET, FILM COATED ORAL at 12:43

## 2023-06-07 RX ADMIN — Medication 250 MG: at 17:20

## 2023-06-07 RX ADMIN — METRONIDAZOLE 500 MG: 500 TABLET ORAL at 07:45

## 2023-06-07 RX ADMIN — Medication 250 MG: at 08:53

## 2023-06-07 RX ADMIN — CARBIDOPA AND LEVODOPA 1 TABLET: 25; 100 TABLET ORAL at 08:53

## 2023-06-07 RX ADMIN — INSULIN LISPRO 1 UNITS: 100 INJECTION, SOLUTION INTRAVENOUS; SUBCUTANEOUS at 21:14

## 2023-06-07 RX ADMIN — BUPROPION HYDROCHLORIDE 300 MG: 150 TABLET, EXTENDED RELEASE ORAL at 08:53

## 2023-06-07 RX ADMIN — Medication 800 MG: at 17:20

## 2023-06-07 RX ADMIN — CARBIDOPA AND LEVODOPA 1 TABLET: 25; 100 TABLET ORAL at 17:20

## 2023-06-07 RX ADMIN — APIXABAN 5 MG: 5 TABLET, FILM COATED ORAL at 08:53

## 2023-06-07 RX ADMIN — TRAZODONE HYDROCHLORIDE 150 MG: 100 TABLET ORAL at 21:13

## 2023-06-07 RX ADMIN — PANTOPRAZOLE SODIUM 40 MG: 40 TABLET, DELAYED RELEASE ORAL at 05:40

## 2023-06-07 RX ADMIN — APIXABAN 5 MG: 5 TABLET, FILM COATED ORAL at 17:20

## 2023-06-07 RX ADMIN — OXYBUTYNIN CHLORIDE 5 MG: 5 TABLET, EXTENDED RELEASE ORAL at 08:53

## 2023-06-07 NOTE — OCCUPATIONAL THERAPY NOTE
Occupational Therapy Evaluation/Treatment     Patient Name: Bea Muniz  Today's Date: 6/7/2023  Problem List  Principal Problem:    Idiopathic acute pancreatitis without infection or necrosis  Active Problems:    BPH (benign prostatic hyperplasia)    Chronic heart failure with preserved ejection fraction (HCC)    Sleep apnea    ISRAEL (acute kidney injury) ([facility] )    Hyponatremia    Type 2 diabetes mellitus without complication, without long-term current use of insulin (HCC)    Paroxysmal atrial fibrillation (HCC)    SIRS (systemic inflammatory response syndrome) ([facility] )    Ambulatory dysfunction    Past Medical History  Past Medical History:   Diagnosis Date    Arthritis     Asthma     Colon polyp     CPAP (continuous positive airway pressure) dependence     Edema     left leg    GERD (gastroesophageal reflux disease)     History of echocardiogram 07/2017    History of Holter monitoring 09/2014    History of stress test 08/2014    Hypertension     Myocardial infarct [facility])     2011    Myocardial infarction [facility])     Obesity     Sleep apnea     Thrombophlebitis      Past Surgical History  Past Surgical History:   Procedure Laterality Date    CARDIAC CATHETERIZATION  03/2012    [id number]    CHOLECYSTECTOMY      COLONOSCOPY      COLONOSCOPY N/A 10/30/2017    Procedure: COLONOSCOPY;  Surgeon: Neisha Marie MD;  Location: [facility] GI LAB; Service: Gastroenterology    ENDOVENOUS ABLATION SAPHENOUS VEIN W/ LASER      each addit vein    ERCP      ESOPHAGOGASTRODUODENOSCOPY N/A 10/30/2017    Procedure: ESOPHAGOGASTRODUODENOSCOPY (EGD); Surgeon: Neisha Marie MD;  Location: [facility] GI LAB;   Service: Gastroenterology    GASTRIC BYPASS      2001    GASTRIC BYPASS      HERNIA REPAIR      paraesophageal hiatus hernia    HERNIA REPAIR      x5 last one 2011    JOINT REPLACEMENT      KNEE ARTHROPLASTY Right     2102    REPLACEMENT TOTAL KNEE Right 02/2011 06/07/23 1217   OT Last Visit   OT Visit Date 06/07/23   Note Type Note type Evaluation   Pain Assessment   Pain Assessment Tool 0-10   Pain Score No Pain   Effect of Pain on Daily Activities N/A   Patient's Stated Pain Goal No pain   Hospital Pain Intervention(s) Repositioned; Ambulation/increased activity   Multiple Pain Sites No   Restrictions/Precautions   Weight Bearing Precautions Per Order No   Other Precautions Cognitive; Chair Alarm; Bed Alarm; Fall Risk;Pain   Home Living   Type of [address] Two level; Laundry in basement;Performs ADLs on one level; Able to live on main level with bedroom/bathroom  (1 big GIOVANNI; FFOS into basement which pt does not access at baseline)   Bathroom Shower/Tub Walk-in shower   Bathroom Toilet Standard   Bathroom Equipment Other (Comment)  (None)   Bathroom Accessibility Accessible   Home Equipment Walker;Cane;Other (Comment)  (Rollator)   Additional Comments Pt ambulates with SPC vs walker for functional household distance   Prior Function   Level of [place] Independent with ADLs; Independent with functional mobility; Needs assistance with IADLS   Lives With Spouse   Receives Help From Family;Friend(s)  (Supportive family and friends)   IADLs Independent with driving; Independent with meal prep; Independent with medication management   Falls in the last 6 months (S)  >10  (More than 10 but less than 20)   Vocational On disability   Comments Pt lives in a [facility] with a first floor setup with his wife  Reports being independent with ADLs however with increased time to complete d/t fatigue and 2/2 body habitus  Spouse completes most home managment tasks and assist with IADLs, report his duty is to take out the trash but has been lacking in doing so lately  (+) driving  PT spoke with daughter via telephone during the session who reports pt has been sedentary since the beginning of the COVID-19 pandemic/   Lifestyle   Autonomy At baseline pt is (I) c ADLs, needs assist with IADLs   Ambulates with RW vs SPC  (+) driving and reports and "EXTENSIVE fall hx  Reciprocal Relationships Family//friends   Service to Others On disability   General   Additional Pertinent History Pt admitted 6/3/2023 with abdominal pain x 1 week  DX:  Idiopathic acute pancreatitis without infection or necrosis   Family/Caregiver Present Yes  (Wife who is noted to be highly anxious t/o the session)   Subjective   Subjective \"I have not been up since I have been here, its difficult\"   ADL   Eating Assistance 6  Modified independent   Eating Deficit Beverage management;Scoop assist;Bringing food to mouth assist;Setup  (pt eating lunch upon arrival to the room, no difficulites noted)   Grooming Assistance 5  Supervision/Setup   Grooming Deficit Setup; Wash/dry hands; Wash/dry face  (seated at the EOB with fair balance)   UB Bathing Assistance 3  Moderate Assistance   LB Bathing Assistance 2  Maximal Assistance   UB Dressing Assistance 3  Moderate Assistance   LB Dressing Assistance 2  Maximal Assistance   LB Dressing Deficit Don/doff R sock; Don/doff L sock  ((S) to adjust L sock with leg positioned on bed; (A) to adjust R sock)   Toileting Assistance  2  Maximal Assistance   Additional Comments Pt is limited by endurance, 2/2 body habitius and iniaition to tasks  Bed Mobility   Supine to Sit 2  Maximal assistance   Additional items Assist x 1;HOB elevated; Bedrails; Increased time required;Verbal cues;LE management  (trunk managment)   Sit to Supine   (unable to assess)   Additional Comments Supervision to scoot FWB towards EOB for optimal seated position for participation in functional tasks  Pt with overall fair/fair-, static/dymanic sitting balance with intermittent use of bedrails for support  Transfers   Sit to Stand 3  Moderate assistance   Additional items Assist x 2; Increased time required;Verbal cues   Stand to Sit 3  Moderate assistance   Additional items Assist x 2; Increased time required;Verbal cues;Armrests   Stand pivot 3  Moderate assistance   Additional items " Assist x 2; Increased time required;Verbal cues  (RW)   Additional Comments SPT from EOB > recliner chair with mod assist x 2; inital STS tranfers mod assist x 2 progressing towards min assist x 2 with additional transfers  Verbal cues for optimal hand placement and body mechanics for safe transfers, pt wit good application of cues  Functional Mobility   Functional Mobility 4  Minimal assistance   Additional Comments assist x 2 for short household distance with use of RW  Increased time to complete pt with increased fatigue upon excertion  Additional items Rolling walker   Balance   Static Sitting Fair   Dynamic Sitting Fair -   Static Standing Fair -   Dynamic Standing Poor +   Activity Tolerance   Activity Tolerance Patient limited by fatigue  (limited by self-limiting behaviors, decreased endurance)   Medical Staff Made Aware Spoke with care coordination, KILEY, PT Annabelle Price   Nurse Made Aware Spoke with RN Jimmy Romero pre/post session   RUE Assessment   RUE Assessment WFL   LUE Assessment   LUE Assessment WFL   Hand Function   Gross Motor Coordination Functional   Fine Motor Coordination Functional   Sensation   Light Touch Partial deficits in the RLE;Partial deficits in the LLE  (Neuropathy at baseline)   Vision-Basic Assessment   Current Vision Wears glasses all the time   Cognition   Overall Cognitive Status Impaired  [facility] t/o session; questionable higher level cognition deficits ~ f/u for cognitive evaluation)   Arousal/Participation Alert; Responsive; Cooperative   Attention Attends with cues to redirect   Orientation Level Oriented X4   Memory Decreased recall of recent events   Following Commands Follows one step commands without difficulty   Comments Deficits noted with delayed processing time and executive functioning skills  Pt presents with overall flat affect  Assessment   Limitation Decreased ADL status; Decreased UE ROM; Decreased UE strength;Decreased cognition;Decreased endurance;Decreased self-care trans;Decreased high-level ADLs   Prognosis Good   Assessment Patient is a 70 y o  male seen for OT evaluation at [address] [place] [address] following admission on 6/3/2023  s/p Idiopathic acute pancreatitis without infection or necrosis  Comorbidities and significant PMHx impacting functional performance include: BPH, CHD, ISRAEL, type 2DM, Afib, ambulatory dysfunction, asthma, falls  Patient presents with active orders for OT eval and treat  Prior to admission, patient was independent with functional mobility with SPC vs walker , independent with ADLS and requiring assist for IADLS  Upon initial evaluation, patient requires mod assist for UB ADLs, max assist for LB ADLs, max assist x1 for bed mobility, mod assist x 2 progressing to min assist x 2 for transfers and  min assist x 2 for functional mobility short distance  with RW  Based on functional eval, pt presents with intact  attention, intact  safety awareness, impaired  problem solving skills, and intact  memory  Occupational performance is affected by the following deficits: endurance ,  decreased muscular strength , decreased balance , decreased standing tolerance for self care tasks , decreased dynamic balance impacting functional reach, decreased functional reach , decreased trunk control , decreased activity tolerance , impaired safety awareness  and mood limitation The AM-PAC & Barthel Index outcome tools were used to assist in determining pt safety w/self care /mobility and appropriate d/c recommendations, see above for score  Personal factors impacting performance include: decreased (+) Hx of falls , steps to enter/navigate the home, Assistance needed for ADL/IADLs, Assistance needed for ADLs and functional mobility, High fall risk  and decreased insight toward deficits    Patient would benefit from OT services within the acute care setting to maximize level of functional independence in the following occupational areas bathing/showering, toileting, dressing ", activity engagement , fall prevention , energy conservation , self-care transfers, bed mobility , functional mobility and formal cognitive evaluation  From OT standpoint, recommendation at time of D/C would be post-acute rehabilitation   Goals   Patient Goals \"to go to rehab to get stronger\"   LTG Time Frame 10-14   Long Term Goal See below   Plan   Treatment Interventions ADL retraining;Functional transfer training;UE strengthening/ROM; Endurance training;Cognitive reorientation;Patient/family training;Equipment evaluation/education; Compensatory technique education; Energy conservation; Activityengagement   Goal Expiration Date 06/17/23   OT Treatment Day 0   OT Frequency 3-5x/wk   Recommendation   OT Discharge Recommendation Post acute rehabilitation services   Additional Comments  The patient's raw score on the AM-PAC Daily Activity Inpatient Short Form is 15  A raw score of less than 19 suggests the patient may benefit from discharge to post-acute rehabilitation services  Please refer to the recommendation of the Occupational Therapist for safe discharge planning     AM-PAC Daily Activity Inpatient   Lower Body Dressing 2   Bathing 2   Toileting 2   Upper Body Dressing 2   Grooming 3   Eating 4   Daily Activity Raw Score 15   Daily Activity Standardized Score (Calc for Raw Score >=11) 34 69   AM-[facility] Applied Cognition Inpatient   Following a Speech/Presentation 3   Understanding Ordinary Conversation 4   Taking Medications 3   Remembering Where Things Are Placed or Put Away 4   Remembering List of 4-5 Errands 4   Taking Care of Complicated Tasks 2   Applied Cognition Raw Score 20   Applied Cognition Standardized Score 41 76   Barthel Index   Feeding 10   Bathing 0   Grooming Score 0   Dressing Score 5   Bladder Score 5   Bowels Score 10   Toilet Use Score 5   Transfers (Bed/Chair) Score 10   Mobility (Level Surface) Score 0   Stairs Score 0   Barthel Index Score 45   Modified [place] Scale   Modified [place] Scale 4 " End of Consult   Education Provided Yes;Family or social support of family present for education by provider   Patient Position at End of Consult Bedside chair;Bed/Chair alarm activated; All needs within reach     Goals established on initial evaluation in order to achieve pt's goal of going to rehab to get stronger     Pt will complete UB ADLs Mod independent   for increased ADL independence within 10 days  Pt will complete LB ADLs Mod independent   for increased ADL independence within 10 days  Pt will complete toileting Min A  with use of DME for increased ADL independence within 10 days  Pt will demonstrate proper body mechanics to complete self-care transfers and functional mobility with Min A and use of LRAD for increased safety and functional independence within 10 days  Pt will demonstrate standing tolerance of 3 min with Min A and use of LRAD for increased activity tolerance during ADL/IADL tasks within 10 days  Pt will complete bed mobility Mod A  for increased independence in repositioning, pressure offloading, and managing comfort  Pt will demonstrate proper body mechanics and fall prevention strategies during 100% of tx sessions for increased safety awareness during ADL/IADLs    Pt will demonstrate activity tolerance of 5 min in therapeutic tasks for increased participation in meaningful activities upon D/C  Pt will participate in ongoing cognitive assessments to assist with safe D/C planning and supervision/assistance recommendations  Pt will verbalize and demonstrate understanding of B UE HEP program increase strength and endurance during self care transfers within 10 days  Pt will demonstrate OOB sitting tolerance of 2-4 hr/day for increased activity tolerance and engagement in leisure activities within 10 days         Pt benefited from co-session of skilled OT and PT therapists in order to most appropriately address functional deficits d/t regression from functioning level prior to admission , extensive physical assistance required for safe mobility , and decreased activity tolerance  OT/PT objectives were addressed separately; please see PT note for specific goal areas targeted      Mack [place], Luite Lance 87 OTR/L   [facility] Licensure# [id number]

## 2023-06-07 NOTE — ASSESSMENT & PLAN NOTE
Wt Readings from Last 3 Encounters:   06/07/23 (!) 145 kg (320 lb)   04/07/22 (!) 150 kg (331 lb)   03/21/22 (!) 150 kg (331 lb 6 4 oz)     No acute exacerbation at this time  Demadex, Aldactone recently stopped with addition of Lopressor by cardiologist outpatient  • Echo (1/2023): EF 60 to 75%, normal systolic and diastolic function  • Resume Lopressor 25 mg BID, tachycardic this a m    • Daily weights, I/Os  • Low-salt diet once cleared for oral intake

## 2023-06-07 NOTE — PLAN OF CARE
Problem: PAIN - ADULT  Goal: Verbalizes/displays adequate comfort level or baseline comfort level  Description: Interventions:  - Encourage patient to monitor pain and request assistance  - Assess pain using appropriate pain scale  - Administer analgesics based on type and severity of pain and evaluate response  - Implement non-pharmacological measures as appropriate and evaluate response  - Consider cultural and social influences on pain and pain management  - Notify physician/advanced practitioner if interventions unsuccessful or patient reports new pain  Outcome: Progressing     Problem: INFECTION - ADULT  Goal: Absence or prevention of progression during hospitalization  Description: INTERVENTIONS:  - Assess and monitor for signs and symptoms of infection  - Monitor lab/diagnostic results  - Monitor all insertion sites, i e  indwelling lines, tubes, and drains  - Monitor endotracheal if appropriate and nasal secretions for changes in amount and color  - Brogue appropriate cooling/warming therapies per order  - Administer medications as ordered  - Instruct and encourage patient and family to use good hand hygiene technique  - Identify and instruct in appropriate isolation precautions for identified infection/condition  Outcome: Progressing  Goal: Absence of fever/infection during neutropenic period  Description: INTERVENTIONS:  - Monitor WBC    Outcome: Progressing     Problem: SAFETY ADULT  Goal: Patient will remain free of falls  Description: INTERVENTIONS:  - Educate patient/family on patient safety including physical limitations  - Instruct patient to call for assistance with activity   - Consult OT/PT to assist with strengthening/mobility   - Keep Call bell within reach  - Keep bed low and locked with side rails adjusted as appropriate  - Keep care items and personal belongings within reach  - Initiate and maintain comfort rounds  - Make Fall Risk Sign visible to staff  - Offer Toileting every 3 Hours, in advance of need  - Initiate/Maintain bed alarm  - Obtain necessary fall risk management equipment:   - Apply yellow socks and bracelet for high fall risk patients  - Consider moving patient to room near nurses station  Outcome: Progressing  Goal: Maintain or return to baseline ADL function  Description: INTERVENTIONS:  -  Assess patient's ability to carry out ADLs; assess patient's baseline for ADL function and identify physical deficits which impact ability to perform ADLs (bathing, care of mouth/teeth, toileting, grooming, dressing, etc )  - Assess/evaluate cause of self-care deficits   - Assess range of motion  - Assess patient's mobility; develop plan if impaired  - Assess patient's need for assistive devices and provide as appropriate  - Encourage maximum independence but intervene and supervise when necessary  - Involve family in performance of ADLs  - Assess for home care needs following discharge   - Consider OT consult to assist with ADL evaluation and planning for discharge  - Provide patient education as appropriate  Outcome: Progressing  Goal: Maintains/Returns to pre admission functional level  Description: INTERVENTIONS:  - Perform BMAT or MOVE assessment daily    - Set and communicate daily mobility goal to care team and patient/family/caregiver  - Collaborate with rehabilitation services on mobility goals if consulted  - Perform Range of Motion 3 times a day  - Reposition patient every 3 hours    - Dangle patient 3 times a day  - Stand patient 3 times a day  - Ambulate patient 3 times a day  - Out of bed to chair 3 times a day   - Out of bed for meals 3 times a day  - Out of bed for toileting  - Record patient progress and toleration of activity level   Outcome: Progressing     Problem: Knowledge Deficit  Goal: Patient/family/caregiver demonstrates understanding of disease process, treatment plan, medications, and discharge instructions  Description: Complete learning assessment and assess knowledge base  Interventions:  - Provide teaching at level of understanding  - Provide teaching via preferred learning methods  Outcome: Progressing     Problem: MOBILITY - ADULT  Goal: Maintain or return to baseline ADL function  Description: INTERVENTIONS:  -  Assess patient's ability to carry out ADLs; assess patient's baseline for ADL function and identify physical deficits which impact ability to perform ADLs (bathing, care of mouth/teeth, toileting, grooming, dressing, etc )  - Assess/evaluate cause of self-care deficits   - Assess range of motion  - Assess patient's mobility; develop plan if impaired  - Assess patient's need for assistive devices and provide as appropriate  - Encourage maximum independence but intervene and supervise when necessary  - Involve family in performance of ADLs  - Assess for home care needs following discharge   - Consider OT consult to assist with ADL evaluation and planning for discharge  - Provide patient education as appropriate  Outcome: Progressing  Goal: Maintains/Returns to pre admission functional level  Description: INTERVENTIONS:  - Perform BMAT or MOVE assessment daily    - Set and communicate daily mobility goal to care team and patient/family/caregiver  - Collaborate with rehabilitation services on mobility goals if consulted  - Perform Range of Motion 3 times a day  - Reposition patient every 3 hours  - Dangle patient 3 times a day  - Stand patient 3 times a day  - Ambulate patient 3 times a day  - Out of bed to chair 3 times a day   - Out of bed for meals 3 times a day  - Out of bed for toileting  - Record patient progress and toleration of activity level   Outcome: Progressing     Problem: Nutrition/Hydration-ADULT  Goal: Nutrient/Hydration intake appropriate for improving, restoring or maintaining nutritional needs  Description: Monitor and assess patient's nutrition/hydration status for malnutrition   Collaborate with interdisciplinary team and initiate plan and interventions as ordered  Monitor patient's weight and dietary intake as ordered or per policy  Utilize nutrition screening tool and intervene as necessary  Determine patient's food preferences and provide high-protein, high-caloric foods as appropriate       INTERVENTIONS:  - Monitor oral intake, urinary output, labs, and treatment plans  - Assess nutrition and hydration status and recommend course of action  - Evaluate amount of meals eaten  - Assist patient with eating if necessary   - Allow adequate time for meals  - Recommend/ encourage appropriate diets, oral nutritional supplements, and vitamin/mineral supplements  - Order, calculate, and assess calorie counts as needed  - Recommend, monitor, and adjust tube feedings and TPN/PPN based on assessed needs  - Assess need for intravenous fluids  - Provide specific nutrition/hydration education as appropriate  - Include patient/family/caregiver in decisions related to nutrition  Outcome: Progressing

## 2023-06-07 NOTE — PLAN OF CARE
Problem: PHYSICAL THERAPY ADULT  Goal: Performs mobility at highest level of function for planned discharge setting  See evaluation for individualized goals  Description: Treatment/Interventions: Functional transfer training, LE strengthening/ROM, Elevations, Therapeutic exercise, Endurance training, Patient/family training, Equipment eval/education, Bed mobility, Gait training, Spoke to nursing, Spoke to case management, Spoke to advanced practitioner     See flowsheet documentation for full assessment, interventions and recommendations  Outcome: Progressing  Note: Prognosis: Fair  Problem List: Decreased strength, Decreased range of motion, Decreased endurance, Impaired balance, Decreased mobility, Decreased cognition, Impaired judgement, Decreased safety awareness, Obesity, Decreased skin integrity  Assessment: Pt seen for PT evaluation for mobility assessment & discharge needs  Activity orders: OOBTC  Pt admitted 6/3/2023 w/ abdominal pain, dx Idiopathic acute pancreatitis without infection or necrosis  Comorbidities affecting pt's fnxl performance include: obesity, gastric bypass surgery, multiple hernia repairs, neuropathy  During PT IE, pt requires [address] for bed mobility in hospital bed, MODA 2 person + RW for transfers, and MODA 2 person + RW for ambulatory transition between surfaces  During addt'l treatment pt progresses to transfer to stand with [facility] 2 person and ambulate 5ft fwd with RW and [place] 2 person + chair follow  The AM-PAC & Barthel Index outcome tools were used to assist in determining pt safety w/ mobility/self care & appropriate d/c recommendations, see above for scores  Pt is at risk of falls d/t multiple comorbidities, h/o falls, impaired balance, impaired sensation, impaired insight/safety awareness, use of ambulatory aid, varying levels of pain  and acuity of medical illness   Pt will benefit from continued PT services in order to address impairments, decrease risk of falls, maximize independence w/ fnxl mobility, & ensure safety w/ mobility for transition to next level of care  Based on pt presentation & impairments, pt would most appropriately benefit from post acute STR  Barriers to Discharge: Decreased caregiver support     PT Discharge Recommendation: Post acute rehabilitation services    See flowsheet documentation for full assessment

## 2023-06-07 NOTE — PLAN OF CARE
Problem: OCCUPATIONAL THERAPY ADULT  Goal: Performs self-care activities at highest level of function for planned discharge setting  See evaluation for individualized goals  Description: Treatment Interventions: ADL retraining, Functional transfer training, UE strengthening/ROM, Endurance training, Cognitive reorientation, Patient/family training, Equipment evaluation/education, Compensatory technique education, Energy conservation, Activityengagement          See flowsheet documentation for full assessment, interventions and recommendations  Note: Limitation: Decreased ADL status, Decreased UE ROM, Decreased UE strength, Decreased cognition, Decreased endurance, Decreased self-care trans, Decreased high-level ADLs  Prognosis: Good  Assessment: Patient is a 70 y o  male seen for OT evaluation at [address] [place] [address] following admission on 6/3/2023  s/p Idiopathic acute pancreatitis without infection or necrosis  Comorbidities and significant PMHx impacting functional performance include: BPH, CHD, [place]RA[place], type 2DM, Afib, ambulatory dysfunction, asthma, falls  Patient presents with active orders for OT eval and treat  Prior to admission, patient was independent with functional mobility with SPC vs walker , independent with ADLS and requiring assist for IADLS  Upon initial evaluation, patient requires mod assist for UB ADLs, max assist for LB ADLs, max assist x1 for bed mobility, mod assist x 2 progressing to min assist x 2 for transfers and  min assist x 2 for functional mobility short distance  with RW  Based on functional eval, pt presents with intact  attention, intact  safety awareness, impaired  problem solving skills, and intact  memory   Occupational performance is affected by the following deficits: endurance ,  decreased muscular strength , decreased balance , decreased standing tolerance for self care tasks , decreased dynamic balance impacting functional reach, decreased functional reach , decreased trunk control , decreased activity tolerance , impaired safety awareness  and mood limitation The AM-PAC & Barthel Index outcome tools were used to assist in determining pt safety w/self care /mobility and appropriate d/c recommendations, see above for score  Personal factors impacting performance include: decreased (+) Hx of falls , steps to enter/navigate the home, Assistance needed for ADL/IADLs, Assistance needed for ADLs and functional mobility, High fall risk  and decreased insight toward deficits   Patient would benefit from OT services within the acute care setting to maximize level of functional independence in the following occupational areas bathing/showering, toileting, dressing , activity engagement , fall prevention , energy conservation , self-care transfers, bed mobility , functional mobility and formal cognitive evaluation   From OT standpoint, recommendation at time of D/C would be post-acute rehabilitation     OT Discharge Recommendation: Post acute rehabilitation services     Jose M Ahuja Lance 85 OTR/L   [place] Licensure# [id number]

## 2023-06-07 NOTE — ASSESSMENT & PLAN NOTE
Presented with generalized abdominal pain X 1 month, significantly worsened day of arrival  Denies nausea/vomiting  Recent admission in January with pancreatitis suspected 2/2 Trulicity, at the time was monitored off antibiotics and advised to follow-up with GI outpatient  Denies alcohol abuse, new medications  · CT a/p: Acute interstitial edematous pancreatitis involving the pancreatic tail with no pancreatic or peripancreatic cyst or fluid collection seen  · Lipase: 76->13  Triglycerides WNL, 54   · EGD: Normal esophagus, jejunum, healthy previous gastric bypass surgery  · GI following:  · Advance to low-fat diet, supportive care  · Outpatient MRI/MRCP in 6 weeks to further evaluate pancreatitis    · Hold Trulicity indefinitely on discharge  · Optimize pain regimen, D/C IV narcotics and wean oxycodone as tolerated  · Continue monitoring off IVFs

## 2023-06-07 NOTE — ASSESSMENT & PLAN NOTE
Lab Results   Component Value Date    HGBA1C 6 4 (H) 01/15/2023       Recent Labs     06/06/23  1548 06/06/23 2053 06/07/23  0714 06/07/23  1105   POCGLU 136 134 133 126       Blood Sugar Average: Last 72 hrs:  (P) 365 [id number]   · Hold home oral diabetic medications while inpatient   · SSI coverage with Accu-Cheks ACHS  · Likely will indefinitely hold Trulicity

## 2023-06-07 NOTE — PLAN OF CARE
Problem: INFECTION - ADULT  Goal: Absence or prevention of progression during hospitalization  Description: INTERVENTIONS:  - Assess and monitor for signs and symptoms of infection  - Monitor lab/diagnostic results  - Monitor all insertion sites, i e  indwelling lines, tubes, and drains  - Monitor endotracheal if appropriate and nasal secretions for changes in amount and color  - Cable appropriate cooling/warming therapies per order  - Administer medications as ordered  - Instruct and encourage patient and family to use good hand hygiene technique  - Identify and instruct in appropriate isolation precautions for identified infection/condition  Outcome: Progressing  Goal: Absence of fever/infection during neutropenic period  Description: INTERVENTIONS:  - Monitor WBC    Outcome: Progressing     Problem: Knowledge Deficit  Goal: Patient/family/caregiver demonstrates understanding of disease process, treatment plan, medications, and discharge instructions  Description: Complete learning assessment and assess knowledge base    Interventions:  - Provide teaching at level of understanding  - Provide teaching via preferred learning methods  Outcome: Progressing

## 2023-06-07 NOTE — PROGRESS NOTES
Progress Note - [facility]  Lenka Posey 70 y o  male MRN: [id number]    Unit/Bed#: -01 Encounter: [id number]      Assessment/Plan:  27-year-old male with history of cholecystectomy, Justino-en-Y gastric bypass anatomy, hypertension, hyperlipidemia, GERD, diabetes, A-fib, who presented to the hospital yesterday with generalized abdominal pain for the past 1 month      1  Acute intestinal pancreatitis  Patient reported 1 month history of generalized abdominal pain  CT abdomen on admission showed acute intestinal edematous pancreatitis involving the pancreatic tail with no pancreatic or peripancreatic cyst or fluid collection seen  Lipase within normal limits  Triglycerides normal   Calcium normal   Patient reports third episode since January 2022  LFTs within normal limits  Total bilirubin 0 71 and within normal limits  Direct bilirubin 0 99  No further leukocytosis, white blood count 8 08 and within normal limits  Afebrile for over 24 hours  Pancreatitis may be idiopathic, biliary stone which patient passed, autoimmune, or secondary to Trulicity      -Recommend holding Trulicity   -Ultrasound of abdomen showed no intrahepatic biliary dilation  Previous cholecystectomy  Common bile duct measures 5 0 mm  No choledocholithiasis  Pancreas not visible due to overlying bowel gas  -Continue IV fluid resuscitation  -Pain management per attending team  -IgG within normal limits  -JOE negative  - IgG4 pending  -Advance to low-fat diet  -Nutritional consult to educate on low-fat diet  -Antibiotics per attending team  -Will need outpatient MRI/MRCP in approximately 6 weeks to further evaluate pancreatitis  Order placed to be done in 6 weeks as outpatient      2  Diarrhea  Patient reported 1 day history of loose stools which resolved upon admission  No report of diarrhea since admission      -Monitor stools     3  GERD  Patient has history of GERD    EGD done 6/6/2023 showed esophagus appeared normal  "Healthy previous gastric bypass surgery in the stomach  Jejunum appeared normal      -Continue pantoprazole  -Continue Eliquis  -GI will follow as needed  Call GI if needed   -Okay to discharge to rehab facility from GI standpoint if tolerates low-fat diet  Discussed case with attending team   -Follow-up with GI as outpatient  Subjective:   Lying in bed in no acute distress  Tolerating clear liquid diet  Denies nausea or vomiting  Patient reports abdominal pain has improved  No report of tenderness with palpation  Abdomen exam benign  Objective:     Vitals: Blood pressure 119/63, pulse 80, temperature (!) 97 4 °F (36 3 °C), temperature source Tympanic, resp  rate 18, height 5' 9\" (1 753 m), weight (!) 145 kg (320 lb), SpO2 96 %  ,Body mass index is 47 26 kg/m²  Intake/Output Summary (Last 24 hours) at 6/7/2023 0853  Last data filed at 6/7/2023 0849  Gross per 24 hour   Intake 526 41 ml   Output 500 ml   Net 26 41 ml       Physical Exam: Physical Exam  Vitals and nursing note reviewed  Cardiovascular:      Rate and Rhythm: Normal rate and regular rhythm  Pulses: Normal pulses  Heart sounds: Normal heart sounds  Pulmonary:      Effort: Pulmonary effort is normal  No respiratory distress  Breath sounds: Normal breath sounds  No stridor  No wheezing, rhonchi or rales  Abdominal:      General: Bowel sounds are normal  There is no distension  Palpations: Abdomen is soft  There is no mass  Tenderness: There is no abdominal tenderness  There is no guarding or rebound  Hernia: No hernia is present  Musculoskeletal:      Right lower leg: No edema  Left lower leg: No edema  Skin:     General: Skin is warm and dry  Capillary Refill: Capillary refill takes less than 2 seconds  Coloration: Skin is not jaundiced or pale  Neurological:      Mental Status: He is alert and oriented to person, place, and time           Invasive Devices     Peripheral Intravenous " Line  Duration           Peripheral IV 06/03/23 Right Antecubital 4 days    Peripheral IV 06/06/23 Left;Ventral (anterior) Forearm <1 day                  Current Facility-Administered Medications:   •  acetaminophen (TYLENOL) tablet 650 mg, 650 mg, Oral, Q6H PRN, Wili Uribe MD, 650 mg at 06/06/23 1945  •  apixaban (ELIQUIS) tablet 5 mg, 5 mg, Oral, BID, Wili Uribe MD, 5 mg at 06/06/23 1740  •  atorvastatin (LIPITOR) tablet 20 mg, 20 mg, Oral, Daily, Wili Uribe MD, 20 mg at 06/06/23 0930  •  buPROPion (WELLBUTRIN XL) 24 hr tablet 300 mg, 300 mg, Oral, Daily, Wili Uribe MD, 300 mg at 06/06/23 0930  •  carbidopa-levodopa (SINEMET)  mg per tablet 1 tablet, 1 tablet, Oral, TID, Wili Uribe MD, 1 tablet at 06/06/23 2137  •  cefTRIAXone (ROCEPHIN) IVPB (premix in dextrose) 2,000 mg 50 mL, 2,000 mg, Intravenous, Q24H, Wili Uribe MD, Last Rate: 100 mL/hr at 06/06/23 1538, 2,000 mg at 06/06/23 1538  •  gabapentin (NEURONTIN) capsule 300 mg, 300 mg, Oral, Daily, Wili Uribe MD, 300 mg at 06/06/23 0930  •  HYDROmorphone HCl (DILAUDID) injection 0 2 mg, 0 2 mg, Intravenous, Q3H PRN, Wili Uribe MD  •  insulin lispro (HumaLOG) 100 units/mL subcutaneous injection 1-6 Units, 1-6 Units, Subcutaneous, HS, Wili Uribe MD  •  insulin lispro (HumaLOG) 100 units/mL subcutaneous injection 2-12 Units, 2-12 Units, Subcutaneous, TID AC, 4 Units at 06/06/23 1222 **AND** Fingerstick Glucose (POCT), , , TID AC, Wili Uribe MD  •  magnesium Oxide (MAG-OX) tablet 800 mg, 800 mg, Oral, BID, Wili Uribe MD, 800 mg at 06/06/23 1740  •  metroNIDAZOLE (FLAGYL) tablet 500 mg, 500 mg, Oral, Q8H [facility], Wili Uribe MD, 500 mg at 06/07/23 0745  •  oxybutynin (DITROPAN-XL) 24 hr tablet 5 mg, 5 mg, Oral, Daily, Wili Uribe MD, 5 mg at 06/06/23 0930  •  oxyCODONE (ROXICODONE) immediate release tablet 10 mg, 10 mg, Oral, Q4H PRN, Wili Uribe MD, 10 mg at 06/07/23 1165  •  oxyCODONE (Haily Fare) IR tablet 5 mg, 5 mg, Oral, Q4H PRN, Talia Nowak MD  •  pantoprazole (PROTONIX) EC tablet 40 mg, 40 mg, Oral, Early Morning, Talia Nowak MD, 40 mg at 06/07/23 0540  •  saccharomyces boulardii (FLORASTOR) capsule 250 mg, 250 mg, Oral, BID, Talia Nowak MD, 250 mg at 06/06/23 1740  •  traZODone (DESYREL) tablet 150 mg, 150 mg, Oral, HS, Talia Nowak MD, 150 mg at 06/06/23 2137    Lab Results:   Recent Results (from the past 24 hour(s))   Fingerstick Glucose (POCT)    Collection Time: 06/06/23 11:22 AM   Result Value Ref Range    POC Glucose 230 (H) 65 - 140 mg/dl   Fingerstick Glucose (POCT)    Collection Time: 06/06/23  3:48 PM   Result Value Ref Range    POC Glucose 136 65 - 140 mg/dl   Fingerstick Glucose (POCT)    Collection Time: 06/06/23  8:53 PM   Result Value Ref Range    POC Glucose 134 65 - 140 mg/dl   CBC and differential    Collection Time: 06/07/23  4:39 AM   Result Value Ref Range    WBC 8 08 4 31 - 10 16 Thousand/uL    RBC 4 94 3 88 - 5 62 Million/uL    Hemoglobin 12 0 12 0 - 17 0 g/dL    Hematocrit 37 3 36 5 - 49 3 %    MCV 76 (L) 82 - 98 fL    MCH 24 3 (L) 26 8 - 34 3 pg    MCHC 32 2 31 4 - 37 4 g/dL    RDW 13 6 11 6 - 15 1 %    MPV 9 6 8 9 - 12 7 fL    Platelets 049 422 - 493 Thousands/uL    nRBC 0 /100 WBCs    Neutrophils Relative 74 43 - 75 %    Immat GRANS % 1 0 - 2 %    Lymphocytes Relative 10 (L) 14 - 44 %    Monocytes Relative 11 4 - 12 %    Eosinophils Relative 3 0 - 6 %    Basophils Relative 1 0 - 1 %    Neutrophils Absolute 6 05 1 85 - 7 62 Thousands/µL    Immature Grans Absolute 0 05 0 00 - 0 20 Thousand/uL    Lymphocytes Absolute 0 81 0 60 - 4 47 Thousands/µL    Monocytes Absolute 0 90 0 17 - 1 22 Thousand/µL    Eosinophils Absolute 0 22 0 00 - 0 61 Thousand/µL    Basophils Absolute 0 05 0 00 - 0 10 Thousands/µL   Comprehensive metabolic panel    Collection Time: 06/07/23  4:39 AM   Result Value Ref Range    Sodium 132 (L) 135 - 147 mmol/L    Potassium 3 8 3 5 - 5 3 mmol/L Chloride 97 96 - 108 mmol/L    CO2 27 21 - 32 mmol/L    ANION GAP 8 4 - 13 mmol/L    BUN 11 5 - 25 mg/dL    Creatinine 0 76 0 60 - 1 30 mg/dL    Glucose 121 65 - 140 mg/dL    Calcium 8 6 8 4 - 10 2 mg/dL    Corrected Calcium 9 2 8 3 - 10 1 mg/dL    AST 13 13 - 39 U/L    ALT 3 (L) 7 - 52 U/L    Alkaline Phosphatase 60 34 - 104 U/L    Total Protein 6 6 6 4 - 8 4 g/dL    Albumin 3 2 (L) 3 5 - 5 0 g/dL    Total Bilirubin 0 71 0 20 - 1 00 mg/dL    eGFR 95 ml/min/1 73sq m   Magnesium    Collection Time: 06/07/23  4:39 AM   Result Value Ref Range    Magnesium 2 4 1 9 - 2 7 mg/dL   Fingerstick Glucose (POCT)    Collection Time: 06/07/23  7:14 AM   Result Value Ref Range    POC Glucose 133 65 - 140 mg/dl             Imaging Studies: EGD    Result Date: 6/6/2023  Narrative: Table formatting from the original result was not included  TANISHA Myers [address] [id number] [facility] [phone] DATE OF SERVICE: 6/06/23 PHYSICIAN(S): Attending: Kevin Jesus MD Fellow: No Staff Documented INDICATION: Pain of upper abdomen POST-OP DIAGNOSIS: See the impression below  PREPROCEDURE: Informed consent was obtained for the procedure, including sedation  Risks of perforation, hemorrhage, adverse drug reaction and aspiration were discussed  The patient was placed in the left lateral decubitus position  Patient was explained about the risks and benefits of the procedure  Risks including but not limited to bleeding, infection, and perforation were explained in detail  Also explained about less than 100% sensitivity with the exam and other alternatives  PROCEDURE: EGD DETAILS OF PROCEDURE: Patient was taken to the procedure room where a time out was performed to confirm correct patient and correct procedure   The patient underwent monitored anesthesia care, which was administered by an anesthesia professional  The patient's blood pressure, heart rate, level of consciousness, respirations and oxygen were monitored throughout the procedure  The scope was advanced to the jejunum  Retroflexion was performed in the fundus  The patient experienced no blood loss  The procedure was not difficult  The patient tolerated the procedure well  There were no apparent adverse events  ANESTHESIA INFORMATION: ASA: III Anesthesia Type: IV Sedation with Anesthesia MEDICATIONS: No administrations occurring from 1414 to 1428 on 06/06/23 FINDINGS: The esophagus appeared normal  Z-line is 44 cm from the incisors  Healthy previous gastric bypass surgery in the stomach; no bleeding was identified  Some retained thick whitish material within the gastric pouch, likely residual enteric contrast from CT scan 6/3/2023  No suggestion of suture line disruption, anastomotic stricture or marginal ulcer  The pouch appeared enlarged The jejunum appeared normal  SPECIMENS: * No specimens in log *     Impression: The esophagus appeared normal  Healthy previous gastric bypass surgery in the stomach The jejunum appeared normal  RECOMMENDATION:  Advance diet as tolerated  Outpatient MRI/MRCP  Rolo Ferguson MD     US right upper quadrant    Result Date: 6/6/2023  Narrative: RIGHT UPPER QUADRANT ULTRASOUND INDICATION:     assess the CBD  Per the sonographer, history of pancreatitis  History of cholecystectomy greater than 15 years ago  Denies abdominal symptoms  COMPARISON: 6/3/2023 abdominopelvic CT  5/30/2019 ultrasound  TECHNIQUE:   Real-time ultrasound of the right upper quadrant was performed with a curvilinear transducer with both volumetric sweeps and still imaging techniques  FINDINGS: PANCREAS: Not visualized due to overlying bowel gas  AORTA AND IVC: Poorly visualized  LIVER: Size:  Within normal range  The liver measures 16 3 cm in the midclavicular line  Contour: Smooth Parenchyma: Evidence of similar, mild steatosis  No liver mass identified   Limited imaging of the main portal vein shows it to be patent and hepatopetal  BILIARY: Cholecystectomy  No intrahepatic biliary dilatation  CBD measures 5 0 mm  No choledocholithiasis  KIDNEY: Right kidney measures 12 6 x 5 8 x 4 4 cm  Volume 167 6 mL 3 2 cm simple cyst similar Otherwise normal kidney appearance  ASCITES:   None  Impression: No evidence of choledocholithiasis  Chronic and nonemergent findings above  Workstation performed: [id number]     XR chest 1 view portable    Result Date: 6/3/2023  Narrative: CHEST INDICATION:   chest pain  COMPARISON: CXR 6/18/2019, abdomen CT 6/3/2023, chest CT 1/27/2022  EXAM PERFORMED/VIEWS:  XR CHEST PORTABLE  FINDINGS: Cardiomediastinal silhouette normal  Lungs clear  No effusion or pneumothorax  Upper abdomen normal  Bones normal for age  Impression: No acute cardiopulmonary disease  Workstation performed: [id number]     CT abdomen pelvis with contrast    Result Date: 6/3/2023  Narrative: CT ABDOMEN AND PELVIS WITH IV CONTRAST INDICATION:   abdominal pain, history of gastric bypass  COMPARISON: January 27, 2022 TECHNIQUE:  CT examination of the abdomen and pelvis was performed  In addition to portal venous phase postcontrast scanning through the abdomen and pelvis, delayed phase postcontrast scanning was performed through the upper abdominal viscera  Multiplanar 2D reformatted images were created from the source data  This examination, like all CT scans performed in the [facility], was performed utilizing techniques to minimize radiation dose exposure, including the use of iterative reconstruction and automated exposure control  Radiation dose length product (DLP) for this visit:  3500 mGy-cm IV Contrast:  100 mL of iohexol (OMNIPAQUE) Enteric Contrast:  Enteric contrast was administered  FINDINGS: ABDOMEN LOWER CHEST:  No clinically significant abnormality identified in the visualized lower chest  LIVER/BILIARY TREE:  Unremarkable  GALLBLADDER:  Gallbladder is surgically absent  SPLEEN:  Unremarkable   PANCREAS: Focal thickening "throughout the pancreatic tail with peripancreatic inflammatory edema consistent with acute interstitial edematous pancreatitis  No pancreatic or peripancreatic cyst or fluid collection  ADRENAL GLANDS:  Unremarkable  KIDNEYS/URETERS: Bilateral renal cortical cysts, largest in the medial upper pole of the left kidney, 4 7 cm  No solid renal mass, urinary tract calculus, or hydronephrosis  STOMACH AND BOWEL: Surgical changes of prior Justino-en-Y gastric bypass  No abnormal bowel wall thickening or bowel obstruction  APPENDIX: A normal appendix was visualized  ABDOMINOPELVIC CAVITY:  No ascites  No pneumoperitoneum  No lymphadenopathy  VESSELS:  Unremarkable for patient's age  PELVIS REPRODUCTIVE ORGANS:  Unremarkable for patient's age  URINARY BLADDER:  Unremarkable  ABDOMINAL WALL/INGUINAL REGIONS: Surgical changes of left inguinal hernia repair, similar in appearance from previous examination  OSSEOUS STRUCTURES: Chronic bilateral L5 corticated spondylolysis with grade 2 L5 relative to S1 spondylolisthesis and L5-S1 degenerative change, similar from prior examination  No acute fracture or destructive osseous lesion  Impression: Acute interstitial edematous pancreatitis involving the pancreatic tail with no pancreatic or peripancreatic cyst or fluid collection seen  This examination was marked \"immediate notification\" in Epic in order to begin the standard process by which the radiology reading room liaison alerts the referring practitioner  Workstation performed: [id number]           SELVIN Calhoun      Please Note: \"This note has been constructed using a voice recognition system  Therefore there may be syntax, spelling, and/or grammatical errors  Please call if you have any questions   \"**  "

## 2023-06-07 NOTE — ASSESSMENT & PLAN NOTE
As evidenced by fever (temp 101), and leukocytosis (WBCs 15) on 6/4/2023  · Suspect related to pain with pancreatitis, as no necrosis or abscess seen on imaging  · RUQ US: No evidence of choledocholithiasis    · S/p 3 days IV Rocephin/Flagyl, no need for further abx   · Monitor CBC/temperature curve

## 2023-06-07 NOTE — TREATMENT PLAN
Nephrology    Labs reviewed today  Sodium stable at 132    No objections to discharge    Discussed with the primary team     We will sign off call for any questions or concerns    Thank you

## 2023-06-07 NOTE — PHYSICAL THERAPY NOTE
PHYSICAL THERAPY EVALUATION & TREATMENT  DATE: 06/07/23  TIME: 0761-3516    NAME:  Jaydon Orozco  AGE:   70 y o  Mrn:   [id number]  Length Of Stay: 4    ADMIT DX:  Hypokalemia [E87 6]  Hypomagnesemia [E83 42]  Hyponatremia [E87 1]  Pancreatitis [K85 90]  Abdominal pain [R10 9]    Past Medical History:   Diagnosis Date    Arthritis     Asthma     Colon polyp     CPAP (continuous positive airway pressure) dependence     Edema     left leg    GERD (gastroesophageal reflux disease)     History of echocardiogram 07/2017    History of Holter monitoring 09/2014    History of stress test 08/2014    Hypertension     Myocardial infarct [facility])     2011    Myocardial infarction [facility])     Obesity     Sleep apnea     Thrombophlebitis      Past Surgical History:   Procedure Laterality Date    CARDIAC CATHETERIZATION  03/2012    0103,3790    CHOLECYSTECTOMY      COLONOSCOPY      COLONOSCOPY N/A 10/30/2017    Procedure: COLONOSCOPY;  Surgeon: Yolis Hernandez MD;  Location: [facility]; Service: Gastroenterology    ENDOVENOUS ABLATION SAPHENOUS VEIN W/ LASER      each addit vein    ERCP      ESOPHAGOGASTRODUODENOSCOPY N/A 10/30/2017    Procedure: ESOPHAGOGASTRODUODENOSCOPY (EGD); Surgeon: Yolis Hernandez MD;  Location: [facility] GI LAB; Service: Gastroenterology    GASTRIC BYPASS      2001    GASTRIC BYPASS      HERNIA REPAIR      paraesophageal hiatus hernia    HERNIA REPAIR      x5 last one 2011    JOINT REPLACEMENT      KNEE ARTHROPLASTY Right     2102    REPLACEMENT TOTAL KNEE Right 02/2011       Performed at least 2 patient identifiers during session: Name, Papo Menezes, and ID bracelet     06/07/23 1216   PT Last Visit   PT Visit Date 06/07/23   Note Type   Note type Evaluation  (& treatment)   Pain Assessment   Pain Assessment Tool 0-10   Pain Score No Pain   Effect of Pain on Daily Activities n/a   Hospital Pain Intervention(s) Repositioned; Ambulation/increased activity   Multiple Pain Sites No   Restrictions/Precautions "  Weight Bearing Precautions Per Order No   Other Precautions Cognitive; Chair Alarm; Bed Alarm; Fall Risk;Pain   Home Living   Type of [address] Two level;Performs ADLs on one level; Able to live on main level with bedroom/bathroom; Laundry in basement;Stairs to enter with rails  (1 \"large\" GIOVANNI the home, maintains FFSU, pt does not have to access basement)   Bathroom Shower/Tub Walk-in shower   Bathroom Toilet Standard   Bathroom Equipment Other (Comment)  (none)   Bathroom Accessibility Accessible via walker   Home Equipment Walker;Cane  (RW and rollator walker)   Additional Comments Pt reports using SPC vs RW interchangably depending on the day and how he feels  Prior Function   Level of [place] Independent with ADLs; Independent with functional mobility; Needs assistance with IADLS   Lives With Spouse   Receives Help From Family;Friend(s)   IADLs Independent with driving; Independent with medication management; Family/Friend/Other provides meals  (wife completes cooking, cleaning, laundry, grocery shopping)   Falls in the last 6 months (S)  >10  (pt reports between 15-20 falls, states he needs \"big strong guys to help pick him up\", unable to report trigger for falls )   Vocational On disability   Comments Pt is a questionable historian, his wife is present t/o session but also questionable  Pt and wife report increasing difficulty with ADLS and mobility  Pt ambulates household distances with RW vs SPC  During eval, pt's sister calls pt and requests to talk with therapist: states that she lives in [place] and is unable to provide assistance however reports that since pandemic in 2020 pt has veen very sedentary and has not been completing ADLs or IADLs as needed  General   Additional Pertinent History Pt admitted 6/3/23 with abdominal pain x1 month, dx: pancreatitis     Family/Caregiver Present Yes  (spouse present t/o session; of note pt's spouse very anxious regarding pt's mobility and safety at " "home)   Cognition   Overall Cognitive Status Impaired   Arousal/Participation Cooperative   Orientation Level Oriented X4   Memory Decreased recall of precautions;Decreased recall of recent events   Following Commands Follows one step commands without difficulty   Subjective   Subjective \"I can't go home like this  \"   RUE Assessment   RUE Assessment WFL   RUE Strength   RUE Overall Strength Within Functional Limits - able to perform ADL tasks with strength   LUE Assessment   LUE Assessment WFL   LUE Strength   LUE Overall Strength Within Functional Limits - able to perform ADL tasks with strength   RLE Assessment   RLE Assessment X  (grossly 2+/5 to 3+/5 MMT throughout)   LLE Assessment   LLE Assessment X  (grossly 2+/5 to 3+/5 MMT throughout)   Coordination   Movements are Fluid and Coordinated 1   Sensation X   Light Touch   RLE Light Touch Impaired   RLE Light Touch Comments pt reports baseline \"pins and needles from neuropathy\" in B foot, does not go above ankle   LLE Light Touch Impaired   LLE Light Touch Comments pt reports baseline \"pins and needles from neuropathy\" in B foot, does not go above ankle   Proprioception   RLE Proprioception Grossly intact   LLE Proprioception Grossly Intact   Bed Mobility   Supine to Sit 2  Maximal assistance   Additional items Assist x 1;HOB elevated; Bedrails; Increased time required;Verbal cues;LE management  (trunk management)   Sit to Supine   (NT as pt was left seated OOB in recliner chair)   Additional Comments Pt with fair+ static sitting and fair dynamic sitting balance at EOB while attempting functional self care tasks  Transfers   Sit to Stand 3  Moderate assistance   Additional items Assist x 2; Increased time required;Verbal cues   Stand to Sit 3  Moderate assistance   Additional items Assist x 1; Increased time required;Verbal cues;Armrests   Stand pivot 3  Moderate assistance   Additional items Assist x 2; Increased time required;Verbal cues  (RW -- physical " assistance for steadying + RW management)   Additional Comments Max verbal and tactile cues for hand placement for all transfers in order to maximize safety, pt with limited application and carry over  Ambulation/Elevation   Gait pattern Improper Weight shift; Forward Flexion; Wide GATO; Decreased foot clearance; Short stride; Excessively slow   Gait Assistance 3  Moderate assist   Additional items Assist x 2;Verbal cues; Tactile cues   Assistive Device Rolling walker   Distance 4ft during EOB to recliner chair ambulatory transition  (pt needing functional seated rest break post minimal mobility, prior to additional mobility efforts  see treatment section below for additional mobility trials )   Stair Management Assistance Not tested   Balance   Static Sitting Fair +   Dynamic Sitting Fair   Static Standing Fair -  (w/ RW support + physical assist from therapist)   Dynamic Standing Poor +  (w/ RW support + physical assist from therapist)   Ambulatory Poor +  (w/ RW support + physical assist from therapist)   Endurance Deficit   Endurance Deficit Yes   Activity Tolerance   Activity Tolerance Patient limited by fatigue; Other (Comment)  (self limiting behaviors)   Medical Staff Made Aware Spoke with SLIM, CM, coordinated care with OT   Nurse Made Aware Spoke with MAMIE Hodge pre/post session   Assessment   Prognosis Fair   Problem List Decreased strength;Decreased range of motion;Decreased endurance; Impaired balance;Decreased mobility; Decreased cognition; Impaired judgement;Decreased safety awareness; Obesity; Decreased skin integrity   Assessment Pt seen for PT evaluation for mobility assessment & discharge needs  Activity orders: OOBTC  Pt admitted 6/3/2023 w/ abdominal pain, dx Idiopathic acute pancreatitis without infection or necrosis  Comorbidities affecting pt's fnxl performance include: obesity, gastric bypass surgery, multiple hernia repairs, neuropathy   During PT IE, pt requires [address] for bed mobility in hospital bed, "MODA 2 person + RW for transfers, and MODA 2 person + RW for ambulatory transition between surfaces  During addt'l treatment pt progresses to transfer to stand with [facility] 2 person and ambulate 5ft fwd with RW and SIMON 2 person + chair follow  The AM-PAC & Barthel Index outcome tools were used to assist in determining pt safety w/ mobility/self care & appropriate d/c recommendations, see above for scores  Pt is at risk of falls d/t multiple comorbidities, h/o falls, impaired balance, impaired sensation, impaired insight/safety awareness, use of ambulatory aid, varying levels of pain  and acuity of medical illness  Pt will benefit from continued PT services in order to address impairments, decrease risk of falls, maximize independence w/ fnxl mobility, & ensure safety w/ mobility for transition to next level of care  Based on pt presentation & impairments, pt would most appropriately benefit from post acute STR  Barriers to Discharge Decreased caregiver support   Goals   Patient Goals \"to go to rehab\"   STG Expiration Date 06/17/23   Short Term Goal #1 Patient PT goals established in order to maximize pt independence and decrease risk of falls   Pt will: complete all bed mobility in hospital bed with no greater than SIMON in order to promote increased OOB functional mobility to improve overall activity tolerance; complete all transfers with RW and S in order to increase safety with functional mobility; ambulate >50ft with RW and S in order to increase safety with household functional mobility; negotiate 1 (8\") step with RW and SIMON in order to facilitate safe access to his home; improve B LE strength to >/= 4/5 MMT t/o in order to increase safety with functional mobility and decrease risk of falls; demonstrate understanding and independence with LE strengthening HEP; improve ambulatory balance to >/= good grade with RW in order to promote safety and increased independence with mobility; tolerate >3hrs OOB in upright " position, in order to improve muscular endurance and respiratory status; improve AM-PAC score to >/= 16/24 in order to increase independence with mobility and decrease burden of care; improve Barthel Index score to >/= 50/100 in order to increase independence and decrease risk of falls  PT Treatment Day 1   Plan   Treatment/Interventions Functional transfer training;LE strengthening/ROM; Elevations; Therapeutic exercise; Endurance training;Patient/family training;Equipment eval/education; Bed mobility;Gait training;Spoke to nursing;Spoke to case management;Spoke to advanced practitioner   PT Frequency 3-5x/wk   Recommendation   PT Discharge Recommendation Post acute rehabilitation services   AM-PAC Basic Mobility Inpatient   Turning in Flat Bed Without Bedrails 2   Lying on Back to Sitting on Edge of Flat Bed Without Bedrails 2   Moving Bed to Chair 2   Standing Up From Chair Using Arms 2   Walk in Room 2   Climb 3-5 Stairs With Railing 1   Basic Mobility Inpatient Raw Score 11   Basic Mobility Standardized Score 30 25   Highest Level Of Mobility   -[facility] Goal 4: Move to chair/commode   -HL Achieved 4: Move to chair/commode   Modified [place] Scale   Modified [place] Scale 4   Barthel Index   Feeding 10   Bathing 0   Grooming Score 0   Dressing Score 5   Bladder Score 5   Bowels Score 10   Toilet Use Score 5   Transfers (Bed/Chair) Score 5   Mobility (Level Surface) Score 0   Stairs Score 0   Barthel Index Score 40   Additional Treatment Session   Start Time 1205   End Time 1216   Treatment Assessment Pt seen for addt'l PT treatment post IE with focus on: gait training  Pt progresses to transfer with [facility] 2 person, increased time and ongoing extensive cues for body mechanics  Pt then ambulates 5ft fwd (without turns) with RW and [facility] 2 person + chair follow  Pt initially with good step length and clearance however quickly regresses to shuffling quality gait and significant fwd flexion after 2ft   Overall pt remains limited due to weakness, gait deviations, decreased endurance/activity tolerance, self limiting behaviors, and continues to perform below their baseline level of functional mobility  Pt was left seated OOB in recliner chair with alarm engaged and needs in reach, spouse in room, care returned to RN  Pt continues to most appropriately benefit from post acute STR upon d/c from the acute care setting  Continue skilled PT POC as able and appropriate in order to progress towards therapy goals and maximize independence with functional mobility  Next session, plan for intervention of further gait training as able and appropriate  Equipment Use Recommend RN staffing A x2 and pt use of RW for short in room mobility  Additional Treatment Day 1   End of Consult   Patient Position at End of Consult Bedside chair;Bed/Chair alarm activated; All needs within reach  (wife in room, care returned to RN)   End of Consult Comments Based on patient's [facility] Highest Level of Mobility scores today, patient currently has a goal of [facility] Levels: 4: MOVE TO CHAIR/COMMODE, to be completed with RN staffing each shift, in order to improve overall activity tolerance and mobility, combat hospital related deconditioning, and maximize outcomes for d/c from the acute care setting  The patient's AM-PAC Basic Mobility Inpatient Short Form Raw Score is 11  A Raw score of less than or equal to 16 suggests the patient may benefit from discharge to post-acute rehabilitation services  Please also refer to the recommendation of the Physical Therapist for safe discharge planning  This session, pt required and most appropriately benefited from partial or full skilled PT/OT co-eval due to extensive physical assistance of SKILLED therapists, cognitive-behavioral deficits, significant regression from baseline level of mobility, and decreased activity tolerance  PT and OT goals were addressed separately as seen in documentation        Edwinna Books Ugo Tariq DPT   Available via [facility] # [id number]  [place] License - [id number]  0/7/0708

## 2023-06-07 NOTE — ASSESSMENT & PLAN NOTE
· Patient reports difficulty with ambulating, fearful that he will fall at home  · PT/OT evaluation pending

## 2023-06-07 NOTE — ASSESSMENT & PLAN NOTE
Hyponatremia on admission, Na 132 in ED, decreased to as low as 128 (6/5)  · Possibly in setting of acute pancreatitis, consider IVF-induced with aggressive hydration  · IVFs D/C (6/5), since improved  · Hyponatremia work-up:  · TSH slightly low 0 258, T4 slightly elevated 1 36  · Uric acid wnl, urine osmol 279, urine sodium 36, serum osmo 282  · Nephrology consult:  · Na stable 132, no need for diuretics  Obtain BMP in 1 week from discharge    · Monitor BMP daily while inpatient

## 2023-06-07 NOTE — ASSESSMENT & PLAN NOTE
Creatinine elevated at 1 42 on admission   Baseline Cr around 0 7-1 0   · Most likely pre-renal with pancreatitis, dehydration   · S/p IVFs as above  · Resolved, renal function remains stable at baseline

## 2023-06-07 NOTE — PROGRESS NOTES
Tammy U  64   Progress Note  Name: Sarah Fritz I  MRN: [id number]  Unit/Bed#: -01 I Date of Admission: 6/3/2023   Date of Service: 6/7/2023 I Hospital Day: 4    Assessment/Plan   * Idiopathic acute pancreatitis without infection or necrosis  Assessment & Plan  Presented with generalized abdominal pain X 1 month, significantly worsened day of arrival  Denies nausea/vomiting  Recent admission in January with pancreatitis suspected 2/2 Trulicity, at the time was monitored off antibiotics and advised to follow-up with GI outpatient  Denies alcohol abuse, new medications  · CT a/p: Acute interstitial edematous pancreatitis involving the pancreatic tail with no pancreatic or peripancreatic cyst or fluid collection seen  · Lipase: 76->13  Triglycerides WNL, 54   · EGD: Normal esophagus, jejunum, healthy previous gastric bypass surgery  · GI following:  · Advance to low-fat diet, supportive care  · Outpatient MRI/MRCP in 6 weeks to further evaluate pancreatitis  · Hold Trulicity indefinitely on discharge  · Optimize pain regimen, D/C IV narcotics and wean oxycodone as tolerated  · Continue monitoring off IVFs    SIRS (systemic inflammatory response syndrome) (HCC)  Assessment & Plan  As evidenced by fever (temp 101), and leukocytosis (WBCs 15) on 6/4/2023  · Suspect related to pain with pancreatitis, as no necrosis or abscess seen on imaging  · RUQ US: No evidence of choledocholithiasis  · S/p 3 days IV Rocephin/Flagyl, no need for further abx   · Monitor CBC/temperature curve    Hyponatremia  Assessment & Plan  Hyponatremia on admission, Na 132 in ED, decreased to as low as 128 (6/5)    · Possibly in setting of acute pancreatitis, consider IVF-induced with aggressive hydration  · IVFs D/C (6/5), since improved  · Hyponatremia work-up:  · TSH slightly low 0 258, T4 slightly elevated 1 36  · Uric acid wnl, urine osmol 279, urine sodium 36, serum osmo 282  · Nephrology consult:  · Na stable 132, no need for diuretics  Obtain BMP in 1 week from discharge  · Monitor BMP daily while inpatient    Chronic heart failure with preserved ejection fraction [facility])  Assessment & Plan  Wt Readings from Last 3 Encounters:   06/07/23 (!) 145 kg (320 lb)   04/07/22 (!) 150 kg (331 lb)   03/21/22 (!) 150 kg (331 lb 6 4 oz)     No acute exacerbation at this time  Demadex, Aldactone recently stopped with addition of Lopressor by cardiologist outpatient  • Echo (1/2023): EF 60 to 91%, normal systolic and diastolic function  • Resume Lopressor 25 mg BID, tachycardic this a m  • Daily weights, I/Os  • Low-salt diet once cleared for oral intake    Ambulatory dysfunction  Assessment & Plan  · Patient reports difficulty with ambulating, fearful that he will fall at home  · PT/OT evaluation pending    Paroxysmal atrial fibrillation ([facility] )  Assessment & Plan  · Currently rate controlled  · Continue Lopressor, Eliquis    Type 2 diabetes mellitus without complication, without long-term current use of insulin [facility])  Assessment & Plan  Lab Results   Component Value Date    HGBA1C 6 4 (H) 01/15/2023       Recent Labs     06/06/23  1548 06/06/23  2053 06/07/23  0714 06/07/23  1105   POCGLU 136 134 133 126       Blood Sugar Average: Last 72 hrs:  (P) 244 [id number]   · Hold home oral diabetic medications while inpatient   · SSI coverage with Accu-Cheks ACHS  · Likely will indefinitely hold Trulicity     ISRAEL (acute kidney injury) ([facility] )  Assessment & Plan  Creatinine elevated at 1 42 on admission   Baseline Cr around 0 7-1 0   · Most likely pre-renal with pancreatitis, dehydration   · S/p IVFs as above  · Resolved, renal function remains stable at baseline    Sleep apnea  Assessment & Plan  · Continue CPAP qHS     BPH (benign prostatic hyperplasia)  Assessment & Plan  · Continue formulary equivalent oxybutynin 5 mg    Hypokalemia-resolved as of 6/7/2023  Assessment & Plan  · Hypokalemia initially on admission  · Repleted, since resolved           VTE Pharmacologic Prophylaxis: VTE Score: 5 High Risk (Score >/= 5) - Pharmacological DVT Prophylaxis Ordered: apixaban (Eliquis)  Sequential Compression Devices Ordered  Patient Centered Rounds: I performed bedside rounds with nursing staff today  Discussions with Specialists or Other Care Team Provider: Nephrology, GI, case management    Education and Discussions with Family / Patient: Patient declined call to   Total Time Spent on Date of Encounter in care of patient: 45 minutes This time was spent on one or more of the following: performing physical exam; counseling and coordination of care; obtaining or reviewing history; documenting in the medical record; reviewing/ordering tests, medications or procedures; communicating with other healthcare professionals and discussing with patient's family/caregivers  Current Length of Stay: 4 day(s)  Current Patient Status: Inpatient   Certification Statement: The patient will continue to require additional inpatient hospital stay due to Tachycardia, discharge planning  Discharge Plan: Anticipate discharge in 24-48 hrs to rehab facility  Code Status: Level 1 - Full Code    Subjective:   Patient is seen at bedside this a m , reports ongoing generalized abdominal pain despite no signs of discomfort, tolerating diet well without nausea/vomiting  Patient's main concern is going home, states he feels too weak and unable to walk, fearful that he will fall  Objective:     Vitals:   Temp (24hrs), Av 6 °F (36 4 °C), Min:97 4 °F (36 3 °C), Max:98 2 °F (36 8 °C)    Temp:  [97 4 °F (36 3 °C)-98 2 °F (36 8 °C)] 98 2 °F (36 8 °C)  HR:  [] 78  Resp:  [18-25] 20  BP: (106-122)/(63-79) 106/72  SpO2:  [96 %-98 %] 97 %  Body mass index is 47 26 kg/m²  Input and Output Summary (last 24 hours):      Intake/Output Summary (Last 24 hours) at 2023 1709  Last data filed at 2023 1408  Gross per 24 hour   Intake 950 ml Output 675 ml   Net 275 ml       Physical Exam:   Physical Exam  Constitutional:       General: He is not in acute distress  Appearance: He is obese  He is not ill-appearing, toxic-appearing or diaphoretic  Cardiovascular:      Rate and Rhythm: Normal rate and regular rhythm  Pulses: Normal pulses  Heart sounds: Normal heart sounds  Pulmonary:      Effort: Pulmonary effort is normal  No respiratory distress  Breath sounds: Normal breath sounds  Abdominal:      General: Bowel sounds are normal  There is no distension  Palpations: Abdomen is soft  Tenderness: There is no abdominal tenderness  There is no guarding  Comments: No grimacing of face or signs of discomfort on palpation of abdomen  Musculoskeletal:         General: No swelling or tenderness  Skin:     General: Skin is warm  Coloration: Skin is not jaundiced or pale  Neurological:      General: No focal deficit present  Mental Status: He is alert  Mental status is at baseline     Psychiatric:         Mood and Affect: Mood normal          Behavior: Behavior normal           Additional Data:     Labs:  Results from last 7 days   Lab Units 06/07/23  0439   EOS PCT % 3   HEMATOCRIT % 37 3   HEMOGLOBIN g/dL 12 0   LYMPHS PCT % 10*   MONOS PCT % 11   NEUTROS PCT % 74   PLATELETS Thousands/uL 284   WBC Thousand/uL 8 08     Results from last 7 days   Lab Units 06/07/23  0439   ANION GAP mmol/L 8   ALBUMIN g/dL 3 2*   ALK PHOS U/L 60   ALT U/L 3*   AST U/L 13   BUN mg/dL 11   CALCIUM mg/dL 8 6   CHLORIDE mmol/L 97   CO2 mmol/L 27   CREATININE mg/dL 0 76   GLUCOSE RANDOM mg/dL 121   POTASSIUM mmol/L 3 8   SODIUM mmol/L 132*   TOTAL BILIRUBIN mg/dL 0 71     Results from last 7 days   Lab Units 06/03/23  0702   INR  1 10     Results from last 7 days   Lab Units 06/07/23  1105 06/07/23  0714 06/06/23  2053 06/06/23  1548 06/06/23  1122 06/06/23  0706 06/05/23  2230 06/05/23  1559 06/05/23  1146 06/05/23  3508 06/04/23  2317 06/04/23  1709   POC GLUCOSE mg/dl 126 133 134 136 230* 130 133 150* 165* 150* 173* 203*         Results from last 7 days   Lab Units 06/05/23  0510 06/04/23  0757 06/03/23  0702   LACTIC ACID mmol/L  --   --  2 0   PROCALCITONIN ng/ml 0 98* 0 79*  --        Lines/Drains:  Invasive Devices     Peripheral Intravenous Line  Duration           Peripheral IV 06/03/23 Right Antecubital 4 days    Peripheral IV 06/06/23 Left;Ventral (anterior) Forearm 1 day                      Imaging: Reviewed radiology reports from this admission including: ultrasound(s)    Recent Cultures (last 7 days):   Results from last 7 days   Lab Units 06/04/23  1647   BLOOD CULTURE  No Growth at 48 hrs  Last 24 Hours Medication List:   Current Facility-Administered Medications   Medication Dose Route Frequency Provider Last Rate   • acetaminophen  650 mg Oral Q6H PRN Alo Simpson MD     • apixaban  5 mg Oral BID Alo Simpson MD     • atorvastatin  20 mg Oral Daily Alo Simpson MD     • buPROPion  300 mg Oral Daily Alo Simpson MD     • carbidopa-levodopa  1 tablet Oral TID Alo Simpson MD     • gabapentin  300 mg Oral Daily Alo Simpson MD     • insulin lispro  1-6 Units Subcutaneous HS Alo Simpson MD     • insulin lispro  2-12 Units Subcutaneous TID [facility] Alo Simpson MD     • magnesium Oxide  800 mg Oral BID Alo Simpson MD     • metoprolol tartrate  25 mg Oral Q12H [address] Law Alberto PA-C     • oxybutynin  5 mg Oral Daily Alo Simpson MD     • oxyCODONE  10 mg Oral Q6H PRN Law Alberto PA-C     • oxyCODONE  5 mg Oral Q6H PRN Law Alberto PA-C     • pantoprazole  40 mg Oral Early Morning Alo Simpson MD     • saccharomyces boulardii  250 mg Oral BID Alo Simpson MD     • traZODone  150 mg Oral HS Alo Simpson MD          Today, Patient Was Seen By: Law Alberto PA-C    **Please Note: This note may have been constructed using a voice recognition system  **

## 2023-06-08 VITALS
DIASTOLIC BLOOD PRESSURE: 69 MMHG | HEART RATE: 87 BPM | HEIGHT: 69 IN | OXYGEN SATURATION: 95 % | WEIGHT: 315 LBS | RESPIRATION RATE: 16 BRPM | SYSTOLIC BLOOD PRESSURE: 124 MMHG | TEMPERATURE: 97.4 F | BODY MASS INDEX: 46.65 KG/M2

## 2023-06-08 LAB
ANION GAP SERPL CALCULATED.3IONS-SCNC: 8 MMOL/L (ref 4–13)
BUN SERPL-MCNC: 12 MG/DL (ref 5–25)
CALCIUM SERPL-MCNC: 8.3 MG/DL (ref 8.4–10.2)
CHLORIDE SERPL-SCNC: 98 MMOL/L (ref 96–108)
CO2 SERPL-SCNC: 27 MMOL/L (ref 21–32)
CREAT SERPL-MCNC: 0.76 MG/DL (ref 0.6–1.3)
GFR SERPL CREATININE-BSD FRML MDRD: 95 ML/MIN/1.73SQ M
GLUCOSE SERPL-MCNC: 132 MG/DL (ref 65–140)
GLUCOSE SERPL-MCNC: 133 MG/DL (ref 65–140)
GLUCOSE SERPL-MCNC: 147 MG/DL (ref 65–140)
POTASSIUM SERPL-SCNC: 3.8 MMOL/L (ref 3.5–5.3)
SODIUM SERPL-SCNC: 133 MMOL/L (ref 135–147)

## 2023-06-08 PROCEDURE — 80048 BASIC METABOLIC PNL TOTAL CA: CPT | Performed by: PHYSICIAN ASSISTANT

## 2023-06-08 PROCEDURE — 82948 REAGENT STRIP/BLOOD GLUCOSE: CPT

## 2023-06-08 PROCEDURE — 99239 HOSP IP/OBS DSCHRG MGMT >30: CPT | Performed by: PHYSICIAN ASSISTANT

## 2023-06-08 RX ORDER — LANOLIN ALCOHOL/MO/W.PET/CERES
800 CREAM (GRAM) TOPICAL 2 TIMES DAILY
Refills: 0
Start: 2023-06-08

## 2023-06-08 RX ADMIN — METOPROLOL TARTRATE 25 MG: 25 TABLET, FILM COATED ORAL at 08:37

## 2023-06-08 RX ADMIN — BUPROPION HYDROCHLORIDE 300 MG: 150 TABLET, EXTENDED RELEASE ORAL at 08:36

## 2023-06-08 RX ADMIN — OXYBUTYNIN CHLORIDE 5 MG: 5 TABLET, EXTENDED RELEASE ORAL at 08:36

## 2023-06-08 RX ADMIN — ATORVASTATIN CALCIUM 20 MG: 20 TABLET, FILM COATED ORAL at 08:36

## 2023-06-08 RX ADMIN — Medication 800 MG: at 08:36

## 2023-06-08 RX ADMIN — GABAPENTIN 300 MG: 300 CAPSULE ORAL at 08:36

## 2023-06-08 RX ADMIN — APIXABAN 5 MG: 5 TABLET, FILM COATED ORAL at 08:36

## 2023-06-08 RX ADMIN — PANTOPRAZOLE SODIUM 40 MG: 40 TABLET, DELAYED RELEASE ORAL at 05:35

## 2023-06-08 RX ADMIN — CARBIDOPA AND LEVODOPA 1 TABLET: 25; 100 TABLET ORAL at 08:36

## 2023-06-08 RX ADMIN — Medication 250 MG: at 08:36

## 2023-06-08 NOTE — ASSESSMENT & PLAN NOTE
· Briefly became tachycardic (6/7), likely due to Lopressor being held with soft BP  · Resumed Lopressor, continue Eliquis  · Currently rate controlled

## 2023-06-08 NOTE — ASSESSMENT & PLAN NOTE
Clinic Visit Note  Renita Henderson 75 y o  male   MRN: [id number]    Assessment and Plan      Diagnoses and all orders for this visit:    Chronic obstructive pulmonary disease, unspecified COPD type ([facility] )  No signs of acute exacerbation, continue to monitor, currently off Anoro Ellipta    Bipolar depression (HCC)  Lamictal, ReVia, Zoloft, mood appropriate/stable    Stage 3a chronic kidney disease ([facility] )  Repeat next visit, stable, repeat next visit, has seen nephrology, appreciate recommendations    Mixed hyperlipidemia  Continue statin therapy    Former smoker  Continue tobacco cessation    My impressions and treatment recommendations were discussed in detail with the patient who verbalized understanding and had no further questions  Discharge instructions were provided  Subjective     Chief Complaint: Follow-up visit    History of Present Illness:    Patient is a pleasant 49-year-old male coming in for follow-up visit  No acute concerns today  The following portions of the patient's history were reviewed and updated as appropriate: allergies, current medications, past family history, past medical history, past social history, past surgical history and problem list     REVIEW OF SYSTEMS:  A complete 12-point review of systems is negative other than that noted in the HPI  Review of Systems   Constitutional: Negative for chills, fatigue and fever  HENT: Negative for congestion and sore throat  Eyes: Negative for pain and visual disturbance  Respiratory: Negative for shortness of breath and wheezing  Cardiovascular: Negative for chest pain and palpitations  Gastrointestinal: Negative for abdominal pain, constipation, diarrhea, nausea and vomiting  Genitourinary: Negative for dysuria and frequency  Musculoskeletal: Negative for back pain and neck pain  Skin: Negative for color change and rash  Neurological: Negative for dizziness and headaches     Psychiatric/Behavioral: Negative for agitation Wt Readings from Last 3 Encounters:   06/08/23 (!) 144 kg (317 lb 3 2 oz)   04/07/22 (!) 150 kg (331 lb)   03/21/22 (!) 150 kg (331 lb 6 4 oz)     No acute exacerbation at this time  Demadex, Aldactone recently stopped with addition of Lopressor by cardiologist outpatient  • Echo (1/2023): EF 60 to 36%, normal systolic and diastolic function    • Resumed Lopressor (6/7), initially held with soft BP  • Daily weights, I/Os and confusion  All other systems reviewed and are negative  Current Outpatient Medications:   •  atorvastatin (LIPITOR) 20 mg tablet, Take 20 mg by mouth daily  , Disp: , Rfl: 0  •  lamoTRIgine (LaMICtal) 200 MG tablet, Take 200 mg by mouth daily , Disp: , Rfl: 0  •  naltrexone (REVIA) 50 mg tablet, 50 mg daily  , Disp: , Rfl: 0  •  sertraline (ZOLOFT) 50 mg tablet, Take 50 mg by mouth daily at bedtime  , Disp: , Rfl: 0  •  tamsulosin (FLOMAX) 0 4 mg, TAKE 1 CAPSULE(0 4 MG) BY MOUTH DAILY WITH DINNER, Disp: 90 capsule, Rfl: 1  Past Medical History:   Diagnosis Date   • Bipolar depression ([facility] )    • Colon polyp    • COPD (chronic obstructive pulmonary disease) ([facility])    • Depression    • Hyperlipidemia    • Lung disease     goes to pulmonologist, mild SOB at rest resolved with activity   • Shortness of breath    • Wears glasses      Past Surgical History:   Procedure Laterality Date   • COLONOSCOPY  2007 8/2018   • FRACTURE SURGERY      right rib cage post trauma age 13   • PLEURAL SCARIFICATION Right     post trauma   • IN COLONOSCOPY FLX DX W/COLLJ SPEC WHEN PFRMD N/A 8/16/2018    Procedure: COLONOSCOPY;  Surgeon: Terrie Olivas MD;  Location: [place] [address] GI LAB; Service: Gastroenterology   • IN COLONOSCOPY FLX DX W/COLLJ SPEC WHEN PFRMD N/A 3/25/2019    Procedure: COLONOSCOPY;  Surgeon: Terrie Olivas MD;  Location: [place] [address] GI LAB;   Service: Gastroenterology     Social History     Socioeconomic History   • Marital status: /Civil Union     Spouse name: Not on file   • Number of children: Not on file   • Years of education: Not on file   • Highest education level: Not on file   Occupational History   • Not on file   Tobacco Use   • Smoking status: Former     Packs/day: 3 00     Years: 35 00     Pack years: 105 00     Types: Cigarettes     Quit date: 8/29/2007     Years since quitting: 15 5   • Smokeless tobacco: Never   Vaping Use   • Vaping Use: Never used   Substance and Sexual Activity   • Alcohol use: No   • Drug use: No   • Sexual activity: Not on file   Other Topics Concern   • Not on file   Social History Narrative   • Not on file     Social Determinants of Health     Financial Resource Strain: Not on file   Food Insecurity: Not on file   Transportation Needs: Not on file   Physical Activity: Not on file   Stress: Not on file   Social Connections: Not on file   Intimate Partner Violence: Not on file   Housing Stability: Not on file     Family History   Problem Relation Age of Onset   • No Known Problems Mother    • Heart disease Father         CABG   • No Known Problems Brother    • No Known Problems Brother      No Known Allergies    Objective     Vitals:    02/24/23 1041   BP: 118/84   BP Location: Left arm   Patient Position: Sitting   Cuff Size: Large   Pulse: 76   Resp: 14   Temp: 98 4 °F (36 9 °C)   TempSrc: Temporal   Weight: 78 4 kg (172 lb 12 8 oz)   Height: 5' 7 2" (1 707 m)       Physical Exam:     GENERAL: NAD, pleasant   HEENT:  NC/AT, PERRL, EOMI, no scleral icterus  CARDIAC:  RRR, +S1/S2, no S3/S4 appreciated, no m/g/r  PULMONARY:  CTA B/L, no wheezing/rales/rhonci, non-labored breathing  ABDOMEN:  Soft, NT/ND, no rebound/guarding/rigidity  Extremities:  No edema, cyanosis, or clubbing  Musculoskeletal:  Full range of motion intact in all extremities   NEUROLOGIC: Grossly intact, no focal deficits  SKIN:  No rashes or erythema noted on exposed skin  Psych: Normal affect, mood stable    ==  PLEASE NOTE:  This encounter was completed utilizing the [employer] Direct Speech Voice Recognition Software  Grammatical errors, random word insertions, pronoun errors and incomplete sentences are occasional consequences of the system due to software limitations, ambient noise and hardware issues  These may be missed by proof reading prior to affixing electronic signature   Any questions or concerns about the content, text or information contained within the body of this dictation should be directly addressed to the physician for clarification  Please do not hesitate to call me directly if you have any any questions or concerns      DO Terrence Larry [address] Internal Medicine   [facility]

## 2023-06-08 NOTE — CASE MANAGEMENT
Case Management Discharge Planning Note    Patient name Abe Ill  Location /-01 MRN [id number]  : 1960 Date 2023       Current Admission Date: 6/3/2023  Current Admission Diagnosis:Idiopathic acute pancreatitis without infection or necrosis   Patient Active Problem List    Diagnosis Date Noted   • Ambulatory dysfunction 2023   • Constipation by delayed colonic transit 2022   • Hx of adenomatous colonic polyps 2022   • Overweight 2022   • Paroxysmal atrial fibrillation ([place] [address] ) 2022   • SIRS (systemic inflammatory response syndrome) ([place] [address] ) 2022   • Idiopathic acute pancreatitis without infection or necrosis 2022   • Type 2 diabetes mellitus without complication, without long-term current use of insulin ([place] [address] ) 2022   • Other constipation 2022   • Chronic diastolic congestive heart failure ([place] [address] ) 2020   • Vitamin D deficiency 2019   • Benign essential hypertension 2019   • ISRAEL (acute kidney injury) ([place] [address] ) 2019   • Hyponatremia 2019   • Urgency of urination 2019   • Hypertension 2019   • Depression 2019   • Chronic heart failure with preserved ejection fraction ([place] [address] ) 2019   • Morbid obesity with BMI of 45 0-49 9, adult ([place] [address] ) 2019   • Rosacea 10/25/2018   • Acute bronchitis 10/04/2018   • Chronic left hip pain 2018   • Chronic pain of left knee 2018   • GERD without esophagitis 2018   • Dyspnea on exertion 2018   • Chest pain, atypical 2018   • Morbid obesity with body mass index of 50 or higher ([place] [address] ) 2018   • Hematospermia 2018   • Erectile dysfunction 2017   • BPH (benign prostatic hyperplasia) 2017   • Sleep apnea 2017   • Chronic obstructive pulmonary disease ([place] [address] ) 2013   • Asthma 2013      LOS (days): 5  Geometric Mean LOS (GMLOS) (days): 3 10  Days to GMLOS:-2     OBJECTIVE:  Risk of Unplanned Readmission Score: 18 79         Current admission status: Inpatient   Preferred Pharmacy:   [facility] [address], [address] [facility] [address]  Phone: [phone] Fax: [phone] [facility] [address]  Phone: [phone] Fax: [phone]    [facility] [address] [place] [id number] [facility]  [address] [facility] [address]  Phone: [phone] Fax: [phone], [address] [place] [address]  Phone: [phone] Fax: [phone]    Primary Care Provider: Anastacio Victoria DO    Primary Insurance: MEDICARE  Secondary Insurance:     DISCHARGE DETAILS:    Discharge planning discussed with[de-identified] patient and wife  Freedom of Choice: Yes  Comments - Freedom of Choice: Cm informed that patient is dc ready for today  Cm confirmed with patient that he would prefer bed offer at [facility] rehab  Cm confirmed that facility will have a bed  Cm arranged for [facility] transport for this afternoon  Cm contacted patient's wife and confirmed location and time of transport  Cm contacted facility and informed thenm of transport time  All parties aware and in agreement with dc plan    CM contacted family/caregiver?: Yes  Were Treatment Team discharge recommendations reviewed with patient/caregiver?: Yes  Did patient/caregiver verbalize understanding of patient care needs?: N/A- going to facility  Were patient/caregiver advised of the risks associated with not following Treatment Team discharge recommendations?: Yes    Contacts  Patient Contacts: spouse, Angella Steiner  Relationship to Patient[de-identified] Family  Contact Method: Phone  Reason/Outcome: Continuity of Care, Discharge [address]         Is the patient interested in [facility] at discharge?: No    DME Referral Provided  Referral made for DME?: No    Other Referral/Resources/Interventions Provided:  Referral Comments: Bed offer and accepted at [facility] post acute  Treatment Team Recommendation: Short Term Rehab  Discharge Destination Plan[de-identified] Short Term Rehab  Transport at Discharge : Wheelchair van  Dispatcher Contacted: Yes  Number/Name of Dispatcher: Roundtrip     ETA of Transport (Date): 06/08/23  ETA of Transport (Time): 1300              IMM Given (Date):: 06/08/23  IMM Given to[de-identified] Patient       IMM reviewed with patient, patient agrees with discharge determination        Accepting Facility Name, [facility] : [facility] rehab  Receiving Facility/Agency Phone Number: [phone]  Facility/Agency Fax Number: [phone]

## 2023-06-08 NOTE — ASSESSMENT & PLAN NOTE
· Patient reports difficulty with ambulating, fearful that he will fall at home  · PT/OT recommending STR, CM on board for placement

## 2023-06-08 NOTE — ASSESSMENT & PLAN NOTE
Presented with generalized abdominal pain X 1 month, significantly worsened day of arrival  Denies nausea/vomiting  Recent admission in January with pancreatitis suspected 2/2 Trulicity, at the time was monitored off antibiotics and advised to follow-up with GI outpatient  Denies alcohol abuse, new medications  · CT a/p: Acute interstitial edematous pancreatitis involving the pancreatic tail with no pancreatic or peripancreatic cyst or fluid collection seen  · Lipase: 76->13  Triglycerides WNL, 54   · EGD: Normal esophagus, jejunum, healthy previous gastric bypass surgery  · GI following:  · Advanced to low-fat diet  Obtain outpatient MRI/MRCP in 6 weeks to further evaluate pancreatitis    · Hold Trulicity indefinitely on discharge  · Optimize pain regimen, D/C IV narcotics and wean oxycodone as tolerated  · Continue monitoring off IVFs

## 2023-06-08 NOTE — PLAN OF CARE
Problem: PAIN - ADULT  Goal: Verbalizes/displays adequate comfort level or baseline comfort level  Description: Interventions:  - Encourage patient to monitor pain and request assistance  - Assess pain using appropriate pain scale  - Administer analgesics based on type and severity of pain and evaluate response  - Implement non-pharmacological measures as appropriate and evaluate response  - Consider cultural and social influences on pain and pain management  - Notify physician/advanced practitioner if interventions unsuccessful or patient reports new pain  Outcome: Progressing     Problem: INFECTION - ADULT  Goal: Absence or prevention of progression during hospitalization  Description: INTERVENTIONS:  - Assess and monitor for signs and symptoms of infection  - Monitor lab/diagnostic results  - Monitor all insertion sites, i e  indwelling lines, tubes, and drains  - Monitor endotracheal if appropriate and nasal secretions for changes in amount and color  - [place] appropriate cooling/warming therapies per order  - Administer medications as ordered  - Instruct and encourage patient and family to use good hand hygiene technique  - Identify and instruct in appropriate isolation precautions for identified infection/condition  Outcome: Progressing     Problem: SAFETY ADULT  Goal: Maintain or return to baseline ADL function  Description: INTERVENTIONS:  -  Assess patient's ability to carry out ADLs; assess patient's baseline for ADL function and identify physical deficits which impact ability to perform ADLs (bathing, care of mouth/teeth, toileting, grooming, dressing, etc )  - Assess/evaluate cause of self-care deficits   - Assess range of motion  - Assess patient's mobility; develop plan if impaired  - Assess patient's need for assistive devices and provide as appropriate  - Encourage maximum independence but intervene and supervise when necessary  - Involve family in performance of ADLs  - Assess for home care needs following discharge   - Consider OT consult to assist with ADL evaluation and planning for discharge  - Provide patient education as appropriate  Outcome: Progressing     Problem: MOBILITY - ADULT  Goal: Maintain or return to baseline ADL function  Description: INTERVENTIONS:  -  Assess patient's ability to carry out ADLs; assess patient's baseline for ADL function and identify physical deficits which impact ability to perform ADLs (bathing, care of mouth/teeth, toileting, grooming, dressing, etc )  - Assess/evaluate cause of self-care deficits   - Assess range of motion  - Assess patient's mobility; develop plan if impaired  - Assess patient's need for assistive devices and provide as appropriate  - Encourage maximum independence but intervene and supervise when necessary  - Involve family in performance of ADLs  - Assess for home care needs following discharge   - Consider OT consult to assist with ADL evaluation and planning for discharge  - Provide patient education as appropriate  Outcome: Progressing

## 2023-06-08 NOTE — PLAN OF CARE
Problem: PAIN - ADULT  Goal: Verbalizes/displays adequate comfort level or baseline comfort level  Description: Interventions:  - Encourage patient to monitor pain and request assistance  - Assess pain using appropriate pain scale  - Administer analgesics based on type and severity of pain and evaluate response  - Implement non-pharmacological measures as appropriate and evaluate response  - Consider cultural and social influences on pain and pain management  - Notify physician/advanced practitioner if interventions unsuccessful or patient reports new pain  6/8/2023 1206 by Susan Montes RN  Outcome: Adequate for Discharge  6/8/2023 0738 by Susan Montes RN  Outcome: Progressing     Problem: INFECTION - ADULT  Goal: Absence or prevention of progression during hospitalization  Description: INTERVENTIONS:  - Assess and monitor for signs and symptoms of infection  - Monitor lab/diagnostic results  - Monitor all insertion sites, i e  indwelling lines, tubes, and drains  - Monitor endotracheal if appropriate and nasal secretions for changes in amount and color  - [place] appropriate cooling/warming therapies per order  - Administer medications as ordered  - Instruct and encourage patient and family to use good hand hygiene technique  - Identify and instruct in appropriate isolation precautions for identified infection/condition  6/8/2023 1206 by Susan Montes RN  Outcome: Adequate for Discharge  6/8/2023 0738 by Susan Montes RN  Outcome: Progressing  Goal: Absence of fever/infection during neutropenic period  Description: INTERVENTIONS:  - Monitor WBC    6/8/2023 1206 by Susan Montes RN  Outcome: Adequate for Discharge  6/8/2023 0738 by Susan Montes RN  Outcome: Progressing     Problem: SAFETY ADULT  Goal: Patient will remain free of falls  Description: INTERVENTIONS:  - Educate patient/family on patient safety including physical limitations  - Instruct patient to call for assistance with activity   - Consult OT/PT to assist with strengthening/mobility   - Keep Call bell within reach  - Keep bed low and locked with side rails adjusted as appropriate  - Keep care items and personal belongings within reach  - Initiate and maintain comfort rounds  - Make Fall Risk Sign visible to staff  - Offer Toileting  in advance of need  - Initiate/Maintain  - Obtain necessary fall risk management equipment  - Apply yellow socks and bracelet for high fall risk patients  - Consider moving patient to room near nurses station  6/8/2023 1206 by Jett Day RN  Outcome: Adequate for Discharge  6/8/2023 0738 by Jett Day RN  Outcome: Progressing  Goal: Maintain or return to baseline ADL function  Description: INTERVENTIONS:  -  Assess patient's ability to carry out ADLs; assess patient's baseline for ADL function and identify physical deficits which impact ability to perform ADLs (bathing, care of mouth/teeth, toileting, grooming, dressing, etc )  - Assess/evaluate cause of self-care deficits   - Assess range of motion  - Assess patient's mobility; develop plan if impaired  - Assess patient's need for assistive devices and provide as appropriate  - Encourage maximum independence but intervene and supervise when necessary  - Involve family in performance of ADLs  - Assess for home care needs following discharge   - Consider OT consult to assist with ADL evaluation and planning for discharge  - Provide patient education as appropriate  6/8/2023 1206 by Jett Day RN  Outcome: Adequate for Discharge  6/8/2023 0738 by Jett Dya RN  Outcome: Progressing  Goal: Maintains/Returns to pre admission functional level  Description: INTERVENTIONS:  - Perform BMAT or MOVE assessment daily    - Set and communicate daily mobility goal to care team and patient/family/caregiver     - Collaborate with rehabilitation services on mobility goals if consulted  - Perform Range of Motion  Problem: DISCHARGE PLANNING  Goal: Discharge to home or other facility with appropriate resources  Description: INTERVENTIONS:  - Identify barriers to discharge w/patient and caregiver  - Arrange for needed discharge resources and transportation as appropriate  - Identify discharge learning needs (meds, wound care, etc )  - Arrange for interpretive services to assist at discharge as needed  - Refer to Case Management Department for coordinating discharge planning if the patient needs post-hospital services based on physician/advanced practitioner order or complex needs related to functional status, cognitive ability, or social support system  6/8/2023 1206 by Martell Salinas RN  Outcome: Adequate for Discharge  6/8/2023 0738 by Martell Salinas RN  Outcome: Progressing     Problem: MOBILITY - ADULT  Goal: Maintain or return to baseline ADL function  Description: INTERVENTIONS:  -  Assess patient's ability to carry out ADLs; assess patient's baseline for ADL function and identify physical deficits which impact ability to perform ADLs (bathing, care of mouth/teeth, toileting, grooming, dressing, etc )  - Assess/evaluate cause of self-care deficits   - Assess range of motion  - Assess patient's mobility; develop plan if impaired  - Assess patient's need for assistive devices and provide as appropriate  - Encourage maximum independence but intervene and supervise when necessary  - Involve family in performance of ADLs  - Assess for home care needs following discharge   - Consider OT consult to assist with ADL evaluation and planning for discharge  - Provide patient education as appropriate  6/8/2023 1206 by Martell Salinas RN  Outcome: Adequate for Discharge  6/8/2023 0738 by Martell Salinas RN  Outcome: Progressing  Goal: Maintains/Returns to pre admission functional level  Description: INTERVENTIONS:  - Perform BMAT or MOVE assessment daily    - Set and communicate daily mobility goal to care team and patient/family/caregiver     - Collaborate with rehabilitation services on mobility goals if consulted  - Perform Range of Motion   Problem: Nutrition/Hydration-ADULT  Goal: Nutrient/Hydration intake appropriate for improving, restoring or maintaining nutritional needs  Description: Monitor and assess patient's nutrition/hydration status for malnutrition  Collaborate with interdisciplinary team and initiate plan and interventions as ordered  Monitor patient's weight and dietary intake as ordered or per policy  Utilize nutrition screening tool and intervene as necessary  Determine patient's food preferences and provide high-protein, high-caloric foods as appropriate       INTERVENTIONS:  - Monitor oral intake, urinary output, labs, and treatment plans  - Assess nutrition and hydration status and recommend course of action  - Evaluate amount of meals eaten  - Assist patient with eating if necessary   - Allow adequate time for meals  - Recommend/ encourage appropriate diets, oral nutritional supplements, and vitamin/mineral supplements  - Order, calculate, and assess calorie counts as needed  - Recommend, monitor, and adjust tube feedings and TPN/PPN based on assessed needs  - Assess need for intravenous fluids  - Provide specific nutrition/hydration education as appropriate  - Include patient/family/caregiver in decisions related to nutrition  6/8/2023 1206 by Susan Montes RN  Outcome: Adequate for Discharge  6/8/2023 0738 by Susan Montes RN  Outcome: Progressing     Problem: Prexisting or High Potential for Compromised Skin Integrity  Goal: Skin integrity is maintained or improved  Description: INTERVENTIONS:  - Identify patients at risk for skin breakdown  - Assess and monitor skin integrity  - Assess and monitor nutrition and hydration status  - Monitor labs   - Assess for incontinence   - Turn and reposition patient  - Assist with mobility/ambulation  - Relieve pressure over bony prominences  - Avoid friction and shearing  - Provide appropriate hygiene as needed including keeping skin clean and dry  - Evaluate need for skin moisturizer/barrier cream  - Collaborate with interdisciplinary team   - Patient/family teaching  - Consider wound care consult   6/8/2023 1206 by Onelia Pineda RN  Outcome: Adequate for Discharge  6/8/2023 0738 by Onelia Pineda RN  Outcome: Progressing     - Reposition patient   - Dangle patient   - Stand patient  - Ambulate patient   - Out of bed to chair    - Out of bed for meals   - Out of bed for toileting  - Record patient progress and toleration of activity level   6/8/2023 1206 by Onelia Pineda RN  Outcome: Adequate for Discharge  6/8/2023 0738 by Onelia Pineda RN  Outcome: Progressing     - Reposition patient   - Dangle patient   - Stand patient   - Ambulate patient   - Out of bed to chair   - Out of bed for meals  - Out of bed for toileting  - Record patient progress and toleration of activity level   6/8/2023 1206 by Onelia Pineda RN  Outcome: Adequate for Discharge  6/8/2023 0738 by Onelia Pineda RN  Outcome: Progressing

## 2023-06-08 NOTE — ASSESSMENT & PLAN NOTE
Hyponatremia on admission, Na 132 in ED, decreased to as low as 128 (6/5)  · Possibly in setting of acute pancreatitis, consider IVF-induced with aggressive hydration  · IVFs D/C (6/5), since improved  · Hyponatremia work-up:  · TSH slightly low 0 258, T4 slightly elevated 1 36  · Uric acid wnl, urine osmol 279, urine sodium 36, serum osmo 282  · Nephrology consult:  · Na stable 133, no need for diuretics  Obtain BMP in 1 week from discharge    · Monitor BMP daily while inpatient

## 2023-06-08 NOTE — ASSESSMENT & PLAN NOTE
Lab Results   Component Value Date    HGBA1C 6 4 (H) 01/15/2023       Recent Labs     06/07/23  1105 06/07/23  1648 06/07/23 2040 06/08/23  0718   POCGLU 126 162* 156* 132       Blood Sugar Average: Last 72 hrs:  (P) 149   · Hold home oral diabetic medications while inpatient   · SSI coverage with Accu-Cheks ACHS  · Likely will indefinitely hold Trulicity

## 2023-06-08 NOTE — ASSESSMENT & PLAN NOTE
· Creatinine elevated at 1 42 on admission   Baseline Cr around 0 7-1 0   · Most likely pre-renal with pancreatitis, dehydration   · S/p IVFs as above  · Resolved, renal function remains stable at baseline

## 2023-06-08 NOTE — DISCHARGE SUMMARY
[address]  Discharge- Maximiliano Orozco 1960, 70 y o  male MRN: [id number]  Unit/Bed#: -01 Encounter: [id number]  Primary Care Provider: Deanne Mccoy,    Date and time admitted to hospital: 6/3/2023  6:24 AM    * Idiopathic acute pancreatitis without infection or necrosis  Assessment & Plan  Presented with generalized abdominal pain X 1 month, significantly worsened day of arrival  Denies nausea/vomiting  Recent admission in January with pancreatitis suspected 2/2 Trulicity, at the time was monitored off antibiotics and advised to follow-up with GI outpatient  Denies alcohol abuse, new medications  · CT a/p: Acute interstitial edematous pancreatitis involving the pancreatic tail with no pancreatic or peripancreatic cyst or fluid collection seen  · Lipase: 76->13  Triglycerides WNL, 54   · EGD: Normal esophagus, jejunum, healthy previous gastric bypass surgery  · GI following:  · Advanced to low-fat diet  Obtain outpatient MRI/MRCP in 6 weeks to further evaluate pancreatitis  · Hold Trulicity indefinitely on discharge  · Optimize pain regimen, D/C IV narcotics and wean oxycodone as tolerated  · Continue monitoring off IVFs    SIRS (systemic inflammatory response syndrome) (HCC)  Assessment & Plan  As evidenced by fever (temp 101), and leukocytosis (WBCs 15) on 6/4/2023  · Suspect related to pain with pancreatitis, as no necrosis or abscess seen on imaging  · BC x1: NGTD (72 hrs)  · RUQ US: No evidence of choledocholithiasis  · S/p 3 days IV Rocephin/Flagyl, no need for further abx     Hyponatremia  Assessment & Plan  Hyponatremia on admission, Na 132 in ED, decreased to as low as 128 (6/5)    · Possibly in setting of acute pancreatitis, consider IVF-induced with aggressive hydration  · IVFs D/C (6/5), since improved  · Hyponatremia work-up:  · TSH slightly low 0 258, T4 slightly elevated 1 36  · Uric acid wnl, urine osmol 279, urine sodium 36, serum osmo 282  · Nephrology consult:  · Na stable 133, no need for diuretics  Obtain BMP in 1 week from discharge  · Monitor BMP daily while inpatient    Ambulatory dysfunction  Assessment & Plan  · Patient reports difficulty with ambulating, fearful that he will fall at home  · PT/OT recommending [facility] CORTES on board for placement    Chronic heart failure with preserved ejection fraction [facility])  Assessment & Plan  Wt Readings from Last 3 Encounters:   06/08/23 (!) 144 kg (317 lb 3 2 oz)   04/07/22 (!) 150 kg (331 lb)   03/21/22 (!) 150 kg (331 lb 6 4 oz)     No acute exacerbation at this time  Demadex, Aldactone recently stopped with addition of Lopressor by cardiologist outpatient  • Echo (1/2023): EF 60 to 35%, normal systolic and diastolic function  • Resumed Lopressor (6/7), initially held with soft BP  • Daily weights, I/Os    Paroxysmal atrial fibrillation (HCC)  Assessment & Plan  · Briefly became tachycardic (6/7), likely due to Lopressor being held with soft BP  · Resumed Lopressor, continue Eliquis  · Currently rate controlled    Type 2 diabetes mellitus without complication, without long-term current use of insulin [facility])  Assessment & Plan  Lab Results   Component Value Date    HGBA1C 6 4 (H) 01/15/2023       Recent Labs     06/07/23  1105 06/07/23  1648 06/07/23  2040 06/08/23  0718   POCGLU 126 162* 156* 132       Blood Sugar Average: Last 72 hrs:  (P) 149   · Hold home oral diabetic medications while inpatient   · SSI coverage with Accu-Cheks ACHS  · Likely will indefinitely hold Trulicity     ISRAEL (acute kidney injury) ([facility] [address] )  Assessment & Plan  · Creatinine elevated at 1 42 on admission   Baseline Cr around 0 7-1 0   · Most likely pre-renal with pancreatitis, dehydration   · S/p IVFs as above  · Resolved, renal function remains stable at baseline    Sleep apnea  Assessment & Plan  · Continue CPAP qHS     BPH (benign prostatic hyperplasia)  Assessment & Plan  · Continue formulary equivalent oxybutynin 5 mg    Medical Problems Resolved Problems  Date Reviewed: 6/8/2023          Resolved    Hypokalemia 6/7/2023     Resolved by  Bassem Hummel PA-C        Discharging Physician / Practitioner: Bassem Hummel PA-C  PCP: Kathrin Kat DO  Admission Date:   Admission Orders (From admission, onward)     Ordered        06/03/23 1014  [facility] [facility]                      Discharge Date: 06/08/23    Consultations During Hospital Stay:  · Gastroenterology   · Nephrology  · PT/OT    Procedures Performed:   · EGD 6/6/23: The esophagus appeared normal  Healthy previous gastric bypass surgery in the stomach  The jejunum appeared normal     Significant Findings / Test Results:   · CT a/p: Acute interstitial edematous pancreatitis involving the pancreatic tail with no pancreatic or peripancreatic cyst or fluid collection seen  · RUQ US: No evidence of choledocholithiasis  Incidental Findings:   · None    Test Results Pending at Discharge (will require follow up): · Repeat IgG results  · Final blood culture results     Outpatient Tests Requested:  · Follow-up with GI for management of pancreatitis  · Follow-up with nephrology for management of hyponatremia  · Obtain outpatient MRCP/MRI in 6 weeks  · Obtain BMP in 1 week     Complications:  None    Reason for Admission: Idiopathic acute pancreatitis    Hospital Course:   Angella Talley is a 70 y o  male patient, PMH JOSIAH, obesity, DM, paroxysmal atrial fibrillation AC on Eliquis, chronic HFpEF, BPH, CKD, who originally presented to the hospital on 6/3/2023 due to acute idiopathic pancreatitis, presented with generalized abdominal pain for 1 month  Recent admission in January for pancreatitis thought to be from [address] [facility]  On arrival to ED, CT showed acute interstitial pancreatitis, although lipase and triglycerides wnl  Patient also with mild hyponatremia, ISRAEL started on aggressive IVFs and pain management   GI following patient, RUQ US did not show any acute findings, EGD unremarkable and "lipase remained stable  Patient met SIRS during admission, treated empirically with 3 days IV Rocephin/Flagyl but no need for further treatement  Nephrology evaluated patient, hyponatremia improved with completion of IVFs, recommend obtaining BMP in 1 week with outpatient follow-up  Patient's abdominal symptoms improved with advancing diet, needs MRCP/MRI in 6 weeks with outpatient follow-up with GI on discharge  PT/OT evaluated patient due to concerns with mobility, recommended rehab placement, accepted at [facility]  Please see above list of diagnoses and related plan for additional information  Condition at Discharge: stable    Discharge Day Visit / Exam:   Subjective:  Patient is seen at bedside this a m , denies abdominal pain, nausea, vomiting, tolerating low-fat diet well  Patient agreeable to rehab placement, states he feels ready for discharge  Vitals: Blood Pressure: 124/69 (06/08/23 0718)  Pulse: 87 (06/08/23 0718)  Temperature: (!) 97 4 °F (36 3 °C) (06/08/23 0718)  Temp Source: Tympanic (06/08/23 0718)  Respirations: 16 (06/08/23 0718)  Height: 5' 9\" (175 3 cm) (06/06/23 1401)  Weight - Scale: (!) 144 kg (317 lb 3 2 oz) (06/08/23 0600)  SpO2: 95 % (06/08/23 0735)  Exam:   Physical Exam  Constitutional:       General: He is not in acute distress  Appearance: He is obese  He is not ill-appearing, toxic-appearing or diaphoretic  Cardiovascular:      Rate and Rhythm: Normal rate and regular rhythm  Pulses: Normal pulses  Heart sounds: Normal heart sounds  Pulmonary:      Effort: Pulmonary effort is normal  No respiratory distress  Breath sounds: Normal breath sounds  Abdominal:      General: Bowel sounds are normal  There is no distension  Palpations: Abdomen is soft  Tenderness: There is no abdominal tenderness  Musculoskeletal:         General: No swelling or tenderness  Skin:     General: Skin is warm     Neurological:      General: No focal " deficit present  Mental Status: He is alert  Mental status is at baseline  Psychiatric:         Mood and Affect: Mood normal          Behavior: Behavior normal            Discussion with Family: Patient declined call to   Discharge instructions/Information to patient and family:   See after visit summary for information provided to patient and family  Provisions for Follow-Up Care:  See after visit summary for information related to follow-up care and any pertinent home health orders  Disposition:   Other [place] [facility] Readmission: None     Discharge Statement:  I spent 60 minutes discharging the patient  This time was spent on the day of discharge  I had direct contact with the patient on the day of discharge  Greater than 50% of the total time was spent examining patient, answering all patient questions, arranging and discussing plan of care with patient as well as directly providing post-discharge instructions  Additional time then spent on discharge activities  Discharge Medications:  See after visit summary for reconciled discharge medications provided to patient and/or family        **Please Note: This note may have been constructed using a voice recognition system**

## 2023-06-08 NOTE — PLAN OF CARE
Problem: PAIN - ADULT  Goal: Verbalizes/displays adequate comfort level or baseline comfort level  Description: Interventions:  - Encourage patient to monitor pain and request assistance  - Assess pain using appropriate pain scale  - Administer analgesics based on type and severity of pain and evaluate response  - Implement non-pharmacological measures as appropriate and evaluate response  - Consider cultural and social influences on pain and pain management  - Notify physician/advanced practitioner if interventions unsuccessful or patient reports new pain  Outcome: Progressing     Problem: INFECTION - ADULT  Goal: Absence or prevention of progression during hospitalization  Description: INTERVENTIONS:  - Assess and monitor for signs and symptoms of infection  - Monitor lab/diagnostic results  - Monitor all insertion sites, i e  indwelling lines, tubes, and drains  - Monitor endotracheal if appropriate and nasal secretions for changes in amount and color  - [place] appropriate cooling/warming therapies per order  - Administer medications as ordered  - Instruct and encourage patient and family to use good hand hygiene technique  - Identify and instruct in appropriate isolation precautions for identified infection/condition  Outcome: Progressing  Goal: Absence of fever/infection during neutropenic period  Description: INTERVENTIONS:  - Monitor WBC    Outcome: Progressing     Problem: DISCHARGE PLANNING  Goal: Discharge to home or other facility with appropriate resources  Description: INTERVENTIONS:  - Identify barriers to discharge w/patient and caregiver  - Arrange for needed discharge resources and transportation as appropriate  - Identify discharge learning needs (meds, wound care, etc )  - Arrange for interpretive services to assist at discharge as needed  - Refer to Case Management Department for coordinating discharge planning if the patient needs post-hospital services based on physician/advanced practitioner order or complex needs related to functional status, cognitive ability, or social support system  Outcome: Progressing     Problem: Knowledge Deficit  Goal: Patient/family/caregiver demonstrates understanding of disease process, treatment plan, medications, and discharge instructions  Description: Complete learning assessment and assess knowledge base  Interventions:  - Provide teaching at level of understanding  - Provide teaching via preferred learning methods  Outcome: Progressing     Problem: Nutrition/Hydration-ADULT  Goal: Nutrient/Hydration intake appropriate for improving, restoring or maintaining nutritional needs  Description: Monitor and assess patient's nutrition/hydration status for malnutrition  Collaborate with interdisciplinary team and initiate plan and interventions as ordered  Monitor patient's weight and dietary intake as ordered or per policy  Utilize nutrition screening tool and intervene as necessary  Determine patient's food preferences and provide high-protein, high-caloric foods as appropriate       INTERVENTIONS:  - Monitor oral intake, urinary output, labs, and treatment plans  - Assess nutrition and hydration status and recommend course of action  - Evaluate amount of meals eaten  - Assist patient with eating if necessary   - Allow adequate time for meals  - Recommend/ encourage appropriate diets, oral nutritional supplements, and vitamin/mineral supplements  - Order, calculate, and assess calorie counts as needed  - Recommend, monitor, and adjust tube feedings and TPN/PPN based on assessed needs  - Assess need for intravenous fluids  - Provide specific nutrition/hydration education as appropriate  - Include patient/family/caregiver in decisions related to nutrition  Outcome: Progressing

## 2023-06-08 NOTE — ASSESSMENT & PLAN NOTE
As evidenced by fever (temp 101), and leukocytosis (WBCs 15) on 6/4/2023  · Suspect related to pain with pancreatitis, as no necrosis or abscess seen on imaging  · BC x1: NGTD (72 hrs)  · RUQ US: No evidence of choledocholithiasis    · S/p 3 days IV Rocephin/Flagyl, no need for further abx

## 2023-06-09 ENCOUNTER — NURSING HOME VISIT (OUTPATIENT)
Dept: GERIATRICS | Facility: OTHER | Age: 63
End: 2023-06-09
Payer: MEDICARE

## 2023-06-09 DIAGNOSIS — I48.0 PAROXYSMAL ATRIAL FIBRILLATION (HCC): ICD-10-CM

## 2023-06-09 DIAGNOSIS — F32.A DEPRESSION, UNSPECIFIED DEPRESSION TYPE: ICD-10-CM

## 2023-06-09 DIAGNOSIS — G20 PARKINSON DISEASE (HCC): ICD-10-CM

## 2023-06-09 DIAGNOSIS — K85.00 IDIOPATHIC ACUTE PANCREATITIS WITHOUT INFECTION OR NECROSIS: Primary | ICD-10-CM

## 2023-06-09 DIAGNOSIS — G47.33 OBSTRUCTIVE SLEEP APNEA SYNDROME: ICD-10-CM

## 2023-06-09 DIAGNOSIS — I10 PRIMARY HYPERTENSION: ICD-10-CM

## 2023-06-09 DIAGNOSIS — E11.9 TYPE 2 DIABETES MELLITUS WITHOUT COMPLICATION, WITHOUT LONG-TERM CURRENT USE OF INSULIN (HCC): ICD-10-CM

## 2023-06-09 PROBLEM — G20.A1 PARKINSON DISEASE: Status: ACTIVE | Noted: 2023-06-09

## 2023-06-09 PROCEDURE — 99306 1ST NF CARE HIGH MDM 50: CPT | Performed by: FAMILY MEDICINE

## 2023-06-09 NOTE — ASSESSMENT & PLAN NOTE
Continue metoprolol tartrate 25 mg twice daily for heart rate control, may need to adjust given elevated BP  Continue Eliquis for stroke risk reduction

## 2023-06-09 NOTE — PROGRESS NOTES
Yessi [address]  [address] post acute [facility]  History and Physical    NAME: Alberto Orozco  AGE: 70 y o  SEX: male [id number]    DATE OF ENCOUNTER: 6/9/2023    Code status:  Full code    Assessment and Plan     1  Idiopathic acute pancreatitis without infection or necrosis  Assessment & Plan:  Patient presenting with generalized abdominal pain for about a month significantly worsened on the day of admission  He was previously admittedlp with pancreatitis suspected secondary to Trulicity  Patient denies any alcohol abuse or any other new medications imaging significant for acute interstitial edematous pancreatitis involving the pancreatic tail with no pancreatic or peripancreatic cyst or fluid collection seen    Lipase 76-13, triglycerides within normal limit  EGD with a normal esophagus, jejunum and previous gastric bypass surgery   Tolerating low-fat diet  MRI/MRCP in 6 weeks        2  Type 2 diabetes mellitus without complication, without long-term current use of insulin (HCC)  Assessment & Plan:    Lab Results   Component Value Date    HGBA1C 6 4 (H) 01/15/2023   Continue metformin 500 mg twice daily      3  Primary hypertension  Assessment & Plan:  BP noted elevated   Currently on metoprolol tartrate 25 mg twice daily, in the past he was also taking clonidine and ramipril   Start ramipril 5 mg daily and increase metoprolol to 50 mg BID       4  Paroxysmal atrial fibrillation (HCC)  Assessment & Plan:  Continue metoprolol tartrate 25 mg twice daily for heart rate control, may need to adjust given elevated BP  Continue Eliquis for stroke risk reduction  5  Depression, unspecified depression type  Assessment & Plan: On trazodone 150 mg and bupropion 300 mg daily  Also on Vraylar in the past        6  Parkinson disease ([facility] [address] )  Assessment & Plan:  On Sinemet  TID      7   Obstructive sleep apnea syndrome  Assessment & Plan:  Continue CPAP therapy QHS          All medications and routine orders were reviewed and updated as needed  Plan discussed with: Nurse    Chief Complaint     Seen for admission at [facility] [address]    History of Present Illness     64-year-old male with a past medical history significant for JOSIAH, obesity, diabetes mellitus, paroxysmal A-fib, CAD stents, BPH, CKD who presented to the hospital on 6/3 with persistent abdominal pain for a month  She had a prior admission for pancreatitis thought to be caused by Trulicity  Patient met SIRS criteria, on arrival to the emergency he was found to have acute kidney injury and hyponatremia and was a started on aggressive IV fluid hydration  CT showed acute interstitial pancreatitis with normal lipase and to glycerides  Gastroenterology was consulted, right upper quadrant ultrasound unremarkable, EGD also unremarkable  Lipase remained stable  He was treated empirically with 3 days of IV ceftriaxone and metronidazole  Nephrology evaluated patient for the hyponatremia which improved with hydration  Patient will need follow-up with gastroenterology and MRCP in 6 weeks  He will also need follow-up for his hyponatremia (BMP in a week)  Patient was evaluated by PT and OT who recommended postacute rehab services, he was accepted at [facility]  Patient was seen today for admission to [facility], he is doing better, complaining of constipation, BP noted elevated, no other concerns expressed by nursing staff  Labs ordered       HISTORY:  Past Medical History:   Diagnosis Date   • Arthritis    • Asthma    • Colon polyp    • CPAP (continuous positive airway pressure) dependence    • Edema     left leg   • GERD (gastroesophageal reflux disease)    • History of echocardiogram 07/2017   • History of Holter monitoring 09/2014   • History of stress test 08/2014   • Hypertension    • Myocardial infarct [facility])     2011   • Myocardial infarction [facility])    • Obesity    • Sleep apnea    • Thrombophlebitis      Family History   Problem Relation Age of Onset   • Uterine cancer Mother    • Prostate cancer Father    • Diabetes Father    • Heart failure Father    • Cancer Father    • Stroke Family         syndrome   • Aneurysm Family         aoritc   • Diabetes Brother    • Other Brother         amputation-   • Diabetes Paternal Grandmother    • Diabetes Paternal Grandfather    • Cancer Sister      Social History     Socioeconomic History   • Marital status: /Civil Union     Spouse name: Not on file   • Number of children: 2   • Years of education: Not on file   • Highest education level: Not on file   Occupational History   • Not on file   Tobacco Use   • Smoking status: Former     Packs/day: 1 00     Years: 3 00     Total pack years: 3 00     Types: Cigarettes   • Smokeless tobacco: Former   • Tobacco comments:     never a smoker per allscripts - as per [employer] Use   • Vaping Use: Never used   Substance and Sexual Activity   • Alcohol use: Not Currently     Comment: rarely - denied per allscripts    • Drug use: No   • Sexual activity: Not on file   Other Topics Concern   • Not on file   Social History Narrative    · Do you currently or have you served in [employer] [facility] [employer]:   No      · Were you activated, into active duty, as a member of the [employer] or as a Reservist:   No      · Advance directive:   No      · Live alone or with others:   with others      · Caffeine intake:   Occasional      · Guns present in home:   No      · Seat belts used routinely:   Yes      · Presence of domestic violence:   No      · Sunscreen used routinely:   Yes      · Passive smoke exposure:   No      · Are you currently employed:   No      · Asbestos exposure:   No      · TB exposure:   No      · Environmental exposure:   No      · Animal exposure:    Yes      · Blood Transfusion:   No      · Smoke alarm in home:   No      · Hard of hearing or deaf in one or both ears:   No      · Legally blind in one or both eyes:   No      ·      Social Determinants of Health     Financial Resource Strain: Not on file   Food Insecurity: No Food Insecurity (6/6/2023)    Hunger Vital Sign    • Worried About Running Out of Food in the Last Year: Never true    • Ran Out of Food in the Last Year: Never true   Transportation Needs: No Transportation Needs (6/6/2023)    PRAPARE - Transportation    • Lack of Transportation (Medical): No    • Lack of Transportation (Non-Medical): No   Physical Activity: Not on file   Stress: Not on file   Social Connections: Not on file   Intimate Partner Violence: Not on file   Housing Stability: Unknown (6/6/2023)    Housing Stability Vital Sign    • Unable to Pay for Housing in the Last Year: No    • Number of Places Lived in the Last Year: Not on file    • Unstable Housing in the Last Year: No       Allergies: Allergies   Allergen Reactions   • Gluten Meal - Food Allergy    • Adhesive [Medical Tape] Rash       Review of Systems     Review of Systems   Constitutional: Positive for fatigue  Respiratory: Negative for shortness of breath  Cardiovascular: Positive for leg swelling  Negative for chest pain  Gastrointestinal: Positive for constipation  Endocrine: Negative for cold intolerance and heat intolerance  Genitourinary: Negative for difficulty urinating  All other systems reviewed and are negative  Medications and orders     All medications reviewed and updated in Nursing Home EMR  Objective     Vitals:  Blood pressure 162/90  Temperature 97 8  Heart rate 96  Weight 322 8 pounds  Respiratory rate 18  Blood glucose 130  O2 saturation 96 %    Physical Exam  Vitals reviewed  Constitutional:       General: He is not in acute distress  Appearance: He is obese  He is not toxic-appearing or diaphoretic  HENT:      Head: Normocephalic  Nose: Nose normal       Mouth/Throat:      Mouth: Mucous membranes are moist    Cardiovascular:      Rate and Rhythm: Normal rate  Heart sounds: Normal heart sounds  Pulmonary:      Breath sounds: Normal breath sounds  Abdominal:      General: Bowel sounds are normal  There is no distension  Tenderness: There is no abdominal tenderness  Musculoskeletal:      Right lower leg: Edema present  Left lower leg: Edema present  Skin:     General: Skin is warm  Neurological:      Mental Status: He is alert  Mental status is at baseline  Psychiatric:         Mood and Affect: Mood normal          Pertinent Laboratory/Diagnostic Studies: The following labs/studies were reviewed please see chart or hospital paperwork for details    CBC BMP    - Counseling Documentation: patient was counseled regarding: diagnostic results, instructions for management, risk factor reductions and prognosis

## 2023-06-09 NOTE — ASSESSMENT & PLAN NOTE
BP noted elevated   Currently on metoprolol tartrate 25 mg twice daily, in the past he was also taking clonidine and ramipril   Start ramipril 5 mg daily and increase metoprolol to 50 mg BID

## 2023-06-09 NOTE — ASSESSMENT & PLAN NOTE
Lab Results   Component Value Date    HGBA1C 6 4 (H) 01/15/2023   Continue metformin 500 mg twice daily

## 2023-06-09 NOTE — ASSESSMENT & PLAN NOTE
Patient presenting with generalized abdominal pain for about a month significantly worsened on the day of admission    He was previously admittedlp with pancreatitis suspected secondary to Trulicity  Patient denies any alcohol abuse or any other new medications imaging significant for acute interstitial edematous pancreatitis involving the pancreatic tail with no pancreatic or peripancreatic cyst or fluid collection seen    Lipase 76-13, triglycerides within normal limit  EGD with a normal esophagus, jejunum and previous gastric bypass surgery   Tolerating low-fat diet  MRI/MRCP in 6 weeks

## 2023-06-10 LAB — BACTERIA BLD CULT: NORMAL

## 2023-06-12 LAB
IGG SERPL-MCNC: 986 MG/DL (ref 603–1613)
IGG1 SER-MCNC: 341 MG/DL (ref 248–810)
IGG2 SER-MCNC: 396 MG/DL (ref 130–555)
IGG3 SER-MCNC: 52 MG/DL (ref 15–102)
IGG4 SER-MCNC: 44 MG/DL (ref 2–96)

## 2023-06-13 ENCOUNTER — NURSING HOME VISIT (OUTPATIENT)
Dept: GERIATRICS | Facility: OTHER | Age: 63
End: 2023-06-13
Payer: MEDICARE

## 2023-06-13 VITALS
RESPIRATION RATE: 18 BRPM | TEMPERATURE: 98.1 F | SYSTOLIC BLOOD PRESSURE: 118 MMHG | HEART RATE: 89 BPM | OXYGEN SATURATION: 97 % | DIASTOLIC BLOOD PRESSURE: 82 MMHG

## 2023-06-13 DIAGNOSIS — K21.9 GERD WITHOUT ESOPHAGITIS: Chronic | ICD-10-CM

## 2023-06-13 DIAGNOSIS — I50.32 CHRONIC DIASTOLIC CONGESTIVE HEART FAILURE (HCC): ICD-10-CM

## 2023-06-13 DIAGNOSIS — F32.A DEPRESSION, UNSPECIFIED DEPRESSION TYPE: ICD-10-CM

## 2023-06-13 DIAGNOSIS — G20 PARKINSON DISEASE (HCC): ICD-10-CM

## 2023-06-13 DIAGNOSIS — K85.00 IDIOPATHIC ACUTE PANCREATITIS WITHOUT INFECTION OR NECROSIS: Primary | ICD-10-CM

## 2023-06-13 DIAGNOSIS — E11.9 TYPE 2 DIABETES MELLITUS WITHOUT COMPLICATION, WITHOUT LONG-TERM CURRENT USE OF INSULIN (HCC): ICD-10-CM

## 2023-06-13 DIAGNOSIS — R26.2 AMBULATORY DYSFUNCTION: ICD-10-CM

## 2023-06-13 DIAGNOSIS — I48.0 PAROXYSMAL ATRIAL FIBRILLATION (HCC): ICD-10-CM

## 2023-06-13 DIAGNOSIS — I10 BENIGN ESSENTIAL HYPERTENSION: ICD-10-CM

## 2023-06-13 PROCEDURE — 99309 SBSQ NF CARE MODERATE MDM 30: CPT

## 2023-06-13 RX ORDER — CHOLESTYRAMINE 4 G/9G
1 POWDER, FOR SUSPENSION ORAL
Start: 2023-06-13

## 2023-06-13 NOTE — ASSESSMENT & PLAN NOTE
Lab Results   Component Value Date    HGBA1C 6 4 (H) 61/54/0138   Trulicity discontinued d/t pancreatitis   Continue metformin

## 2023-06-13 NOTE — ASSESSMENT & PLAN NOTE
Wt Readings from Last 3 Encounters:   06/08/23 (!) 144 kg (317 lb 3 2 oz)   04/07/22 (!) 150 kg (331 lb)   03/21/22 (!) 150 kg (331 lb 6 4 oz)     Euvolemic on exam   Demadex and spironolactone recently discontinued by cardiologist   Daily weights at STR   Monitor volume status   Encourage cardiac diet compliance

## 2023-06-13 NOTE — PROGRESS NOTES
[address]  [phone]  FACILITY: [facility]  Code 31 (STR)        NAME: Carolina Lopez  AGE: 70 y o  SEX: male CODE STATUS: CPR    DATE OF ENCOUNTER: 6/13/2023    Assessment and Plan     1  Idiopathic acute pancreatitis without infection or necrosis  Assessment & Plan:  Previous admission with pancreatitis suspected d/t Trulicity   Imaging during admission significant for acute interstitial edematous pancreatitis involving the pancreatic tail with no pancreatic cyst or fluid collection   EGD WNL   Now tolerating low fat diet   Patient with new diarrhea will start cholestyramine 4 g TID w meals   Recommended outpatient MRI/MRCP in 6 weeks       2  Type 2 diabetes mellitus without complication, without long-term current use of insulin [facility])  Assessment & Plan:    Lab Results   Component Value Date    HGBA1C 6 4 (H) 71/49/3981   Trulicity discontinued d/t pancreatitis   Continue metformin       3  GERD without esophagitis  Assessment & Plan:  Continue omeprazole      4  Benign essential hypertension  Assessment & Plan:  BP stable 118/82  BP goal <140/90, avoid hypotension   Continue lopressor        5  Chronic diastolic congestive heart failure [facility])  Assessment & Plan:  Wt Readings from Last 3 Encounters:   06/08/23 (!) 144 kg (317 lb 3 2 oz)   04/07/22 (!) 150 kg (331 lb)   03/21/22 (!) 150 kg (331 lb 6 4 oz)     Euvolemic on exam   Demadex and spironolactone recently discontinued by cardiologist   Daily weights at STR   Monitor volume status   Encourage cardiac diet compliance       6  Paroxysmal atrial fibrillation (HCC)  Assessment & Plan:  HR stable   Continue metoprolol for rate control   AC on eliquis       7   Parkinson disease [facility])  Assessment & Plan:  Alert and oriented x 4  Continue Sinemet   Fall/safety precautions  Supportive care, assistance with ADLs   Avoid deliriogenic medications   Encourage adequate hydration and nutrition   Maintain sleep/wake cycle         8  Ambulatory dysfunction  Assessment & Plan:  Multifactorial   Continue PT/OT  Fall and safety precautions         9  Depression, unspecified depression type  Assessment & Plan:  Mood stable   Continue wellbutrin and trazodone          All medications and routine orders were reviewed and updated as needed  Chief Complaint     STR follow up visit    Past Medical and Surgica History      Past Medical History:   Diagnosis Date   • Arthritis    • Asthma    • Colon polyp    • CPAP (continuous positive airway pressure) dependence    • Edema     left leg   • GERD (gastroesophageal reflux disease)    • History of echocardiogram 07/2017   • History of Holter monitoring 09/2014   • History of stress test 08/2014   • Hypertension    • Myocardial infarct [facility])     2011   • Myocardial infarction [facility])    • Obesity    • Sleep apnea    • Thrombophlebitis      Past Surgical History:   Procedure Laterality Date   • CARDIAC CATHETERIZATION  03/2012    [id number],[id number]   • CHOLECYSTECTOMY     • COLONOSCOPY     • COLONOSCOPY N/A 10/30/2017    Procedure: COLONOSCOPY;  Surgeon: Waddell Favre, MD;  Location: [facility] GI LAB; Service: Gastroenterology   • ENDOVENOUS ABLATION SAPHENOUS VEIN W/ LASER      each addit vein   • ERCP     • ESOPHAGOGASTRODUODENOSCOPY N/A 10/30/2017    Procedure: ESOPHAGOGASTRODUODENOSCOPY (EGD); Surgeon: Waddell Favre, MD;  Location: [facility] GI LAB; Service: Gastroenterology   • GASTRIC BYPASS      2001   • GASTRIC BYPASS     • HERNIA REPAIR      paraesophageal hiatus hernia   • HERNIA REPAIR      x5 last one 2011   • JOINT REPLACEMENT     • KNEE ARTHROPLASTY Right     2102   • REPLACEMENT TOTAL KNEE Right 02/2011     Allergies   Allergen Reactions   • Gluten Meal - Food Allergy    • Adhesive [Medical Tape] Rash          History of Present Illness     Patient is a 70 y o  old male being seen at [facility] for follow up of acute and chronic medical conditions   He has PMH including but not limited to JOSIAH, obesity, diabetes mellitus, paroxysmal A-fib, CAD stents, BPH, CKD  He presented to the hospital on 6/3/23 with abdominal pain and was admitted for pancreatitis likely due to Trulicity  He was treated with IV fluids and 3 days of IV antibiotics  He improved and was discharged to [facility]  He will need ongoing follow up with GI  On exam today he is doing well  He does complain of one large bout of mucousy diarrhea  He did have some abdominal pain with diarrhea as well  Denies nausea or vomiting  He is eager to work with physical therapy to get stronger and be able to ambulate  Nursing without concerns at this time  The patient's allergies, past medical, surgical, social and family history were reviewed and unchanged  Review of Systems     Review of Systems   Constitutional: Negative for chills and fever  HENT: Negative for ear pain and sore throat  Eyes: Negative for pain and visual disturbance  Respiratory: Negative for cough and shortness of breath  Cardiovascular: Negative for chest pain and palpitations  Gastrointestinal: Positive for diarrhea  Negative for abdominal pain and vomiting  Genitourinary: Negative for dysuria and hematuria  Musculoskeletal: Positive for gait problem  Negative for arthralgias and back pain  Skin: Negative for color change and rash  Neurological: Positive for weakness  Negative for seizures and syncope  All other systems reviewed and are negative  Objective     Vitals:   Vitals:    06/13/23 0904   BP: 118/82   Pulse: 89   Resp: 18   Temp: 98 1 °F (36 7 °C)   SpO2: 97%         Physical Exam  Vitals reviewed  Constitutional:       General: He is not in acute distress  Appearance: Normal appearance  He is obese  He is not ill-appearing, toxic-appearing or diaphoretic  HENT:      Head: Normocephalic and atraumatic     Eyes:      Conjunctiva/sclera: Conjunctivae normal    Cardiovascular:      Rate and Rhythm: Normal rate and regular rhythm  Pulses: Normal pulses  Heart sounds: Normal heart sounds  No murmur heard  Pulmonary:      Effort: Pulmonary effort is normal  No respiratory distress  Breath sounds: Normal breath sounds  Abdominal:      General: Bowel sounds are normal  There is no distension  Palpations: Abdomen is soft  Tenderness: There is no abdominal tenderness  Musculoskeletal:      Right lower leg: No edema  Left lower leg: No edema  Skin:     General: Skin is warm and dry  Neurological:      General: No focal deficit present  Mental Status: He is alert and oriented to person, place, and time  Comments: tardive dyskinesia   Psychiatric:         Mood and Affect: Mood normal          Behavior: Behavior normal          Thought Content: Thought content normal          Pertinent Laboratory/Diagnostic Studies:   Reviewed in facility chart-stable      Current Medications   Medications reviewed and updated see facility [facility] for details  Current Outpatient Medications:   •  apixaban (Eliquis) 5 mg, Take 1 tablet (5 mg total) by mouth 2 (two) times a day, Disp: 60 tablet, Rfl: 0  •  atorvastatin (LIPITOR) 20 mg tablet, Take 20 mg by mouth daily, Disp: , Rfl:   •  buPROPion (WELLBUTRIN XL) 300 mg 24 hr tablet, Take 300 mg by mouth daily, Disp: , Rfl:   •  carbidopa-levodopa (SINEMET)  mg per tablet, TAKE 1 TABLET BY MOUTH THREE TIMES DAILY   INCREASE SLOWLY ACCORDING TO SEPARATE SCHEDULE, Disp: , Rfl:   •  gabapentin (NEURONTIN) 100 mg capsule, Take 300 mg by mouth, Disp: , Rfl:   •  magnesium Oxide (MAG-OX) 400 mg TABS, Take 2 tablets (800 mg total) by mouth 2 (two) times a day, Disp: , Rfl: 0  •  metFORMIN (GLUMETZA) 500 MG (MOD) 24 hr tablet, Take 500 mg by mouth 2 (two) times a day with meals, Disp: , Rfl:   •  metoprolol tartrate (LOPRESSOR) 25 mg tablet, Take 1 tablet (25 mg total) by mouth every 12 (twelve) hours, Disp: , Rfl: 0  •  Mirabegron ER 25 MG TB24, Take 25 mg by mouth daily at bedtime, Disp: 90 tablet, Rfl: 3  •  Multiple Vitamin (MULTIVITAMIN) tablet, Take 1 tablet by mouth daily, Disp: , Rfl:   •  omeprazole (PriLOSEC) 40 MG capsule, Take 1 capsule (40 mg total) by mouth daily, Disp: 90 capsule, Rfl: 3  •  Oyster Shell (OYSCO 500 PO), Take 500 mg by mouth daily, Disp: , Rfl:   •  polyethylene glycol (GLYCOLAX) 17 GM/SCOOP powder, Take 17 g by mouth daily, Disp: 289 g, Rfl: 1  •  sildenafil (VIAGRA) 100 mg tablet, Take 100 mg by mouth daily as needed, Disp: , Rfl:   •  traZODone (DESYREL) 150 mg tablet, Take 150 mg by mouth daily at bedtime, Disp: , Rfl:           SELVIN Mckenzie  6/13/2023  11:10 AM

## 2023-06-13 NOTE — ASSESSMENT & PLAN NOTE
Alert and oriented x 4  Continue Sinemet   Fall/safety precautions  Supportive care, assistance with ADLs   Avoid deliriogenic medications   Encourage adequate hydration and nutrition   Maintain sleep/wake cycle

## 2023-06-13 NOTE — ASSESSMENT & PLAN NOTE
Previous admission with pancreatitis suspected d/t Trulicity   Imaging during admission significant for acute interstitial edematous pancreatitis involving the pancreatic tail with no pancreatic cyst or fluid collection   EGD WNL   Now tolerating low fat diet   Patient with new diarrhea will start cholestyramine 4 g TID w meals   Recommended outpatient MRI/MRCP in 6 weeks

## 2023-06-15 ENCOUNTER — NURSING HOME VISIT (OUTPATIENT)
Dept: GERIATRICS | Facility: OTHER | Age: 63
End: 2023-06-15
Payer: MEDICARE

## 2023-06-15 VITALS
HEART RATE: 89 BPM | OXYGEN SATURATION: 97 % | RESPIRATION RATE: 18 BRPM | SYSTOLIC BLOOD PRESSURE: 118 MMHG | TEMPERATURE: 98.1 F | WEIGHT: 315 LBS | BODY MASS INDEX: 47.67 KG/M2 | DIASTOLIC BLOOD PRESSURE: 82 MMHG

## 2023-06-15 DIAGNOSIS — K85.00 IDIOPATHIC ACUTE PANCREATITIS WITHOUT INFECTION OR NECROSIS: Primary | ICD-10-CM

## 2023-06-15 DIAGNOSIS — G20 PARKINSON DISEASE (HCC): ICD-10-CM

## 2023-06-15 DIAGNOSIS — I50.32 CHRONIC DIASTOLIC CONGESTIVE HEART FAILURE (HCC): ICD-10-CM

## 2023-06-15 DIAGNOSIS — I10 PRIMARY HYPERTENSION: ICD-10-CM

## 2023-06-15 DIAGNOSIS — E11.9 TYPE 2 DIABETES MELLITUS WITHOUT COMPLICATION, WITHOUT LONG-TERM CURRENT USE OF INSULIN (HCC): ICD-10-CM

## 2023-06-15 DIAGNOSIS — F32.A DEPRESSION, UNSPECIFIED DEPRESSION TYPE: ICD-10-CM

## 2023-06-15 DIAGNOSIS — R26.2 AMBULATORY DYSFUNCTION: ICD-10-CM

## 2023-06-15 DIAGNOSIS — I48.0 PAROXYSMAL ATRIAL FIBRILLATION (HCC): ICD-10-CM

## 2023-06-15 PROCEDURE — 99309 SBSQ NF CARE MODERATE MDM 30: CPT

## 2023-06-15 NOTE — ASSESSMENT & PLAN NOTE
Lab Results   Component Value Date    HGBA1C 6 4 (H) 01/15/2023   BS has been controlled  Trulicity discontinued d/t pancreatitis  Continue metformin 500 mg PO bid  Monitor accu-checks  Encourage CHO diet  Avoid hypoglycemia

## 2023-06-15 NOTE — ASSESSMENT & PLAN NOTE
Previous admission w/ pancreatitis suspected to be d/t Trulicity  Imaging this admission significant for acute interstitial edematous pancreatitis involving the pancreatic tail w/ no pancreatic cyst or fluid collection  RUQ US WNL  EGD WNL  Patient is tolerating diet  Denies any bowel concerns today  Continue cholestyramine tid w/ meals  F/u w/ GI as an outpatient for MRI/MRCP in 6 weeks

## 2023-06-15 NOTE — ASSESSMENT & PLAN NOTE
Wt Readings from Last 3 Encounters:   06/15/23 (!) 146 kg (322 lb 12 8 oz)   06/08/23 (!) 144 kg (317 lb 3 2 oz)   04/07/22 (!) 150 kg (331 lb)   Clinically appears euvolemic on exam  Denies SOB/CP  Lungs are clear  Patient and wife insistent on patient being put back on his torsemide and spironolactone  Will add back torsemide 40 mg PO daily and spironolactone 25 mg PO daily  Continue beta blocker  Will monitor patient weight and volume status  Continue to monitor BP  Trend labs periodically  Encourage heart healthy diet

## 2023-06-15 NOTE — ASSESSMENT & PLAN NOTE
HR has been controlled  Continue metoprolol tartrate 50 mg PO bid  Continue eliquis 5 mg PO bid  Monitor VS

## 2023-06-15 NOTE — PROGRESS NOTES
[facility]  [address]  [phone]  FACILITY: [facility]  Code 31 (STR)        NAME: Rajeev Orozco  AGE: 70 y o  SEX: male CODE STATUS: CPR    DATE OF ENCOUNTER: 6/15/2023    Assessment and Plan     1  Idiopathic acute pancreatitis without infection or necrosis  Assessment & Plan:  Previous admission w/ pancreatitis suspected to be d/t Trulicity  Imaging this admission significant for acute interstitial edematous pancreatitis involving the pancreatic tail w/ no pancreatic cyst or fluid collection  RUQ US WNL  EGD WNL  Patient is tolerating diet  Denies any bowel concerns today  Continue cholestyramine tid w/ meals  F/u w/ GI as an outpatient for MRI/MRCP in 6 weeks        2  Type 2 diabetes mellitus without complication, without long-term current use of insulin ([facility])  Assessment & Plan:    Lab Results   Component Value Date    HGBA1C 6 4 (H) 01/15/2023   BS has been controlled  Trulicity discontinued d/t pancreatitis  Continue metformin 500 mg PO bid  Monitor accu-checks  Encourage CHO diet  Avoid hypoglycemia      3  Primary hypertension  Assessment & Plan:  BP has been controlled  Continue metoprolol tartrate 25 mg PO bid  Continue lisinopril 10 mg PO daily  Added back patient's torsemide 40 mg PO daily w/ hold parameters  Monitor VS  Avoid hypotension      4   Chronic diastolic congestive heart failure (HCC)  Assessment & Plan:  Wt Readings from Last 3 Encounters:   06/15/23 (!) 146 kg (322 lb 12 8 oz)   06/08/23 (!) 144 kg (317 lb 3 2 oz)   04/07/22 (!) 150 kg (331 lb)   Clinically appears euvolemic on exam  Denies SOB/CP  Lungs are clear  Patient and wife insistent on patient being put back on his torsemide and spironolactone  Will add back torsemide 40 mg PO daily and spironolactone 25 mg PO daily  Continue beta blocker  Will monitor patient weight and volume status  Continue to monitor BP  Trend labs periodically  Encourage heart healthy diet 5  Paroxysmal atrial fibrillation (HCC)  Assessment & Plan:  HR has been controlled  Continue metoprolol tartrate 50 mg PO bid  Continue eliquis 5 mg PO bid  Monitor VS      6  Parkinson disease [facility])  Assessment & Plan:  Continue sinemet tid  Maintain fall precautions  Assist w/ ADLs  Supportive care      7  Depression, unspecified depression type  Assessment & Plan:  Mood stable and pleasant  Continue wellbutrin 300 mg PO daily  Continue trazodone 150 mg PO daily      8  Ambulatory dysfunction  Assessment & Plan:  Multifactoral  Maintain fall precautions  Continue PT/OT  Ensure adequate nutrition and hydration   for dispo planning  Patient lives w/ his wife         All medications and routine orders were reviewed and updated as needed  Chief Complaint     STR follow up visit    Past Medical and Surgica History      Past Medical History:   Diagnosis Date   • Arthritis    • Asthma    • Colon polyp    • CPAP (continuous positive airway pressure) dependence    • Edema     left leg   • GERD (gastroesophageal reflux disease)    • History of echocardiogram 07/2017   • History of Holter monitoring 09/2014   • History of stress test 08/2014   • Hypertension    • Myocardial infarct [facility])     2011   • Myocardial infarction [facility])    • Obesity    • Sleep apnea    • Thrombophlebitis      Past Surgical History:   Procedure Laterality Date   • CARDIAC CATHETERIZATION  03/2012    5002,4696   • CHOLECYSTECTOMY     • COLONOSCOPY     • COLONOSCOPY N/A 10/30/2017    Procedure: COLONOSCOPY;  Surgeon: Vaughn Cox MD;  Location: [facility] GI LAB; Service: Gastroenterology   • ENDOVENOUS ABLATION SAPHENOUS VEIN W/ LASER      each addit vein   • ERCP     • ESOPHAGOGASTRODUODENOSCOPY N/A 10/30/2017    Procedure: ESOPHAGOGASTRODUODENOSCOPY (EGD); Surgeon: Vaughn Cox MD;  Location: [facility] GI [facility];   Service: Gastroenterology   • GASTRIC BYPASS      2001   • GASTRIC BYPASS     • HERNIA REPAIR      paraesophageal hiatus hernia   • HERNIA REPAIR      x5 last one 2011   • JOINT REPLACEMENT     • KNEE ARTHROPLASTY Right     2102   • REPLACEMENT TOTAL KNEE Right 02/2011     Allergies   Allergen Reactions   • Gluten Meal - Food Allergy    • Adhesive [Medical Tape] Rash          History of Present Illness     Fifi Summers is a 70year old male admitted to [address] [place] [address] for STR following a hospitalization for abdominal pain  He has a PMH including but not limited to DM2, JOSIAH, PAF, CAD, BPH, CKD and ambulatory dysfunction  The patient presented to the hospital for abdominal pain x 1 month  He had a piror admission for pancreatitis thought to be caused by trulicity  Patient met SIRS criteria on admission and was found to have [place] and hyponatremia  He was started on aggressive IV fluids  CT revealed acute interstitial pancreatitis  GI was consulted  Patient underwent RUQ US w/ negative results and an EGD which was unremarkable  He was treated empirically w/ 3 days of IV abx  Nephrology was consulted for hyponatremia which improved w/ hydration  Patient's overall status improved and he is to f/u w/ GI for MRCP in 6 weeks  He was seen by PT and was recommended for STR  The patient is seen today for a routine STR follow up visit  The patient was seen and examined at bedside in stable condition  He is alert and oriented x 3  He is seen accompanied w/ his wife at bedside  She is concerned that he is not getting his torsemide and spironolactone  Per chart review, these medications were previously discontinued by patient's cardiologist  Patient and wife were unaware of this change and state that he has been receiving it for a long time  When medications were discontinued in the past, he went into a bad CHF exacerbation  Other than medication concerns, the patient states that he is feeling fine  He denies any pain and is not in any acute distress  He denies CP/SOB/N/V/D   Denies lightheadedness, dizziness, headaches, vision changes  Patient states they are eating well and staying hydrated  Denies any bowel or bladder issues  No concerns from nursing staff  The patient's allergies, past medical, surgical, social and family history were reviewed and unchanged  Review of Systems     Review of Systems   Constitutional: Negative  Negative for appetite change, chills, fatigue and fever  HENT: Negative  Negative for congestion, facial swelling, rhinorrhea, sneezing and sore throat  Eyes: Negative  Negative for redness, itching and visual disturbance  Respiratory: Negative  Negative for cough, chest tightness, shortness of breath and wheezing  Cardiovascular: Negative for chest pain, palpitations and leg swelling  Gastrointestinal: Negative for abdominal distention, abdominal pain, constipation, nausea and vomiting  Genitourinary: Negative  Negative for difficulty urinating, flank pain, frequency and hematuria  Musculoskeletal: Positive for gait problem  Negative for arthralgias, back pain, myalgias and neck stiffness  Skin: Negative for pallor, rash and wound  Neurological: Positive for weakness  Negative for dizziness, light-headedness, numbness and headaches  Psychiatric/Behavioral: Negative for agitation, confusion and sleep disturbance  The patient is not nervous/anxious  Objective     Vitals:   Vitals:    06/15/23 1224   BP: 118/82   Pulse: 89   Resp: 18   Temp: 98 1 °F (36 7 °C)   SpO2: 97%         Physical Exam  Vitals reviewed  Constitutional:       General: He is not in acute distress  Appearance: Normal appearance  He is obese  He is not ill-appearing, toxic-appearing or diaphoretic  HENT:      Head: Normocephalic and atraumatic  Right Ear: External ear normal       Left Ear: External ear normal       Nose: Nose normal  No congestion or rhinorrhea  Mouth/Throat:      Mouth: Mucous membranes are moist       Pharynx: Oropharynx is clear   No oropharyngeal exudate or posterior oropharyngeal erythema  Eyes:      General:         Right eye: No discharge  Left eye: No discharge  Extraocular Movements: Extraocular movements intact  Conjunctiva/sclera: Conjunctivae normal       Pupils: Pupils are equal, round, and reactive to light  Cardiovascular:      Rate and Rhythm: Normal rate and regular rhythm  Pulses: Normal pulses  Heart sounds: Normal heart sounds  No murmur heard  No friction rub  No gallop  Pulmonary:      Effort: Pulmonary effort is normal  No respiratory distress  Breath sounds: Normal breath sounds  No wheezing  Chest:      Chest wall: No tenderness  Abdominal:      General: Abdomen is flat  Bowel sounds are normal  There is no distension  Palpations: Abdomen is soft  There is no mass  Tenderness: There is no abdominal tenderness  There is no guarding  Musculoskeletal:         General: No swelling or tenderness  Normal range of motion  Cervical back: Normal range of motion and neck supple  No rigidity or tenderness  Right lower leg: No edema  Left lower leg: No edema  Skin:     General: Skin is warm and dry  Coloration: Skin is not jaundiced  Findings: No bruising, erythema or rash  Neurological:      General: No focal deficit present  Mental Status: He is alert and oriented to person, place, and time  Mental status is at baseline  Sensory: No sensory deficit  Motor: Weakness present  Coordination: Coordination normal       Gait: Gait abnormal    Psychiatric:         Mood and Affect: Mood normal          Behavior: Behavior normal          Thought Content: Thought content normal          Judgment: Judgment normal          Pertinent Laboratory/Diagnostic Studies:   Reviewed in facility chart-stable    HGB 12 5  WBC 7 5      BUN 13  CREAT 0 8    K 5 0    Current Medications   Medications reviewed and updated see facility [facility] for details        Current "Outpatient Medications:   •  apixaban (Eliquis) 5 mg, Take 1 tablet (5 mg total) by mouth 2 (two) times a day, Disp: 60 tablet, Rfl: 0  •  atorvastatin (LIPITOR) 20 mg tablet, Take 20 mg by mouth daily, Disp: , Rfl:   •  buPROPion (WELLBUTRIN XL) 300 mg 24 hr tablet, Take 300 mg by mouth daily, Disp: , Rfl:   •  carbidopa-levodopa (SINEMET)  mg per tablet, TAKE 1 TABLET BY MOUTH THREE TIMES DAILY  INCREASE SLOWLY ACCORDING TO SEPARATE SCHEDULE, Disp: , Rfl:   •  cholestyramine (QUESTRAN) 4 g packet, Take 1 packet (4 g total) by mouth 3 (three) times a day with meals, Disp: , Rfl:   •  gabapentin (NEURONTIN) 100 mg capsule, Take 300 mg by mouth, Disp: , Rfl:   •  magnesium Oxide (MAG-OX) 400 mg TABS, Take 2 tablets (800 mg total) by mouth 2 (two) times a day, Disp: , Rfl: 0  •  metFORMIN (GLUMETZA) 500 MG (MOD) 24 hr tablet, Take 500 mg by mouth 2 (two) times a day with meals, Disp: , Rfl:   •  metoprolol tartrate (LOPRESSOR) 25 mg tablet, Take 1 tablet (25 mg total) by mouth every 12 (twelve) hours, Disp: , Rfl: 0  •  Mirabegron ER 25 MG TB24, Take 25 mg by mouth daily at bedtime, Disp: 90 tablet, Rfl: 3  •  Multiple Vitamin (MULTIVITAMIN) tablet, Take 1 tablet by mouth daily, Disp: , Rfl:   •  omeprazole (PriLOSEC) 40 MG capsule, Take 1 capsule (40 mg total) by mouth daily, Disp: 90 capsule, Rfl: 3  •  Oyster Shell (OYSCO 500 PO), Take 500 mg by mouth daily, Disp: , Rfl:   •  polyethylene glycol (GLYCOLAX) 17 GM/SCOOP powder, Take 17 g by mouth daily, Disp: 289 g, Rfl: 1  •  sildenafil (VIAGRA) 100 mg tablet, Take 100 mg by mouth daily as needed, Disp: , Rfl:   •  traZODone (DESYREL) 150 mg tablet, Take 150 mg by mouth daily at bedtime, Disp: , Rfl:      Please note:  Voice-recognition software may have been used in the preparation of this document  Occasional wrong word or \"sound-alike\" substitutions may have occurred due to the inherent limitations of voice recognition software    Interpretation should be " guided by SELVIN Palma  6/15/2023  12:11 PM

## 2023-06-15 NOTE — ASSESSMENT & PLAN NOTE
Multifactoral  Maintain fall precautions  Continue PT/OT  Ensure adequate nutrition and hydration   for dispo planning  Patient lives w/ his wife

## 2023-06-15 NOTE — ASSESSMENT & PLAN NOTE
BP has been controlled  Continue metoprolol tartrate 25 mg PO bid  Continue lisinopril 10 mg PO daily  Added back patient's torsemide 40 mg PO daily w/ hold parameters  Monitor VS  Avoid hypotension

## 2023-06-19 ENCOUNTER — NURSING HOME VISIT (OUTPATIENT)
Dept: GERIATRICS | Facility: OTHER | Age: 63
End: 2023-06-19
Payer: MEDICARE

## 2023-06-19 VITALS
OXYGEN SATURATION: 97 % | BODY MASS INDEX: 46.72 KG/M2 | RESPIRATION RATE: 18 BRPM | TEMPERATURE: 97.5 F | HEART RATE: 70 BPM | SYSTOLIC BLOOD PRESSURE: 115 MMHG | DIASTOLIC BLOOD PRESSURE: 64 MMHG | WEIGHT: 315 LBS

## 2023-06-19 DIAGNOSIS — E11.9 TYPE 2 DIABETES MELLITUS WITHOUT COMPLICATION, WITHOUT LONG-TERM CURRENT USE OF INSULIN (HCC): ICD-10-CM

## 2023-06-19 DIAGNOSIS — I48.0 PAROXYSMAL ATRIAL FIBRILLATION (HCC): ICD-10-CM

## 2023-06-19 DIAGNOSIS — R26.2 AMBULATORY DYSFUNCTION: ICD-10-CM

## 2023-06-19 DIAGNOSIS — G20 PARKINSON DISEASE (HCC): ICD-10-CM

## 2023-06-19 DIAGNOSIS — K85.00 IDIOPATHIC ACUTE PANCREATITIS WITHOUT INFECTION OR NECROSIS: Primary | ICD-10-CM

## 2023-06-19 DIAGNOSIS — I10 PRIMARY HYPERTENSION: ICD-10-CM

## 2023-06-19 DIAGNOSIS — I50.32 CHRONIC DIASTOLIC CONGESTIVE HEART FAILURE (HCC): ICD-10-CM

## 2023-06-19 PROCEDURE — 99309 SBSQ NF CARE MODERATE MDM 30: CPT

## 2023-06-19 NOTE — ASSESSMENT & PLAN NOTE
Wt Readings from Last 3 Encounters:   06/19/23 (!) 144 kg (316 lb 6 4 oz)   06/15/23 (!) 146 kg (322 lb 12 8 oz)   06/08/23 (!) 144 kg (317 lb 3 2 oz)   Stable  Denies SOB/CP  Lungs are clear  Continue torsemide 40 mg PO daily  Continue spironolactone 25 mg PO daily  Continue beta blocker  Continue to monitor weights and volume status  Encourage heart healthy diet

## 2023-06-19 NOTE — ASSESSMENT & PLAN NOTE
HR has been controlled  Patient denies any symptoms  Continue metoprolol tartrate 50 mg PO bid  Continue eliquis 5 mg PO bid  Monitor VS

## 2023-06-19 NOTE — PROGRESS NOTES
[facility]  [address]  [phone]  FACILITY: [facility]  Code 31 (STR)        NAME: Luis Orozco  AGE: 70 y o  SEX: male CODE STATUS: CPR    DATE OF ENCOUNTER: 6/19/2023    Assessment and Plan     1  Idiopathic acute pancreatitis without infection or necrosis  Assessment & Plan:  Previous admission w/ pancreatitis suspected to be d/t Trulicity  Imaging this admission significant for acute interstitial edematous pancreatitis involving the pancreatic tail w/ no pancreatic cyst or fluid collection  RUQ US WNL  EGD WNL  Patient is tolerating diet well  Denies any pain  Reports some diarrhea but has been taking cholestyramine that helps  F/u w/ GI as an outpatient for MRI/MRCP in 6 weeks      2  Type 2 diabetes mellitus without complication, without long-term current use of insulin ([facility])  Assessment & Plan:    Lab Results   Component Value Date    HGBA1C 6 4 (H) 01/15/2023   BS has been controlled  Trulicity has been discontinued d/t pancreatitis  Continue metformin 500 mg PO bid  Monitor accu-checks  Encourage CHO diet  Avoid hypoglycemia      3  Primary hypertension  Assessment & Plan:  BP has been controlled  Continue metoprolol tartrate 25 mg PO bid  Continue lisinopril 10 mg PO daily  Continue torsemide 40 mg PO daily w/ hold parameters  Monitor VS  Avoid hypotension      4  Chronic diastolic congestive heart failure [facility])  Assessment & Plan:  Wt Readings from Last 3 Encounters:   06/19/23 (!) 144 kg (316 lb 6 4 oz)   06/15/23 (!) 146 kg (322 lb 12 8 oz)   06/08/23 (!) 144 kg (317 lb 3 2 oz)   Stable  Denies SOB/CP  Lungs are clear  Continue torsemide 40 mg PO daily  Continue spironolactone 25 mg PO daily  Continue beta blocker  Continue to monitor weights and volume status  Encourage heart healthy diet            5   Paroxysmal atrial fibrillation (HCC)  Assessment & Plan:  HR has been controlled  Patient denies any symptoms  Continue metoprolol tartrate 50 mg PO bid  Continue eliquis 5 mg PO bid  Monitor VS      6  Parkinson disease [facility])  Assessment & Plan:  Continue sinemet tid  Maintain fall precautions  Assist w/ ADLs  Supportive care      7  Ambulatory dysfunction  Assessment & Plan:  Multifactoral  Maintain fall precautions  Continue PT/OT  Ensure adequate nutrition and hydration   for dispo planning  Patient lives w/ his wife         All medications and routine orders were reviewed and updated as needed  Chief Complaint     STR follow up visit    Past Medical and Surgica History      Past Medical History:   Diagnosis Date   • Arthritis    • Asthma    • Colon polyp    • CPAP (continuous positive airway pressure) dependence    • Edema     left leg   • GERD (gastroesophageal reflux disease)    • History of echocardiogram 07/2017   • History of Holter monitoring 09/2014   • History of stress test 08/2014   • Hypertension    • Myocardial infarct [facility])     2011   • Myocardial infarction [facility])    • Obesity    • Sleep apnea    • Thrombophlebitis      Past Surgical History:   Procedure Laterality Date   • CARDIAC CATHETERIZATION  03/2012    [id number]   • CHOLECYSTECTOMY     • COLONOSCOPY     • COLONOSCOPY N/A 10/30/2017    Procedure: COLONOSCOPY;  Surgeon: Denice Vasquez MD;  Location: [facility]; Service: Gastroenterology   • ENDOVENOUS ABLATION SAPHENOUS VEIN W/ LASER      each addit vein   • ERCP     • ESOPHAGOGASTRODUODENOSCOPY N/A 10/30/2017    Procedure: ESOPHAGOGASTRODUODENOSCOPY (EGD); Surgeon: Denice Vasquez MD;  Location: [facility] GI LAB;   Service: Gastroenterology   • GASTRIC BYPASS      2001   • GASTRIC BYPASS     • HERNIA REPAIR      paraesophageal hiatus hernia   • HERNIA REPAIR      x5 last one 2011   • JOINT REPLACEMENT     • KNEE ARTHROPLASTY Right     2102   • REPLACEMENT TOTAL KNEE Right 02/2011     Allergies   Allergen Reactions   • Gluten Meal - Food Allergy    • Adhesive [Medical Tape] Rash          History of Present Illness     Daphne Castleman is a 70year old male admitted to [facility] for STR following a hospitalization for abdominal pain  He has a PMH including but not limited to DM2, JOSIAH, PAF, CAD, BPH, CKD and ambulatory dysfunction      The patient presented to the hospital for abdominal pain x 1 month  He had a piror admission for pancreatitis thought to be caused by trulicity  Patient met SIRS criteria on admission and was found to have [place] and hyponatremia  He was started on aggressive IV fluids  CT revealed acute interstitial pancreatitis  GI was consulted  Patient underwent RUQ US w/ negative results and an EGD which was unremarkable  He was treated empirically w/ 3 days of IV abx  Nephrology was consulted for hyponatremia which improved w/ hydration  Patient's overall status improved and he is to f/u w/ GI for MRCP in 6 weeks  He was seen by PT and was recommended for STR      The patient is seen today for a routine STR follow up visit      The patient was seen and examined at bedside in stable condition  He is alert and oriented x 3  He is seen resting in bed, easily aroused  He denies any complaints today  He denies any pain and is not in any acute distress  He reports that he walked a total of 600 ft w/ therapy today  He states that he is doing well but still needs to get some of his strength back yet before he feels comfortable going home  Overall, he denies CP/SOB/N/V/D  Denies lightheadedness, dizziness, headaches, vision changes  Patient states they are eating well and staying hydrated  Denies any bowel or bladder issues  No concerns from nursing staff  The patient's allergies, past medical, surgical, social and family history were reviewed and unchanged  Review of Systems     Review of Systems   Constitutional: Negative  Negative for appetite change, chills, fatigue and fever  HENT: Negative  Negative for congestion, facial swelling, rhinorrhea, sneezing and sore throat      Eyes: Negative  Negative for redness, itching and visual disturbance  Respiratory: Negative  Negative for cough, chest tightness, shortness of breath and wheezing  Cardiovascular: Positive for leg swelling  Negative for chest pain and palpitations  Gastrointestinal: Negative for abdominal distention, abdominal pain, constipation, nausea and vomiting  Genitourinary: Negative  Negative for difficulty urinating, flank pain, frequency and hematuria  Musculoskeletal: Positive for gait problem  Negative for arthralgias, back pain, myalgias and neck stiffness  Skin: Negative for pallor, rash and wound  Neurological: Positive for weakness  Negative for dizziness, light-headedness, numbness and headaches  Psychiatric/Behavioral: Negative for agitation, confusion and sleep disturbance  The patient is not nervous/anxious  Objective     Vitals:   Vitals:    06/19/23 1026   BP: 115/64   Pulse: 70   Resp: 18   Temp: 97 5 °F (36 4 °C)   SpO2: 97%         Physical Exam  Vitals reviewed  Constitutional:       General: He is not in acute distress  Appearance: Normal appearance  He is obese  He is not ill-appearing, toxic-appearing or diaphoretic  HENT:      Head: Normocephalic and atraumatic  Right Ear: External ear normal       Left Ear: External ear normal       Nose: Nose normal  No congestion or rhinorrhea  Mouth/Throat:      Mouth: Mucous membranes are moist       Pharynx: Oropharynx is clear  No oropharyngeal exudate or posterior oropharyngeal erythema  Eyes:      General:         Right eye: No discharge  Left eye: No discharge  Extraocular Movements: Extraocular movements intact  Conjunctiva/sclera: Conjunctivae normal       Pupils: Pupils are equal, round, and reactive to light  Cardiovascular:      Rate and Rhythm: Normal rate and regular rhythm  Pulses: Normal pulses  Heart sounds: Normal heart sounds  No murmur heard  No friction rub  No gallop  Pulmonary:      Effort: Pulmonary effort is normal  No respiratory distress  Breath sounds: Normal breath sounds  No wheezing  Chest:      Chest wall: No tenderness  Abdominal:      General: Abdomen is flat  Bowel sounds are normal  There is no distension  Palpations: Abdomen is soft  There is no mass  Tenderness: There is no abdominal tenderness  There is no guarding  Musculoskeletal:         General: No swelling or tenderness  Normal range of motion  Cervical back: Normal range of motion and neck supple  No rigidity or tenderness  Right lower leg: Edema present  Left lower leg: Edema present  Skin:     General: Skin is warm and dry  Coloration: Skin is not jaundiced  Findings: No bruising, erythema or rash  Neurological:      General: No focal deficit present  Mental Status: He is alert and oriented to person, place, and time  Mental status is at baseline  Sensory: No sensory deficit  Motor: Weakness present  Coordination: Coordination normal       Gait: Gait abnormal    Psychiatric:         Mood and Affect: Mood normal          Behavior: Behavior normal          Thought Content: Thought content normal          Judgment: Judgment normal          Pertinent Laboratory/Diagnostic Studies:   Reviewed in facility chart-stable    HGB 12 5  WBC 7 5  PLTS 401    BUN 16  CREAT 0 9    K 5 1    Current Medications   Medications reviewed and updated see facility [facility] for details  Current Outpatient Medications:   •  apixaban (Eliquis) 5 mg, Take 1 tablet (5 mg total) by mouth 2 (two) times a day, Disp: 60 tablet, Rfl: 0  •  atorvastatin (LIPITOR) 20 mg tablet, Take 20 mg by mouth daily, Disp: , Rfl:   •  buPROPion (WELLBUTRIN XL) 300 mg 24 hr tablet, Take 300 mg by mouth daily, Disp: , Rfl:   •  carbidopa-levodopa (SINEMET)  mg per tablet, TAKE 1 TABLET BY MOUTH THREE TIMES DAILY   INCREASE SLOWLY ACCORDING TO SEPARATE SCHEDULE, Disp: , "Rfl:   •  cholestyramine (QUESTRAN) 4 g packet, Take 1 packet (4 g total) by mouth 3 (three) times a day with meals, Disp: , Rfl:   •  gabapentin (NEURONTIN) 100 mg capsule, Take 300 mg by mouth, Disp: , Rfl:   •  magnesium Oxide (MAG-OX) 400 mg TABS, Take 2 tablets (800 mg total) by mouth 2 (two) times a day, Disp: , Rfl: 0  •  metFORMIN (GLUMETZA) 500 MG (MOD) 24 hr tablet, Take 500 mg by mouth 2 (two) times a day with meals, Disp: , Rfl:   •  metoprolol tartrate (LOPRESSOR) 25 mg tablet, Take 1 tablet (25 mg total) by mouth every 12 (twelve) hours, Disp: , Rfl: 0  •  Mirabegron ER 25 MG TB24, Take 25 mg by mouth daily at bedtime, Disp: 90 tablet, Rfl: 3  •  Multiple Vitamin (MULTIVITAMIN) tablet, Take 1 tablet by mouth daily, Disp: , Rfl:   •  omeprazole (PriLOSEC) 40 MG capsule, Take 1 capsule (40 mg total) by mouth daily, Disp: 90 capsule, Rfl: 3  •  Oyster Shell (OYSCO 500 PO), Take 500 mg by mouth daily, Disp: , Rfl:   •  polyethylene glycol (GLYCOLAX) 17 GM/SCOOP powder, Take 17 g by mouth daily, Disp: 289 g, Rfl: 1  •  sildenafil (VIAGRA) 100 mg tablet, Take 100 mg by mouth daily as needed, Disp: , Rfl:   •  traZODone (DESYREL) 150 mg tablet, Take 150 mg by mouth daily at bedtime, Disp: , Rfl:        Please note:  Voice-recognition software may have been used in the preparation of this document  Occasional wrong word or \"sound-alike\" substitutions may have occurred due to the inherent limitations of voice recognition software  Interpretation should be guided by SELVIN Scherer  6/19/2023  10:24 AM    "

## 2023-06-19 NOTE — ASSESSMENT & PLAN NOTE
Lab Results   Component Value Date    HGBA1C 6 4 (H) 01/15/2023   BS has been controlled  Trulicity has been discontinued d/t pancreatitis  Continue metformin 500 mg PO bid  Monitor accu-checks  Encourage CHO diet  Avoid hypoglycemia

## 2023-06-19 NOTE — ASSESSMENT & PLAN NOTE
Previous admission w/ pancreatitis suspected to be d/t Trulicity  Imaging this admission significant for acute interstitial edematous pancreatitis involving the pancreatic tail w/ no pancreatic cyst or fluid collection  RUQ US WNL  EGD WNL  Patient is tolerating diet well  Denies any pain  Reports some diarrhea but has been taking cholestyramine that helps  F/u w/ GI as an outpatient for MRI/MRCP in 6 weeks

## 2023-06-19 NOTE — ASSESSMENT & PLAN NOTE
BP has been controlled  Continue metoprolol tartrate 25 mg PO bid  Continue lisinopril 10 mg PO daily  Continue torsemide 40 mg PO daily w/ hold parameters  Monitor VS  Avoid hypotension

## 2023-06-21 ENCOUNTER — NURSING HOME VISIT (OUTPATIENT)
Dept: GERIATRICS | Facility: OTHER | Age: 63
End: 2023-06-21
Payer: MEDICARE

## 2023-06-21 VITALS
BODY MASS INDEX: 46.72 KG/M2 | RESPIRATION RATE: 18 BRPM | TEMPERATURE: 98.1 F | OXYGEN SATURATION: 97 % | HEART RATE: 66 BPM | WEIGHT: 315 LBS | DIASTOLIC BLOOD PRESSURE: 68 MMHG | SYSTOLIC BLOOD PRESSURE: 109 MMHG

## 2023-06-21 DIAGNOSIS — I50.32 CHRONIC DIASTOLIC CONGESTIVE HEART FAILURE (HCC): ICD-10-CM

## 2023-06-21 DIAGNOSIS — R26.2 AMBULATORY DYSFUNCTION: ICD-10-CM

## 2023-06-21 DIAGNOSIS — I10 PRIMARY HYPERTENSION: ICD-10-CM

## 2023-06-21 DIAGNOSIS — K85.00 IDIOPATHIC ACUTE PANCREATITIS WITHOUT INFECTION OR NECROSIS: Primary | ICD-10-CM

## 2023-06-21 DIAGNOSIS — E11.9 TYPE 2 DIABETES MELLITUS WITHOUT COMPLICATION, WITHOUT LONG-TERM CURRENT USE OF INSULIN (HCC): ICD-10-CM

## 2023-06-21 DIAGNOSIS — I48.0 PAROXYSMAL ATRIAL FIBRILLATION (HCC): ICD-10-CM

## 2023-06-21 DIAGNOSIS — G20 PARKINSON DISEASE (HCC): ICD-10-CM

## 2023-06-21 PROCEDURE — 99309 SBSQ NF CARE MODERATE MDM 30: CPT

## 2023-06-21 NOTE — ASSESSMENT & PLAN NOTE
BP has been running hypotensive  Will decrease lisinopril to 5 mg PO daily  Continue metoprolol tartrate 25 mg PO bid  Continue torsemide 40 mg PO daily w/ hold parameters  Continue to monitor VS and adjust medications as needed for hypotension

## 2023-06-21 NOTE — ASSESSMENT & PLAN NOTE
Previous admission w/ pancreatitis suspected to be d/t Trulicity  Imaging this admission significant for acute interstitial edematous pancreatitis involving the pancreatic tail w/ no pancreatic cyst or fluid collection  RUQ US WNL  EGD WNL  Patient is tolerating diet well  Denies any pain  Reports improvement in diarrhea  Continue cholestyramine w/ meals  F/u w/ GI as an outpatient for MRI/MRCP in 6 weeks

## 2023-06-21 NOTE — ASSESSMENT & PLAN NOTE
Wt Readings from Last 3 Encounters:   06/21/23 (!) 144 kg (316 lb 6 4 oz)   06/19/23 (!) 144 kg (316 lb 6 4 oz)   06/15/23 (!) 146 kg (322 lb 12 8 oz)   Stable  Denies SOB/CP  Lungs are clear  Continue torsemide 40 mg PO daily  Continue spironolactone 25 mg PO daily  Continue beta blocker  Continue to monitor weights and volume status  Encourage heart healthy diet

## 2023-06-21 NOTE — PROGRESS NOTES
[facility]  [address]  [phone]  FACILITY[facility]  Code 31 (STR)        NAME: Camryn Orozco  AGE: 70 y o  SEX: male CODE STATUS: CPR    DATE OF ENCOUNTER: 6/21/2023    Assessment and Plan     1  Idiopathic acute pancreatitis without infection or necrosis  Assessment & Plan:  Previous admission w/ pancreatitis suspected to be d/t Trulicity  Imaging this admission significant for acute interstitial edematous pancreatitis involving the pancreatic tail w/ no pancreatic cyst or fluid collection  RUQ US WNL  EGD WNL  Patient is tolerating diet well  Denies any pain  Reports improvement in diarrhea  Continue cholestyramine w/ meals  F/u w/ GI as an outpatient for MRI/MRCP in 6 weeks      2  Type 2 diabetes mellitus without complication, without long-term current use of insulin ([facility])  Assessment & Plan:    Lab Results   Component Value Date    HGBA1C 6 4 (H) 01/15/2023   BS has been controlled  Trulicity has been discontinued d/t pancreatitis  Continue metformin 500 mg PO bid  Monitor accu-checks  Encourage CHO diet  Avoid hypoglycemia      3  Primary hypertension  Assessment & Plan:  BP has been running hypotensive  Will decrease lisinopril to 5 mg PO daily  Continue metoprolol tartrate 25 mg PO bid  Continue torsemide 40 mg PO daily w/ hold parameters  Continue to monitor VS and adjust medications as needed for hypotension      4  Chronic diastolic congestive heart failure [facility])  Assessment & Plan:  Wt Readings from Last 3 Encounters:   06/21/23 (!) 144 kg (316 lb 6 4 oz)   06/19/23 (!) 144 kg (316 lb 6 4 oz)   06/15/23 (!) 146 kg (322 lb 12 8 oz)   Stable  Denies SOB/CP  Lungs are clear  Continue torsemide 40 mg PO daily  Continue spironolactone 25 mg PO daily  Continue beta blocker  Continue to monitor weights and volume status  Encourage heart healthy diet            5   Paroxysmal atrial fibrillation (HCC)  Assessment & Plan:  HR has been controlled  Patient denies any symptoms  Continue metoprolol tartrate 50 mg PO bid  Continue eliquis 5 mg PO bid  Monitor VS      6  Parkinson disease [facility])  Assessment & Plan:  Continue sinemet tid  Maintain fall precautions  Assist w/ ADLs  Supportive care      7  Ambulatory dysfunction  Assessment & Plan:  Multifactoral  Maintain fall precautions  Continue PT/OT  Ensure adequate nutrition and hydration   for dispo planning  Patient lives at home w/ wife         All medications and routine orders were reviewed and updated as needed  Chief Complaint     STR follow up visit    Past Medical and Surgica History      Past Medical History:   Diagnosis Date   • Arthritis    • Asthma    • Colon polyp    • CPAP (continuous positive airway pressure) dependence    • Edema     left leg   • GERD (gastroesophageal reflux disease)    • History of echocardiogram 07/2017   • History of Holter monitoring 09/2014   • History of stress test 08/2014   • Hypertension    • Myocardial infarct [facility])     2011   • Myocardial infarction [facility])    • Obesity    • Sleep apnea    • Thrombophlebitis      Past Surgical History:   Procedure Laterality Date   • CARDIAC CATHETERIZATION  03/2012    4112,0767   • CHOLECYSTECTOMY     • COLONOSCOPY     • COLONOSCOPY N/A 10/30/2017    Procedure: COLONOSCOPY;  Surgeon: Trever Bowie MD;  Location: [facility] GI LAB; Service: Gastroenterology   • ENDOVENOUS ABLATION SAPHENOUS VEIN W/ LASER      each addit vein   • ERCP     • ESOPHAGOGASTRODUODENOSCOPY N/A 10/30/2017    Procedure: ESOPHAGOGASTRODUODENOSCOPY (EGD); Surgeon: Trever Bowie MD;  Location: [facility] GI LAB;   Service: Gastroenterology   • GASTRIC BYPASS      2001   • GASTRIC BYPASS     • HERNIA REPAIR      paraesophageal hiatus hernia   • HERNIA REPAIR      x5 last one 2011   • JOINT REPLACEMENT     • KNEE ARTHROPLASTY Right     2102   • REPLACEMENT TOTAL KNEE Right 02/2011     Allergies   Allergen Reactions   • Gluten Meal - Food Allergy    • Adhesive [Medical Tape] Rash          History of Present Illness     Rich Denise is a 70year old male admitted to [address] for STR following a hospitalization for abdominal pain  He has a PMH including but not limited to DM2, JOSIAH, PAF, CAD, BPH, CKD and ambulatory dysfunction      The patient presented to the hospital for abdominal pain x 1 month  He had a piror admission for pancreatitis thought to be caused by trulicity  Patient met SIRS criteria on admission and was found to have [place] and hyponatremia  He was started on aggressive IV fluids  CT revealed acute interstitial pancreatitis  GI was consulted  Patient underwent RUQ US w/ negative results and an EGD which was unremarkable  He was treated empirically w/ 3 days of IV abx  Nephrology was consulted for hyponatremia which improved w/ hydration  Patient's overall status improved and he is to f/u w/ GI for MRCP in 6 weeks  He was seen by PT and was recommended for STR      The patient is seen today for a routine STR follow up visit      The patient was seen and examined at bedside in stable condition  He is alert and oriented x 3  Patient states that he is feeling well today  He denies any pain and is not in any acute distress  She main complaint is feeling more weak today  She states he was able to walk a lot more w/ therapy the other day and not as much today  He reports that he has someone putting ramps and grab bars in at his home for when he goes home  Overall, he denies CP/SOB/N/V/D  Denies lightheadedness, dizziness, headaches, vision changes  Patient states they are eating well and staying hydrated  Denies any bowel or bladder issues  No concerns from nursing staff  The patient's allergies, past medical, surgical, social and family history were reviewed and unchanged  Review of Systems     Review of Systems   Constitutional: Negative  Negative for appetite change, chills, fatigue and fever  HENT: Negative  Negative for congestion, facial swelling, rhinorrhea, sneezing and sore throat  Eyes: Negative  Negative for redness, itching and visual disturbance  Respiratory: Negative  Negative for cough, chest tightness, shortness of breath and wheezing  Cardiovascular: Positive for leg swelling  Negative for chest pain and palpitations  Gastrointestinal: Negative for abdominal distention, abdominal pain, constipation, nausea and vomiting  Genitourinary: Negative  Negative for difficulty urinating, flank pain, frequency and hematuria  Musculoskeletal: Positive for gait problem  Negative for arthralgias, back pain, myalgias and neck stiffness  Skin: Negative for pallor, rash and wound  Neurological: Positive for weakness  Negative for dizziness, light-headedness, numbness and headaches  Psychiatric/Behavioral: Negative for agitation, confusion and sleep disturbance  The patient is not nervous/anxious  Objective     Vitals:   Vitals:    06/21/23 0913   BP: 109/68   Pulse: 66   Resp: 18   Temp: 98 1 °F (36 7 °C)   SpO2: 97%         Physical Exam  Vitals reviewed  Constitutional:       General: He is not in acute distress  Appearance: Normal appearance  He is obese  He is not ill-appearing, toxic-appearing or diaphoretic  HENT:      Head: Normocephalic and atraumatic  Right Ear: External ear normal       Left Ear: External ear normal       Nose: Nose normal  No congestion or rhinorrhea  Mouth/Throat:      Mouth: Mucous membranes are moist       Pharynx: Oropharynx is clear  No oropharyngeal exudate or posterior oropharyngeal erythema  Eyes:      General:         Right eye: No discharge  Left eye: No discharge  Extraocular Movements: Extraocular movements intact  Conjunctiva/sclera: Conjunctivae normal       Pupils: Pupils are equal, round, and reactive to light  Cardiovascular:      Rate and Rhythm: Normal rate and regular rhythm  Pulses: Normal pulses  Heart sounds: Normal heart sounds  No murmur heard  No friction rub  No gallop  Pulmonary:      Effort: Pulmonary effort is normal  No respiratory distress  Breath sounds: Normal breath sounds  No wheezing  Chest:      Chest wall: No tenderness  Abdominal:      General: Abdomen is flat  Bowel sounds are normal  There is no distension  Palpations: Abdomen is soft  There is no mass  Tenderness: There is no abdominal tenderness  There is no guarding  Musculoskeletal:         General: No swelling or tenderness  Normal range of motion  Cervical back: Normal range of motion and neck supple  No rigidity or tenderness  Right lower leg: Edema present  Left lower leg: Edema present  Skin:     General: Skin is warm and dry  Coloration: Skin is not jaundiced  Findings: No bruising, erythema or rash  Neurological:      General: No focal deficit present  Mental Status: He is alert and oriented to person, place, and time  Mental status is at baseline  Sensory: No sensory deficit  Motor: Weakness present  Coordination: Coordination normal       Gait: Gait abnormal    Psychiatric:         Mood and Affect: Mood normal          Behavior: Behavior normal          Thought Content: Thought content normal          Judgment: Judgment normal          Pertinent Laboratory/Diagnostic Studies:   Reviewed in facility chart-stable    HGB 12 5  WBC 7 5  PLTS 401    BUN 16  CREAT 0 9    K 5 1    Current Medications   Medications reviewed and updated see facility [facility] for details        Current Outpatient Medications:   •  apixaban (Eliquis) 5 mg, Take 1 tablet (5 mg total) by mouth 2 (two) times a day, Disp: 60 tablet, Rfl: 0  •  atorvastatin (LIPITOR) 20 mg tablet, Take 20 mg by mouth daily, Disp: , Rfl:   •  buPROPion (WELLBUTRIN XL) 300 mg 24 hr tablet, Take 300 mg by mouth daily, Disp: , Rfl:   •  carbidopa-levodopa (SINEMET)  mg per tablet, TAKE 1 "TABLET BY MOUTH THREE TIMES DAILY  INCREASE SLOWLY ACCORDING TO SEPARATE SCHEDULE, Disp: , Rfl:   •  cholestyramine (QUESTRAN) 4 g packet, Take 1 packet (4 g total) by mouth 3 (three) times a day with meals, Disp: , Rfl:   •  gabapentin (NEURONTIN) 100 mg capsule, Take 300 mg by mouth, Disp: , Rfl:   •  magnesium Oxide (MAG-OX) 400 mg TABS, Take 2 tablets (800 mg total) by mouth 2 (two) times a day, Disp: , Rfl: 0  •  metFORMIN (GLUMETZA) 500 MG (MOD) 24 hr tablet, Take 500 mg by mouth 2 (two) times a day with meals, Disp: , Rfl:   •  metoprolol tartrate (LOPRESSOR) 25 mg tablet, Take 1 tablet (25 mg total) by mouth every 12 (twelve) hours, Disp: , Rfl: 0  •  Mirabegron ER 25 MG TB24, Take 25 mg by mouth daily at bedtime, Disp: 90 tablet, Rfl: 3  •  Multiple Vitamin (MULTIVITAMIN) tablet, Take 1 tablet by mouth daily, Disp: , Rfl:   •  omeprazole (PriLOSEC) 40 MG capsule, Take 1 capsule (40 mg total) by mouth daily, Disp: 90 capsule, Rfl: 3  •  Oyster Shell (OYSCO 500 PO), Take 500 mg by mouth daily, Disp: , Rfl:   •  polyethylene glycol (GLYCOLAX) 17 GM/SCOOP powder, Take 17 g by mouth daily, Disp: 289 g, Rfl: 1  •  sildenafil (VIAGRA) 100 mg tablet, Take 100 mg by mouth daily as needed, Disp: , Rfl:   •  traZODone (DESYREL) 150 mg tablet, Take 150 mg by mouth daily at bedtime, Disp: , Rfl:       Please note:  Voice-recognition software may have been used in the preparation of this document  Occasional wrong word or \"sound-alike\" substitutions may have occurred due to the inherent limitations of voice recognition software  Interpretation should be guided by SELVIN Andersen  6/21/2023  9:12 AM    "

## 2023-06-27 ENCOUNTER — NURSING HOME VISIT (OUTPATIENT)
Age: 63
End: 2023-06-27

## 2023-06-27 ENCOUNTER — TELEPHONE (OUTPATIENT)
Dept: OTHER | Facility: OTHER | Age: 63
End: 2023-06-27

## 2023-06-27 VITALS
DIASTOLIC BLOOD PRESSURE: 58 MMHG | TEMPERATURE: 98.1 F | HEART RATE: 58 BPM | SYSTOLIC BLOOD PRESSURE: 88 MMHG | OXYGEN SATURATION: 97 %

## 2023-06-27 DIAGNOSIS — I50.32 CHRONIC DIASTOLIC CONGESTIVE HEART FAILURE (HCC): ICD-10-CM

## 2023-06-27 DIAGNOSIS — K85.00 IDIOPATHIC ACUTE PANCREATITIS WITHOUT INFECTION OR NECROSIS: Primary | ICD-10-CM

## 2023-06-27 DIAGNOSIS — G20 PARKINSON DISEASE (HCC): ICD-10-CM

## 2023-06-27 DIAGNOSIS — I10 BENIGN ESSENTIAL HYPERTENSION: ICD-10-CM

## 2023-06-27 DIAGNOSIS — I48.0 PAROXYSMAL ATRIAL FIBRILLATION (HCC): ICD-10-CM

## 2023-06-27 PROBLEM — E87.5 HYPERKALEMIA: Status: ACTIVE | Noted: 2023-06-27

## 2023-06-27 RX ORDER — SPIRONOLACTONE 25 MG/1
25 TABLET ORAL EVERY OTHER DAY
COMMUNITY
Start: 2023-05-19

## 2023-06-27 RX ORDER — TORSEMIDE 20 MG/1
40 TABLET ORAL DAILY
COMMUNITY
Start: 2023-05-06

## 2023-06-27 RX ORDER — CALCIUM CARBONATE 500(1250)
1 TABLET ORAL DAILY
COMMUNITY
Start: 2023-03-22

## 2023-06-27 NOTE — PROGRESS NOTES
[facility]  [address]  [phone]  DISCHARGE SUMMARY  FACILITY: [facility]  Code 31    NAME: Michele Bridges  AGE: 70 y o  SEX: male   CODE STATUS: CPR      DISCHARGE DISPOSITION: Stable for discharge to home with family support and home health PT/OT/SN services  Past Medical and Surgical History:   Past Medical History:   Diagnosis Date   • Arthritis    • Asthma    • Colon polyp    • CPAP (continuous positive airway pressure) dependence    • Edema     left leg   • GERD (gastroesophageal reflux disease)    • History of echocardiogram 07/2017   • History of Holter monitoring 09/2014   • History of stress test 08/2014   • Hypertension    • Myocardial infarct [facility])     2011   • Myocardial infarction [facility])    • Obesity    • Sleep apnea    • Thrombophlebitis       Past Surgical History:   Procedure Laterality Date   • CARDIAC CATHETERIZATION  03/2012    [id number]   • CHOLECYSTECTOMY     • COLONOSCOPY     • COLONOSCOPY N/A 10/30/2017    Procedure: COLONOSCOPY;  Surgeon: Miky Arguello MD;  Location: [facility] GI LAB; Service: Gastroenterology   • ENDOVENOUS ABLATION SAPHENOUS VEIN W/ LASER      each addit vein   • ERCP     • ESOPHAGOGASTRODUODENOSCOPY N/A 10/30/2017    Procedure: ESOPHAGOGASTRODUODENOSCOPY (EGD); Surgeon: Miky Arguello MD;  Location: [facility] GI LAB; Service: Gastroenterology   • GASTRIC BYPASS      2001   • GASTRIC BYPASS     • HERNIA REPAIR      paraesophageal hiatus hernia   • HERNIA REPAIR      x5 last one 2011   • JOINT REPLACEMENT     • KNEE ARTHROPLASTY Right     2102   • REPLACEMENT TOTAL KNEE Right 02/2011       Course of stay:   Patient is a 70 y o  old male being seen at [facility] for follow up of acute and chronic medical conditions  He has PMH including but not limited to JOSIAH, obesity, diabetes mellitus, paroxysmal A-fib, CAD stents, BPH, CKD   He presented to the hospital on 6/3/23 with abdominal pain and was admitted for pancreatitis likely due to Trulicity  He was treated with IV fluids and 3 days of IV antibiotics  He improved and was discharged to [facility]  He will need ongoing follow up with GI  On exam today he is doing well  He is eager to return home  He does continue with edema to his lower extremities but denies CP or SOB  He has no medical complaints at this time  ROS:  Review of Systems   Constitutional: Negative for chills and fever  HENT: Negative for ear pain and sore throat  Eyes: Negative for pain and visual disturbance  Respiratory: Negative for cough and shortness of breath  Cardiovascular: Negative for chest pain and palpitations  Gastrointestinal: Positive for diarrhea  Negative for abdominal pain and vomiting  Genitourinary: Negative for dysuria and hematuria  Musculoskeletal: Positive for gait problem  Negative for arthralgias and back pain  Skin: Negative for color change and rash  Neurological: Positive for tremors and weakness  Negative for seizures and syncope  All other systems reviewed and are negative  PHYSICAL EXAM:  VITALS:   Vitals:    06/27/23 1255   BP: (!) 88/58   Pulse: 58   Temp: 98 1 °F (36 7 °C)   SpO2: 97%        Physical Exam  Vitals reviewed  Constitutional:       General: He is not in acute distress  Appearance: Normal appearance  He is obese  He is not ill-appearing, toxic-appearing or diaphoretic  HENT:      Head: Normocephalic and atraumatic  Eyes:      Conjunctiva/sclera: Conjunctivae normal    Cardiovascular:      Rate and Rhythm: Normal rate and regular rhythm  Pulses: Normal pulses  Heart sounds: Normal heart sounds  No murmur heard  Pulmonary:      Effort: Pulmonary effort is normal  No respiratory distress  Breath sounds: Normal breath sounds  Abdominal:      General: Bowel sounds are normal  There is no distension  Palpations: Abdomen is soft  Tenderness: There is no abdominal tenderness     Musculoskeletal:      Right lower leg: Edema present  Left lower leg: Edema present  Skin:     General: Skin is warm and dry  Neurological:      General: No focal deficit present  Mental Status: He is alert and oriented to person, place, and time  Mental status is at baseline  Psychiatric:         Mood and Affect: Mood normal          Behavior: Behavior normal          Thought Content: Thought content normal          Admission Diagnoses:   1  Idiopathic acute pancreatitis without infection or necrosis  Assessment & Plan:  During hospital admission found to have pancreatitis suspected to be related to Trulicity  Imaging significant for acute interstitial edematous pancreatitis involving the pancreatic tail with no pancreatic cyst or fluid collection  Right upper quadrant ultrasound within normal limits  EGD within normal limits  Patient tolerating diet well  Denies any abdominal pain  Continues to have diarrhea; continue cholestyramine with meals  Follow-up with GI outpatient for MRI/MRCP in 6 weeks      2  Chronic diastolic congestive heart failure [facility])  Assessment & Plan:  Wt Readings from Last 3 Encounters:   06/21/23 (!) 144 kg (316 lb 6 4 oz)   06/19/23 (!) 144 kg (316 lb 6 4 oz)   06/15/23 (!) 146 kg (322 lb 12 8 oz)     Weight on admission to  lbs now 312 6 lbs  Continue torsemide 40 mg daily and spironolactone 25 mg every other day   Encourage heart healthy diet  Monitor daily weights       3  Benign essential hypertension  Assessment & Plan:  Blood pressure log reviewed  BP 88/58 this am, on recheck was 111/58  Will discontinue lisinopril in the presence of hyperkalemia   Continue diuretics and metoprolol   Avoid hypotension   If blood pressure remains below goal BP <130/80, may consider reduction of metoprolol given bradycardia       4  Paroxysmal atrial fibrillation (HCC)  Assessment & Plan:  HR bradycardic; 58   Continue rate control with metoprolol   Continue anticoagulation with eliquis       5   Parkinson disease [facility])  Assessment & Plan:  Continue Sinemet   Redirection, reorientation and distraction as appropriate   Fall/safety precautions  Supportive care, assistance with ADLs   Avoid deliriogenic medications   Encourage adequate hydration and nutrition   Maintain sleep/wake cycle            Follow-up Recommendations:    • Outpatient Follow up with PCP in the next 2 weeks        Discharge Medications: See discharge medication list which was reviewed and signed  Current Outpatient Medications:   •  apixaban (Eliquis) 5 mg, Take 1 tablet (5 mg total) by mouth 2 (two) times a day, Disp: 60 tablet, Rfl: 0  •  atorvastatin (LIPITOR) 20 mg tablet, Take 20 mg by mouth daily, Disp: , Rfl:   •  buPROPion (WELLBUTRIN XL) 300 mg 24 hr tablet, Take 300 mg by mouth daily, Disp: , Rfl:   •  calcium carbonate (OYSTER SHELL,OSCAL) 500 mg, Take 1 tablet by mouth in the morning, Disp: , Rfl:   •  carbidopa-levodopa (SINEMET)  mg per tablet, TAKE 1 TABLET BY MOUTH THREE TIMES DAILY   INCREASE SLOWLY ACCORDING TO SEPARATE SCHEDULE, Disp: , Rfl:   •  cholestyramine (QUESTRAN) 4 g packet, Take 1 packet (4 g total) by mouth 3 (three) times a day with meals, Disp: , Rfl:   •  gabapentin (NEURONTIN) 100 mg capsule, Take 300 mg by mouth, Disp: , Rfl:   •  magnesium Oxide (MAG-OX) 400 mg TABS, Take 2 tablets (800 mg total) by mouth 2 (two) times a day, Disp: , Rfl: 0  •  metFORMIN (GLUMETZA) 500 MG (MOD) 24 hr tablet, Take 500 mg by mouth 2 (two) times a day with meals, Disp: , Rfl:   •  metoprolol tartrate (LOPRESSOR) 25 mg tablet, Take 1 tablet (25 mg total) by mouth every 12 (twelve) hours, Disp: , Rfl: 0  •  Mirabegron ER 25 MG TB24, Take 25 mg by mouth daily at bedtime, Disp: 90 tablet, Rfl: 3  •  Multiple Vitamin (MULTIVITAMIN) tablet, Take 1 tablet by mouth daily, Disp: , Rfl:   •  omeprazole (PriLOSEC) 40 MG capsule, Take 1 capsule (40 mg total) by mouth daily, Disp: 90 capsule, Rfl: 3  •  polyethylene glycol (GLYCOLAX) 17 GM/SCOOP "powder, Take 17 g by mouth daily, Disp: 289 g, Rfl: 1  •  sildenafil (VIAGRA) 100 mg tablet, Take 100 mg by mouth daily as needed, Disp: , Rfl:   •  spironolactone (ALDACTONE) 25 mg tablet, Take 25 mg by mouth every other day, Disp: , Rfl:   •  torsemide (DEMADEX) 20 mg tablet, Take 40 mg by mouth in the morning, Disp: , Rfl:   •  traZODone (DESYREL) 150 mg tablet, Take 150 mg by mouth daily at bedtime, Disp: , Rfl:      Discussion with patient/family and further instructions:  -Fall precautions  -Aspiration precautions  -Bleeding precautions  -Monitor for signs/symptoms of infection  -Medication list was reviewed and signed  -DME form was completed    Status at time of discharge: Stable    Plan discussed with Dr Deisi Moise noted agreement with assessment and plan  Billing based on time  Time spent on unit, 40 minutes  Time spent counseling pt on debility/condition, 30 minutes  Please note:  Voice-recognition software may have been used in the preparation of this document  Occasional wrong word or \"sound-alike\" substitutions may have occurred due to the inherent limitations of voice recognition software  Interpretation should be guided by context          SELVIN Aguilar  6/27/2023     "

## 2023-06-27 NOTE — ASSESSMENT & PLAN NOTE
BMP on 6/22 with potassium 5 6   Spironolactone was changed from daily to every other day   Will repeat STAT BMP   Will d/c lisinopril as BP are 90s-110s/50s-60's

## 2023-06-27 NOTE — ASSESSMENT & PLAN NOTE
Blood pressure log reviewed  BP 88/58 this am, on recheck was 111/58  Will discontinue lisinopril in the presence of hyperkalemia   Continue diuretics and metoprolol   Avoid hypotension   If blood pressure remains below goal BP <130/80, may consider reduction of metoprolol given bradycardia

## 2023-06-27 NOTE — ASSESSMENT & PLAN NOTE
Continue Sinemet   Redirection, reorientation and distraction as appropriate   Fall/safety precautions  Supportive care, assistance with ADLs   Avoid deliriogenic medications   Encourage adequate hydration and nutrition   Maintain sleep/wake cycle

## 2023-06-27 NOTE — ASSESSMENT & PLAN NOTE
During hospital admission found to have pancreatitis suspected to be related to Trulicity  Imaging significant for acute interstitial edematous pancreatitis involving the pancreatic tail with no pancreatic cyst or fluid collection  Right upper quadrant ultrasound within normal limits  EGD within normal limits  Patient tolerating diet well  Denies any abdominal pain  Continues to have diarrhea; continue cholestyramine with meals  Follow-up with GI outpatient for MRI/MRCP in 6 weeks

## 2023-06-27 NOTE — ASSESSMENT & PLAN NOTE
Wt Readings from Last 3 Encounters:   06/21/23 (!) 144 kg (316 lb 6 4 oz)   06/19/23 (!) 144 kg (316 lb 6 4 oz)   06/15/23 (!) 146 kg (322 lb 12 8 oz)     Weight on admission to  lbs now 312 6 lbs  Continue torsemide 40 mg daily and spironolactone 25 mg every other day   Encourage heart healthy diet  Monitor daily weights

## 2023-06-28 NOTE — TELEPHONE ENCOUNTER
Olga Lam called, requesting a call back from on call provider, to review recent results   Provider paged via [facility]

## 2023-07-19 ENCOUNTER — HOSPITAL ENCOUNTER (OUTPATIENT)
Dept: MRI IMAGING | Facility: HOSPITAL | Age: 63
Discharge: HOME | End: 2023-07-19

## 2023-07-19 DIAGNOSIS — K85.90 PANCREATITIS: ICD-10-CM

## 2023-07-25 ENCOUNTER — NURSE TRIAGE (OUTPATIENT)
Age: 63
End: 2023-07-25

## 2023-07-25 NOTE — TELEPHONE ENCOUNTER
----- Message from [facility] sent at 7/25/2023  9:07 AM EDT -----  Pt calling because he has an appointment scheduled with Dr Jose Parkinson on 7/27/23, but has not had his MRI yet. He would like to know if he should keep the appointment or reschedule.

## 2023-07-27 ENCOUNTER — OFFICE VISIT (OUTPATIENT)
Dept: GASTROENTEROLOGY | Facility: CLINIC | Age: 63
End: 2023-07-27
Payer: MEDICARE

## 2023-07-27 VITALS
HEART RATE: 50 BPM | HEIGHT: 69 IN | SYSTOLIC BLOOD PRESSURE: 98 MMHG | WEIGHT: 297.2 LBS | BODY MASS INDEX: 44.02 KG/M2 | DIASTOLIC BLOOD PRESSURE: 55 MMHG

## 2023-07-27 DIAGNOSIS — R10.30 LOWER ABDOMINAL PAIN: ICD-10-CM

## 2023-07-27 DIAGNOSIS — E66.3 OVERWEIGHT: ICD-10-CM

## 2023-07-27 DIAGNOSIS — Z98.84 HISTORY OF GASTRIC BYPASS: ICD-10-CM

## 2023-07-27 DIAGNOSIS — Z87.19 HISTORY OF PANCREATITIS: ICD-10-CM

## 2023-07-27 DIAGNOSIS — K59.00 CONSTIPATION, UNSPECIFIED CONSTIPATION TYPE: ICD-10-CM

## 2023-07-27 DIAGNOSIS — Z86.010 HX OF ADENOMATOUS COLONIC POLYPS: ICD-10-CM

## 2023-07-27 DIAGNOSIS — K21.9 GERD WITHOUT ESOPHAGITIS: Primary | ICD-10-CM

## 2023-07-27 PROCEDURE — 99214 OFFICE O/P EST MOD 30 MIN: CPT | Performed by: INTERNAL MEDICINE

## 2023-07-27 RX ORDER — EMPAGLIFLOZIN 25 MG/1
TABLET, FILM COATED ORAL
COMMUNITY
Start: 2023-07-20

## 2023-07-27 RX ORDER — POTASSIUM CHLORIDE 20 MEQ/1
TABLET, EXTENDED RELEASE ORAL
COMMUNITY
Start: 2023-07-14

## 2023-07-27 RX ORDER — CARIPRAZINE 3 MG/1
3 CAPSULE, GELATIN COATED ORAL DAILY
COMMUNITY
Start: 2023-06-30

## 2023-07-27 NOTE — PROGRESS NOTES
[facility] [facility] - Outpatient Follow-up Note  Wilder Orozco 70 y.o. male MRN: [id number]  Encounter: [id number]          ASSESSMENT AND PLAN:      1. GERD without esophagitis  Controlled on omeprazole    2. History of gastric bypass  Recent EGD    3. Overweight  As above    4. Lower abdominal pain  Chronic in nature due for repeat colonoscopy last year suboptimal prep. However he gets bradycardia and is being considered for a pacemaker we will wait until that decision. 5. Hx of adenomatous colonic polyps  As above    6. Constipation, unspecified constipation type  Moving bowels on a regular basis no real complaints at this time. Possibility of some components of medication like calcium trazodone and activity. 7. History of pancreatitis  Recent hospitalization elevated bilirubin possibly Jobert's. He is due for MRI MRCP. Trulicity was stopped. He will otherwise follow-up in 6 months    ______________________________________________________________________    SUBJECTIVE: Very pleasant 49-year-old gentleman who we see for gastroesophageal reflux status post gastric bypass overweight lower abdominal pain history of adenomatous colon polyps constipation history of pancreatitis. No real complaints of lower abdominal discomfort at this time this is chronic in nature he is due for repeat colonoscopy however is being considered for a pacemaker. Recent hospitalization for interstitial pancreatitis possibly secondary to Trulicity which was held. IgG4 negative. He is due for MRI MRCP. Reflux is under control. REVIEW OF SYSTEMS IS OTHERWISE NEGATIVE.       Historical Information   Past Medical History:   Diagnosis Date   • Arthritis    • Asthma    • Colon polyp    • CPAP (continuous positive airway pressure) dependence    • Edema     left leg   • GERD (gastroesophageal reflux disease)    • History of echocardiogram 07/2017   • History of Holter monitoring 09/2014   • History of stress test 08/2014   • Hypertension    • Myocardial infarct [facility]     2011   • Myocardial infarction [facility]    • Obesity    • Sleep apnea    • Thrombophlebitis      Past Surgical History:   Procedure Laterality Date   • CARDIAC CATHETERIZATION  03/2012    [id number]   • CHOLECYSTECTOMY     • COLONOSCOPY     • COLONOSCOPY N/A 10/30/2017    Procedure: COLONOSCOPY;  Surgeon: Belén Roche MD;  Location: [address] [place] [address] GI LAB; Service: Gastroenterology   • ENDOVENOUS ABLATION SAPHENOUS VEIN W/ LASER      each addit vein   • ERCP     • ESOPHAGOGASTRODUODENOSCOPY N/A 10/30/2017    Procedure: ESOPHAGOGASTRODUODENOSCOPY (EGD); Surgeon: Belén Roche MD;  Location: [facility] GI LAB;   Service: Gastroenterology   • GASTRIC BYPASS      2001   • GASTRIC BYPASS     • HERNIA REPAIR      paraesophageal hiatus hernia   • HERNIA REPAIR      x5 last one 2011   • JOINT REPLACEMENT     • KNEE ARTHROPLASTY Right     2102   • REPLACEMENT TOTAL KNEE Right 02/2011     Social History   Social History     Substance and Sexual Activity   Alcohol Use Not Currently    Comment: rarely - denied per allscripts      Social History     Substance and Sexual Activity   Drug Use No     Social History     Tobacco Use   Smoking Status Former   • Packs/day: 1.00   • Years: 3.00   • Total pack years: 3.00   • Types: Cigarettes   Smokeless Tobacco Former   Tobacco Comments    never a smoker per allscripts - as per [place]     Family History   Problem Relation Age of Onset   • Uterine cancer Mother    • Prostate cancer Father    • Diabetes Father    • Heart failure Father    • Cancer Father    • Stroke Family         syndrome   • Aneurysm Family         aoritc   • Diabetes Brother    • Other Brother         amputation-   • Diabetes Paternal Grandmother    • Diabetes Paternal Grandfather    • Cancer Sister        Meds/Allergies       Current Outpatient Medications:   •  apixaban (Eliquis) 5 mg  •  atorvastatin (LIPITOR) 20 mg tablet  •  buPROPion (WELLBUTRIN XL) 300 mg 24 hr tablet  •  calcium carbonate (OYSTER SHELL,OSCAL) 500 mg  •  carbidopa-levodopa (SINEMET)  mg per tablet  •  gabapentin (NEURONTIN) 100 mg capsule  •  Jardiance 25 MG TABS  •  metFORMIN (GLUMETZA) 500 MG (MOD) 24 hr tablet  •  metoprolol tartrate (LOPRESSOR) 25 mg tablet  •  omeprazole (PriLOSEC) 40 MG capsule  •  potassium chloride (K-DUR,KLOR-CON) 20 mEq tablet  •  sildenafil (VIAGRA) 100 mg tablet  •  spironolactone (ALDACTONE) 25 mg tablet  •  torsemide (DEMADEX) 20 mg tablet  •  traZODone (DESYREL) 150 mg tablet  •  Vraylar 3 MG capsule  •  cholestyramine (QUESTRAN) 4 g packet  •  magnesium Oxide (MAG-OX) 400 mg TABS  •  Mirabegron ER 25 MG TB24  •  Multiple Vitamin (MULTIVITAMIN) tablet  •  polyethylene glycol (GLYCOLAX) 17 GM/SCOOP powder    Allergies   Allergen Reactions   • Gluten Meal - Food Allergy    • Adhesive [Medical Tape] Rash           Objective     Blood pressure 98/55, pulse (!) 50, height 5' 9" (1.753 m), weight 135 kg (297 lb 3.2 oz). Body mass index is 43.89 kg/m². PHYSICAL EXAM:      General Appearance:   Alert, cooperative, no distress   HEENT:   Normocephalic, atraumatic, anicteric.     Neck:  Supple, symmetrical, trachea midline   Lungs:   Clear to auscultation bilaterally; no rales, rhonchi or wheezing; respirations unlabored    Heart[de-identified]   Regular rate and rhythm; no murmur, rub, or gallop. Abdomen:   Soft, non-tender, non-distended; normal bowel sounds; no masses, no organomegaly    Genitalia:   Deferred    Rectal:   Deferred    Extremities:  No cyanosis, clubbing or edema    Pulses:  2+ and symmetric    Skin:  No jaundice, rashes, or lesions    Lymph nodes:  No palpable cervical lymphadenopathy        Lab Results:   No visits with results within 1 Day(s) from this visit. Latest known visit with results is:   No results displayed because visit has over 200 results.             Radiology Results:   ECHO 2D COMPLETE DOPPLER AND COLOR FLOW WITH CONTRAST    Result Date: 7/12/2023  Narrative: This result has an attachment that is not available. •  Technically difficult study. •  Left Ventricle: Left ventricle is normal in size. Systolic function is normal with an ejection fraction of 60-65%. There is normal diastolic function. •  Right Ventricle: Right ventricle cavity is mildly dilated. Systolic function is normal. •  No significant valvular abnormalities. Left Ventricle Left ventricle is normal in size. Systolic function is normal with an ejection fraction of 60-65%. Wall motion is within normal limits. There is normal diastolic function. Right Ventricle Right ventricle cavity is mildly dilated. Systolic function is normal. Left Atrium Left atrium cavity size is normal. Right Atrium Right atrium cavity is normal. IVC/SVC The inferior vena cava demonstrates a diameter of <=21 mm and collapses >50%; therefore, the right atrial pressure is estimated at 0-5 mmHg. Mitral Valve Mitral valve structure is normal. There is no regurgitation or stenosis. Tricuspid Valve Tricuspid valve structure is normal. There is trace regurgitation. There is no evidence of tricuspid valve stenosis. Aortic Valve The aortic valve is trileaflet. The leaflets are mildly thickened. There is no regurgitation or stenosis. Pulmonic Valve The pulmonic valve was not well visualized. There is no regurgitation or stenosis. The estimated pulmonary artery systolic pressure is 49.7 mmHg. Ascending Aorta The aorta appears normal in size. Pericardium There is no pericardial effusion. Study Details A complete 2D echocardiogram was performed. Color flow, Pulse Wave and Continuous Wave Doppler was performed and analyzed. Definity contrast was used during the study. The study had technical difficulties. The study was difficult due to patient's body habitus.     XR CHEST 1 VW PORTABLE    Result Date: 7/11/2023  Narrative: Single portable AP view of chest compared with examination on 4/7/2023 HISTORY: Sepsis alert The heart is of normal size. There is no widening of mediastinum. There is no consolidation of lungs. There are no pleural effusions. There is no pneumothorax. Impression: IMPRESSION: No acute cardiopulmonary disease.  [id number]

## 2023-07-27 NOTE — PATIENT INSTRUCTIONS
You are due for colonoscopy due to suboptimal prep last year. However we will wait to schedule that due to the fact that you may need a pacemaker.

## 2023-07-29 ENCOUNTER — HOSPITAL ENCOUNTER (OUTPATIENT)
Dept: MRI IMAGING | Facility: HOSPITAL | Age: 63
Discharge: HOME | End: 2023-07-29
Payer: MEDICARE

## 2023-07-29 PROCEDURE — 74181 MRI ABDOMEN W/O CONTRAST: CPT

## 2023-07-29 PROCEDURE — G1004 CDSM NDSC: HCPCS

## 2023-08-10 DIAGNOSIS — K85.80 OTHER ACUTE PANCREATITIS, UNSPECIFIED COMPLICATION STATUS: Primary | ICD-10-CM

## 2023-08-11 ENCOUNTER — TELEPHONE (OUTPATIENT)
Age: 63
End: 2023-08-11

## 2023-08-11 NOTE — TELEPHONE ENCOUNTER
I called and spoke with patient letting him know this MRI is with contrast. Patient verbalized understanding.

## 2023-08-11 NOTE — TELEPHONE ENCOUNTER
Patient would like a call back from the office to question why he needs to have another MRI of the abdomen if he already had one done on July 29.   Please call back to discuss

## 2023-08-16 NOTE — TELEPHONE ENCOUNTER
Patient called back and wants to make sure the reason for repeat MRI with contrast is for an area identified with a cyst. Is there any other concerns identified for repeat MRI? Please follow up with patient.

## 2023-08-30 ENCOUNTER — OFFICE VISIT (OUTPATIENT)
Dept: NEPHROLOGY | Facility: CLINIC | Age: 63
End: 2023-08-30
Payer: MEDICARE

## 2023-08-30 VITALS
SYSTOLIC BLOOD PRESSURE: 120 MMHG | HEIGHT: 69 IN | DIASTOLIC BLOOD PRESSURE: 84 MMHG | BODY MASS INDEX: 45.38 KG/M2 | WEIGHT: 306.4 LBS

## 2023-08-30 DIAGNOSIS — I50.32 CHRONIC HEART FAILURE WITH PRESERVED EJECTION FRACTION (HCC): ICD-10-CM

## 2023-08-30 DIAGNOSIS — E87.1 HYPONATREMIA: Primary | ICD-10-CM

## 2023-08-30 DIAGNOSIS — E87.6 HYPOKALEMIA: ICD-10-CM

## 2023-08-30 DIAGNOSIS — N17.9 AKI (ACUTE KIDNEY INJURY) (HCC): ICD-10-CM

## 2023-08-30 PROCEDURE — 99214 OFFICE O/P EST MOD 30 MIN: CPT | Performed by: INTERNAL MEDICINE

## 2023-08-30 NOTE — PROGRESS NOTES
NEPHROLOGY OFFICE PROGRESS NOTE   Sal Orozco 70 y.o. male MRN: [id number]  DATE: 08/30/23  Reason for visit: Continued evaluation and management of hyponatremia, elevated creatinine. ASSESSMENT & PLAN:  1. Hyponatremia:  • Noted during admission to [facility] in June 2023. • Na on discharge was 133 on June 8, 2023. • Now resolved. Na 136    2. Acute kidney injury:  · Noted during admission to [facility] in July 2023. · Admission creatinine of 1.64 on July 11, 2023. · Discharge creatinine of 0.96 on July 14, 2023. · Most recent creatinine 1.41 on August 2, 2023. · Baseline creatinine is 0.8-1.0.  · Suspect prerenal azotemia. · Diuretics were already decreased and Torsemide is down to 20 mg daily. · Recheck labs this week. 3. Congestive heart failure:  · Currently compensated. · Current medications: Torsemide 20 mg daily, spironolactone 25 mg QOD. · No changes. 4. Hypokalemia:  · Resolved. · Potassium 3.6. · Current medications: Kdur 40 meq OD, Spironolactone 25 mg QOD. · Check repeat labs and Mg. · Currently not taking magnesium supplements. 5. Hypertension:  • BP is at goal.  • Current regimen: Spironolactone 25 mg QOD, Torsemide 20 mg OD. • Now off Ramipril and Clonidine. 6. Chest pain:   · This is reproducible. · However, I advised him to proceed to the ER or call his cardiologist if symptoms worsen. 7. Diabetes mellitus: Follow-up with Dr. Melinda Hurtado. 8. Morbid obesity: Follow-up with PCP. Patient Instructions   I recommend no changes to your medications today. Please go for blood work this week and again in 2 months. Follow up in 4 months - please obtain blood work 1-2 weeks prior to the appointment. SUBJECTIVE / INTERVAL HISTORY:  Laureen Gonzalez was last seen by me in the office in 2020. During the last visit, we did not arrange for follow up as all his renal related issues were stable.    Laureen Gonzalez was recently admitted to [facility] between June 3 and June 8, 2023 with pancreatitis. He was seen by our service for hyponatremia. On presentation, his sodium level was 132 on Vivian 3, 2023 but went down to 128 on June 5, 2023. The Na level was up to 133 on discharge on June 8, 2023. Renal function was stable during that hospital stay. He required rehab for 20 days after the hospitalization. Since being discharged, he was readmitted to [facility] between July 11 and July 14, 2023. During that admission, his sodium level had normalized with readings between 138 and 140. However, he did have [place] with a creatinine of 1.64 on admission which was down to 0.96 on discharge. The reason for admission was bradycardia and he was seen by cardiology during the hospital stay. He remains on Metoprolol. He is due for an MRI of the abdomen this week. His most recent labs are from August 2, 2023 which revealed a creatinine of 1.41 and a sodium of 136. He recalls that he was discharged on Torsemide 40 mg daily but this was decreased to 20 mg daily a few weeks ago. He is still on Spironolactone 25 mg QOD and Kdur 40 meq daily. PMH/PSH: HTN, DM, CHF, cholecystectomy, fatty liver, GERD, obesity s/p gastric bypass in Jan 2002, COPD, JOSIAH on CPAP, DJD, parkinson's. ALLERGIES:   Allergies   Allergen Reactions   • Gluten Meal - Food Allergy    • Adhesive [Medical Tape] Rash     REVIEW OF SYSTEMS:  Review of Systems   Constitutional: Negative for appetite change, chills, fatigue and fever. Respiratory: Positive for shortness of breath. Negative for cough. Cardiovascular: Positive for chest pain. Negative for leg swelling. Gastrointestinal: Positive for abdominal pain. Negative for diarrhea, nausea and vomiting. Genitourinary: Negative for hematuria. Musculoskeletal: Positive for arthralgias. Neurological: Negative for dizziness and light-headedness.      OBJECTIVE:  /84 (BP Location: Left arm, Patient Position: Sitting, Cuff Size: Large) Ht 5' 9" (1.753 m)   Wt (!) 139 kg (306 lb 6.4 oz)   BMI 45.25 kg/m²   Current Weight: Weight - Scale: (!) 139 kg (306 lb 6.4 oz) Body mass index is 45.25 kg/m². Physical Exam  Constitutional:       General: He is not in acute distress. Appearance: He is well-developed. He is obese. He is not diaphoretic. HENT:      Head: Normocephalic and atraumatic. Eyes:      General: No scleral icterus. Conjunctiva/sclera: Conjunctivae normal.   Neck:      Vascular: No JVD. Cardiovascular:      Rate and Rhythm: Regular rhythm. Bradycardia present. Heart sounds: Normal heart sounds. Comments: Reproducible pain noted on R sternal border  Pulmonary:      Effort: Pulmonary effort is normal. No respiratory distress. Breath sounds: Normal breath sounds. Abdominal:      General: Bowel sounds are normal.      Palpations: Abdomen is soft. Musculoskeletal:      Cervical back: Neck supple. Right lower leg: No edema. Left lower leg: No edema. Skin:     General: Skin is warm and dry. Neurological:      Mental Status: He is alert and oriented to person, place, and time. Psychiatric:         Behavior: Behavior normal.         Judgment: Judgment normal.       Medications:  Current Outpatient Medications:   •  apixaban (Eliquis) 5 mg, Take 1 tablet (5 mg total) by mouth 2 (two) times a day, Disp: 60 tablet, Rfl: 0  •  atorvastatin (LIPITOR) 20 mg tablet, Take 20 mg by mouth daily, Disp: , Rfl:   •  buPROPion (WELLBUTRIN XL) 300 mg 24 hr tablet, Take 300 mg by mouth daily, Disp: , Rfl:   •  calcium carbonate (OYSTER SHELL,OSCAL) 500 mg, Take 1 tablet by mouth in the morning, Disp: , Rfl:   •  carbidopa-levodopa (SINEMET)  mg per tablet, TAKE 1 TABLET BY MOUTH THREE TIMES DAILY.  INCREASE SLOWLY ACCORDING TO SEPARATE SCHEDULE, Disp: , Rfl:   •  gabapentin (NEURONTIN) 100 mg capsule, Take 300 mg by mouth, Disp: , Rfl:   •  Jardiance 25 MG TABS, , Disp: , Rfl:   •  metFORMIN (GLUMETZA) 500 MG (MOD) 24 hr tablet, Take 500 mg by mouth daily with breakfast, Disp: , Rfl:   •  metoprolol tartrate (LOPRESSOR) 25 mg tablet, Take 1 tablet (25 mg total) by mouth every 12 (twelve) hours, Disp: , Rfl: 0  •  omeprazole (PriLOSEC) 40 MG capsule, Take 1 capsule (40 mg total) by mouth daily, Disp: 90 capsule, Rfl: 3  •  polyethylene glycol (GLYCOLAX) 17 GM/SCOOP powder, Take 17 g by mouth daily, Disp: 289 g, Rfl: 1  •  potassium chloride (K-DUR,KLOR-CON) 20 mEq tablet, TAKE TOTAL 2 TABLETS ALONG WITH TORSEMIDE 40 MG DOSE. CAN LOWER TO 1 TABLET IF TORSEMIDE IS LOWERED TO 20 MG TABLET, Disp: , Rfl:   •  sildenafil (VIAGRA) 100 mg tablet, Take 100 mg by mouth daily as needed, Disp: , Rfl:   •  spironolactone (ALDACTONE) 25 mg tablet, Take 25 mg by mouth every other day, Disp: , Rfl:   •  torsemide (DEMADEX) 20 mg tablet, Take 40 mg by mouth in the morning, Disp: , Rfl:   •  traZODone (DESYREL) 150 mg tablet, Take 150 mg by mouth daily at bedtime, Disp: , Rfl:   •  Vraylar 3 MG capsule, Take 3 mg by mouth daily, Disp: , Rfl:   •  cholestyramine (QUESTRAN) 4 g packet, Take 1 packet (4 g total) by mouth 3 (three) times a day with meals (Patient not taking: Reported on 7/27/2023), Disp: , Rfl:   •  magnesium Oxide (MAG-OX) 400 mg TABS, Take 2 tablets (800 mg total) by mouth 2 (two) times a day (Patient not taking: Reported on 7/27/2023), Disp: , Rfl: 0  •  Mirabegron ER 25 MG TB24, Take 25 mg by mouth daily at bedtime (Patient not taking: Reported on 7/27/2023), Disp: 90 tablet, Rfl: 3  •  Multiple Vitamin (MULTIVITAMIN) tablet, Take 1 tablet by mouth daily (Patient not taking: Reported on 7/27/2023), Disp: , Rfl:     Laboratory Results:  August 2, 2023: Glucose 141, BUN 14, creatinine 1.41, sodium 136, potassium 3.6, chloride 91, CO2 36, calcium 9.5, albumin 4.3, total protein 7.3.

## 2023-08-30 NOTE — PATIENT INSTRUCTIONS
I recommend no changes to your medications today. Please go for blood work this week and again in 2 months. Follow up in 4 months - please obtain blood work 1-2 weeks prior to the appointment.

## 2023-09-01 ENCOUNTER — HOSPITAL ENCOUNTER (OUTPATIENT)
Dept: MRI IMAGING | Facility: HOSPITAL | Age: 63
End: 2023-09-01
Attending: INTERNAL MEDICINE
Payer: MEDICARE

## 2023-09-01 DIAGNOSIS — K85.80 OTHER ACUTE PANCREATITIS, UNSPECIFIED COMPLICATION STATUS: ICD-10-CM

## 2023-09-01 PROCEDURE — 74183 MRI ABD W/O CNTR FLWD CNTR: CPT

## 2023-09-01 PROCEDURE — A9585 GADOBUTROL INJECTION: HCPCS | Performed by: INTERNAL MEDICINE

## 2023-09-01 PROCEDURE — G1004 CDSM NDSC: HCPCS

## 2023-09-01 RX ORDER — GADOBUTROL 604.72 MG/ML
13 INJECTION INTRAVENOUS
Status: COMPLETED | OUTPATIENT
Start: 2023-09-01 | End: 2023-09-01

## 2023-09-01 RX ADMIN — GADOBUTROL 13 ML: 604.72 INJECTION INTRAVENOUS at 13:10

## 2023-09-05 ENCOUNTER — NURSE TRIAGE (OUTPATIENT)
Age: 63
End: 2023-09-05

## 2023-09-05 NOTE — TELEPHONE ENCOUNTER
Regarding: mri results  ----- Message from Rosales Lopez sent at 9/5/2023 12:13 PM EDT -----  Patients GI provider:  Dr. Linda Bradshaw    Number to return call: [phone]     Reason for call: Pt calling requesting to speak with someone to go over MRI results     Scheduled procedure/appointment date if applicable: Apt/procedure

## 2023-09-05 NOTE — TELEPHONE ENCOUNTER
I returned call, spoke with patient and reviewed that MRI has not been resulted to date. I contacted MRI dept and they will send message to reading room to have read. I did advise patient will be notified of results when completed.

## 2023-09-06 ENCOUNTER — NURSE TRIAGE (OUTPATIENT)
Age: 63
End: 2023-09-06

## 2023-09-06 NOTE — TELEPHONE ENCOUNTER
Spoke to patient on phone he is aware OF MRI which showed scarring of the pancreatic tail has slightly increased since July 29, 2023. There is no associated suspicious pancreatic mass. A 4 mm pancreatic cyst may be a sidebranch intraductal papillary mucinous neoplasm without suspicious features or high-risk stigmata. Please place patient on recall list for MRI with and without contrast MRCP in 2 years.   Thank you

## 2023-09-06 NOTE — RESULT ENCOUNTER NOTE
Please call the patient regarding his abnormal result.   Patient should repeat MRI MRCP with and without contrast in 2 years

## 2023-11-15 NOTE — ASSESSMENT & PLAN NOTE
Presented with generalized abdominal pain X 1 month, significantly worsened day of arrival  Denies nausea/vomiting  Recent admission in January with pancreatitis suspected 2/2 Trulicity, at the time was monitored off antibiotics and advised to follow-up with GI outpatient  Denies alcohol abuse, new medications  · CT a/p: Acute interstitial edematous pancreatitis involving the pancreatic tail with no pancreatic or peripancreatic cyst or fluid collection seen  · Lipase: 76->13   Triglycerides WNL, 54   · S/p 500cc IVF bolus in ED, initially placed on [email or web address]  · S/p [email or web address] x 9 hrs -> [email or web address] x 2 hours today  · Hold further IVFs with hyponatremia  · GI following:  · IgG, JOE negative  · Check RUQ US  · Continue holding Trulicity  · Clear liquid diet, advance as tolerated  · Pain management, antiemetics Yes

## 2024-01-08 ENCOUNTER — OFFICE VISIT (OUTPATIENT)
Dept: NEPHROLOGY | Facility: CLINIC | Age: 64
End: 2024-01-08
Payer: MEDICARE

## 2024-01-08 VITALS
SYSTOLIC BLOOD PRESSURE: 122 MMHG | HEART RATE: 60 BPM | DIASTOLIC BLOOD PRESSURE: 80 MMHG | OXYGEN SATURATION: 96 % | HEIGHT: 69 IN | BODY MASS INDEX: 44.73 KG/M2 | WEIGHT: 302 LBS

## 2024-01-08 DIAGNOSIS — I50.32 CHRONIC HEART FAILURE WITH PRESERVED EJECTION FRACTION (HCC): Primary | ICD-10-CM

## 2024-01-08 DIAGNOSIS — E11.49 OTHER DIABETIC NEUROLOGICAL COMPLICATION ASSOCIATED WITH TYPE 2 DIABETES MELLITUS (HCC): ICD-10-CM

## 2024-01-08 DIAGNOSIS — I50.32 CHRONIC DIASTOLIC CHF (CONGESTIVE HEART FAILURE) (HCC): ICD-10-CM

## 2024-01-08 DIAGNOSIS — E55.9 VITAMIN D DEFICIENCY: ICD-10-CM

## 2024-01-08 DIAGNOSIS — E87.6 HYPOKALEMIA: ICD-10-CM

## 2024-01-08 PROBLEM — E87.5 HYPERKALEMIA: Status: RESOLVED | Noted: 2023-06-27 | Resolved: 2024-01-08

## 2024-01-08 PROBLEM — N17.9 AKI (ACUTE KIDNEY INJURY) (HCC): Status: RESOLVED | Noted: 2019-07-05 | Resolved: 2024-01-08

## 2024-01-08 PROBLEM — E87.1 HYPONATREMIA: Status: RESOLVED | Noted: 2019-07-05 | Resolved: 2024-01-08

## 2024-01-08 PROCEDURE — 99214 OFFICE O/P EST MOD 30 MIN: CPT | Performed by: INTERNAL MEDICINE

## 2024-01-08 NOTE — PROGRESS NOTES
NEPHROLOGY OFFICE PROGRESS NOTE   Evert Orozco 63 y.o. male MRN: [id number]  DATE: 01/08/24  Reason for visit: Continued evaluation and management of hyponatremia, elevated creatinine.     ASSESSMENT & PLAN:  Hypokalemia  K is stable at 3.6.   Continue Kdur 40 meq daily.   Continue Spironolactone 25 mg daily.   Mg is at goal.     Congestive heart failure.   Follows with Dr. Reyes of [facility].   Rx: Torsemide 60 mg OD, Spironolactone 25 mg OD.   He appears to be in the remote monitoring program of [facility].   Will defer to them regarding diuretic management.     Renal function  Baseline SCr is 1.0 to 1.3.  Renal function is stable - SCr 1.21.   No proteinuria.     Hypertension  BP is at goal (<130/80)  Rx: Torsemide 60 mg OD, Spironolactone 25 mg OD, Sotalol.   No changes.     Vitamin D deficiency  He is on Ergocalciferol 50K once week per Dr. Lange.   Vitamin D is low.  Defer management to Dr. Lange.     Diabetes mellitus: Follow-up with Dr. Lange.  Morbid obesity: Follow-up with PCP.    Patient Instructions   Your kidney function is stable.   No change to medications recommended.   Continue follow up with cardiology for CHF management.  Continue with Torsemide 60 mg daily, Spironolactone 25 mg daily, and Kdur 40 meq daily.   Follow up in 6 months.     SUBJECTIVE / INTERVAL HISTORY:  Evert was last seen by me in the office in August 2023.   Since that time, he has been admitted to [facility] at least a couple of times.    He was admitted to [facility] between December 6 and December 9, 2023 with congestive heart failure.  His torsemide was increased to 60 mg daily on discharge.  He had another admission to [facility] between December 19 and December 22, 2023.  During this admission, he underwent an elective dual-chamber pacemaker implantation and tolerated the procedure without any issues.  His spironolactone was increased from 25 mg every other day to 25 mg daily.    He had repeat labs in the end of December 2023 and  "early January 2024.     He has a scale at home which is being monitored by [facility] cardiology. His weight today is 302 lbs. If his weight goes up, he gets a call from [facility] cardiology. Since being discharged, he has gotten called once and he was advised to take an extra Torsemide.     He checks home BP everyday - BP is usually 120s/70s. HR is in the 60s.   He has baseline SOB on exertion.     PMH/PSH: HTN, DM, CHF, cholecystectomy, fatty liver, GERD, obesity s/p gastric bypass in Jan 2002, COPD, JOSIAH on CPAP, DJD, parkinson's.     Previous work up:  7/12/23 Echo - EF 60-65%. Normal diastolic function. RV is mildly dilated.     ALLERGIES:   Allergies   Allergen Reactions    Gluten Meal - Food Allergy     Adhesive [Medical Tape] Rash     REVIEW OF SYSTEMS:  Review of Systems   Constitutional:  Positive for fatigue. Negative for appetite change, chills and fever.   Respiratory:  Positive for shortness of breath (on minimal exertion. No SOB at rest.). Negative for cough.    Cardiovascular:  Positive for leg swelling. Negative for chest pain.   Gastrointestinal:  Negative for abdominal pain, diarrhea, nausea and vomiting.   Genitourinary:  Negative for hematuria.   Musculoskeletal:  Positive for back pain. Negative for arthralgias.   Allergic/Immunologic: Negative for immunocompromised state.   Neurological:  Negative for dizziness and light-headedness.     OBJECTIVE:  /80 (BP Location: Left arm, Patient Position: Sitting, Cuff Size: Large)   Pulse 60   Ht 5' 9\" (1.753 m)   Wt (!) 137 kg (302 lb)   SpO2 96%   BMI 44.60 kg/m²   Current Weight: Weight - Scale: (!) 137 kg (302 lb) Body mass index is 44.6 kg/m².  Physical Exam  Constitutional:       General: He is not in acute distress.     Appearance: He is well-developed. He is obese. He is not toxic-appearing.   HENT:      Head: Normocephalic and atraumatic.   Eyes:      General: No scleral icterus.     Conjunctiva/sclera: Conjunctivae normal.   Neck:      Vascular: " No JVD.   Cardiovascular:      Rate and Rhythm: Normal rate and regular rhythm.      Heart sounds: Normal heart sounds.   Pulmonary:      Effort: Pulmonary effort is normal. No respiratory distress.      Breath sounds: Normal breath sounds.   Abdominal:      General: Bowel sounds are normal.      Palpations: Abdomen is soft.   Musculoskeletal:      Cervical back: Neck supple.      Comments: + bilateral LE edema.    Skin:     General: Skin is warm and dry.   Neurological:      Mental Status: He is alert and oriented to person, place, and time.   Psychiatric:         Mood and Affect: Mood normal.         Behavior: Behavior normal.         Judgment: Judgment normal.       Medications:  Current Outpatient Medications:     apixaban (Eliquis) 5 mg, Take 1 tablet (5 mg total) by mouth 2 (two) times a day, Disp: 60 tablet, Rfl: 0    atorvastatin (LIPITOR) 20 mg tablet, Take 20 mg by mouth daily, Disp: , Rfl:     buPROPion (WELLBUTRIN XL) 300 mg 24 hr tablet, Take 300 mg by mouth daily, Disp: , Rfl:     calcium carbonate (OYSTER SHELL,OSCAL) 500 mg, Take 1 tablet by mouth in the morning, Disp: , Rfl:     carbidopa-levodopa (SINEMET)  mg per tablet, TAKE 1 TABLET BY MOUTH THREE TIMES DAILY. INCREASE SLOWLY ACCORDING TO SEPARATE SCHEDULE, Disp: , Rfl:     cholestyramine (QUESTRAN) 4 g packet, Take 1 packet (4 g total) by mouth 3 (three) times a day with meals (Patient not taking: Reported on 7/27/2023), Disp: , Rfl:     gabapentin (NEURONTIN) 100 mg capsule, Take 300 mg by mouth, Disp: , Rfl:     Jardiance 25 MG TABS, , Disp: , Rfl:     magnesium Oxide (MAG-OX) 400 mg TABS, Take 2 tablets (800 mg total) by mouth 2 (two) times a day (Patient not taking: Reported on 7/27/2023), Disp: , Rfl: 0    metFORMIN (GLUMETZA) 500 MG (MOD) 24 hr tablet, Take 500 mg by mouth daily with breakfast, Disp: , Rfl:     metoprolol tartrate (LOPRESSOR) 25 mg tablet, Take 1 tablet (25 mg total) by mouth every 12 (twelve) hours, Disp: , Rfl:  0    Mirabegron ER 25 MG TB24, Take 25 mg by mouth daily at bedtime (Patient not taking: Reported on 7/27/2023), Disp: 90 tablet, Rfl: 3    Multiple Vitamin (MULTIVITAMIN) tablet, Take 1 tablet by mouth daily (Patient not taking: Reported on 7/27/2023), Disp: , Rfl:     omeprazole (PriLOSEC) 40 MG capsule, Take 1 capsule (40 mg total) by mouth daily, Disp: 90 capsule, Rfl: 3    polyethylene glycol (GLYCOLAX) 17 GM/SCOOP powder, Take 17 g by mouth daily, Disp: 289 g, Rfl: 1    potassium chloride (K-DUR,KLOR-CON) 20 mEq tablet, TAKE TOTAL 2 TABLETS ALONG WITH TORSEMIDE 40 MG DOSE. CAN LOWER TO 1 TABLET IF TORSEMIDE IS LOWERED TO 20 MG TABLET, Disp: , Rfl:     sildenafil (VIAGRA) 100 mg tablet, Take 100 mg by mouth daily as needed, Disp: , Rfl:     spironolactone (ALDACTONE) 25 mg tablet, Take 25 mg by mouth every other day, Disp: , Rfl:     torsemide (DEMADEX) 20 mg tablet, Take 40 mg by mouth in the morning, Disp: , Rfl:     traZODone (DESYREL) 150 mg tablet, Take 150 mg by mouth daily at bedtime, Disp: , Rfl:     Vraylar 3 MG capsule, Take 3 mg by mouth daily, Disp: , Rfl:     Laboratory Results:  December 27, 2023: Glucose 123, BUN 18, creatinine 1.30, sodium 138, potassium 3.8, chloride 92, CO2 35, calcium 9.0, alkaline phosphatase 76, albumin 4.7.  BNP 29.    January 5, 2024: BUN 19, creatinine 1.21, sodium 137, potassium 3.6, chloride 93, CO2 26, calcium 9.4. Urine ACR 4. Vitamin D 22.6. Mg 2.4.

## 2024-01-08 NOTE — PATIENT INSTRUCTIONS
Your kidney function is stable.   No change to medications recommended.   Continue follow up with cardiology for CHF management.  Continue with Torsemide 60 mg daily, Spironolactone 25 mg daily, and Kdur 40 meq daily.   Follow up in 6 months.

## 2024-02-15 NOTE — PATIENT INSTRUCTIONS
Acute Kidney Injury- Resolved  Baseline creatinine around 1   UA bland  Creatinine on 7/1 is 1 02  Hypokalemia- he takes kdur 10meq daily  He also takes spironolactone 50mg daily  Hyponatremia- resolved    Borderline Low Bicarbonate Level- monitor, if it continues to be low, I would recommend sodium bicarbonate tablets 650mg twice a day  With your family doctor and as needed with our office  Follow up with your primary care physician  Photo Preface (Leave Blank If You Do Not Want): Photographs were obtained today Detail Level: Zone

## 2024-06-14 NOTE — CASE MANAGEMENT
Case Management Discharge Planning Note    Patient name Celeste Mckeon  Location /-01 MRN [id number]  : 1960 Date 2023       Current Admission Date: 6/3/2023  Current Admission Diagnosis:Idiopathic acute pancreatitis without infection or necrosis   Patient Active Problem List    Diagnosis Date Noted   • Ambulatory dysfunction 2023   • Constipation by delayed colonic transit 2022   • Hx of adenomatous colonic polyps 2022   • Overweight 2022   • Paroxysmal atrial fibrillation ([place] [address] ) 2022   • SIRS (systemic inflammatory response syndrome) ([place] [address] ) 2022   • Idiopathic acute pancreatitis without infection or necrosis 2022   • Type 2 diabetes mellitus without complication, without long-term current use of insulin ([place] [address] ) 2022   • Other constipation 2022   • Chronic diastolic congestive heart failure ([place] [address] ) 2020   • Vitamin D deficiency 2019   • Benign essential hypertension 2019   • [place] (acute kidney injury) ([place] [address] ) 2019   • Hyponatremia 2019   • Urgency of urination 2019   • Hypertension 2019   • Depression 2019   • Chronic heart failure with preserved ejection fraction ([place] [address] ) 2019   • Morbid obesity with BMI of 45 0-49 9, adult ([place] [address] ) 2019   • Rosacea 10/25/2018   • Acute bronchitis 10/04/2018   • Chronic left hip pain 2018   • Chronic pain of left knee 2018   • GERD without esophagitis 2018   • Dyspnea on exertion 2018   • Chest pain, atypical 2018   • Morbid obesity with body mass index of 50 or higher ([place] [address] ) 2018   • Hematospermia 2018   • Erectile dysfunction 2017   • BPH (benign prostatic hyperplasia) 2017   • Sleep apnea 2017   • Chronic obstructive pulmonary disease ([place] [address] ) 2013   • Asthma 2013      LOS (days): 4  Geometric Mean LOS (GMLOS) (days): 3 10  Days to GMLOS:-1 1     OBJECTIVE:  Risk of Unplanned Readmission Patient hasn't had a lipid lab since 2022. Please advise on refills.    Score: 16 32         Current admission status: Inpatient   Preferred Pharmacy:   [facility] [address], [address] [facility] [address]  Phone: [phone] Fax: [phone] [address] [facility] [address]  Phone: [phone] Fax: [phone]    [facility] [address]  [address] [facility] [address]  Phone: [phone] Fax: [phone], [address] [place] [address]  Phone: [phone] Fax: [phone]    Primary Care Provider: Bhupinder Villalta DO    Primary Insurance: MEDICARE  Secondary Insurance:     DISCHARGE DETAILS:    Discharge planning discussed with[de-identified] patient and wife  Freedom of Choice: Yes  Comments - Freedom of Choice: Cm informed by team, that patient is being recommended for rehab  Cm met with patient and wife to discuss options for rehab  Patient is aware of process of referral and steps to get back home after rehab  Patient states his preference is for a rehab in [facility]  Cm sent blanket referrals for SNF rehabs  Cm will follow  CM contacted family/caregiver?: Yes  Were Treatment Team discharge recommendations reviewed with patient/caregiver?: Yes  Did patient/caregiver verbalize understanding of patient care needs?: Yes  Were patient/caregiver advised of the risks associated with not following Treatment Team discharge recommendations?: Yes    Contacts  Patient Contacts: spouse, Abby Elizabeth  Relationship to Patient[de-identified] Family  Contact Method:  In Person  Reason/Outcome: Continuity of Care, Discharge [address]         Is the patient interested in [facility] at discharge?: No    DME Referral Provided  Referral made for DME?: No    Other Referral/Resources/Interventions Provided:  Referral Comments: blanket referral to SNFs in [place] area         Treatment Team Recommendation: Short Term Rehab  Discharge Destination Plan[de-identified] [address]

## 2024-07-08 ENCOUNTER — TELEPHONE (OUTPATIENT)
Dept: NEPHROLOGY | Facility: CLINIC | Age: 64
End: 2024-07-08

## 2024-07-29 ENCOUNTER — NURSING HOME VISIT (OUTPATIENT)
Dept: GERIATRICS | Facility: OTHER | Age: 64
End: 2024-07-29
Payer: MEDICARE

## 2024-07-29 VITALS
BODY MASS INDEX: 57.97 KG/M2 | OXYGEN SATURATION: 96 % | DIASTOLIC BLOOD PRESSURE: 80 MMHG | WEIGHT: 315 LBS | HEIGHT: 62 IN | SYSTOLIC BLOOD PRESSURE: 129 MMHG | RESPIRATION RATE: 18 BRPM | HEART RATE: 63 BPM | TEMPERATURE: 97.6 F

## 2024-07-29 DIAGNOSIS — Z79.899 ENCOUNTER FOR MEDICATION REVIEW: ICD-10-CM

## 2024-07-29 DIAGNOSIS — G89.29 CHRONIC PAIN OF LEFT KNEE: ICD-10-CM

## 2024-07-29 DIAGNOSIS — M25.562 CHRONIC PAIN OF LEFT KNEE: ICD-10-CM

## 2024-07-29 DIAGNOSIS — E11.9 TYPE 2 DIABETES MELLITUS WITHOUT COMPLICATION, WITHOUT LONG-TERM CURRENT USE OF INSULIN (HCC): ICD-10-CM

## 2024-07-29 DIAGNOSIS — I10 BENIGN ESSENTIAL HYPERTENSION: ICD-10-CM

## 2024-07-29 DIAGNOSIS — G20.A1 PARKINSON'S DISEASE, UNSPECIFIED WHETHER DYSKINESIA PRESENT, UNSPECIFIED WHETHER MANIFESTATIONS FLUCTUATE: ICD-10-CM

## 2024-07-29 DIAGNOSIS — I50.32 CHRONIC DIASTOLIC CHF (CONGESTIVE HEART FAILURE) (HCC): ICD-10-CM

## 2024-07-29 DIAGNOSIS — I48.0 PAROXYSMAL ATRIAL FIBRILLATION (HCC): ICD-10-CM

## 2024-07-29 DIAGNOSIS — F32.A DEPRESSION, UNSPECIFIED DEPRESSION TYPE: ICD-10-CM

## 2024-07-29 DIAGNOSIS — Z96.652 HISTORY OF TOTAL LEFT KNEE REPLACEMENT: Primary | ICD-10-CM

## 2024-07-29 DIAGNOSIS — G47.33 OBSTRUCTIVE SLEEP APNEA SYNDROME: ICD-10-CM

## 2024-07-29 PROCEDURE — 99305 1ST NF CARE MODERATE MDM 35: CPT | Performed by: INTERNAL MEDICINE

## 2024-07-29 NOTE — ASSESSMENT & PLAN NOTE
Patient with history of Parkinson's disease will need to continue on carbidopa levodopa 25 100 takes 1 tablet orally 3 times daily.

## 2024-07-29 NOTE — ASSESSMENT & PLAN NOTE
Patient with recent left knee replacement at [facility] comes to facility for further rehabilitation.

## 2024-07-29 NOTE — PROGRESS NOTES
[facility]  Marshall Lincoln MD [facility]  History of physical performed at [facility]  Time spent in admission 65 minutes 20 minutes reviewing chart 40 minutes evaluating patient 5 minutes discussing prognosis and goals of care.  NAME: Evert Orozco  AGE: 64 y.o. SEX: male [phone]    DATE OF ENCOUNTER: 7/29/2024    Assessment and Plan     Problem List Items Addressed This Visit          Cardiovascular and Mediastinum    Benign essential hypertension     Patient with longstanding history of hypertension will need to continue to monitor closely his blood pressure  parameters during present admission.         Chronic diastolic CHF (congestive heart failure) (HCC)     Wt Readings from Last 3 Encounters:   01/08/24 (!) 137 kg (302 lb)   08/30/23 (!) 139 kg (306 lb 6.4 oz)   07/27/23 135 kg (297 lb 3.2 oz)     Patient with history of chronic diastolic heart failure had been on oral diuretics.               Paroxysmal atrial fibrillation (HCC)     Patient with history of paroxysmal atrial fibrillation will need to continue on oral anticoagulation long-term.            Respiratory    Sleep apnea     Patient with history of sleep apnea will need to continue on CPAP            Endocrine    Type 2 diabetes mellitus without complication, without long-term current use of insulin ([facility])       Lab Results   Component Value Date    HGBA1C 6.2 (H) 08/02/2023   Patient's diabetes under fairly good control last hemoglobin A1c on May 29, 2024 was 7.5.            Nervous and Auditory    Parkinson disease     Patient with history of Parkinson's disease will need to continue on carbidopa levodopa 25 100 takes 1 tablet orally 3 times daily.            Behavioral Health    Depression     With history of depression will need to continue on Wellbutrin 300 mg orally daily.            Surgery/Wound/Pain    Chronic pain of left knee     Patient with recent left knee replacement here for therapy to  improve his ambulation.         History of total left knee replacement - Primary     Patient with recent left knee replacement at [facility] comes to facility for further rehabilitation.            Other    Encounter for medication review     Current medication regimen acetaminophen 500 mg 2 tablets by mouth 3 times a day, atorvastatin 20 mg at bedtime, bupropion hydrochloride extended release 300 mg orally daily, carbidopa levodopa  to take 1 tablet by mouth 3 times a day for his Parkinson's, cefadroxil oral capsule 500 mg every 12 hours for the next 7 days, cholecalciferol 2000 international units orally daily, Eliquis 5 mg orally twice a day, famotidine 40 mg give 40 mg by mouth once daily, gabapentin 300 mg once daily, Jardiance 25 mg orally daily, metformin 500 mg by mouth twice a day, methocarbamol 500 mg every 6 hours as needed for muscle spasms, oxycodone 5 mg 1 tablet by mouth every 4 hours as needed for pain 8 to 1014 days max, oxycodone 5 mg to take every 8-12 hours as needed for moderate to severe pain-4-10 potassium chloride extended release tablet to take 2 tablets by mouth twice daily for hypokalemia sotalol 80 mg 80 mg every 12 hours, spironolactone 25 mg once a day, torsemide 20 mg twice daily every Monday Wednesday and Friday for swelling and torsemide 20 mg 4 tablets by mouth twice daily every Tuesday Thursday Saturday and Sunday for swelling, trazodone 150 mg at bedtime, vraylar 3 mg capsule 1 mouth daily for depression          Recommendations    1.-Patient was on CPAP at home with a pressure of 13 would recommend him getting his CPAP machine from home and using it at the facility.    2.-Would recommend drivers evaluation given his poor visual acuity.    3.-Weight loss management per endocrinology    4.-Continue with current anticoagulation treatment.    All medications and routine orders were reviewed and updated as needed.    Plan discussed with: Patient    Chief Complaint      No chief complaint on file.  Status post left knee replacement    History of Present Illness     Patient is a 64-year-old  male who comes to the facility after having undergone left knee replacement.  Patient has an extensive past medical history of rheumatoid arthritis, history of cardiac pacemaker placement secondary to sick sinus syndrome, chronic diastolic heart failure, recurrent major depressive disorder, paroxysmal atrial fibrillation currently on oral anticoagulation, diabetes mellitus type 2 with most recent hemoglobin A1c of 7.2, history of hypertension, history of parkinsonism, coronary artery disease, mixed hyperlipidemia, history of lymphedema, history of previous gastric surgery, history of GERD, history of obstructive sleep apnea on CPAP, history of COPD.  His diabetes has been fairly well-controlled last hemoglobin A1c that I have here is from May 29, 2024 he was 7.5.  His blood work from July 27, 2024 revealed a sodium of 134 with a GFR of 86 and a glucose of 137.  Last hemoglobin was 12.4 with a hematocrit of 37.5 it is noted that the patient's MCV is 74.  Patient with history of hypertension patient is continuing on torsemide 60 mg daily, spironolactone 25 mg a day and sotalol.  Also noted to have evidence of vitamin D deficiency patient had been replaced on ergocalciferol 50,000 international units once a week by Dr. Lange.  Patient also noted to have evidence of morbid obesity patient's BMI is 44.60.  Patient lives at home with his wife and daughter he complains of poor vision but he still driving.  Patient with history of morbid obesity had a fall approximately 6 months ago at the [facility] the need about 5 people to get him back on his feet.  Patient has history of urinary incontinence does wear depends and occasionally has issues with stool incontinence when he uses laxatives.  Patient also complains of mild hearing loss.  He apparently has been started on  Ozempic by endocrinology for weight management.          HISTORY:  Past Surgical History:   Procedure Laterality Date    CARDIAC CATHETERIZATION  03/2012 1996,2007    CHOLECYSTECTOMY      COLONOSCOPY      COLONOSCOPY N/A 10/30/2017    Procedure: COLONOSCOPY;  Surgeon: Kiran Fulton MD;  Location: [facility];  Service: Gastroenterology    ENDOVENOUS ABLATION SAPHENOUS VEIN W/ LASER      each addit vein    ERCP      ESOPHAGOGASTRODUODENOSCOPY N/A 10/30/2017    Procedure: ESOPHAGOGASTRODUODENOSCOPY (EGD);  Surgeon: Kiran Fulton MD;  Location: [facility];  Service: Gastroenterology    GASTRIC BYPASS      2001    GASTRIC BYPASS      HERNIA REPAIR      paraesophageal hiatus hernia    HERNIA REPAIR      x5 last one 2011    JOINT REPLACEMENT      KNEE ARTHROPLASTY Right     2102    REPLACEMENT TOTAL KNEE Right 02/2011      Past Medical History:   Diagnosis Date    Arthritis     Asthma     Colon polyp     CPAP (continuous positive airway pressure) dependence     Edema     left leg    GERD (gastroesophageal reflux disease)     History of echocardiogram 07/2017    History of Holter monitoring 09/2014    History of stress test 08/2014    Hypertension     Myocardial infarct (HCC)     2011    Myocardial infarction (HCC)     Obesity     Sleep apnea     Thrombophlebitis      Family History   Problem Relation Age of Onset    Uterine cancer Mother     Prostate cancer Father     Diabetes Father     Heart failure Father     Cancer Father     Stroke Family         syndrome    Aneurysm Family         aoritc    Diabetes Brother     Other Brother         amputation-    Diabetes Paternal Grandmother     Diabetes Paternal Grandfather     Cancer Sister      Social History     Socioeconomic History    Marital status: /Civil Union     Spouse name: None    Number of children: 2    Years of education: None    Highest education level: None   Occupational History    None   Tobacco Use    Smoking status: Former     Current packs/day:  1.00     Average packs/day: 1 pack/day for 3.0 years (3.0 ttl pk-yrs)     Types: Cigarettes    Smokeless tobacco: Former    Tobacco comments:     never a smoker per allscripts - as per [place]   Vaping Use    Vaping status: Never Used   Substance and Sexual Activity    Alcohol use: Not Currently     Comment: rarely - denied per allscripts     Drug use: No    Sexual activity: None   Other Topics Concern    None   Social History Narrative    · Do you currently or have you served in the  [employer] [place]   No      · Were you activated, into active duty, as a member of the [employer] or as a Reservist:   No      · Advance directive:   No      · Live alone or with others:   with others      · Caffeine intake:   Occasional      · Guns present in home:   No      · Seat belts used routinely:   Yes      · Presence of domestic violence:   No      · Sunscreen used routinely:   Yes      · Passive smoke exposure:   No      · Are you currently employed:   No      · Asbestos exposure:   No      · TB exposure:   No      · Environmental exposure:   No      · Animal exposure:   Yes      · Blood Transfusion:   No      · Smoke alarm in home:   No      · Hard of hearing or deaf in one or both ears:   No      · Legally blind in one or both eyes:   No      ·      Social Determinants of Health     Financial Resource Strain: Low Risk  (7/22/2024)    Received from [facility]    Overall Financial Resource Strain (CARDIA)     Difficulty of Paying Living Expenses: Not hard at all   Food Insecurity: No Food Insecurity (7/22/2024)    Received from [facility]    Hunger Vital Sign     Worried About Running Out of Food in the Last Year: Never true     Ran Out of Food in the Last Year: Never true   Transportation Needs: No Transportation Needs (7/22/2024)    Received from [facility]    PRAPARE - Transportation     Lack of Transportation (Medical): No     Lack of Transportation (Non-Medical): No    Physical Activity: Not on file   Stress: Not on file   Social Connections: Not on file   Intimate Partner Violence: Patient Declined (7/22/2024)    Received from [facility]    Humiliation, Afraid, Rape, and Kick questionnaire     Fear of Current or Ex-Partner: Patient declined     Emotionally Abused: Patient declined     Physically Abused: Patient declined     Sexually Abused: Patient declined   Housing Stability: Low Risk  (7/22/2024)    Received from [facility]    Housing Stability Vital Sign     Unable to Pay for Housing in the Last Year: No     Number of Times Moved in the Last Year: 0     Homeless in the Last Year: No       Allergies:  Allergies   Allergen Reactions    Gluten Meal - Food Allergy     Adhesive [Medical Tape] Rash       Review of Systems     Review of Systems   Constitutional: Negative.    HENT:  Positive for hearing loss.    Eyes:  Positive for visual disturbance.   Respiratory: Negative.     Cardiovascular:  Positive for leg swelling.   Gastrointestinal:  Positive for constipation.   Endocrine: Negative.    Genitourinary:         History of urinary incontinence   Musculoskeletal:  Positive for arthralgias and gait problem.   Skin: Negative.    Allergic/Immunologic: Negative.    Hematological: Negative.    Psychiatric/Behavioral: Negative.         PHQ-2/9 Depression Screening             Medications and orders       Current Outpatient Medications:     apixaban (Eliquis) 5 mg, Take 1 tablet (5 mg total) by mouth 2 (two) times a day, Disp: 60 tablet, Rfl: 0    atorvastatin (LIPITOR) 20 mg tablet, Take 20 mg by mouth daily, Disp: , Rfl:     buPROPion (WELLBUTRIN XL) 300 mg 24 hr tablet, Take 300 mg by mouth daily, Disp: , Rfl:     calcium carbonate (OYSTER SHELL,OSCAL) 500 mg, Take 1 tablet by mouth in the morning, Disp: , Rfl:     carbidopa-levodopa (SINEMET)  mg per tablet, TAKE 1 TABLET BY MOUTH THREE TIMES DAILY. INCREASE SLOWLY ACCORDING TO SEPARATE  SCHEDULE, Disp: , Rfl:     cholestyramine (QUESTRAN) 4 g packet, Take 1 packet (4 g total) by mouth 3 (three) times a day with meals (Patient not taking: Reported on 7/27/2023), Disp: , Rfl:     gabapentin (NEURONTIN) 100 mg capsule, Take 300 mg by mouth, Disp: , Rfl:     Jardiance 25 MG TABS, 25 mg in the morning, Disp: , Rfl:     magnesium Oxide (MAG-OX) 400 mg TABS, Take 2 tablets (800 mg total) by mouth 2 (two) times a day (Patient not taking: Reported on 7/27/2023), Disp: , Rfl: 0    metFORMIN (GLUMETZA) 500 MG (MOD) 24 hr tablet, Take 500 mg by mouth daily with breakfast, Disp: , Rfl:     metoprolol tartrate (LOPRESSOR) 25 mg tablet, Take 1 tablet (25 mg total) by mouth every 12 (twelve) hours (Patient not taking: Reported on 1/8/2024), Disp: , Rfl: 0    Mirabegron ER 25 MG TB24, Take 25 mg by mouth daily at bedtime (Patient not taking: Reported on 7/27/2023), Disp: 90 tablet, Rfl: 3    Multiple Vitamin (MULTIVITAMIN) tablet, Take 1 tablet by mouth daily (Patient not taking: Reported on 7/27/2023), Disp: , Rfl:     omeprazole (PriLOSEC) 40 MG capsule, Take 1 capsule (40 mg total) by mouth daily, Disp: 90 capsule, Rfl: 3    polyethylene glycol (GLYCOLAX) 17 GM/SCOOP powder, Take 17 g by mouth daily, Disp: 289 g, Rfl: 1    potassium chloride (K-DUR,KLOR-CON) 20 mEq tablet, TAKE TOTAL 2 TABLETS ALONG WITH TORSEMIDE 40 MG DOSE. CAN LOWER TO 1 TABLET IF TORSEMIDE IS LOWERED TO 20 MG TABLET, Disp: , Rfl:     sildenafil (VIAGRA) 100 mg tablet, Take 100 mg by mouth daily as needed, Disp: , Rfl:     SOTALOL HCL, AF, PO, Take by mouth in the morning, Disp: , Rfl:     spironolactone (ALDACTONE) 25 mg tablet, Take 25 mg by mouth daily, Disp: , Rfl:     torsemide (DEMADEX) 20 mg tablet, Take 60 mg by mouth in the morning, Disp: , Rfl:     traZODone (DESYREL) 150 mg tablet, Take 150 mg by mouth daily at bedtime, Disp: , Rfl:     Vraylar 3 MG capsule, Take 3 mg by mouth daily, Disp: , Rfl:        Objective     Vitals:  "  Vitals:    07/29/24 1420   BP: 129/80   Pulse: 63   Resp: 18   Temp: 97.6 °F (36.4 °C)   SpO2: 96%   Weight: (!) 147 kg (323 lb)   Height: 5' 2\" (1.575 m)       Physical Exam  Constitutional:       Appearance: He is obese.   HENT:      Head: Normocephalic and atraumatic.      Right Ear: External ear normal.      Left Ear: External ear normal.      Nose: Nose normal.      Mouth/Throat:      Mouth: Mucous membranes are dry.   Eyes:      Conjunctiva/sclera: Conjunctivae normal.      Pupils: Pupils are equal, round, and reactive to light.   Cardiovascular:      Rate and Rhythm: Normal rate. Rhythm irregular.      Pulses: Normal pulses.      Heart sounds: Normal heart sounds.   Pulmonary:      Effort: Pulmonary effort is normal.      Breath sounds: Normal breath sounds.   Abdominal:      General: Bowel sounds are normal.      Palpations: Abdomen is soft.   Musculoskeletal:         General: Normal range of motion.      Cervical back: Neck supple.   Skin:     General: Skin is dry.   Neurological:      Mental Status: He is oriented to person, place, and time. Mental status is at baseline.   Psychiatric:         Mood and Affect: Mood normal.         Behavior: Behavior normal.         Thought Content: Thought content normal.         Judgment: Judgment normal.         Pertinent Laboratory/Diagnostic Studies:   The following labs/studies were reviewed please see facility chart for details.              "

## 2024-07-29 NOTE — ASSESSMENT & PLAN NOTE
Wt Readings from Last 3 Encounters:   01/08/24 (!) 137 kg (302 lb)   08/30/23 (!) 139 kg (306 lb 6.4 oz)   07/27/23 135 kg (297 lb 3.2 oz)     Patient with history of chronic diastolic heart failure had been on oral diuretics.

## 2024-07-29 NOTE — ASSESSMENT & PLAN NOTE
Current medication regimen acetaminophen 500 mg 2 tablets by mouth 3 times a day, atorvastatin 20 mg at bedtime, bupropion hydrochloride extended release 300 mg orally daily, carbidopa levodopa  to take 1 tablet by mouth 3 times a day for his Parkinson's, cefadroxil oral capsule 500 mg every 12 hours for the next 7 days, cholecalciferol 2000 international units orally daily, Eliquis 5 mg orally twice a day, famotidine 40 mg give 40 mg by mouth once daily, gabapentin 300 mg once daily, Jardiance 25 mg orally daily, metformin 500 mg by mouth twice a day, methocarbamol 500 mg every 6 hours as needed for muscle spasms, oxycodone 5 mg 1 tablet by mouth every 4 hours as needed for pain 8 to 1014 days max, oxycodone 5 mg to take every 8-12 hours as needed for moderate to severe pain-4-10 potassium chloride extended release tablet to take 2 tablets by mouth twice daily for hypokalemia sotalol 80 mg 80 mg every 12 hours, spironolactone 25 mg once a day, torsemide 20 mg twice daily every Monday Wednesday and Friday for swelling and torsemide 20 mg 4 tablets by mouth twice daily every Tuesday Thursday Saturday and Sunday for swelling, trazodone 150 mg at bedtime, vraylar 3 mg capsule 1 mouth daily for depression

## 2024-07-29 NOTE — ASSESSMENT & PLAN NOTE
Lab Results   Component Value Date    HGBA1C 6.2 (H) 08/02/2023   Patient's diabetes under fairly good control last hemoglobin A1c on May 29, 2024 was 7.5.

## 2024-07-29 NOTE — ASSESSMENT & PLAN NOTE
Patient with history of paroxysmal atrial fibrillation will need to continue on oral anticoagulation long-term.

## 2024-07-29 NOTE — ASSESSMENT & PLAN NOTE
Patient with longstanding history of hypertension will need to continue to monitor closely his blood pressure  parameters during present admission.

## 2024-07-31 ENCOUNTER — NURSING HOME VISIT (OUTPATIENT)
Dept: GERIATRICS | Facility: OTHER | Age: 64
End: 2024-07-31
Payer: MEDICARE

## 2024-07-31 VITALS
WEIGHT: 315 LBS | DIASTOLIC BLOOD PRESSURE: 80 MMHG | RESPIRATION RATE: 18 BRPM | BODY MASS INDEX: 59.08 KG/M2 | SYSTOLIC BLOOD PRESSURE: 129 MMHG | TEMPERATURE: 97 F | OXYGEN SATURATION: 96 % | HEART RATE: 63 BPM

## 2024-07-31 DIAGNOSIS — I50.32 CHRONIC DIASTOLIC CHF (CONGESTIVE HEART FAILURE) (HCC): ICD-10-CM

## 2024-07-31 DIAGNOSIS — I48.0 PAROXYSMAL ATRIAL FIBRILLATION (HCC): ICD-10-CM

## 2024-07-31 DIAGNOSIS — Z96.652 HISTORY OF TOTAL LEFT KNEE REPLACEMENT: Primary | ICD-10-CM

## 2024-07-31 DIAGNOSIS — G47.33 OBSTRUCTIVE SLEEP APNEA SYNDROME: ICD-10-CM

## 2024-07-31 DIAGNOSIS — F32.A DEPRESSION, UNSPECIFIED DEPRESSION TYPE: ICD-10-CM

## 2024-07-31 DIAGNOSIS — E11.9 TYPE 2 DIABETES MELLITUS WITHOUT COMPLICATION, WITHOUT LONG-TERM CURRENT USE OF INSULIN (HCC): ICD-10-CM

## 2024-07-31 DIAGNOSIS — G20.A1 PARKINSON'S DISEASE, UNSPECIFIED WHETHER DYSKINESIA PRESENT, UNSPECIFIED WHETHER MANIFESTATIONS FLUCTUATE: ICD-10-CM

## 2024-07-31 PROCEDURE — 99309 SBSQ NF CARE MODERATE MDM 30: CPT | Performed by: NURSE PRACTITIONER

## 2024-07-31 NOTE — PROGRESS NOTES
[facility]  [address] [address]  [phone]  FACILITY: [facility]  Code 31 (STR)      NAME: Evert Orozco  AGE: 64 y.o. SEX: male CODE STATUS: CPR    DATE OF ENCOUNTER: 7/31/2024    Assessment and Plan     1. History of total left knee replacement  Assessment & Plan:  Left TKA done at [facility]  Continue PT/OT---> WBAT  OP f/u with Ottho  Multimodal pain regimen   2. Depression, unspecified depression type  Assessment & Plan:  Stable  Denies SI/HI  Continue home dose Wellbutrin 300 mg Daily   Continue Vraylar 3 mg daily   3. Parkinson's disease, unspecified whether dyskinesia present, unspecified whether manifestations fluctuate  Assessment & Plan:  Stable   Continue carbidopa levodopa - 1 tab TID  4. Type 2 diabetes mellitus without complication, without long-term current use of insulin (HCC)  Assessment & Plan:    Lab Results   Component Value Date    HGBA1C 6.2 (H) 08/02/2023     Good A1c control   Continue Jardiance   Continue Metformin   Accu checks   Hypoglycemic protocol   5. Obstructive sleep apnea syndrome  Assessment & Plan:  Continue Cpap   6. Paroxysmal atrial fibrillation (HCC)  Assessment & Plan:  HR irregular but controlled   Continue Sotolol   Continue Eliquis   7. Chronic diastolic CHF (congestive heart failure) (HCC)  Assessment & Plan:  Wt Readings from Last 3 Encounters:   07/31/24 (!) 147 kg (323 lb)   07/29/24 (!) 147 kg (323 lb)   01/08/24 (!) 137 kg (302 lb)       Also morbidly obese with chronic edema   Continue Torsemide  Daily Weights            All medications and routine orders were reviewed and updated as needed.    Chief Complaint     STR follow up visit    Past Medical and Surgica History      Past Medical History:   Diagnosis Date    Arthritis     Asthma     Colon polyp     CPAP (continuous positive airway pressure) dependence     Edema     left leg    GERD (gastroesophageal reflux disease)     History of echocardiogram 07/2017    History  of Holter monitoring 09/2014    History of stress test 08/2014    Hypertension     Myocardial infarct (HCC)     2011    Myocardial infarction (HCC)     Obesity     Sleep apnea     Thrombophlebitis      Past Surgical History:   Procedure Laterality Date    CARDIAC CATHETERIZATION  03/2012 1996,2007    CHOLECYSTECTOMY      COLONOSCOPY      COLONOSCOPY N/A 10/30/2017    Procedure: COLONOSCOPY;  Surgeon: Kiran Fulton MD;  Location: [facility];  Service: Gastroenterology    ENDOVENOUS ABLATION SAPHENOUS VEIN W/ LASER      each addit vein    ERCP      ESOPHAGOGASTRODUODENOSCOPY N/A 10/30/2017    Procedure: ESOPHAGOGASTRODUODENOSCOPY (EGD);  Surgeon: Kiran Fulton MD;  Location: [facility];  Service: Gastroenterology    GASTRIC BYPASS      2001    GASTRIC BYPASS      HERNIA REPAIR      paraesophageal hiatus hernia    HERNIA REPAIR      x5 last one 2011    JOINT REPLACEMENT      KNEE ARTHROPLASTY Right     2102    REPLACEMENT TOTAL KNEE Right 02/2011     Allergies   Allergen Reactions    Gluten Meal - Food Allergy     Adhesive [Medical Tape] Rash          History of Present Illness     Evert Orozco is a 64 y.o. male admitted to [facility] following hospital stay for Left TKA. Patient has a past medical Hx including but not limited to Obesity, JOSIAH, CHF, Depression, DM, Parkinson's disease, OA of Left knee, rheumatoid arthritis, cardiac pacemaker s/p sick sinus syndrome, atrial fibrillation. Patient is seen in collaboration with nursing for medical mgmt and STR follow up.     Patient initially presented to hospital for left knee replacement.  Patient did well and was recommended discharge to short-term rehab.    Seen and examined at bedside today. Patient is a reliable historian. He offers no complaints, States he has been working with therapy. He is requesting to get back in bed. He denies CP/SOB/N/V/D. Denies lightheadedness, dizziness, headaches, vision changes. Patient states he is eating  well and staying hydrated. Denies any bowel or bladder issues. Patient is actively participating in therapy. No concerns from nursing at this time. Patient lives at home with his wife and daughter in a [place] home.               The patient's allergies, past medical, surgical, social and family history were reviewed and unchanged.    Review of Systems     Review of Systems   Constitutional:  Positive for activity change.   Musculoskeletal:  Positive for arthralgias, gait problem and joint swelling.   Neurological:  Positive for weakness.   All other systems reviewed and are negative.        Objective     Vitals:   Vitals:    07/31/24 1559   BP: 129/80   Pulse: 63   Resp: 18   Temp: (!) 97 °F (36.1 °C)   SpO2: 96%         Physical Exam  Vitals and nursing note reviewed.   Constitutional:       General: He is not in acute distress.     Appearance: Normal appearance. He is obese.   HENT:      Head: Normocephalic and atraumatic.      Nose: No congestion or rhinorrhea.      Mouth/Throat:      Mouth: Mucous membranes are moist.   Eyes:      General: No scleral icterus.     Extraocular Movements: Extraocular movements intact.      Conjunctiva/sclera: Conjunctivae normal.      Pupils: Pupils are equal, round, and reactive to light.   Cardiovascular:      Rate and Rhythm: Normal rate. Rhythm irregular.      Pulses: Normal pulses.      Heart sounds: Normal heart sounds. No murmur heard.  Pulmonary:      Effort: Pulmonary effort is normal.      Breath sounds: Normal breath sounds. No wheezing, rhonchi or rales.   Abdominal:      General: Bowel sounds are normal. There is no distension.      Palpations: Abdomen is soft.      Tenderness: There is no abdominal tenderness.   Musculoskeletal:         General: No swelling or signs of injury.      Left lower leg: Edema present.   Lymphadenopathy:      Cervical: No cervical adenopathy.   Skin:     General: Skin is warm and dry.      Findings: No erythema.   Neurological:      Mental  Status: He is alert and oriented to person, place, and time.      Sensory: No sensory deficit.      Motor: Weakness present.      Gait: Gait abnormal.   Psychiatric:         Mood and Affect: Mood normal.         Behavior: Behavior normal.         Pertinent Laboratory/Diagnostic Studies:     Reviewed in facility chart      Current Medications   Medications reviewed and updated see facility MAR for details.      Current Outpatient Medications:     apixaban (Eliquis) 5 mg, Take 1 tablet (5 mg total) by mouth 2 (two) times a day, Disp: 60 tablet, Rfl: 0    atorvastatin (LIPITOR) 20 mg tablet, Take 20 mg by mouth daily, Disp: , Rfl:     buPROPion (WELLBUTRIN XL) 300 mg 24 hr tablet, Take 300 mg by mouth daily, Disp: , Rfl:     calcium carbonate (OYSTER SHELL,OSCAL) 500 mg, Take 1 tablet by mouth in the morning, Disp: , Rfl:     carbidopa-levodopa (SINEMET)  mg per tablet, TAKE 1 TABLET BY MOUTH THREE TIMES DAILY. INCREASE SLOWLY ACCORDING TO SEPARATE SCHEDULE, Disp: , Rfl:     cholestyramine (QUESTRAN) 4 g packet, Take 1 packet (4 g total) by mouth 3 (three) times a day with meals (Patient not taking: Reported on 7/27/2023), Disp: , Rfl:     gabapentin (NEURONTIN) 100 mg capsule, Take 300 mg by mouth, Disp: , Rfl:     Jardiance 25 MG TABS, 25 mg in the morning, Disp: , Rfl:     magnesium Oxide (MAG-OX) 400 mg TABS, Take 2 tablets (800 mg total) by mouth 2 (two) times a day (Patient not taking: Reported on 7/27/2023), Disp: , Rfl: 0    metFORMIN (GLUMETZA) 500 MG (MOD) 24 hr tablet, Take 500 mg by mouth daily with breakfast, Disp: , Rfl:     metoprolol tartrate (LOPRESSOR) 25 mg tablet, Take 1 tablet (25 mg total) by mouth every 12 (twelve) hours (Patient not taking: Reported on 1/8/2024), Disp: , Rfl: 0    Mirabegron ER 25 MG TB24, Take 25 mg by mouth daily at bedtime (Patient not taking: Reported on 7/27/2023), Disp: 90 tablet, Rfl: 3    Multiple Vitamin (MULTIVITAMIN) tablet, Take 1 tablet by mouth daily (Patient  "not taking: Reported on 7/27/2023), Disp: , Rfl:     omeprazole (PriLOSEC) 40 MG capsule, Take 1 capsule (40 mg total) by mouth daily, Disp: 90 capsule, Rfl: 3    polyethylene glycol (GLYCOLAX) 17 GM/SCOOP powder, Take 17 g by mouth daily, Disp: 289 g, Rfl: 1    potassium chloride (K-DUR,KLOR-CON) 20 mEq tablet, TAKE TOTAL 2 TABLETS ALONG WITH TORSEMIDE 40 MG DOSE. CAN LOWER TO 1 TABLET IF TORSEMIDE IS LOWERED TO 20 MG TABLET, Disp: , Rfl:     sildenafil (VIAGRA) 100 mg tablet, Take 100 mg by mouth daily as needed, Disp: , Rfl:     SOTALOL HCL, AF, PO, Take by mouth in the morning, Disp: , Rfl:     spironolactone (ALDACTONE) 25 mg tablet, Take 25 mg by mouth daily, Disp: , Rfl:     torsemide (DEMADEX) 20 mg tablet, Take 60 mg by mouth in the morning, Disp: , Rfl:     traZODone (DESYREL) 150 mg tablet, Take 150 mg by mouth daily at bedtime, Disp: , Rfl:     Vraylar 3 MG capsule, Take 3 mg by mouth daily, Disp: , Rfl:      Please note:  Voice-recognition software may have been used in the preparation of this document.  Occasional wrong word or \"sound-alike\" substitutions may have occurred due to the inherent limitations of voice recognition software.  Interpretation should be guided by context.         SELVIN Ziegler  7/31/2024  4:00 PM    "

## 2024-08-07 ENCOUNTER — NURSING HOME VISIT (OUTPATIENT)
Dept: GERIATRICS | Facility: OTHER | Age: 64
End: 2024-08-07
Payer: MEDICARE

## 2024-08-07 DIAGNOSIS — G20.A1 PARKINSON'S DISEASE, UNSPECIFIED WHETHER DYSKINESIA PRESENT, UNSPECIFIED WHETHER MANIFESTATIONS FLUCTUATE: ICD-10-CM

## 2024-08-07 DIAGNOSIS — E11.9 TYPE 2 DIABETES MELLITUS WITHOUT COMPLICATION, WITHOUT LONG-TERM CURRENT USE OF INSULIN (HCC): ICD-10-CM

## 2024-08-07 DIAGNOSIS — I48.0 PAROXYSMAL ATRIAL FIBRILLATION (HCC): ICD-10-CM

## 2024-08-07 DIAGNOSIS — F32.A DEPRESSION, UNSPECIFIED DEPRESSION TYPE: ICD-10-CM

## 2024-08-07 DIAGNOSIS — I50.32 CHRONIC DIASTOLIC CHF (CONGESTIVE HEART FAILURE) (HCC): ICD-10-CM

## 2024-08-07 DIAGNOSIS — Z96.652 HISTORY OF TOTAL LEFT KNEE REPLACEMENT: Primary | ICD-10-CM

## 2024-08-07 PROCEDURE — 99309 SBSQ NF CARE MODERATE MDM 30: CPT | Performed by: NURSE PRACTITIONER

## 2024-08-07 NOTE — PROGRESS NOTES
[facility]  [address] [address]  [phone]  FACILITY: [facility]  Code 31 (STR)        NAME: Evert Orozco  AGE: 64 y.o. SEX: male CODE STATUS: CPR    DATE OF ENCOUNTER: 8/7/2024    Assessment and Plan     1. History of total left knee replacement  Assessment & Plan:  Left TKA done at [facility]  Continue PT/OT---> WBAT  OP f/u with Ottho  Multimodal pain regimen   He is ambulated with RW with assist x 1 at this time  2. Chronic diastolic CHF (congestive heart failure) (HCC)  Assessment & Plan:  Wt Readings from Last 3 Encounters:   07/31/24 (!) 147 kg (323 lb)   07/29/24 (!) 147 kg (323 lb)   01/08/24 (!) 137 kg (302 lb)       Also morbidly obese with chronic edema   Continue Torsemide  Daily Weights       3. Paroxysmal atrial fibrillation (HCC)  Assessment & Plan:  HR irregular but controlled   Continue Sotolol   Continue Eliquis   4. Type 2 diabetes mellitus without complication, without long-term current use of insulin (formerly [facility])  Assessment & Plan:    Lab Results   Component Value Date    HGBA1C 6.2 (H) 08/02/2023     Good A1c control   Continue Jardiance   Continue Metformin   Accu checks   Hypoglycemic protocol   5. Parkinson's disease, unspecified whether dyskinesia present, unspecified whether manifestations fluctuate  Assessment & Plan:  Stable   Continue carbidopa levodopa - 1 tab TID  6. Depression, unspecified depression type  Assessment & Plan:  Stable  Denies SI/HI  Continue home dose Wellbutrin 300 mg Daily   Continue Vraylar 3 mg daily        All medications and routine orders were reviewed and updated as needed.    Chief Complaint     STR follow up visit    Past Medical and Surgica History      Past Medical History:   Diagnosis Date    Arthritis     Asthma     Colon polyp     CPAP (continuous positive airway pressure) dependence     Edema     left leg    GERD (gastroesophageal reflux disease)     History of echocardiogram 07/2017    History of Holter  monitoring 09/2014    History of stress test 08/2014    Hypertension     Myocardial infarct (HCC)     2011    Myocardial infarction (HCC)     Obesity     Sleep apnea     Thrombophlebitis      Past Surgical History:   Procedure Laterality Date    CARDIAC CATHETERIZATION  03/2012    1996,2007    CHOLECYSTECTOMY      COLONOSCOPY      COLONOSCOPY N/A 10/30/2017    Procedure: COLONOSCOPY;  Surgeon: Kiran Fulton MD;  Location: [facility];  Service: Gastroenterology    ENDOVENOUS ABLATION SAPHENOUS VEIN W/ LASER      each addit vein    ERCP      ESOPHAGOGASTRODUODENOSCOPY N/A 10/30/2017    Procedure: ESOPHAGOGASTRODUODENOSCOPY (EGD);  Surgeon: Kiran Fulton MD;  Location: [facility];  Service: Gastroenterology    GASTRIC BYPASS      2001    GASTRIC BYPASS      HERNIA REPAIR      paraesophageal hiatus hernia    HERNIA REPAIR      x5 last one 2011    JOINT REPLACEMENT      KNEE ARTHROPLASTY Right     2102    REPLACEMENT TOTAL KNEE Right 02/2011     Allergies   Allergen Reactions    Gluten Meal - Food Allergy     Adhesive [Medical Tape] Rash          History of Present Illness     Evert Orozco is a 64 y.o. male admitted to [facility] following hospital stay for Left TKA. Patient has a past medical Hx including but not limited to Obesity, JOSIAH, CHF, Depression, DM, Parkinson's disease, OA of Left knee, rheumatoid arthritis, cardiac pacemaker s/p sick sinus syndrome, atrial fibrillation. Patient is seen in collaboration with nursing for medical mgmt and STR follow up.     Patient initially presented to hospital for left knee replacement.  Patient did well and was recommended discharge to short-term rehab.    Seen and examined at bedside today. Patient is a reliable historian. He is sleepy soundly, difficult to wake, staff states he is a heavy sleeper. Patient offers no complaints, States he has been working with therapy. He denies CP/SOB/N/V/D. Denies lightheadedness, dizziness, headaches, vision  changes. Patient states he is eating well and staying hydrated. Denies any bowel or bladder issues. Patient is actively participating in therapy. No concerns from nursing at this time. Patient lives at home with his wife and daughter [place] a [place] home.             The patient's allergies, past medical, surgical, social and family history were reviewed and unchanged.    Review of Systems     Review of Systems   All other systems reviewed and are negative.        Objective     Vitals:   Vitals:    08/07/24 2251   BP: 129/80   Pulse: 63   Resp: 16   Temp: (!) 97 °F (36.1 °C)   SpO2: 96%           Physical Exam  Vitals and nursing note reviewed.   Constitutional:       General: He is not in acute distress.     Appearance: Normal appearance. He is obese.   HENT:      Head: Normocephalic and atraumatic.      Nose: No congestion or rhinorrhea.      Mouth/Throat:      Mouth: Mucous membranes are moist.   Eyes:      General: No scleral icterus.     Extraocular Movements: Extraocular movements intact.      Conjunctiva/sclera: Conjunctivae normal.      Pupils: Pupils are equal, round, and reactive to light.   Cardiovascular:      Rate and Rhythm: Normal rate. Rhythm irregular.      Pulses: Normal pulses.      Heart sounds: Normal heart sounds. No murmur heard.  Pulmonary:      Effort: Pulmonary effort is normal.      Breath sounds: Normal breath sounds. No wheezing, rhonchi or rales.   Abdominal:      General: Bowel sounds are normal. There is no distension.      Palpations: Abdomen is soft.      Tenderness: There is no abdominal tenderness.   Musculoskeletal:         General: No swelling or signs of injury.      Left lower leg: Edema present.   Lymphadenopathy:      Cervical: No cervical adenopathy.   Skin:     General: Skin is warm and dry.      Findings: No erythema.   Neurological:      Mental Status: He is alert and oriented to person, place, and time.      Sensory: No sensory deficit.      Motor: Weakness present.       Gait: Gait abnormal.   Psychiatric:         Mood and Affect: Mood normal.         Behavior: Behavior normal.         Pertinent Laboratory/Diagnostic Studies:     Reviewed in facility chart      Current Medications   Medications reviewed and updated see facility MAR for details.      Current Outpatient Medications:     apixaban (Eliquis) 5 mg, Take 1 tablet (5 mg total) by mouth 2 (two) times a day, Disp: 60 tablet, Rfl: 0    atorvastatin (LIPITOR) 20 mg tablet, Take 20 mg by mouth daily, Disp: , Rfl:     buPROPion (WELLBUTRIN XL) 300 mg 24 hr tablet, Take 300 mg by mouth daily, Disp: , Rfl:     calcium carbonate (OYSTER SHELL,OSCAL) 500 mg, Take 1 tablet by mouth in the morning, Disp: , Rfl:     carbidopa-levodopa (SINEMET)  mg per tablet, TAKE 1 TABLET BY MOUTH THREE TIMES DAILY. INCREASE SLOWLY ACCORDING TO SEPARATE SCHEDULE, Disp: , Rfl:     cholestyramine (QUESTRAN) 4 g packet, Take 1 packet (4 g total) by mouth 3 (three) times a day with meals (Patient not taking: Reported on 7/27/2023), Disp: , Rfl:     gabapentin (NEURONTIN) 100 mg capsule, Take 300 mg by mouth, Disp: , Rfl:     Jardiance 25 MG TABS, 25 mg in the morning, Disp: , Rfl:     magnesium Oxide (MAG-OX) 400 mg TABS, Take 2 tablets (800 mg total) by mouth 2 (two) times a day (Patient not taking: Reported on 7/27/2023), Disp: , Rfl: 0    metFORMIN (GLUMETZA) 500 MG (MOD) 24 hr tablet, Take 500 mg by mouth daily with breakfast, Disp: , Rfl:     metoprolol tartrate (LOPRESSOR) 25 mg tablet, Take 1 tablet (25 mg total) by mouth every 12 (twelve) hours (Patient not taking: Reported on 1/8/2024), Disp: , Rfl: 0    Mirabegron ER 25 MG TB24, Take 25 mg by mouth daily at bedtime (Patient not taking: Reported on 7/27/2023), Disp: 90 tablet, Rfl: 3    Multiple Vitamin (MULTIVITAMIN) tablet, Take 1 tablet by mouth daily (Patient not taking: Reported on 7/27/2023), Disp: , Rfl:     omeprazole (PriLOSEC) 40 MG capsule, Take 1 capsule (40 mg total) by mouth  "daily, Disp: 90 capsule, Rfl: 3    polyethylene glycol (GLYCOLAX) 17 GM/SCOOP powder, Take 17 g by mouth daily, Disp: 289 g, Rfl: 1    potassium chloride (K-DUR,KLOR-CON) 20 mEq tablet, TAKE TOTAL 2 TABLETS ALONG WITH TORSEMIDE 40 MG DOSE. CAN LOWER TO 1 TABLET IF TORSEMIDE IS LOWERED TO 20 MG TABLET, Disp: , Rfl:     sildenafil (VIAGRA) 100 mg tablet, Take 100 mg by mouth daily as needed, Disp: , Rfl:     SOTALOL HCL, AF, PO, Take by mouth in the morning, Disp: , Rfl:     spironolactone (ALDACTONE) 25 mg tablet, Take 25 mg by mouth daily, Disp: , Rfl:     torsemide (DEMADEX) 20 mg tablet, Take 60 mg by mouth in the morning, Disp: , Rfl:     traZODone (DESYREL) 150 mg tablet, Take 150 mg by mouth daily at bedtime, Disp: , Rfl:     Vraylar 3 MG capsule, Take 3 mg by mouth daily, Disp: , Rfl:      Please note:  Voice-recognition software may have been used in the preparation of this document.  Occasional wrong word or \"sound-alike\" substitutions may have occurred due to the inherent limitations of voice recognition software.  Interpretation should be guided by context.         SELVIN Ziegler  8/7/2024  8:21 AM    "

## 2024-08-13 VITALS
OXYGEN SATURATION: 96 % | RESPIRATION RATE: 16 BRPM | TEMPERATURE: 97 F | SYSTOLIC BLOOD PRESSURE: 129 MMHG | DIASTOLIC BLOOD PRESSURE: 80 MMHG | HEART RATE: 63 BPM

## 2024-08-13 NOTE — ASSESSMENT & PLAN NOTE
Wt Readings from Last 3 Encounters:   07/31/24 (!) 147 kg (323 lb)   07/29/24 (!) 147 kg (323 lb)   01/08/24 (!) 137 kg (302 lb)       Also morbidly obese with chronic edema   Continue Torsemide  Daily Weights

## 2024-08-13 NOTE — ASSESSMENT & PLAN NOTE
Left TKA done at [facility]  Continue PT/OT---> WBAT  OP f/u with Ottho  Multimodal pain regimen

## 2024-08-13 NOTE — ASSESSMENT & PLAN NOTE
Lab Results   Component Value Date    HGBA1C 6.2 (H) 08/02/2023     Good A1c control   Continue Jardiance   Continue Metformin   Accu checks   Hypoglycemic protocol

## 2024-08-14 NOTE — ASSESSMENT & PLAN NOTE
Left TKA done at [facility]  Continue PT/OT---> WBAT  OP f/u with Ottho  Multimodal pain regimen   He is ambulated with RW with assist x 1 at this time

## 2024-08-20 ENCOUNTER — NURSING HOME VISIT (OUTPATIENT)
Dept: GERIATRICS | Facility: OTHER | Age: 64
End: 2024-08-20
Payer: MEDICARE

## 2024-08-20 VITALS
HEART RATE: 63 BPM | WEIGHT: 291.8 LBS | TEMPERATURE: 97 F | OXYGEN SATURATION: 96 % | SYSTOLIC BLOOD PRESSURE: 129 MMHG | BODY MASS INDEX: 53.37 KG/M2 | RESPIRATION RATE: 18 BRPM | DIASTOLIC BLOOD PRESSURE: 80 MMHG

## 2024-08-20 DIAGNOSIS — F32.A DEPRESSION, UNSPECIFIED DEPRESSION TYPE: ICD-10-CM

## 2024-08-20 DIAGNOSIS — G47.33 OBSTRUCTIVE SLEEP APNEA SYNDROME: ICD-10-CM

## 2024-08-20 DIAGNOSIS — E11.9 TYPE 2 DIABETES MELLITUS WITHOUT COMPLICATION, WITHOUT LONG-TERM CURRENT USE OF INSULIN (HCC): ICD-10-CM

## 2024-08-20 DIAGNOSIS — I48.0 PAROXYSMAL ATRIAL FIBRILLATION (HCC): ICD-10-CM

## 2024-08-20 DIAGNOSIS — Z96.652 HISTORY OF TOTAL LEFT KNEE REPLACEMENT: ICD-10-CM

## 2024-08-20 DIAGNOSIS — I50.32 CHRONIC DIASTOLIC CHF (CONGESTIVE HEART FAILURE) (HCC): Primary | ICD-10-CM

## 2024-08-20 DIAGNOSIS — G20.A1 PARKINSON'S DISEASE, UNSPECIFIED WHETHER DYSKINESIA PRESENT, UNSPECIFIED WHETHER MANIFESTATIONS FLUCTUATE: ICD-10-CM

## 2024-08-20 PROCEDURE — 99309 SBSQ NF CARE MODERATE MDM 30: CPT | Performed by: NURSE PRACTITIONER

## 2024-08-20 NOTE — PROGRESS NOTES
[facility]  [address] [address]  [phone]  FACILITY: [facility]  Code 31 (STR)        NAME: Evert Orozco  AGE: 64 y.o. SEX: male CODE STATUS: CPR    DATE OF ENCOUNTER: 8/20/2024    Assessment and Plan     1. Chronic diastolic CHF (congestive heart failure) (HCC)  Assessment & Plan:  Wt Readings from Last 3 Encounters:   08/20/24 132 kg (291 lb 12.8 oz)   07/31/24 (!) 147 kg (323 lb)   07/29/24 (!) 147 kg (323 lb)       Also morbidly obese with chronic edema   Continue Torsemide  Daily Weights       2. Paroxysmal atrial fibrillation (HCC)  Assessment & Plan:  HR irregular but controlled   Continue Sotolol   Continue Eliquis   3. Obstructive sleep apnea syndrome  Assessment & Plan:  Continue Cpap   4. Type 2 diabetes mellitus without complication, without long-term current use of insulin ([facility])  Assessment & Plan:    Lab Results   Component Value Date    HGBA1C 6.2 (H) 08/02/2023     Good A1c control   Continue Jardiance   Continue Metformin   Accu checks   Hypoglycemic protocol   5. Parkinson's disease, unspecified whether dyskinesia present, unspecified whether manifestations fluctuate  Assessment & Plan:  Stable   Continue carbidopa levodopa - 1 tab TID  6. Depression, unspecified depression type  Assessment & Plan:  Stable  Denies SI/HI  Continue home dose Wellbutrin 300 mg Daily   Continue Vraylar 3 mg daily   7. History of total left knee replacement  Assessment & Plan:  Left TKA done at [facility]  Continue PT/OT---> WBAT  OP f/u with Ottho  Multimodal pain regimen   He is ambulated with RW with assist x 1 at this time       All medications and routine orders were reviewed and updated as needed.    Chief Complaint     STR follow up visit    Past Medical and Surgica History      Past Medical History:   Diagnosis Date    Arthritis     Asthma     Colon polyp     CPAP (continuous positive airway pressure) dependence     Edema     left leg    GERD (gastroesophageal  reflux disease)     History of echocardiogram 07/2017    History of Holter monitoring 09/2014    History of stress test 08/2014    Hypertension     Myocardial infarct (HCC)     2011    Myocardial infarction (HCC)     Obesity     Sleep apnea     Thrombophlebitis      Past Surgical History:   Procedure Laterality Date    CARDIAC CATHETERIZATION  03/2012 1996,2007    CHOLECYSTECTOMY      COLONOSCOPY      COLONOSCOPY N/A 10/30/2017    Procedure: COLONOSCOPY;  Surgeon: Kiran Fulton MD;  Location: [facility];  Service: Gastroenterology    ENDOVENOUS ABLATION SAPHENOUS VEIN W/ LASER      each addit vein    ERCP      ESOPHAGOGASTRODUODENOSCOPY N/A 10/30/2017    Procedure: ESOPHAGOGASTRODUODENOSCOPY (EGD);  Surgeon: Kiran Fulton MD;  Location: [facility];  Service: Gastroenterology    GASTRIC BYPASS      2001    GASTRIC BYPASS      HERNIA REPAIR      paraesophageal hiatus hernia    HERNIA REPAIR      x5 last one 2011    JOINT REPLACEMENT      KNEE ARTHROPLASTY Right     2102    REPLACEMENT TOTAL KNEE Right 02/2011     Allergies   Allergen Reactions    Gluten Meal - Food Allergy     Adhesive [Medical Tape] Rash          History of Present Illness     Evert Orozco is a 64 y.o. male admitted to [facility] following hospital stay for Left TKA. Patient has a past medical Hx including but not limited to Obesity, JOSIAH, CHF, Depression, DM, Parkinson's disease, OA of Left knee, rheumatoid arthritis, cardiac pacemaker s/p sick sinus syndrome, atrial fibrillation. Patient is seen in collaboration with nursing for medical mgmt and STR follow up.     Patient initially presented to hospital for left knee replacement.  Patient did well and was recommended discharge to short-term rehab.    Seen and examined at bedside today. Patient is a reliable historian. He is sleepy soundly, difficult to wake, staff states he is a heavy sleeper. Patient offers no complaints, States he has been working with therapy. He  denies CP/SOB/N/V/D. Denies lightheadedness, dizziness, headaches, vision changes. Patient states he is eating well and staying hydrated. Denies any bowel or bladder issues. Patient is actively participating in therapy. No concerns from nursing at this time. Patient lives at home with his wife and daughter in a ranch home.             The patient's allergies, past medical, surgical, social and family history were reviewed and unchanged.    Review of Systems     Review of Systems   All other systems reviewed and are negative.        Objective     Vitals:   Vitals:    08/20/24 1315   BP: 129/80   Pulse: 63   Resp: 18   Temp: (!) 97 °F (36.1 °C)   SpO2: 96%           Physical Exam  Vitals and nursing note reviewed.   Constitutional:       General: He is not in acute distress.     Appearance: Normal appearance. He is obese.   HENT:      Head: Normocephalic and atraumatic.      Nose: No congestion or rhinorrhea.      Mouth/Throat:      Mouth: Mucous membranes are moist.   Eyes:      General: No scleral icterus.     Extraocular Movements: Extraocular movements intact.      Conjunctiva/sclera: Conjunctivae normal.      Pupils: Pupils are equal, round, and reactive to light.   Cardiovascular:      Rate and Rhythm: Normal rate. Rhythm irregular.      Pulses: Normal pulses.      Heart sounds: Normal heart sounds. No murmur heard.  Pulmonary:      Effort: Pulmonary effort is normal.      Breath sounds: Normal breath sounds. No wheezing, rhonchi or rales.   Abdominal:      General: Bowel sounds are normal. There is no distension.      Palpations: Abdomen is soft.      Tenderness: There is no abdominal tenderness.   Musculoskeletal:         General: No swelling or signs of injury.      Left lower leg: Edema present.   Lymphadenopathy:      Cervical: No cervical adenopathy.   Skin:     General: Skin is warm and dry.      Findings: No erythema.   Neurological:      Mental Status: He is alert and oriented to person, place, and  time.      Sensory: No sensory deficit.      Motor: Weakness present.      Gait: Gait abnormal.   Psychiatric:         Mood and Affect: Mood normal.         Behavior: Behavior normal.         Pertinent Laboratory/Diagnostic Studies:     Reviewed in facility chart      Current Medications   Medications reviewed and updated see facility MAR for details.      Current Outpatient Medications:     apixaban (Eliquis) 5 mg, Take 1 tablet (5 mg total) by mouth 2 (two) times a day, Disp: 60 tablet, Rfl: 0    atorvastatin (LIPITOR) 20 mg tablet, Take 20 mg by mouth daily, Disp: , Rfl:     buPROPion (WELLBUTRIN XL) 300 mg 24 hr tablet, Take 300 mg by mouth daily, Disp: , Rfl:     calcium carbonate (OYSTER SHELL,OSCAL) 500 mg, Take 1 tablet by mouth in the morning, Disp: , Rfl:     carbidopa-levodopa (SINEMET)  mg per tablet, TAKE 1 TABLET BY MOUTH THREE TIMES DAILY. INCREASE SLOWLY ACCORDING TO SEPARATE SCHEDULE, Disp: , Rfl:     cholestyramine (QUESTRAN) 4 g packet, Take 1 packet (4 g total) by mouth 3 (three) times a day with meals (Patient not taking: Reported on 7/27/2023), Disp: , Rfl:     gabapentin (NEURONTIN) 100 mg capsule, Take 300 mg by mouth, Disp: , Rfl:     Jardiance 25 MG TABS, 25 mg in the morning, Disp: , Rfl:     magnesium Oxide (MAG-OX) 400 mg TABS, Take 2 tablets (800 mg total) by mouth 2 (two) times a day (Patient not taking: Reported on 7/27/2023), Disp: , Rfl: 0    metFORMIN (GLUMETZA) 500 MG (MOD) 24 hr tablet, Take 500 mg by mouth daily with breakfast, Disp: , Rfl:     metoprolol tartrate (LOPRESSOR) 25 mg tablet, Take 1 tablet (25 mg total) by mouth every 12 (twelve) hours (Patient not taking: Reported on 1/8/2024), Disp: , Rfl: 0    Mirabegron ER 25 MG TB24, Take 25 mg by mouth daily at bedtime (Patient not taking: Reported on 7/27/2023), Disp: 90 tablet, Rfl: 3    Multiple Vitamin (MULTIVITAMIN) tablet, Take 1 tablet by mouth daily (Patient not taking: Reported on 7/27/2023), Disp: , Rfl:      "omeprazole (PriLOSEC) 40 MG capsule, Take 1 capsule (40 mg total) by mouth daily, Disp: 90 capsule, Rfl: 3    polyethylene glycol (GLYCOLAX) 17 GM/SCOOP powder, Take 17 g by mouth daily, Disp: 289 g, Rfl: 1    potassium chloride (K-DUR,KLOR-CON) 20 mEq tablet, TAKE TOTAL 2 TABLETS ALONG WITH TORSEMIDE 40 MG DOSE. CAN LOWER TO 1 TABLET IF TORSEMIDE IS LOWERED TO 20 MG TABLET, Disp: , Rfl:     sildenafil (VIAGRA) 100 mg tablet, Take 100 mg by mouth daily as needed, Disp: , Rfl:     SOTALOL HCL, AF, PO, Take by mouth in the morning, Disp: , Rfl:     spironolactone (ALDACTONE) 25 mg tablet, Take 25 mg by mouth daily, Disp: , Rfl:     torsemide (DEMADEX) 20 mg tablet, Take 60 mg by mouth in the morning, Disp: , Rfl:     traZODone (DESYREL) 150 mg tablet, Take 150 mg by mouth daily at bedtime, Disp: , Rfl:     Vraylar 3 MG capsule, Take 3 mg by mouth daily, Disp: , Rfl:      Please note:  Voice-recognition software may have been used in the preparation of this document.  Occasional wrong word or \"sound-alike\" substitutions may have occurred due to the inherent limitations of voice recognition software.  Interpretation should be guided by context.         SELVIN Ziegler  8/20/2024  1:19 PM    "

## 2024-08-20 NOTE — ASSESSMENT & PLAN NOTE
Wt Readings from Last 3 Encounters:   08/20/24 132 kg (291 lb 12.8 oz)   07/31/24 (!) 147 kg (323 lb)   07/29/24 (!) 147 kg (323 lb)       Also morbidly obese with chronic edema   Continue Torsemide  Daily Weights

## 2024-08-20 NOTE — ASSESSMENT & PLAN NOTE
Left TKA done at [facility]  Continue PT/OT---> WBAT  OP f/u with Ottho  Multimodal pain regimen   He is ambulated with RW with assist x 1 at this time

## 2024-08-26 ENCOUNTER — NURSING HOME VISIT (OUTPATIENT)
Dept: GERIATRICS | Facility: OTHER | Age: 64
End: 2024-08-26
Payer: MEDICARE

## 2024-08-26 VITALS
TEMPERATURE: 97 F | BODY MASS INDEX: 53.22 KG/M2 | RESPIRATION RATE: 18 BRPM | SYSTOLIC BLOOD PRESSURE: 129 MMHG | DIASTOLIC BLOOD PRESSURE: 80 MMHG | WEIGHT: 291 LBS | OXYGEN SATURATION: 96 % | HEART RATE: 63 BPM

## 2024-08-26 DIAGNOSIS — Z96.652 HISTORY OF TOTAL LEFT KNEE REPLACEMENT: ICD-10-CM

## 2024-08-26 DIAGNOSIS — I50.32 CHRONIC DIASTOLIC CHF (CONGESTIVE HEART FAILURE) (HCC): Primary | ICD-10-CM

## 2024-08-26 DIAGNOSIS — E11.9 TYPE 2 DIABETES MELLITUS WITHOUT COMPLICATION, WITHOUT LONG-TERM CURRENT USE OF INSULIN (HCC): ICD-10-CM

## 2024-08-26 DIAGNOSIS — I48.0 PAROXYSMAL ATRIAL FIBRILLATION (HCC): ICD-10-CM

## 2024-08-26 DIAGNOSIS — F32.A DEPRESSION, UNSPECIFIED DEPRESSION TYPE: ICD-10-CM

## 2024-08-26 DIAGNOSIS — G20.A1 PARKINSON'S DISEASE, UNSPECIFIED WHETHER DYSKINESIA PRESENT, UNSPECIFIED WHETHER MANIFESTATIONS FLUCTUATE: ICD-10-CM

## 2024-08-26 DIAGNOSIS — G47.33 OBSTRUCTIVE SLEEP APNEA SYNDROME: ICD-10-CM

## 2024-08-26 PROCEDURE — 99316 NF DSCHRG MGMT 30 MIN+: CPT | Performed by: NURSE PRACTITIONER

## 2024-08-26 NOTE — ASSESSMENT & PLAN NOTE
Wt Readings from Last 3 Encounters:   08/26/24 132 kg (291 lb)   08/20/24 132 kg (291 lb 12.8 oz)   07/31/24 (!) 147 kg (323 lb)       Also morbidly obese with chronic edema   Weights stable , appears euvolemic on exam   Continue Torsemide  Daily Weights

## 2024-08-26 NOTE — PROGRESS NOTES
[facility]  [address] [address]  [phone]  DISCHARGE SUMMARY  FACILITY: [facility]  Code 31    NAME: Evert Orozco  AGE: 64 y.o. SEX: male   CODE STATUS: CPR    DATE OF ADMISSION: 7/27/2024    DATE OF DISCHARGE: 8/27/2024    DISCHARGE DISPOSITION: Stable for discharge to home with family support and home health PT/OT/SN services.       Reason for Admission: Patient was admitted to [facility] for rehabilitation after hospitalization for Total Knee Arthroplasty.    Past Medical and Surgical History:   Past Medical History:   Diagnosis Date   • Arthritis    • Asthma    • Colon polyp    • CPAP (continuous positive airway pressure) dependence    • Edema     left leg   • GERD (gastroesophageal reflux disease)    • History of echocardiogram 07/2017   • History of Holter monitoring 09/2014   • History of stress test 08/2014   • Hypertension    • Myocardial infarct (HCC)     2011   • Myocardial infarction (HCC)    • Obesity    • Sleep apnea    • Thrombophlebitis       Past Surgical History:   Procedure Laterality Date   • CARDIAC CATHETERIZATION  03/2012 1996,2007   • CHOLECYSTECTOMY     • COLONOSCOPY     • COLONOSCOPY N/A 10/30/2017    Procedure: COLONOSCOPY;  Surgeon: Kiran Fulton MD;  Location: [facility] GI LAB;  Service: Gastroenterology   • ENDOVENOUS ABLATION SAPHENOUS VEIN W/ LASER      each addit vein   • ERCP     • ESOPHAGOGASTRODUODENOSCOPY N/A 10/30/2017    Procedure: ESOPHAGOGASTRODUODENOSCOPY (EGD);  Surgeon: Kiran Fulton MD;  Location: [facility];  Service: Gastroenterology   • GASTRIC BYPASS      2001   • GASTRIC BYPASS     • HERNIA REPAIR      paraesophageal hiatus hernia   • HERNIA REPAIR      x5 last one 2011   • JOINT REPLACEMENT     • KNEE ARTHROPLASTY Right     2102   • REPLACEMENT TOTAL KNEE Right 02/2011       Course of stay:   Evert Orozco is a 64 y.o. male admitted to [facility] following hospital stay for  Left TKA. Patient has a past medical Hx including but not limited to Obesity, JOSIAH, CHF, Depression, DM, Parkinson's disease, OA of Left knee, rheumatoid arthritis, cardiac pacemaker s/p sick sinus syndrome, atrial fibrillation. Patient is seen in collaboration with nursing for medical mgmt and Discharge visit.     Patient initially presented to hospital for left knee replacement.  Patient did well and was recommended discharge to short-term rehab. He has done well at rehab. He is WBAT, knee incision is healed, no s/s of infection. Afebrile, denies fever/chills. He is ambulating with RW and standby assist at this time. His pain is controlled with Tylenol and Robaxin, still taking oxycodone only for severe pain. He is a reliable historian, he offers no complaints at this time. He denies CP/SOB/N/V/D. Denies lightheadedness, dizziness, headaches, vision changes. Patient states he is eating well and staying hydrated. Denies any bowel or bladder issues. Patient is actively participating in therapy. No concerns from nursing at this time. Patient lives at home with his wife and daughter in a ranch home. He is medically stable for discharge to home.     Patient to be sent home with remaining medications from rehab, no scripts sent to pharmacy.            ROS:  Review of Systems   Constitutional:  Positive for activity change.   Musculoskeletal:  Positive for arthralgias and gait problem.   Neurological:  Positive for weakness.   All other systems reviewed and are negative.      PHYSICAL EXAM:  VITALS:   Vitals:    08/26/24 1247   BP: 129/80   Pulse: 63   Resp: 18   Temp: (!) 97 °F (36.1 °C)   SpO2: 96%        Physical Exam  Vitals and nursing note reviewed.   Constitutional:       General: He is not in acute distress.     Appearance: Normal appearance. He is obese.   HENT:      Head: Normocephalic and atraumatic.      Nose: No congestion or rhinorrhea.      Mouth/Throat:      Mouth: Mucous membranes are moist.   Eyes:       General: No scleral icterus.     Extraocular Movements: Extraocular movements intact.      Conjunctiva/sclera: Conjunctivae normal.      Pupils: Pupils are equal, round, and reactive to light.   Cardiovascular:      Rate and Rhythm: Normal rate. Rhythm irregular.      Pulses: Normal pulses.      Heart sounds: Normal heart sounds. No murmur heard.  Pulmonary:      Effort: Pulmonary effort is normal.      Breath sounds: Normal breath sounds. No wheezing, rhonchi or rales.   Abdominal:      General: Bowel sounds are normal. There is no distension.      Palpations: Abdomen is soft.      Tenderness: There is no abdominal tenderness.   Musculoskeletal:         General: No swelling or signs of injury.      Left lower leg: Edema present.   Lymphadenopathy:      Cervical: No cervical adenopathy.   Skin:     General: Skin is warm and dry.      Findings: No erythema.      Comments: Left knee incision c/d/I healed    Neurological:      Mental Status: He is alert and oriented to person, place, and time.      Sensory: No sensory deficit.      Motor: Weakness present.      Gait: Gait abnormal.   Psychiatric:         Mood and Affect: Mood normal.         Behavior: Behavior normal.       Admission Diagnoses:   1. Chronic diastolic CHF (congestive heart failure) ([facility])  Assessment & Plan:  Wt Readings from Last 3 Encounters:   08/26/24 132 kg (291 lb)   08/20/24 132 kg (291 lb 12.8 oz)   07/31/24 (!) 147 kg (323 lb)       Also morbidly obese with chronic edema   Weights stable , appears euvolemic on exam   Continue Torsemide  Daily Weights       2. Paroxysmal atrial fibrillation ([facility])  Assessment & Plan:  HR irregular but controlled   Continue Sotolol   Continue Eliquis   3. Obstructive sleep apnea syndrome  Assessment & Plan:  Continue Cpap   4. Type 2 diabetes mellitus without complication, without long-term current use of insulin ([facility])  Assessment & Plan:    Lab Results   Component Value Date    HGBA1C 6.2 (H) 08/02/2023     Good  A1c control   Continue Jardiance   Continue Metformin   Accu checks   Hypoglycemic protocol   5. Parkinson's disease, unspecified whether dyskinesia present, unspecified whether manifestations fluctuate  Assessment & Plan:  Stable   Continue carbidopa levodopa - 1 tab TID  6. Depression, unspecified depression type  Assessment & Plan:  Stable  Denies SI/HI  Continue home dose Wellbutrin 300 mg Daily   Continue Vraylar 3 mg daily   7. History of total left knee replacement  Assessment & Plan:  Left TKA done at [facility]  Continue PT/OT---> WBAT  OP f/u with Ortho done 8/20/24--> overall doing well  Multimodal pain regimen   He is ambulating with RW with assist x 1 at this time  Medically stable for discharge to home with home health PT/OT  OP f/u with Ortho in 6 weeks        Follow-up Recommendations:    Outpatient Follow up with PCP in the next 2 weeks  Home Health PT/OT/SN services     Labs and testing performed during stay:    Reviewed in chart    Discharge Medications: See discharge medication list which was reviewed and signed.      Current Outpatient Medications:   •  apixaban (Eliquis) 5 mg, Take 1 tablet (5 mg total) by mouth 2 (two) times a day, Disp: 60 tablet, Rfl: 0  •  atorvastatin (LIPITOR) 20 mg tablet, Take 20 mg by mouth daily, Disp: , Rfl:   •  buPROPion (WELLBUTRIN XL) 300 mg 24 hr tablet, Take 300 mg by mouth daily, Disp: , Rfl:   •  calcium carbonate (OYSTER SHELL,OSCAL) 500 mg, Take 1 tablet by mouth in the morning, Disp: , Rfl:   •  carbidopa-levodopa (SINEMET)  mg per tablet, TAKE 1 TABLET BY MOUTH THREE TIMES DAILY. INCREASE SLOWLY ACCORDING TO SEPARATE SCHEDULE, Disp: , Rfl:   •  cholestyramine (QUESTRAN) 4 g packet, Take 1 packet (4 g total) by mouth 3 (three) times a day with meals (Patient not taking: Reported on 7/27/2023), Disp: , Rfl:   •  gabapentin (NEURONTIN) 100 mg capsule, Take 300 mg by mouth, Disp: , Rfl:   •  Jardiance 25 MG TABS, 25 mg in the morning, Disp: , Rfl:   •   magnesium Oxide (MAG-OX) 400 mg TABS, Take 2 tablets (800 mg total) by mouth 2 (two) times a day (Patient not taking: Reported on 7/27/2023), Disp: , Rfl: 0  •  metFORMIN (GLUMETZA) 500 MG (MOD) 24 hr tablet, Take 500 mg by mouth daily with breakfast, Disp: , Rfl:   •  metoprolol tartrate (LOPRESSOR) 25 mg tablet, Take 1 tablet (25 mg total) by mouth every 12 (twelve) hours (Patient not taking: Reported on 1/8/2024), Disp: , Rfl: 0  •  Mirabegron ER 25 MG TB24, Take 25 mg by mouth daily at bedtime (Patient not taking: Reported on 7/27/2023), Disp: 90 tablet, Rfl: 3  •  Multiple Vitamin (MULTIVITAMIN) tablet, Take 1 tablet by mouth daily (Patient not taking: Reported on 7/27/2023), Disp: , Rfl:   •  omeprazole (PriLOSEC) 40 MG capsule, Take 1 capsule (40 mg total) by mouth daily, Disp: 90 capsule, Rfl: 3  •  polyethylene glycol (GLYCOLAX) 17 GM/SCOOP powder, Take 17 g by mouth daily, Disp: 289 g, Rfl: 1  •  potassium chloride (K-DUR,KLOR-CON) 20 mEq tablet, TAKE TOTAL 2 TABLETS ALONG WITH TORSEMIDE 40 MG DOSE. CAN LOWER TO 1 TABLET IF TORSEMIDE IS LOWERED TO 20 MG TABLET, Disp: , Rfl:   •  sildenafil (VIAGRA) 100 mg tablet, Take 100 mg by mouth daily as needed, Disp: , Rfl:   •  SOTALOL HCL, AF, PO, Take by mouth in the morning, Disp: , Rfl:   •  spironolactone (ALDACTONE) 25 mg tablet, Take 25 mg by mouth daily, Disp: , Rfl:   •  torsemide (DEMADEX) 20 mg tablet, Take 60 mg by mouth in the morning, Disp: , Rfl:   •  traZODone (DESYREL) 150 mg tablet, Take 150 mg by mouth daily at bedtime, Disp: , Rfl:   •  Vraylar 3 MG capsule, Take 3 mg by mouth daily, Disp: , Rfl:      Discussion with patient/family and further instructions:  -Fall precautions  -Aspiration precautions  -Bleeding precautions  -Monitor for signs/symptoms of infection  -Medication list was reviewed and signed  -DME form was completed    Status at time of discharge: Stable      Billing based on time. Time spent on unit, 40 minutes. Time spent  "counseling pt on debility/condition, 30 minutes.      Please note:  Voice-recognition software may have been used in the preparation of this document.  Occasional wrong word or \"sound-alike\" substitutions may have occurred due to the inherent limitations of voice recognition software.  Interpretation should be guided by context.        SELVIN Ziegler  8/26/2024   "

## 2024-08-26 NOTE — ASSESSMENT & PLAN NOTE
Left TKA done at [facility]  Continue PT/OT---> WBAT  OP f/u with Ortho done 8/20/24--> overall doing well  Multimodal pain regimen   He is ambulating with RW with assist x 1 at this time  Medically stable for discharge to home with home health PT/OT  OP f/u with Ortho in 6 weeks

## 2024-08-28 ENCOUNTER — TELEPHONE (OUTPATIENT)
Dept: OTHER | Facility: OTHER | Age: 64
End: 2024-08-28

## 2024-08-30 ENCOUNTER — NURSING HOME VISIT (OUTPATIENT)
Dept: GERIATRICS | Facility: OTHER | Age: 64
End: 2024-08-30
Payer: MEDICARE

## 2024-08-30 DIAGNOSIS — G63 POLYNEUROPATHY ASSOCIATED WITH UNDERLYING DISEASE (HCC): ICD-10-CM

## 2024-08-30 DIAGNOSIS — Z96.652 HISTORY OF TOTAL LEFT KNEE REPLACEMENT: Primary | ICD-10-CM

## 2024-08-30 DIAGNOSIS — I48.0 PAROXYSMAL ATRIAL FIBRILLATION (HCC): ICD-10-CM

## 2024-08-30 DIAGNOSIS — F32.A DEPRESSION, UNSPECIFIED DEPRESSION TYPE: ICD-10-CM

## 2024-08-30 DIAGNOSIS — R26.2 AMBULATORY DYSFUNCTION: ICD-10-CM

## 2024-08-30 DIAGNOSIS — G20.A1 PARKINSON'S DISEASE, UNSPECIFIED WHETHER DYSKINESIA PRESENT, UNSPECIFIED WHETHER MANIFESTATIONS FLUCTUATE: ICD-10-CM

## 2024-08-30 DIAGNOSIS — I50.32 CHRONIC HEART FAILURE WITH PRESERVED EJECTION FRACTION (HCC): ICD-10-CM

## 2024-08-30 DIAGNOSIS — E11.9 TYPE 2 DIABETES MELLITUS WITHOUT COMPLICATION, WITHOUT LONG-TERM CURRENT USE OF INSULIN (HCC): ICD-10-CM

## 2024-08-30 DIAGNOSIS — E66.01 MORBID OBESITY WITH BMI OF 50.0-59.9, ADULT (HCC): ICD-10-CM

## 2024-08-30 DIAGNOSIS — G47.33 OSA ON CPAP: ICD-10-CM

## 2024-08-30 DIAGNOSIS — I10 BENIGN ESSENTIAL HYPERTENSION: ICD-10-CM

## 2024-08-30 PROBLEM — G62.9 PERIPHERAL NEUROPATHY: Status: ACTIVE | Noted: 2024-08-30

## 2024-08-30 PROCEDURE — 99305 1ST NF CARE MODERATE MDM 35: CPT | Performed by: FAMILY MEDICINE

## 2024-08-30 NOTE — ASSESSMENT & PLAN NOTE
Wt Readings from Last 3 Encounters:   08/26/24 132 kg (291 lb)   08/20/24 132 kg (291 lb 12.8 oz)   07/31/24 (!) 147 kg (323 lb)     Morbidly obese with chronic edema   Torsemide Oral Tablet 20 MG :3 tablet by mouth two times a day every Mon, Wed, Fri. Give 4 tablet by mouth two times a day every Tue, Thu, Sat, Sun.  Continue spironolactone and Kcl tablets

## 2024-08-30 NOTE — ASSESSMENT & PLAN NOTE
Lab Results   Component Value Date    HGBA1C 6.2 (H) 08/02/2023     Continue Jardiance 10 mg od  Continue Metformin 500 mg bid  Accu checks   Hypoglycemic protocol

## 2024-08-30 NOTE — ASSESSMENT & PLAN NOTE
Stable  Denies SI/HI  Continue home dose Wellbutrin 300 mg Daily   Continue Vraylar ( Cariprazine Atypical antipsychotic) 3 mg daily

## 2024-08-30 NOTE — PROGRESS NOTES
[facility]  [address]  [place] [address]   [facility]  History and Physical    NAME: Evert Orozco  AGE: 64 y.o. SEX: male [phone]    DATE OF ENCOUNTER: 8/30/2024    Code status:  CPR    Assessment and Plan     Problem List Items Addressed This Visit       Chronic heart failure with preserved ejection fraction (HCC)     Wt Readings from Last 3 Encounters:   08/26/24 132 kg (291 lb)   08/20/24 132 kg (291 lb 12.8 oz)   07/31/24 (!) 147 kg (323 lb)     Morbidly obese with chronic edema   Torsemide Oral Tablet 20 MG :3 tablet by mouth two times a day every Mon, Wed, Fri. Give 4 tablet by mouth two times a day every Tue, Thu, Sat, Sun.  Continue spironolactone and Kcl tablets             Depression     Stable  Denies SI/HI  Continue home dose Wellbutrin 300 mg Daily   Continue Vraylar ( Cariprazine Atypical antipsychotic) 3 mg daily         JOSIAH on CPAP     Continue Cpap          Benign essential hypertension     /63  Monitor BP  Continue diuretics for CHF           Type 2 diabetes mellitus without complication, without long-term current use of insulin (HCC)       Lab Results   Component Value Date    HGBA1C 6.2 (H) 08/02/2023     Continue Jardiance 10 mg od  Continue Metformin 500 mg bid  Accu checks   Hypoglycemic protocol         Paroxysmal atrial fibrillation (HCC)     HR controlled   Continue Sotolol 80 mg daily  Continue Eliquis 5 mg bid         Ambulatory dysfunction     Ambulatory dysfunction secondary to recent left knee replacement and morbid obesity  Wheelchair bound  Continue PT OT         Parkinson disease     Stable   Continue carbidopa levodopa - 1 tab TID         History of total left knee replacement - Primary     Left TKA done at [facility] on 07/22/2024  OP f/u with Ortho done 8/20/24--> overall doing well    Plans:  Multimodal pain regimen:   Tylenol 500 mg tid, Robaxan 500 mg Q6 hr PRN, Oxycodone 5 mg Q4 PRN  Continue PT/OT/RT/ST  OP  f/u with Ortho          Morbid obesity with BMI of 50.0-59.9, adult ([facility])     BMI 53.22 kg/m2  Recommended lifestyle modification with diet and exercise           Peripheral neuropathy     Peripheral neuropathy s/s diabetes.  Taking gabapentin 300 mg at bedtime.             All medications and routine orders were reviewed and updated as needed.    Plan discussed with: Patient    Chief Complaint     Seen for admission at Nursing Facility    History of Present Illness       Evert Orozco is a 64 y.o. male with a past medical Hx including but not limited to Obesity, JOSIAH, CHF, Depression, T2DM, Parkinson's disease, OA Left knee s/p Lt knee total arthroplasty, rheumatoid arthritis, cardiac pacemaker s/p sick sinus syndrome, atrial fibrillation. Patient had a total left knee replacement surgery on 07/22/2024 at [facility] and he was subsequently discharged to [facility] on 07/27/2024.     Evert has done well at rehab. He is WBAT, knee incision was healed, no s/s of infection. His pain was controlled with Tylenol, Robaxin PRN and oxycodone 5 mg Q 6 PRN  for severe pain. He was discharged from [facility] to home with home health PT/OT on 08/26/2024 after completing therapies and doing well with ambulation.    Pt seen and examined at bedside this morning. He was sitting in a wheelchair. His wife is at bedside at the time of my evaluation. He is AAO x3 and is a reliable historian.He reports difficulty ambulation at home due to Lt knee pain and unable to bear weight. Thus, he decided to come back to [facility] for more therapies. He is taking tylenol 500 mg every 6 hours at home. He denies taking oxycodone as needed for pain. He denies CP/SOB/N/V/D. Denies lightheadedness, dizziness, headaches, vision changes. Patient states he is eating well and staying hydrated. Denies any bowel or bladder issues. Patient lived at home with his wife and daughter in a ranch home.      HISTORY:  Past Medical History:   Diagnosis Date    Arthritis     Asthma     Colon polyp     CPAP (continuous positive airway pressure) dependence     Edema     left leg    GERD (gastroesophageal reflux disease)     History of echocardiogram 07/2017    History of Holter monitoring 09/2014    History of stress test 08/2014    Hypertension     Myocardial infarct (HCC)     2011    Myocardial infarction (HCC)     Obesity     Sleep apnea     Thrombophlebitis      Family History   Problem Relation Age of Onset    Uterine cancer Mother     Prostate cancer Father     Diabetes Father     Heart failure Father     Cancer Father     Stroke Family         syndrome    Aneurysm Family         aoritc    Diabetes Brother     Other Brother         amputation-    Diabetes Paternal Grandmother     Diabetes Paternal Grandfather     Cancer Sister      Social History     Socioeconomic History    Marital status: /Civil Union     Spouse name: Not on file    Number of children: 2    Years of education: Not on file    Highest education level: Not on file   Occupational History    Not on file   Tobacco Use    Smoking status: Former     Current packs/day: 1.00     Average packs/day: 1 pack/day for 3.0 years (3.0 ttl pk-yrs)     Types: Cigarettes    Smokeless tobacco: Former    Tobacco comments:     never a smoker per allscripts - as per [place]   Vaping Use    Vaping status: Never Used   Substance and Sexual Activity    Alcohol use: Not Currently     Comment: rarely - denied per allscripts     Drug use: No    Sexual activity: Not on file   Other Topics Concern    Not on file   Social History Narrative    · Do you currently or have you served in the [place] Armed Forces:   No      · Were you activated, into active duty, as a member of the [employer] or as a Reservist:   No      · Advance directive:   No      · Live alone or with others:   with others      · Caffeine intake:   Occasional      · Guns present in home:   No      · Seat  belts used routinely:   Yes      · Presence of domestic violence:   No      · Sunscreen used routinely:   Yes      · Passive smoke exposure:   No      · Are you currently employed:   No      · Asbestos exposure:   No      · TB exposure:   No      · Environmental exposure:   No      · Animal exposure:   Yes      · Blood Transfusion:   No      · Smoke alarm in home:   No      · Hard of hearing or deaf in one or both ears:   No      · Legally blind in one or both eyes:   No      ·      Social Determinants of Health     Financial Resource Strain: Low Risk  (7/22/2024)    Received from [facility]    Overall Financial Resource Strain (CARDIA)     Difficulty of Paying Living Expenses: Not hard at all   Food Insecurity: No Food Insecurity (7/22/2024)    Received from [facility]    Hunger Vital Sign     Worried About Running Out of Food in the Last Year: Never true     Ran Out of Food in the Last Year: Never true   Transportation Needs: No Transportation Needs (7/22/2024)    Received from [facility]    PRAPARE - Transportation     Lack of Transportation (Medical): No     Lack of Transportation (Non-Medical): No   Physical Activity: Not on file   Stress: Not on file   Social Connections: Not on file   Intimate Partner Violence: Patient Declined (7/22/2024)    Received from [facility]    Humiliation, Afraid, Rape, and Kick questionnaire     Fear of Current or Ex-Partner: Patient declined     Emotionally Abused: Patient declined     Physically Abused: Patient declined     Sexually Abused: Patient declined   Housing Stability: Low Risk  (7/22/2024)    Received from [facility]    Housing Stability Vital Sign     Unable to Pay for Housing in the Last Year: No     Number of Times Moved in the Last Year: 0     Homeless in the Last Year: No       Allergies:  Allergies   Allergen Reactions    Gluten Meal - Food Allergy     Adhesive [Medical Tape]  Rash       Review of Systems     Review of Systems   Constitutional:  Negative for chills and fever.   HENT:  Negative for congestion, ear pain, sneezing and sore throat.    Eyes:  Negative for pain and visual disturbance.   Respiratory:  Negative for cough, choking, chest tightness and shortness of breath.    Cardiovascular:  Negative for chest pain and palpitations.   Gastrointestinal:  Negative for abdominal pain, blood in stool, constipation, diarrhea, nausea and vomiting.   Genitourinary:  Negative for difficulty urinating, dysuria, frequency, hematuria, penile swelling, testicular pain and urgency.   Musculoskeletal:  Negative for arthralgias, back pain, myalgias and neck pain.        Mild- Moderate Lt knee pain   Skin:  Negative for color change and rash.   Neurological:  Negative for dizziness, seizures, syncope, weakness and light-headedness.   Hematological:  Negative for adenopathy. Does not bruise/bleed easily.   Psychiatric/Behavioral:  Negative for agitation and confusion.    All other systems reviewed and are negative.      Medications and orders     All medications reviewed and updated in group home EMR.      Objective     Vitals:     BP: 136/63 mmHg  8/28/2024 20:32 Temp:98 °F  8/28/2024 20:32 Pulse:60 bpm  8/28/2024 20:32 Weight:293.5 Lbs  8/28/2024 21:16   Resp:18 Breaths/min  8/28/2024 20:32 BS:160 mg/dL  8/29/2024 20:27 O2:96 %  8/28/2024 20:33 Pain:0  8/28/2024 20:33       Physical Exam  Constitutional:       General: He is not in acute distress.     Appearance: He is obese. He is not toxic-appearing.      Comments: Sitting on a wheelchair   Eyes:      Conjunctiva/sclera: Conjunctivae normal.      Pupils: Pupils are equal, round, and reactive to light.   Cardiovascular:      Heart sounds: Normal heart sounds. No murmur heard.     No friction rub.   Pulmonary:      Effort: No respiratory distress.      Breath sounds: Normal breath sounds. No wheezing or rales.   Abdominal:      General: Bowel  sounds are normal. There is no distension.      Palpations: Abdomen is soft.      Tenderness: There is no abdominal tenderness.   Musculoskeletal:         General: Swelling present. No tenderness.      Right lower leg: Edema present.      Left lower leg: Edema present.      Comments: A well healed scar on the left knee. No erythema. No discharge. Chronic 1+ pitting edema on bilateral lower extremities.   Skin:     Capillary Refill: Capillary refill takes less than 2 seconds.      Coloration: Skin is not jaundiced or pale.      Findings: No lesion or rash.   Neurological:      Mental Status: He is oriented to person, place, and time.      Cranial Nerves: No cranial nerve deficit.         Pertinent Laboratory/Diagnostic Studies:   The following labs/studies were reviewed please see chart or hospital paperwork for details.    Lab Results   Component Value Date    WBC 8.08 06/07/2023    HGB 12.1 (L) 07/23/2024    HCT 37.0 07/23/2024    MCV 76 (L) 06/07/2023     06/07/2023     Component  Ref Range & Units 7/27/24 0426 7/26/24 0945 7/25/24 0408 7/24/24 0942 7/23/24 0441 6/23/24 1218 5/16/24 0710   Glucose  65 - 99 mg/dL 137 High  218 High  193 High  244 High  170 High  182 High  196 High    BUN  7 - 28 mg/dL 14 13 11 13 10 15 22   Creatinine  0.53 - 1.30 mg/dL 0.97 0.90 0.94 1.03 0.88 1.14 1.28   Sodium  135 - 145 mmol/L 134 Low  133 Low  135 134 Low  136 137 137   Potassium  3.5 - 5.2 mmol/L 3.9 4.6 CM 3.4 Low  3.3 Low  3.4 Low  3.3 Low  3.1 Low    Chloride  100 - 109 mmol/L 94 Low  96 Low  95 Low  97 Low  98 Low  93 Low  93 Low    Carbon Dioxide  21 - 31 mmol/L 32 High  27 32 High  27 30 34 High  34 High    Calcium  8.5 - 10.1 mg/dL 9.2 8.2 Low  7.8 Low  7.5 Low  7.8 Low  9.9 9.3   ANION GAP  3 - 11 8 10 8 10 8 10 10   eGFRcr  >59 86 94 89 80 95 71 62     - Counseling Documentation: patient was counseled regarding: diagnostic results, instructions for management, risk factor reductions, prognosis, patient and  family education, risks and benefits of treatment options, and importance of compliance with treatment    I have spent a total time of 60 minutes in caring for this patient on the day of the visit/encounter including Diagnostic results, Risks and benefits of tx options, Instructions for management, Patient and family education, Importance of tx compliance, Risk factor reductions, Counseling / Coordination of care, Documenting in the medical record, Reviewing / ordering tests, medicine, procedures  , Obtaining or reviewing history  , and Communicating with other healthcare professionals .

## 2024-08-30 NOTE — ASSESSMENT & PLAN NOTE
Ambulatory dysfunction secondary to recent left knee replacement and morbid obesity  Wheelchair bound  Continue PT OT

## 2024-08-30 NOTE — ASSESSMENT & PLAN NOTE
Left TKA done at [facility] on 07/22/2024  OP f/u with Ortho done 8/20/24--> overall doing well    Plans:  Multimodal pain regimen:   Tylenol 500 mg tid, Robaxan 500 mg Q6 hr PRN, Oxycodone 5 mg Q4 PRN  Continue PT/OT/RT/ST  OP f/u with Ortho

## 2024-09-03 ENCOUNTER — NURSING HOME VISIT (OUTPATIENT)
Dept: GERIATRICS | Facility: OTHER | Age: 64
End: 2024-09-03
Payer: MEDICARE

## 2024-09-03 VITALS
WEIGHT: 293.5 LBS | BODY MASS INDEX: 53.68 KG/M2 | DIASTOLIC BLOOD PRESSURE: 63 MMHG | RESPIRATION RATE: 18 BRPM | HEART RATE: 60 BPM | TEMPERATURE: 98 F | OXYGEN SATURATION: 96 % | SYSTOLIC BLOOD PRESSURE: 136 MMHG

## 2024-09-03 DIAGNOSIS — G47.33 OSA ON CPAP: ICD-10-CM

## 2024-09-03 DIAGNOSIS — I50.32 CHRONIC DIASTOLIC CHF (CONGESTIVE HEART FAILURE) (HCC): ICD-10-CM

## 2024-09-03 DIAGNOSIS — I10 BENIGN ESSENTIAL HYPERTENSION: ICD-10-CM

## 2024-09-03 DIAGNOSIS — G20.A1 PARKINSON'S DISEASE, UNSPECIFIED WHETHER DYSKINESIA PRESENT, UNSPECIFIED WHETHER MANIFESTATIONS FLUCTUATE: ICD-10-CM

## 2024-09-03 DIAGNOSIS — E11.9 TYPE 2 DIABETES MELLITUS WITHOUT COMPLICATION, WITHOUT LONG-TERM CURRENT USE OF INSULIN (HCC): Primary | ICD-10-CM

## 2024-09-03 DIAGNOSIS — Z96.652 HISTORY OF TOTAL LEFT KNEE REPLACEMENT: ICD-10-CM

## 2024-09-03 DIAGNOSIS — F32.A DEPRESSION, UNSPECIFIED DEPRESSION TYPE: ICD-10-CM

## 2024-09-03 DIAGNOSIS — I48.0 PAROXYSMAL ATRIAL FIBRILLATION (HCC): ICD-10-CM

## 2024-09-03 PROCEDURE — 99309 SBSQ NF CARE MODERATE MDM 30: CPT | Performed by: NURSE PRACTITIONER

## 2024-09-03 RX ORDER — TORSEMIDE 20 MG/1
TABLET ORAL
Start: 2024-09-03

## 2024-09-03 RX ORDER — METHOCARBAMOL 500 MG/1
500 TABLET, FILM COATED ORAL EVERY 6 HOURS PRN
COMMUNITY
Start: 2024-07-22

## 2024-09-03 RX ORDER — GABAPENTIN 100 MG/1
300 CAPSULE ORAL
Start: 2024-09-03 | End: 2027-07-19

## 2024-09-03 RX ORDER — SOTALOL HYDROCHLORIDE 80 MG/1
80 TABLET ORAL EVERY 12 HOURS SCHEDULED
Start: 2024-09-03

## 2024-09-03 RX ORDER — DAPAGLIFLOZIN 10 MG/1
10 TABLET, FILM COATED ORAL DAILY
COMMUNITY
Start: 2024-08-27

## 2024-09-03 NOTE — ASSESSMENT & PLAN NOTE
Wt Readings from Last 3 Encounters:   09/03/24 133 kg (293 lb 8 oz)   08/26/24 132 kg (291 lb)   08/20/24 132 kg (291 lb 12.8 oz)       Also morbidly obese with chronic edema   Weights stable , appears euvolemic on exam   Continue Torsemide  Daily Weights

## 2024-09-03 NOTE — ASSESSMENT & PLAN NOTE
Left TKA done at [facility]  Continue PT/OT---> WBAT  OP f/u with Ortho done 8/20/24--> overall doing well  Needs to work on extension and not solely sit in the wheelchair  Also will need antibiotic for dental prophylaxis prior to seeing dentist for life due to hx of diabetes which decreased the immune response   OP f/u with Ortho 10/2/2024
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
## 2024-09-03 NOTE — PROGRESS NOTES
[facility]  [address] [address]  [phone]  FACILITY: [facility]  Code 31 (STR)        NAME: Evert Orozco  AGE: 64 y.o. SEX: male CODE STATUS: CPR    DATE OF ENCOUNTER: 9/3/2024    Assessment and Plan     1. Type 2 diabetes mellitus without complication, without long-term current use of insulin (HCC)  Assessment & Plan:  Lab Results   Component Value Date    HGBA1C 6.2 (H) 08/02/2023     Good A1c control   Continue Jardiance   Continue Metformin   Accu checks   Hypoglycemic protocol     Orders:  -     gabapentin (NEURONTIN) 100 mg capsule; Take 3 capsules (300 mg total) by mouth daily at bedtime  2. History of total left knee replacement  Assessment & Plan:  Left TKA done at [facility]  Continue PT/OT---> WBAT  OP f/u with Ortho done 8/20/24--> overall doing well  Needs to work on extension and not solely sit in the wheelchair  Also will need antibiotic for dental prophylaxis prior to seeing dentist for life due to hx of diabetes which decreased the immune response   OP f/u with Ortho 10/2/2024  3. Parkinson's disease, unspecified whether dyskinesia present, unspecified whether manifestations fluctuate  Assessment & Plan:  Stable   Continue carbidopa levodopa - 1 tab TID  4. Benign essential hypertension  Assessment & Plan:  Bp acceptable  Continue Sotalol 80 mg Q12  Continue Torsemide 60 mg MWF and 80 mg TuThusSatSun  Monitor vitals     Orders:  -     sotalol AF (BETAPACE AF) 80 MG; Take 1 tablet (80 mg total) by mouth every 12 (twelve) hours  5. Chronic diastolic CHF (congestive heart failure) (HCC)  Assessment & Plan:  Wt Readings from Last 3 Encounters:   09/03/24 133 kg (293 lb 8 oz)   08/26/24 132 kg (291 lb)   08/20/24 132 kg (291 lb 12.8 oz)       Also morbidly obese with chronic edema   Weights stable , appears euvolemic on exam   Continue Torsemide  Daily Weights       Orders:  -     torsemide (DEMADEX) 20 mg tablet; Take 3 tablets (60 mg total) by mouth 3  (three) times a week AND 4 tablets (80 mg total) 4 (four) times a week. 60 mg MWF and 80 mg TThSatSun.  -     sotalol AF (BETAPACE AF) 80 MG; Take 1 tablet (80 mg total) by mouth every 12 (twelve) hours  6. Paroxysmal atrial fibrillation (HCC)  Assessment & Plan:  HR irregular but controlled   Continue Sotolol   Continue Eliquis   Orders:  -     sotalol AF (BETAPACE AF) 80 MG; Take 1 tablet (80 mg total) by mouth every 12 (twelve) hours  7. JOSIAH on CPAP  Assessment & Plan:  Continue Cpap   8. Depression, unspecified depression type  Assessment & Plan:  Stable  Denies SI/HI  Continue home dose Wellbutrin 300 mg Daily   Continue Vraylar 3 mg daily        All medications and routine orders were reviewed and updated as needed.    Chief Complaint     STR follow up visit    Past Medical and Surgica History      Past Medical History:   Diagnosis Date    Arthritis     Asthma     Colon polyp     CPAP (continuous positive airway pressure) dependence     Edema     left leg    GERD (gastroesophageal reflux disease)     History of echocardiogram 07/2017    History of Holter monitoring 09/2014    History of stress test 08/2014    Hypertension     Myocardial infarct (HCC)     2011    Myocardial infarction (HCC)     Obesity     Sleep apnea     Thrombophlebitis      Past Surgical History:   Procedure Laterality Date    CARDIAC CATHETERIZATION  03/2012 1996,2007    CHOLECYSTECTOMY      COLONOSCOPY      COLONOSCOPY N/A 10/30/2017    Procedure: COLONOSCOPY;  Surgeon: Kiran Fulton MD;  Location: [facility] GI [facility];  Service: Gastroenterology    ENDOVENOUS ABLATION SAPHENOUS VEIN W/ LASER      each addit vein    ERCP      ESOPHAGOGASTRODUODENOSCOPY N/A 10/30/2017    Procedure: ESOPHAGOGASTRODUODENOSCOPY (EGD);  Surgeon: Kiran Fulton MD;  Location: [facility] GI [facility];  Service: Gastroenterology    GASTRIC BYPASS      2001    GASTRIC BYPASS      HERNIA REPAIR      paraesophageal hiatus hernia    HERNIA REPAIR      x5 last one 2011    JOINT  REPLACEMENT      KNEE ARTHROPLASTY Right     2102    REPLACEMENT TOTAL KNEE Right 02/2011     Allergies   Allergen Reactions    Gluten Meal - Food Allergy     Adhesive [Medical Tape] Rash          History of Present Illness     Evert Orozco is a 64 y.o. male admitted to [facility] following hospital stay for Left TKA, completed rehab and was discharged home 8/27/24, he returned from home on 8/28/24 stating he could not care for himself. Patient has a past medical Hx including but not limited to Obesity, JOSIAH, CHF, Depression, DM, Parkinson's disease, OA of Left knee, rheumatoid arthritis, cardiac pacemaker s/p sick sinus syndrome, atrial fibrillation. Patient is seen in collaboration with nursing for medical mgmt and STR follow up.     Patient initially presented to [facility] for left knee replacement 7/22/24.  Patient did well and was recommended discharge to short-term rehab. He is WBAT, knee incision is well healed. He denies any fever/chills. He states he is having trouble with transfers. He takes Tylenol, Oxycodone 5 mg PRN and Robaxin PRN. He was seen and examined at bedside today. Patient is a reliable historian. He is oob in w/c. He states ambulation and transfers are difficulty due to left knee pain. He denies CP/SOB/N/V/D. He states he is eating well, he denies any bowel or bladder issues. He is actively participating in therapy.         The patient's allergies, past medical, surgical, social and family history were reviewed and unchanged.    Review of Systems     Review of Systems   All other systems reviewed and are negative.        Objective     Vitals:   Vitals:    09/03/24 1405   BP: 136/63   Pulse: 60   Resp: 18   Temp: 98 °F (36.7 °C)   SpO2: 96%           Physical Exam  Vitals and nursing note reviewed.   Constitutional:       General: He is not in acute distress.     Appearance: Normal appearance. He is obese.   HENT:      Head: Normocephalic and atraumatic.      Nose: No  congestion or rhinorrhea.      Mouth/Throat:      Mouth: Mucous membranes are moist.   Eyes:      General: No scleral icterus.     Extraocular Movements: Extraocular movements intact.      Conjunctiva/sclera: Conjunctivae normal.      Pupils: Pupils are equal, round, and reactive to light.   Cardiovascular:      Rate and Rhythm: Normal rate. Rhythm irregular.      Pulses: Normal pulses.      Heart sounds: Normal heart sounds. No murmur heard.  Pulmonary:      Effort: Pulmonary effort is normal.      Breath sounds: Normal breath sounds. No wheezing, rhonchi or rales.   Abdominal:      General: Bowel sounds are normal. There is no distension.      Palpations: Abdomen is soft.      Tenderness: There is no abdominal tenderness.   Musculoskeletal:         General: No swelling or signs of injury.      Left lower leg: Edema present.   Lymphadenopathy:      Cervical: No cervical adenopathy.   Skin:     General: Skin is warm and dry.      Findings: No erythema.   Neurological:      Mental Status: He is alert and oriented to person, place, and time.      Sensory: No sensory deficit.      Motor: Weakness present.      Gait: Gait abnormal.   Psychiatric:         Mood and Affect: Mood normal.         Behavior: Behavior normal.         Pertinent Laboratory/Diagnostic Studies:     Reviewed in facility chart      Current Medications   Medications reviewed and updated see facility MAR for details.      Current Outpatient Medications:     apixaban (Eliquis) 5 mg, Take 1 tablet (5 mg total) by mouth 2 (two) times a day, Disp: 60 tablet, Rfl: 0    atorvastatin (LIPITOR) 20 mg tablet, Take 20 mg by mouth daily, Disp: , Rfl:     buPROPion (WELLBUTRIN XL) 300 mg 24 hr tablet, Take 300 mg by mouth daily, Disp: , Rfl:     carbidopa-levodopa (SINEMET)  mg per tablet, TAKE 1 TABLET BY MOUTH THREE TIMES DAILY. INCREASE SLOWLY ACCORDING TO SEPARATE SCHEDULE, Disp: , Rfl:     dapagliflozin (Farxiga) 10 MG tablet, Take 10 mg by mouth daily,  "Disp: , Rfl:     gabapentin (NEURONTIN) 100 mg capsule, Take 3 capsules (300 mg total) by mouth daily at bedtime, Disp: , Rfl:     metFORMIN (GLUMETZA) 500 MG (MOD) 24 hr tablet, Take 500 mg by mouth 2 (two) times a day with meals, Disp: , Rfl:     methocarbamol (ROBAXIN) 500 mg tablet, Take 500 mg by mouth every 6 (six) hours as needed for muscle spasms, Disp: , Rfl:     omeprazole (PriLOSEC) 40 MG capsule, Take 1 capsule (40 mg total) by mouth daily, Disp: 90 capsule, Rfl: 3    polyethylene glycol (GLYCOLAX) 17 GM/SCOOP powder, Take 17 g by mouth daily, Disp: 289 g, Rfl: 1    potassium chloride (K-DUR,KLOR-CON) 20 mEq tablet, TAKE TOTAL 2 TABLETS ALONG WITH TORSEMIDE 40 MG DOSE. CAN LOWER TO 1 TABLET IF TORSEMIDE IS LOWERED TO 20 MG TABLET, Disp: , Rfl:     sotalol AF (BETAPACE AF) 80 MG, Take 1 tablet (80 mg total) by mouth every 12 (twelve) hours, Disp: , Rfl:     spironolactone (ALDACTONE) 25 mg tablet, Take 25 mg by mouth daily, Disp: , Rfl:     torsemide (DEMADEX) 20 mg tablet, Take 3 tablets (60 mg total) by mouth 3 (three) times a week AND 4 tablets (80 mg total) 4 (four) times a week. 60 mg MWF and 80 mg TThSatSun., Disp: , Rfl:     traZODone (DESYREL) 150 mg tablet, Take 150 mg by mouth daily at bedtime, Disp: , Rfl:     Vraylar 3 MG capsule, Take 3 mg by mouth daily, Disp: , Rfl:      Please note:  Voice-recognition software may have been used in the preparation of this document.  Occasional wrong word or \"sound-alike\" substitutions may have occurred due to the inherent limitations of voice recognition software.  Interpretation should be guided by context.         SELVIN Ziegler  9/3/2024  3:22 PM    "

## 2024-09-08 ENCOUNTER — TELEPHONE (OUTPATIENT)
Dept: OTHER | Facility: OTHER | Age: 64
End: 2024-09-08

## 2024-09-09 ENCOUNTER — NURSING HOME VISIT (OUTPATIENT)
Dept: GERIATRICS | Facility: OTHER | Age: 64
End: 2024-09-09
Payer: MEDICARE

## 2024-09-09 VITALS
OXYGEN SATURATION: 95 % | RESPIRATION RATE: 18 BRPM | TEMPERATURE: 97.8 F | SYSTOLIC BLOOD PRESSURE: 117 MMHG | BODY MASS INDEX: 53.22 KG/M2 | WEIGHT: 291 LBS | DIASTOLIC BLOOD PRESSURE: 67 MMHG | HEART RATE: 74 BPM

## 2024-09-09 DIAGNOSIS — E11.9 TYPE 2 DIABETES MELLITUS WITHOUT COMPLICATION, WITHOUT LONG-TERM CURRENT USE OF INSULIN (HCC): ICD-10-CM

## 2024-09-09 DIAGNOSIS — F32.A DEPRESSION, UNSPECIFIED DEPRESSION TYPE: ICD-10-CM

## 2024-09-09 DIAGNOSIS — Z96.652 HISTORY OF TOTAL LEFT KNEE REPLACEMENT: ICD-10-CM

## 2024-09-09 DIAGNOSIS — R07.89 CHEST PAIN, ATYPICAL: Primary | ICD-10-CM

## 2024-09-09 DIAGNOSIS — G20.A1 PARKINSON'S DISEASE, UNSPECIFIED WHETHER DYSKINESIA PRESENT, UNSPECIFIED WHETHER MANIFESTATIONS FLUCTUATE: ICD-10-CM

## 2024-09-09 DIAGNOSIS — I48.0 PAROXYSMAL ATRIAL FIBRILLATION (HCC): ICD-10-CM

## 2024-09-09 DIAGNOSIS — I10 BENIGN ESSENTIAL HYPERTENSION: ICD-10-CM

## 2024-09-09 DIAGNOSIS — I50.32 CHRONIC DIASTOLIC CHF (CONGESTIVE HEART FAILURE) (HCC): ICD-10-CM

## 2024-09-09 PROCEDURE — 99309 SBSQ NF CARE MODERATE MDM 30: CPT | Performed by: NURSE PRACTITIONER

## 2024-09-09 RX ORDER — NITROGLYCERIN 0.4 MG/1
0.4 TABLET SUBLINGUAL
COMMUNITY

## 2024-09-09 RX ORDER — ACETAMINOPHEN 500 MG
1000 TABLET ORAL EVERY 8 HOURS SCHEDULED
COMMUNITY

## 2024-09-09 RX ORDER — OXYCODONE HYDROCHLORIDE 5 MG/1
5 CAPSULE ORAL EVERY 4 HOURS PRN
COMMUNITY

## 2024-09-09 RX ORDER — FAMOTIDINE 40 MG/1
40 TABLET, FILM COATED ORAL DAILY
COMMUNITY

## 2024-09-09 NOTE — TELEPHONE ENCOUNTER
Lakshmi from [facility] called to inform the on call, Patient was sent to the hospital. Patient had Chest pain radiating down arm.    Contacted the on call via secure chat.

## 2024-09-09 NOTE — ASSESSMENT & PLAN NOTE
Bp acceptable  Continue Sotalol 80 mg Q12  Continue Torsemide 60 mg MWF and 80 mg TuThusSatSun  Monitor vitals

## 2024-09-09 NOTE — ASSESSMENT & PLAN NOTE
C/o CP at rehab and requested ED visit  [facility]  EKG unchanged, incomplete RBBB, A Paced  Venous doppler BLE negative for DVT  CP resolved with one dose of nitro   Recommended D/C back to rehab with OP f/u with Cardiology (9/17/2024 2:30 PM [facility] Cardiology [facility] with Ladarius Echavarria MD)  Start Nitro PRN for chest pain

## 2024-09-09 NOTE — ASSESSMENT & PLAN NOTE
Left TKA done at [facility]  Continue PT/OT---> WBAT  OP f/u with Ortho done 8/20/24--> overall doing well  Needs to work on extension and not solely sit in the wheelchair  Also will need antibiotic for dental prophylaxis prior to seeing dentist for life due to hx of diabetes which decreased the immune response   OP f/u with Ortho 10/2/2024

## 2024-09-09 NOTE — ASSESSMENT & PLAN NOTE
Lab Results   Component Value Date    HGBA1C 6.2 (H) 08/02/2023     Good A1c control   BS log reviewed ranging [phone]  Continue Jardiance   Continue Metformin   Accu checks   Hypoglycemic protocol

## 2024-09-09 NOTE — PROGRESS NOTES
[facility]  [address] [address]  [phone]  FACILITY: [facility]  Code 31 (STR)        NAME: Evert Orozco  AGE: 64 y.o. SEX: male CODE STATUS: CPR    DATE OF ENCOUNTER: 9/9/2024    Assessment and Plan     1. Chest pain, atypical  Assessment & Plan:  C/o CP at rehab and requested ED visit  Sent to [facility]- notes reveiwed  EKG unchanged, incomplete RBBB, A Paced  Venous doppler BLE negative for DVT  CP resolved with one dose of nitro   Recommended D/C back to rehab with OP f/u with Cardiology (9/17/2024 2:30 PM [facility] Cardiology [facility] with Ladarius Echavarria MD)  Start Nitro PRN for chest pain   2. Benign essential hypertension  Assessment & Plan:  Bp acceptable  Continue Sotalol 80 mg Q12  Continue Torsemide 60 mg MWF and 80 mg TuThusSatSun  Monitor vitals     3. Chronic diastolic CHF (congestive heart failure) (HCC)  Assessment & Plan:  Wt Readings from Last 3 Encounters:   09/03/24 133 kg (293 lb 8 oz)   08/26/24 132 kg (291 lb)   08/20/24 132 kg (291 lb 12.8 oz)       Also morbidly obese with chronic edema   Weights stable , appears euvolemic on exam   Continue Torsemide  Daily Weights       4. Paroxysmal atrial fibrillation (HCC)  Assessment & Plan:  HR irregular but controlled   Continue Sotolol   Continue Eliquis   5. Type 2 diabetes mellitus without complication, without long-term current use of insulin (HCC)  Assessment & Plan:  Lab Results   Component Value Date    HGBA1C 6.2 (H) 08/02/2023     Good A1c control   BS log reviewed ranging [phone]  Continue Jardiance   Continue Metformin   Accu checks   Hypoglycemic protocol   6. Parkinson's disease, unspecified whether dyskinesia present, unspecified whether manifestations fluctuate  Assessment & Plan:  Stable   Continue carbidopa levodopa - 1 tab TID  7. Depression, unspecified depression type  Assessment & Plan:  Stable  Denies SI/HI  Continue home dose Wellbutrin 300 mg Daily   Continue Vraylar 3 mg  daily   8. History of total left knee replacement  Assessment & Plan:  Left TKA done at [facility]  Continue PT/OT---> WBAT  OP f/u with Ortho done 8/20/24--> overall doing well  Needs to work on extension and not solely sit in the wheelchair  Also will need antibiotic for dental prophylaxis prior to seeing dentist for life due to hx of diabetes which decreased the immune response   OP f/u with Ortho 10/2/2024       All medications and routine orders were reviewed and updated as needed.    Chief Complaint     STR follow up visit    Past Medical and Surgica History      Past Medical History:   Diagnosis Date    Arthritis     Asthma     Colon polyp     CPAP (continuous positive airway pressure) dependence     Edema     left leg    GERD (gastroesophageal reflux disease)     History of echocardiogram 07/2017    History of Holter monitoring 09/2014    History of stress test 08/2014    Hypertension     Myocardial infarct (HCC)     2011    Myocardial infarction (HCC)     Obesity     Sleep apnea     Thrombophlebitis      Past Surgical History:   Procedure Laterality Date    CARDIAC CATHETERIZATION  03/2012 1996,2007    CHOLECYSTECTOMY      COLONOSCOPY      COLONOSCOPY N/A 10/30/2017    Procedure: COLONOSCOPY;  Surgeon: Kiran Fulton MD;  Location: [facility] GI LAB;  Service: Gastroenterology    ENDOVENOUS ABLATION SAPHENOUS VEIN W/ LASER      each addit vein    ERCP      ESOPHAGOGASTRODUODENOSCOPY N/A 10/30/2017    Procedure: ESOPHAGOGASTRODUODENOSCOPY (EGD);  Surgeon: Kiran Fulton MD;  Location: [facility];  Service: Gastroenterology    GASTRIC BYPASS      2001    GASTRIC BYPASS      HERNIA REPAIR      paraesophageal hiatus hernia    HERNIA REPAIR      x5 last one 2011    JOINT REPLACEMENT      KNEE ARTHROPLASTY Right     2102    REPLACEMENT TOTAL KNEE Right 02/2011     Allergies   Allergen Reactions    Gluten Meal - Food Allergy     Adhesive [Medical Tape] Rash          History of Present Illness     Evert Orozco is a 64  y.o. male admitted to [facility] following hospital stay for Left TKA, completed rehab and was discharged home 8/27/24, he returned from home on 8/28/24 stating he could not care for himself. Patient has a past medical Hx including but not limited to Obesity, JOSIAH, CHF, Depression, DM, Parkinson's disease, OA of Left knee, rheumatoid arthritis, cardiac pacemaker s/p sick sinus syndrome, atrial fibrillation. Patient is seen in collaboration with nursing for medical mgmt and STR follow up.     He c/o Chest pain at rehab and was evaluated at [facility] ED 9/8/24-9/9/24. Workup negative for ischemia. CP resolved, did receive one time dose of nitro. EKG unchanged, recommended OP f/u with Cardiology. He would like prn nitro for chest pain.    Patient initially presented to [facility] for left knee replacement 7/22/24.  Patient did well and was recommended discharge to short-term rehab. He is WBAT, knee incision is well healed. He denies any fever/chills. Patient is a reliable historian. He is oob in w/c. He states ambulation and transfers are improving. He denies CP/SOB/N/V/D. He states he is eating well, he denies any bowel or bladder issues. He is actively participating in therapy. He denies any chest pain/sob/n/v/d on exam.        The patient's allergies, past medical, surgical, social and family history were reviewed and unchanged.    Review of Systems     Review of Systems   Cardiovascular:  Positive for leg swelling.   Musculoskeletal:  Positive for gait problem.   Neurological:  Positive for weakness.   All other systems reviewed and are negative.        Objective     Vitals:   Vitals:    09/09/24 1333   BP: 117/67   Pulse: 74   Resp: 18   Temp: 97.8 °F (36.6 °C)   SpO2: 95%             Physical Exam  Vitals and nursing note reviewed.   Constitutional:       General: He is not in acute distress.     Appearance: Normal appearance. He is obese.   HENT:      Head: Normocephalic and atraumatic.      Nose:  No congestion or rhinorrhea.      Mouth/Throat:      Mouth: Mucous membranes are moist.   Eyes:      General: No scleral icterus.     Extraocular Movements: Extraocular movements intact.      Conjunctiva/sclera: Conjunctivae normal.      Pupils: Pupils are equal, round, and reactive to light.   Cardiovascular:      Rate and Rhythm: Normal rate. Rhythm irregular.      Pulses: Normal pulses.      Heart sounds: Normal heart sounds. No murmur heard.  Pulmonary:      Effort: Pulmonary effort is normal.      Breath sounds: Normal breath sounds. No wheezing, rhonchi or rales.   Abdominal:      General: Bowel sounds are normal. There is no distension.      Palpations: Abdomen is soft.      Tenderness: There is no abdominal tenderness.   Musculoskeletal:         General: No swelling or signs of injury.      Left lower leg: Edema present.   Lymphadenopathy:      Cervical: No cervical adenopathy.   Skin:     General: Skin is warm and dry.      Findings: No erythema.   Neurological:      Mental Status: He is alert and oriented to person, place, and time.      Sensory: No sensory deficit.      Motor: Weakness present.      Gait: Gait abnormal.   Psychiatric:         Mood and Affect: Mood normal.         Behavior: Behavior normal.         Pertinent Laboratory/Diagnostic Studies:     Reviewed in facility chart      Current Medications   Medications reviewed and updated see facility MAR for details.      Current Outpatient Medications:     acetaminophen (TYLENOL) 500 mg tablet, Take 1,000 mg by mouth every 8 (eight) hours, Disp: , Rfl:     apixaban (Eliquis) 5 mg, Take 1 tablet (5 mg total) by mouth 2 (two) times a day, Disp: 60 tablet, Rfl: 0    atorvastatin (LIPITOR) 20 mg tablet, Take 20 mg by mouth daily, Disp: , Rfl:     buPROPion (WELLBUTRIN XL) 300 mg 24 hr tablet, Take 300 mg by mouth daily, Disp: , Rfl:     carbidopa-levodopa (SINEMET)  mg per tablet, TAKE 1 TABLET BY MOUTH THREE TIMES DAILY. INCREASE SLOWLY  ACCORDING TO SEPARATE SCHEDULE, Disp: , Rfl:     Cholecalciferol (VITAMIN D3) 1,000 units tablet, Take 2,000 Units by mouth daily, Disp: , Rfl:     dapagliflozin (Farxiga) 10 MG tablet, Take 10 mg by mouth daily, Disp: , Rfl:     famotidine (PEPCID) 40 MG tablet, Take 40 mg by mouth daily, Disp: , Rfl:     gabapentin (NEURONTIN) 100 mg capsule, Take 3 capsules (300 mg total) by mouth daily at bedtime, Disp: , Rfl:     metFORMIN (GLUMETZA) 500 MG (MOD) 24 hr tablet, Take 500 mg by mouth 2 (two) times a day with meals, Disp: , Rfl:     methocarbamol (ROBAXIN) 500 mg tablet, Take 500 mg by mouth every 6 (six) hours as needed for muscle spasms, Disp: , Rfl:     nitroglycerin (NITROSTAT) 0.4 mg SL tablet, Place 0.4 mg under the tongue every 5 (five) minutes as needed for chest pain, Disp: , Rfl:     oxyCODONE (OXY-IR) 5 MG capsule, Take 5 mg by mouth every 4 (four) hours as needed for moderate pain, Disp: , Rfl:     polyethylene glycol (GLYCOLAX) 17 GM/SCOOP powder, Take 17 g by mouth daily, Disp: 289 g, Rfl: 1    potassium chloride (K-DUR,KLOR-CON) 20 mEq tablet, TAKE TOTAL 2 TABLETS ALONG WITH TORSEMIDE 40 MG DOSE. CAN LOWER TO 1 TABLET IF TORSEMIDE IS LOWERED TO 20 MG TABLET, Disp: , Rfl:     sotalol AF (BETAPACE AF) 80 MG, Take 1 tablet (80 mg total) by mouth every 12 (twelve) hours, Disp: , Rfl:     spironolactone (ALDACTONE) 25 mg tablet, Take 25 mg by mouth daily, Disp: , Rfl:     torsemide (DEMADEX) 20 mg tablet, Take 3 tablets (60 mg total) by mouth 3 (three) times a week AND 4 tablets (80 mg total) 4 (four) times a week. 60 mg MWF and 80 mg TThSatSun., Disp: , Rfl:     traZODone (DESYREL) 150 mg tablet, Take 150 mg by mouth daily at bedtime, Disp: , Rfl:     Vraylar 3 MG capsule, Take 3 mg by mouth daily, Disp: , Rfl:   No current facility-administered medications for this visit.     Please note:  Voice-recognition software may have been used in the preparation of this document.  Occasional wrong word or  "\"sound-alike\" substitutions may have occurred due to the inherent limitations of voice recognition software.  Interpretation should be guided by context.         SELVIN Ziegler  9/9/2024  3:25 PM    "

## 2024-09-16 ENCOUNTER — NURSING HOME VISIT (OUTPATIENT)
Dept: GERIATRICS | Facility: OTHER | Age: 64
End: 2024-09-16
Payer: MEDICARE

## 2024-09-16 DIAGNOSIS — E11.9 TYPE 2 DIABETES MELLITUS WITHOUT COMPLICATION, WITHOUT LONG-TERM CURRENT USE OF INSULIN (HCC): ICD-10-CM

## 2024-09-16 DIAGNOSIS — I50.32 CHRONIC DIASTOLIC CHF (CONGESTIVE HEART FAILURE) (HCC): Primary | ICD-10-CM

## 2024-09-16 DIAGNOSIS — I10 BENIGN ESSENTIAL HYPERTENSION: ICD-10-CM

## 2024-09-16 DIAGNOSIS — R07.89 CHEST PAIN, ATYPICAL: ICD-10-CM

## 2024-09-16 DIAGNOSIS — G20.A1 PARKINSON'S DISEASE, UNSPECIFIED WHETHER DYSKINESIA PRESENT, UNSPECIFIED WHETHER MANIFESTATIONS FLUCTUATE (HCC): ICD-10-CM

## 2024-09-16 PROCEDURE — 99309 SBSQ NF CARE MODERATE MDM 30: CPT | Performed by: NURSE PRACTITIONER

## 2024-09-22 ENCOUNTER — TELEPHONE (OUTPATIENT)
Dept: OTHER | Facility: OTHER | Age: 64
End: 2024-09-22

## 2024-09-23 NOTE — TELEPHONE ENCOUNTER
Oscar from [facility] requested to speak to the on call provider regarding the Patient's chest pains.    Contacted the on call via secure chat.

## 2024-09-23 NOTE — TELEPHONE ENCOUNTER
Oscar From [facility] called to follow up with the on call. Patient was given Nitro and taken to [facility].

## 2024-09-24 NOTE — ASSESSMENT & PLAN NOTE
Lab Results   Component Value Date    HGBA1C 6.2 (H) 08/02/2023     Good A1c control   BS log reviewed ranging [phone]  Continue Jardiance   Continue Metformin   Accu checks   Hypoglycemic protocol

## 2024-09-24 NOTE — ASSESSMENT & PLAN NOTE
C/o CP at rehab and requested ED visit  [facility]  EKG unchanged, incomplete RBBB, A Paced  Venous doppler BLE negative for DVT  CP resolved with one dose of nitro   Recommended D/C back to rehab with OP f/u with Cardiology (9/17/2024 2:30 PM [facility] Cardiology - [place] with Ladarius Echavarria MD)  Started Nitro PRN for chest pain   He denies CP on exam today

## 2024-09-24 NOTE — PROGRESS NOTES
[facility]  [address] [address]  [phone]  FACILITY: [facility]  Code 31 (STR)        NAME: Evert Orozco  AGE: 64 y.o. SEX: male CODE STATUS: CPR    DATE OF ENCOUNTER: 9/16/2024    Assessment and Plan     1. Chronic diastolic CHF (congestive heart failure) (HCC)  Assessment & Plan:  Wt Readings from Last 3 Encounters:   09/09/24 132 kg (291 lb)   09/03/24 133 kg (293 lb 8 oz)   08/26/24 132 kg (291 lb)       Also morbidly obese with chronic edema ---> Using Jobst compression stockings at rehab- brought in from home  Weights stable , appears euvolemic on exam   Continue Torsemide  Daily Weights       2. Benign essential hypertension  Assessment & Plan:  Bp acceptable  Continue Sotalol 80 mg Q12  Continue Torsemide 60 mg MWF and 80 mg TuThusSatSun  Monitor vitals     3. Type 2 diabetes mellitus without complication, without long-term current use of insulin ([facility])  Assessment & Plan:  Lab Results   Component Value Date    HGBA1C 6.2 (H) 08/02/2023     Good A1c control   BS log reviewed ranging [id number]  Continue Jardiance   Continue Metformin   Accu checks   Hypoglycemic protocol   4. Parkinson's disease, unspecified whether dyskinesia present, unspecified whether manifestations fluctuate  Assessment & Plan:  Stable   Continue carbidopa levodopa - 1 tab TID  5. Chest pain, atypical  Assessment & Plan:  C/o CP at rehab and requested ED visit  Sent to [facility]- notes reveiwed  EKG unchanged, incomplete RBBB, A Paced  Venous doppler BLE negative for DVT  CP resolved with one dose of nitro   Recommended D/C back to rehab with OP f/u with Cardiology (9/17/2024 2:30 PM [facility] [place] with Ladarius Echavarria MD)  Started Nitro PRN for chest pain   He denies CP on exam today        All medications and routine orders were reviewed and updated as needed.    Chief Complaint     STR follow up visit    Past Medical and Surgica History      Past Medical History:    Diagnosis Date    Arthritis     Asthma     Colon polyp     CPAP (continuous positive airway pressure) dependence     Edema     left leg    GERD (gastroesophageal reflux disease)     History of echocardiogram 07/2017    History of Holter monitoring 09/2014    History of stress test 08/2014    Hypertension     Myocardial infarct (HCC)     2011    Myocardial infarction (HCC)     Obesity     Sleep apnea     Thrombophlebitis      Past Surgical History:   Procedure Laterality Date    CARDIAC CATHETERIZATION  03/2012 1996,2007    CHOLECYSTECTOMY      COLONOSCOPY      COLONOSCOPY N/A 10/30/2017    Procedure: COLONOSCOPY;  Surgeon: Kiran Fulton MD;  Location: [facility] GI [facility];  Service: Gastroenterology    ENDOVENOUS ABLATION SAPHENOUS VEIN W/ LASER      each addit vein    ERCP      ESOPHAGOGASTRODUODENOSCOPY N/A 10/30/2017    Procedure: ESOPHAGOGASTRODUODENOSCOPY (EGD);  Surgeon: Kiran Fulton MD;  Location: [facility] GI [facility];  Service: Gastroenterology    GASTRIC BYPASS      2001    GASTRIC BYPASS      HERNIA REPAIR      paraesophageal hiatus hernia    HERNIA REPAIR      x5 last one 2011    JOINT REPLACEMENT      KNEE ARTHROPLASTY Right     2102    REPLACEMENT TOTAL KNEE Right 02/2011     Allergies   Allergen Reactions    Gluten Meal - Food Allergy     Adhesive [Medical Tape] Rash          History of Present Illness     Evert Orozco is a 64 y.o. male admitted to [facility] following hospital stay for Left TKA, completed rehab and was discharged home 8/27/24, he returned from home on 8/28/24 stating he could not care for himself. Patient has a past medical Hx including but not limited to Obesity, JOSIAH, CHF, Depression, DM, Parkinson's disease, OA of Left knee, rheumatoid arthritis, cardiac pacemaker s/p sick sinus syndrome, atrial fibrillation. Patient is seen in collaboration with nursing for medical mgmt and STR follow up.     He c/o Chest pain at rehab and was evaluated at [facility] ED 9/8/24-9/9/24. Workup  negative for ischemia. CP resolved, did receive one time dose of nitro. EKG unchanged, recommended OP f/u with Cardiology. He would like prn nitro for chest pain.    Patient initially presented to [facility] for left knee replacement 7/22/24.  Patient did well and was recommended discharge to short-term rehab. He is WBAT, knee incision is well healed. He denies any fever/chills. Patient is a reliable historian. He is oob in w/c. He states ambulation and transfers are improving. He denies CP/SOB/N/V/D. He states he is eating well, he denies any bowel or bladder issues. He is actively participating in therapy. He denies any chest pain/sob/n/v/d on exam.        The patient's allergies, past medical, surgical, social and family history were reviewed and unchanged.    Review of Systems     Review of Systems   Cardiovascular:  Positive for leg swelling.   Musculoskeletal:  Positive for gait problem.   Neurological:  Positive for weakness.   All other systems reviewed and are negative.        Objective     Vitals:   There were no vitals filed for this visit.            Physical Exam  Vitals and nursing note reviewed.   Constitutional:       General: He is not in acute distress.     Appearance: Normal appearance. He is obese.   HENT:      Head: Normocephalic and atraumatic.      Nose: No congestion or rhinorrhea.      Mouth/Throat:      Mouth: Mucous membranes are moist.   Eyes:      General: No scleral icterus.     Extraocular Movements: Extraocular movements intact.      Conjunctiva/sclera: Conjunctivae normal.      Pupils: Pupils are equal, round, and reactive to light.   Cardiovascular:      Rate and Rhythm: Normal rate. Rhythm irregular.      Pulses: Normal pulses.      Heart sounds: Normal heart sounds. No murmur heard.  Pulmonary:      Effort: Pulmonary effort is normal.      Breath sounds: Normal breath sounds. No wheezing, rhonchi or rales.   Abdominal:      General: Bowel sounds are normal. There is no  distension.      Palpations: Abdomen is soft.      Tenderness: There is no abdominal tenderness.   Musculoskeletal:         General: No swelling or signs of injury.      Left lower leg: Edema present.   Lymphadenopathy:      Cervical: No cervical adenopathy.   Skin:     General: Skin is warm and dry.      Findings: No erythema.   Neurological:      Mental Status: He is alert and oriented to person, place, and time.      Sensory: No sensory deficit.      Motor: Weakness present.      Gait: Gait abnormal.   Psychiatric:         Mood and Affect: Mood normal.         Behavior: Behavior normal.         Pertinent Laboratory/Diagnostic Studies:     Reviewed in facility chart      Current Medications   Medications reviewed and updated see facility MAR for details.      Current Outpatient Medications:     acetaminophen (TYLENOL) 500 mg tablet, Take 1,000 mg by mouth every 8 (eight) hours, Disp: , Rfl:     apixaban (Eliquis) 5 mg, Take 1 tablet (5 mg total) by mouth 2 (two) times a day, Disp: 60 tablet, Rfl: 0    atorvastatin (LIPITOR) 20 mg tablet, Take 20 mg by mouth daily, Disp: , Rfl:     buPROPion (WELLBUTRIN XL) 300 mg 24 hr tablet, Take 300 mg by mouth daily, Disp: , Rfl:     carbidopa-levodopa (SINEMET)  mg per tablet, TAKE 1 TABLET BY MOUTH THREE TIMES DAILY. INCREASE SLOWLY ACCORDING TO SEPARATE SCHEDULE, Disp: , Rfl:     Cholecalciferol (VITAMIN D3) 1,000 units tablet, Take 2,000 Units by mouth daily, Disp: , Rfl:     dapagliflozin (Farxiga) 10 MG tablet, Take 10 mg by mouth daily, Disp: , Rfl:     famotidine (PEPCID) 40 MG tablet, Take 40 mg by mouth daily, Disp: , Rfl:     gabapentin (NEURONTIN) 100 mg capsule, Take 3 capsules (300 mg total) by mouth daily at bedtime, Disp: , Rfl:     metFORMIN (GLUMETZA) 500 MG (MOD) 24 hr tablet, Take 500 mg by mouth 2 (two) times a day with meals, Disp: , Rfl:     methocarbamol (ROBAXIN) 500 mg tablet, Take 500 mg by mouth every 6 (six) hours as needed for muscle spasms,  "Disp: , Rfl:     nitroglycerin (NITROSTAT) 0.4 mg SL tablet, Place 0.4 mg under the tongue every 5 (five) minutes as needed for chest pain, Disp: , Rfl:     oxyCODONE (OXY-IR) 5 MG capsule, Take 5 mg by mouth every 4 (four) hours as needed for moderate pain, Disp: , Rfl:     polyethylene glycol (GLYCOLAX) 17 GM/SCOOP powder, Take 17 g by mouth daily, Disp: 289 g, Rfl: 1    potassium chloride (K-DUR,KLOR-CON) 20 mEq tablet, TAKE TOTAL 2 TABLETS ALONG WITH TORSEMIDE 40 MG DOSE. CAN LOWER TO 1 TABLET IF TORSEMIDE IS LOWERED TO 20 MG TABLET, Disp: , Rfl:     sotalol AF (BETAPACE AF) 80 MG, Take 1 tablet (80 mg total) by mouth every 12 (twelve) hours, Disp: , Rfl:     spironolactone (ALDACTONE) 25 mg tablet, Take 25 mg by mouth daily, Disp: , Rfl:     torsemide (DEMADEX) 20 mg tablet, Take 3 tablets (60 mg total) by mouth 3 (three) times a week AND 4 tablets (80 mg total) 4 (four) times a week. 60 mg MWF and 80 mg TThSatSun., Disp: , Rfl:     traZODone (DESYREL) 150 mg tablet, Take 150 mg by mouth daily at bedtime, Disp: , Rfl:     Vraylar 3 MG capsule, Take 3 mg by mouth daily, Disp: , Rfl:      Please note:  Voice-recognition software may have been used in the preparation of this document.  Occasional wrong word or \"sound-alike\" substitutions may have occurred due to the inherent limitations of voice recognition software.  Interpretation should be guided by context.         SELVIN Ziegler      "

## 2024-09-24 NOTE — ASSESSMENT & PLAN NOTE
Wt Readings from Last 3 Encounters:   09/09/24 132 kg (291 lb)   09/03/24 133 kg (293 lb 8 oz)   08/26/24 132 kg (291 lb)       Also morbidly obese with chronic edema ---> Using Jobst compression stockings at rehab- brought in from home  Weights stable , appears euvolemic on exam   Continue Torsemide  Daily Weights

## 2024-10-10 ENCOUNTER — OFFICE VISIT (OUTPATIENT)
Dept: NEPHROLOGY | Facility: CLINIC | Age: 64
End: 2024-10-10
Payer: MEDICARE

## 2024-10-10 VITALS
SYSTOLIC BLOOD PRESSURE: 118 MMHG | BODY MASS INDEX: 43.84 KG/M2 | HEART RATE: 63 BPM | HEIGHT: 69 IN | WEIGHT: 296 LBS | DIASTOLIC BLOOD PRESSURE: 82 MMHG | OXYGEN SATURATION: 97 %

## 2024-10-10 DIAGNOSIS — I10 BENIGN ESSENTIAL HYPERTENSION: ICD-10-CM

## 2024-10-10 DIAGNOSIS — I50.32 CHRONIC DIASTOLIC CHF (CONGESTIVE HEART FAILURE) (HCC): Primary | ICD-10-CM

## 2024-10-10 DIAGNOSIS — I50.32 CHRONIC HEART FAILURE WITH PRESERVED EJECTION FRACTION (HCC): ICD-10-CM

## 2024-10-10 DIAGNOSIS — E87.6 HYPOKALEMIA: ICD-10-CM

## 2024-10-10 PROCEDURE — 99213 OFFICE O/P EST LOW 20 MIN: CPT | Performed by: INTERNAL MEDICINE

## 2024-10-10 NOTE — PROGRESS NOTES
NEPHROLOGY OFFICE PROGRESS NOTE   Evert Orozco 64 y.o. male MRN: [id number]  DATE: 10/10/24  Reason for visit: Continued evaluation and management of hyponatremia, elevated creatinine.     ASSESSMENT & PLAN:  Hypokalemia  K was 3.9 on 9/24/24 in the hospital.   Continue Kdur 40 meq daily and Spironolactone 25 mg daily.   Repeat CMP in the next 2 weeks.   Check Mg.     Congestive heart failure.   Being managed by cardiology - Dr. Reyes of [facility].   Rx: Torsemide 60 mg BID 3 days a week and 80 mg BID 4 days a week, Spironolactone 25 mg OD.   Defer diuretics to cardiology.     Renal function  Baseline SCr is 1.0 to 1.3.  Renal function is stable - SCr 1.14.   Check UA and urine ACR.    No proteinuria in the past.     Hypertension  BP is at goal (<130/80)  Rx: Torsemide and Spironolactone  No changes to meds today.     PAF  He is on Sotalol and is on Eliquis.   Defer to cardiology.     Diabetes mellitus: Follow-up with Dr. Lange.  Morbid obesity: Follow-up with PCP.    Patient Instructions   Please continue with your current diuretics as prescribed by cardiology.   Check blood and urine tests any time over the next 1-2 weeks.   Follow up in 6 months.     SUBJECTIVE / INTERVAL HISTORY:  Evert was last seen by me in the office in Jan 2024.   Since that time, he has had multiple admissions and ER visits to [facility].  He had a L knee TKA in July 2024 and was in a SNF thereafter for rehab for 2 months.   His admissions have been related to recurrent chest pain.   He is now on Torsemide 60 mg BID 3 days a week and 80 mg BID 4 days a week. This is being managed by cardiology.   He is also on Spironolactone 25 mg OD and Jardiance (unclear dose)  He looks clinically worse compared to when I last saw him.   His wife, Loretta, is with him today.   He is walking with a walker.   He reports that the edema is not worse but is present.   He sleeps in a recliner.   Not checking home BP.   No labs since being in the hospital.  "    PMH/PSH: HTN, DM, CHF, CAD, PAF, DVT s/p IVC filter, cholecystectomy, fatty liver, GERD, obesity s/p gastric bypass in Jan 2002, COPD, JOSIAH on CPAP, DJD, parkinson's.     Previous work up:  7/12/23 Echo - EF 60-65%. Normal diastolic function. RV is mildly dilated.     ALLERGIES:   Allergies   Allergen Reactions    Gluten Meal - Food Allergy     Adhesive [Medical Tape] Rash     REVIEW OF SYSTEMS:  Review of Systems   Constitutional:  Negative for appetite change, chills and fever.   Respiratory:  Negative for cough and shortness of breath.    Cardiovascular:  Positive for chest pain (on and off) and leg swelling.   Gastrointestinal:  Negative for abdominal pain, diarrhea, nausea and vomiting.   Genitourinary:  Negative for hematuria.   Musculoskeletal:  Negative for arthralgias and back pain.   Neurological:  Negative for dizziness and light-headedness.     OBJECTIVE:  /82 (BP Location: Left arm, Patient Position: Sitting, Cuff Size: Large)   Pulse 63   Ht 5' 9\" (1.753 m)   Wt 134 kg (296 lb)   SpO2 97%   BMI 43.71 kg/m²   Current Weight: Weight - Scale: 134 kg (296 lb) Body mass index is 43.71 kg/m².  Physical Exam  Constitutional:       General: He is not in acute distress.     Appearance: He is well-developed. He is obese. He is ill-appearing (chronically).   HENT:      Head: Normocephalic and atraumatic.   Eyes:      General: No scleral icterus.     Conjunctiva/sclera: Conjunctivae normal.   Neck:      Vascular: No JVD.   Cardiovascular:      Rate and Rhythm: Normal rate. Rhythm irregular.      Heart sounds: Normal heart sounds.   Pulmonary:      Effort: Pulmonary effort is normal. No respiratory distress.      Breath sounds: Normal breath sounds.   Abdominal:      General: Bowel sounds are normal.      Palpations: Abdomen is soft.   Musculoskeletal:      Cervical back: Neck supple.      Comments: + bilateral LE edema - at least moderate.    Skin:     General: Skin is warm and dry.   Neurological: "      Mental Status: He is alert and oriented to person, place, and time.   Psychiatric:         Behavior: Behavior normal.       Medications:  Current Outpatient Medications:     acetaminophen (TYLENOL) 500 mg tablet, Take 1,000 mg by mouth every 8 (eight) hours, Disp: , Rfl:     apixaban (Eliquis) 5 mg, Take 1 tablet (5 mg total) by mouth 2 (two) times a day, Disp: 60 tablet, Rfl: 0    atorvastatin (LIPITOR) 20 mg tablet, Take 20 mg by mouth daily, Disp: , Rfl:     buPROPion (WELLBUTRIN XL) 300 mg 24 hr tablet, Take 300 mg by mouth daily, Disp: , Rfl:     carbidopa-levodopa (SINEMET)  mg per tablet, TAKE 1 TABLET BY MOUTH THREE TIMES DAILY. INCREASE SLOWLY ACCORDING TO SEPARATE SCHEDULE, Disp: , Rfl:     Cholecalciferol (VITAMIN D3) 1,000 units tablet, Take 2,000 Units by mouth daily, Disp: , Rfl:     gabapentin (NEURONTIN) 100 mg capsule, Take 3 capsules (300 mg total) by mouth daily at bedtime, Disp: , Rfl:     metFORMIN (GLUMETZA) 500 MG (MOD) 24 hr tablet, Take 500 mg by mouth 2 (two) times a day with meals, Disp: , Rfl:     potassium chloride (K-DUR,KLOR-CON) 20 mEq tablet, TAKE TOTAL 2 TABLETS ALONG WITH TORSEMIDE 40 MG DOSE. CAN LOWER TO 1 TABLET IF TORSEMIDE IS LOWERED TO 20 MG TABLET, Disp: , Rfl:     sotalol AF (BETAPACE AF) 80 MG, Take 1 tablet (80 mg total) by mouth every 12 (twelve) hours, Disp: , Rfl:     spironolactone (ALDACTONE) 25 mg tablet, Take 25 mg by mouth daily, Disp: , Rfl:     torsemide (DEMADEX) 20 mg tablet, Take 3 tablets (60 mg total) by mouth 3 (three) times a week AND 4 tablets (80 mg total) 4 (four) times a week. 60 mg MWF and 80 mg TThSatSun., Disp: , Rfl:     traZODone (DESYREL) 150 mg tablet, Take 150 mg by mouth daily at bedtime, Disp: , Rfl:     Vraylar 3 MG capsule, Take 3 mg by mouth daily, Disp: , Rfl:     dapagliflozin (Farxiga) 10 MG tablet, Take 10 mg by mouth daily (Patient not taking: Reported on 10/10/2024), Disp: , Rfl:     famotidine (PEPCID) 40 MG tablet, Take  40 mg by mouth daily, Disp: , Rfl:     methocarbamol (ROBAXIN) 500 mg tablet, Take 500 mg by mouth every 6 (six) hours as needed for muscle spasms (Patient not taking: Reported on 10/10/2024), Disp: , Rfl:     nitroglycerin (NITROSTAT) 0.4 mg SL tablet, Place 0.4 mg under the tongue every 5 (five) minutes as needed for chest pain, Disp: , Rfl:     oxyCODONE (OXY-IR) 5 MG capsule, Take 5 mg by mouth every 4 (four) hours as needed for moderate pain (Patient not taking: Reported on 10/10/2024), Disp: , Rfl:     polyethylene glycol (GLYCOLAX) 17 GM/SCOOP powder, Take 17 g by mouth daily (Patient not taking: Reported on 10/10/2024), Disp: 289 g, Rfl: 1    Laboratory Results:  Lab Results   Component Value Date    SODIUM 137 09/24/2024    K 3.9 09/24/2024    CL 98 (L) 09/24/2024    CO2 30 09/24/2024    BUN 12 09/24/2024    CREATININE 1.14 09/24/2024    GLUC 141 (H) 09/24/2024    CALCIUM 9.4 09/24/2024     Lab Results   Component Value Date    WBC 8.08 06/07/2023    HGB 12.1 (L) 07/23/2024    HCT 37.0 07/23/2024    MCV 76 (L) 06/07/2023     06/07/2023

## 2024-10-10 NOTE — PATIENT INSTRUCTIONS
Please continue with your current diuretics as prescribed by cardiology.   Check blood and urine tests any time over the next 1-2 weeks.   Follow up in 6 months.

## 2024-12-01 NOTE — NURSING NOTE
Patient resting in bed  Patient states pain is improving and is now a 6/10  Patient has no other complaints at this time  Bed low and locked  Call bell within reach  Will continue to monitor    Zachary Jesus, MAMIE Subjective  Patient seen and examined., offers no specific complaints . No nausea , no vomiting, no abd pain patient feels fantastic and offers no complaints.  Denies any cough no shortness of breath no dysuria no fever no chills    Review of Systems  As mentioned above     Objective    Last Recorded Vitals  Blood pressure 112/76, pulse (!) 117, temperature 98.2 °F (36.8 °C), temperature source Oral, resp. rate 16, height 5' 6\" (1.676 m), weight 75.5 kg (166 lb 7.2 oz), SpO2 99%.  Body mass index is 26.87 kg/m².    Physical Exam  Vitals reviewed.   Constitutional:       General: She is not in acute distress.     Appearance: Normal appearance.   HENT:      Head: Normocephalic.      Mouth/Throat:      Mouth: Mucous membranes are moist.      Neck: Neck supple.   Eyes:      Conjunctiva/sclera: Conjunctivae normal.   Cardiovascular:      Heart sounds: Normal heart sounds. No murmur heard.  Pulmonary:      Effort: No respiratory distress.      Breath sounds: No wheezing.   Abdominal:      General: There is no distension.      Tenderness: There is no abdominal tenderness. There is no guarding or rebound.   Musculoskeletal:         General: No swelling.      Left lower leg: No edema.   Skin:     Findings: No bruising or rash.   Neurological:      General: No focal deficit present.      Mental Status: She is alert.   Psychiatric:         Mood and Affect: Mood normal.         Behavior: Behavior normal.               Intake/Output Summary (Last 24 hours) at 12/1/2024 0908  Last data filed at 12/1/2024 0600  Gross per 24 hour   Intake 1300 ml   Output 550 ml   Net 750 ml        Labs   Recent Results (from the past 24 hour(s))   Prothrombin Time (INR/PT)    Collection Time: 11/30/24 10:54 AM    Specimen: Blood, Venous   Result Value Ref Range    Protime- PT 15.6 (H) 9.7 - 11.8 sec    INR 1.5     Partial Thromboplastin Time (PTT)    Collection Time: 11/30/24 10:54 AM    Specimen: Blood, Venous   Result Value Ref Range    PTT 32  22 - 32 sec   Comprehensive Metabolic Panel    Collection Time: 11/30/24 10:54 AM    Specimen: Blood, Venous   Result Value Ref Range    Fasting Status      Sodium 129 (L) 135 - 145 mmol/L    Potassium 4.9 3.4 - 5.1 mmol/L    Chloride 102 97 - 110 mmol/L    Carbon Dioxide 20 (L) 21 - 32 mmol/L    Anion Gap 12 7 - 19 mmol/L    Glucose 132 (H) 70 - 99 mg/dL    BUN 15 6 - 20 mg/dL    Creatinine 0.53 0.51 - 0.95 mg/dL    Glomerular Filtration Rate >90 >=60    BUN/Cr 28 (H) 7 - 25    Calcium 7.6 (L) 8.4 - 10.2 mg/dL    Bilirubin, Total 0.9 0.2 - 1.0 mg/dL    GOT/AST 38 (H) <=37 Units/L    GPT/ALT 46 <64 Units/L    Alkaline Phosphatase 252 (H) 45 - 117 Units/L    Albumin 1.2 (L) 3.4 - 5.0 g/dL    Protein, Total 3.5 (L) 6.4 - 8.2 g/dL    Globulin 2.3 2.0 - 4.0 g/dL    A/G Ratio 0.5 (L) 1.0 - 2.4   Lactic Acid Venous With Reflex    Collection Time: 11/30/24 10:54 AM    Specimen: Blood, Venous   Result Value Ref Range    Lactate, Venous 2.6 (HH) 0.0 - 2.0 mmol/L   CBC with Automated Differential (performable only)    Collection Time: 11/30/24 10:54 AM    Specimen: Blood, Venous   Result Value Ref Range    WBC 14.8 (H) 4.2 - 11.0 K/mcL    RBC 3.25 (L) 4.00 - 5.20 mil/mcL    HGB 11.2 (L) 12.0 - 15.5 g/dL    HCT 32.6 (L) 36.0 - 46.5 %    .3 (H) 78.0 - 100.0 fl    MCH 34.5 (H) 26.0 - 34.0 pg    MCHC 34.4 32.0 - 36.5 g/dL    RDW-CV 16.3 (H) 11.0 - 15.0 %    RDW-SD 60.4 (H) 39.0 - 50.0 fL     140 - 450 K/mcL    NRBC 0 <=0 /100 WBC    Neutrophil, Percent 91 %    Lymphocytes, Percent 5 %    Mono, Percent 3 %    Eosinophils, Percent 0 %    Basophils, Percent 0 %    Immature Granulocytes 1 %    Absolute Neutrophils 13.5 (H) 1.8 - 7.7 K/mcL    Absolute Lymphocytes 0.7 (L) 1.0 - 4.0 K/mcL    Absolute Monocytes 0.5 0.3 - 0.9 K/mcL    Absolute Eosinophils  0.0 0.0 - 0.5 K/mcL    Absolute Basophils 0.0 0.0 - 0.3 K/mcL    Absolute Immature Granulocytes 0.1 0.0 - 0.2 K/mcL   Procalcitonin    Collection Time: 11/30/24 10:54 AM     Specimen: Blood, Venous   Result Value Ref Range    Procalcitonin 0.71 (H) <0.10 ng/mL   Light Blue Top    Collection Time: 11/30/24 10:56 AM    Specimen: Blood, Venous   Result Value Ref Range    Extra Tube Hold for Add Ons    Electrocardiogram 12-Lead    Collection Time: 11/30/24 10:59 AM   Result Value Ref Range    Ventricular Rate EKG/Min (BPM) 119     Atrial Rate (BPM) 119     TX-Interval (MSEC) 134     QRS-Interval (MSEC) 60     QT-Interval (MSEC) 284     QTc 399     P Axis (Degrees) 16     R Axis (Degrees) -11     T Axis (Degrees) 14     REPORT TEXT       Sinus tachycardia  Low voltage QRS  Borderline ECG  When compared with ECG of  25-NOV-2024 02:10,  Nonspecific T wave abnormality, improved in  Anterolateral leads  Confirmed by MARIN JACKSON MD ([id number]) on 11/30/2024 4:37:23 PM     Blood Culture    Collection Time: 11/30/24  1:18 PM    Specimen: Blood, Peripheral   Result Value Ref Range    Culture, Blood or Bone Marrow No Growth <24 hours    Blood Culture    Collection Time: 11/30/24  2:18 PM    Specimen: Blood, Peripheral   Result Value Ref Range    Culture, Blood or Bone Marrow No Growth <24 hours    Lactic Acid Venous With Reflex    Collection Time: 11/30/24  2:18 PM    Specimen: Blood, Venous   Result Value Ref Range    Lactate, Venous 4.4 (HH) 0.0 - 2.0 mmol/L   COVID/Flu/RSV panel    Collection Time: 11/30/24  2:46 PM    Specimen: Nasal, Mid-turbinate; Swab   Result Value Ref Range    Rapid SARS-COV-2 by PCR Not Detected Not Detected / Detected / Presumptive Positive / Inhibitors present    Influenza A by PCR Not Detected Not Detected    Influenza B by PCR Not Detected Not Detected    RSV BY PCR Not Detected Not Detected    Isolation Guidelines      Procedural Comment     Partial Thromboplastin Time (PTT)    Collection Time: 11/30/24  4:14 PM    Specimen: Blood, Venous   Result Value Ref Range    PTT 25 22 - 32 sec   Urinalysis & Reflex Microscopy With Culture If Indicated    Collection Time:  11/30/24  5:27 PM    Specimen: Urine, Catheterized - Straight   Result Value Ref Range    COLOR, URINALYSIS Macey     APPEARANCE, URINALYSIS Clear     GLUCOSE, URINALYSIS Negative Negative mg/dL    BILIRUBIN, URINALYSIS Positive (A) Negative    KETONES, URINALYSIS Negative Negative mg/dL    SPECIFIC GRAVITY, URINALYSIS 1.027 1.005 - 1.030    OCCULT BLOOD, URINALYSIS Negative Negative    PH, URINALYSIS 6.0 5.0 - 7.0    PROTEIN, URINALYSIS 30 (A) Negative mg/dL    UROBILINOGEN, URINALYSIS 4.0 (A) 0.2, 1.0 mg/dL    NITRITE, URINALYSIS Negative Negative    LEUKOCYTE ESTERASE, URINALYSIS Negative Negative    SQUAMOUS EPITHELIAL, URINALYSIS 1 to 5 None Seen, 1 to 5 /hpf    ERYTHROCYTES, URINALYSIS 1 to 2 None Seen, 1 to 2 /hpf    LEUKOCYTES, URINALYSIS 1 to 5 None Seen, 1 to 5 /hpf    BACTERIA, URINALYSIS Few (A) None Seen /hpf    HYALINE CASTS, URINALYSIS 11 to 25 (A) None Seen, 1 to 5 /lpf    MUCUS Present    Lactic Acid, Venous    Collection Time: 11/30/24  5:29 PM    Specimen: Blood, Venous   Result Value Ref Range    Lactate, Venous 4.1 (HH) 0.0 - 2.0 mmol/L   Lactic Acid, Venous    Collection Time: 11/30/24 11:51 PM    Specimen: Blood, Venous   Result Value Ref Range    Lactate, Venous 2.4 (HH) 0.0 - 2.0 mmol/L   CBC No Differential    Collection Time: 12/01/24  8:43 AM    Specimen: Blood, Venous   Result Value Ref Range    WBC 14.8 (H) 4.2 - 11.0 K/mcL    RBC 3.56 (L) 4.00 - 5.20 mil/mcL    HGB 11.9 (L) 12.0 - 15.5 g/dL    HCT 37.1 36.0 - 46.5 %    .2 (H) 78.0 - 100.0 fl    MCH 33.4 26.0 - 34.0 pg    MCHC 32.1 32.0 - 36.5 g/dL     140 - 450 K/mcL    RDW-CV 16.4 (H) 11.0 - 15.0 %    RDW-SD 63.3 (H) 39.0 - 50.0 fL    NRBC 0 <=0 /100 WBC          Imaging  No results found.    XR CHEST PA AND LATERAL 2 VIEWS   Final Result      1.   Decrease in size of small pleural effusions.   2.   Increase in left greater than right bibasilar pleural-parenchymal   opacities, nonspecific and could reflect subsegmental  atelectasis,   aspiration, and/or infection.       Electronically Signed by: NHAN ZHANG M.D.    Signed on: 12/1/2024 8:17 AM    Workstation ID: [id number]      NM HEPATOBILIARY WO PHARM   Final Result      1.  Persistent nonvisualization of gallbladder for up to 120 minutes is   suspicious for cystic duct obstruction.  Please note that false-positive   findings can be seen in case of prolonged debilitating illness, TPN,   prolonged fasting for more than 24 hours, chronic opioid usage etc.    Clinical correlation recommended.         2.  Patent common bile duct.         3.  Significant retention of radiotracer in the liver by the end of the   study is suspicious for hepatocellular dysfunction.      Findings were communicated to Dr. Mayra Clancy M.D. by Dr. Neftaly Dong M.D. at 11:28 a.m. on 11/27/2024 immediately after the diagnosis was   made through [employer] messaging application service.      Radiation Dosimetry:   The radiopharmaceutical used for this exam delivers approximately 0.9   mSv/mCi (90 mRem/mCi)   Source:  RADIATION DOSE ESTIMATES TO ADULTS AND CHILDREN, [place];   Effective dose RADAR               Electronically Signed by: NEFTALY DONG MD    Signed on: 11/27/2024 11:31 AM    Workstation ID: [id number]      US LIVER GALLBLADDER PANCREAS (RUQ)   Final Result   Liver demonstrates heterogeneous echotexture, a nonspecific appearance that   can be seen with chronic hepatocellular diseases including liver cirrhosis.   Please correlate with liver function tests and elastography.      No focal hepatic mass.      Cholelithiasis, small pericholecystic fluid and gallbladder wall   thickening.       Recommend further evaluation with hepatobiliary scintigraphy to rule out   cystic duct obstruction.      No biliary ductal dilatation.         Electronically Signed by: SHAN MASTERSON    Signed on: 11/26/2024 10:16 AM    Workstation ID: [id number]      XR CHEST PA OR AP 1 VIEW   Final  Result   Impression:   No significant interval change.               Electronically Signed by: NITA BOWLES MD    Signed on: 11/25/2024 4:36 AM    Workstation ID: [id number]      CT HEAD WO CONTRAST   Final Result      No acute intracranial hemorrhage.                        Electronically Signed by: NITA BOWLES MD    Signed on: 11/25/2024 4:31 AM    Workstation ID: [id number]      CT CERVICAL SPINE WO CONTRAST   Final Result      No evidence of acute pathology.   Bilateral pleural effusions, incompletely visualized.      Electronically Signed by: NITA BOWLES MD    Signed on: 11/25/2024 4:40 AM    Workstation ID: [id number]          LAST ECHO/ECHO STRESS:  No valid procedures specified.  Results for orders placed or performed during the hospital encounter of 11/25/24   Electrocardiogram 12-Lead    Collection Time: 11/30/24 10:59 AM   Result Value Ref Range    Ventricular Rate EKG/Min (BPM) 119     Atrial Rate (BPM) 119     OR-Interval (MSEC) 134     QRS-Interval (MSEC) 60     QT-Interval (MSEC) 284     QTc 399     P Axis (Degrees) 16     R Axis (Degrees) -11     T Axis (Degrees) 14     REPORT TEXT       Sinus tachycardia  Low voltage QRS  Borderline ECG  When compared with ECG of  25-NOV-2024 02:10,  Nonspecific T wave abnormality, improved in  Anterolateral leads  Confirmed by MANUEL VARNER, [facility] ([id number]) on 11/30/2024 4:37:23 PM      \"}      Assessment and Plan     57 year old female  with prior history of Lt common femoral vein DVT s/p IVC filter, on Xarelto 20mg, ESBL UTI, paraesophageal hernia s/p repair *2, Elio-en-Y gastric bypass (3/2019), esophageal stenosis s/p dilation (10/2021), closure of Petersons defect in 9/2022, GERD, HTN, CAD in 2008, MDD, protein-loss enteropathy      UTI in the past treated with meropenem and fosfomycin per ID recommendation (history of ESBL UTI treated with Meropenem in past) and lactulose for concerns of altered mental stratus came in as she hit her head to  the nightstand as per the patient she was trying to get Kleenex and hit her left side of the face to the nightstand.     status post injury CT head CT C-spine normal no external injury no bruising on the face     Tachycardia TSH mildly elevated patient takes Synthroid 100 mcg will continue home dose discussed about compliance     Elevated LFTs will check the hep panel and right upper quadrant ultrasound  Us Liver demonstrates heterogeneous echotexture, a nonspecific appearance that can be seen with chronic hepatocellular diseases including liver cirrhosis. gallbladder with Cholelithiasis, small pericholecystic fluid and gallbladder wall thickening . Hida requested . No nausea , no vomiting , no abd  pain .  Hida concerning for cystic duct obstruction. Seen by surgery, no intervention recommended. No gi sxs      History of left common femoral vein DVT status post IVC filter on Xarelto     History of ESBL UTI     Status post paraesophageal hernia repair status post gastric bypass continue supplements and zinc some vitamin E, vitamin A, thiamine, multivitamins vitamin D     GERD continue PPI     Hypertension blood pressure normal      History of coronary artery disease patient denies any stents  Continue home medications     Hyponatremia we will request SIADH labs patient appears to be euvolemic serum osmolality , u osm , urine sodium and cortisol levels TSH mildly elevated. Sodium at 130.   11/29: labs Not c/w with siadh .  Per patient she does drink a lot of water restrict fluid to 1.2 L/day and monitor sodium  11/30: Sodium at 129.  Patient tachycardic with 0.9 @50 mL monitor    Leukocytosis and tachycardia sepsis alert popping up patient offers no complaints spitting out some phlegm but no shortness of breath on room air no dysuria no diarrhea no fever no chills procalcitonin mildly elevated at 0.7 normal lactic acid at 2.6 could have been dehydrated will give 0.9 and reassess.  Recent echo from September with  normal EF. Will check covid . Blood cultures requested with sepsis  12/01: Sodium at 131, white count of 14.8, lactic acid of 2.4 chest x-ray increasing left greater than right basilar pleural-parenchymal opacities, remains tachycardic with pulse of 100-110 will check CTA chest rule out PE/pneumonia lower extremity venous Doppler secondary to persistent tachycardia     Full code  DVT prophylaxis with xarelto     needs insurance authorization to go back to [facility] DR: Lupe Kauffman     Disposition    Code Status    Code Status: Not on file    DVT Prophylaxis  xarelto    Disposition:  Discharge order in place as of 11/27

## 2025-02-12 ENCOUNTER — APPOINTMENT (EMERGENCY)
Dept: CT IMAGING | Facility: HOSPITAL | Age: 65
End: 2025-02-12
Payer: MEDICARE

## 2025-02-12 ENCOUNTER — APPOINTMENT (EMERGENCY)
Dept: RADIOLOGY | Facility: HOSPITAL | Age: 65
End: 2025-02-12
Payer: MEDICARE

## 2025-02-12 ENCOUNTER — HOSPITAL ENCOUNTER (EMERGENCY)
Facility: HOSPITAL | Age: 65
Discharge: HOME | End: 2025-02-12
Attending: STUDENT IN AN ORGANIZED HEALTH CARE EDUCATION/TRAINING PROGRAM
Payer: MEDICARE

## 2025-02-12 VITALS
RESPIRATION RATE: 18 BRPM | OXYGEN SATURATION: 98 % | DIASTOLIC BLOOD PRESSURE: 72 MMHG | HEART RATE: 62 BPM | WEIGHT: 292.33 LBS | SYSTOLIC BLOOD PRESSURE: 153 MMHG | TEMPERATURE: 98.4 F
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
## 2025-02-12 LAB
ALBUMIN SERPL BCG-MCNC: 4 G/DL (ref 3.5–5)
ALP SERPL-CCNC: 80 U/L (ref 34–104)
ALT SERPL W P-5'-P-CCNC: 11 U/L (ref 7–52)
ANION GAP SERPL CALCULATED.3IONS-SCNC: 5 MMOL/L (ref 4–13)
AST SERPL W P-5'-P-CCNC: 9 U/L (ref 13–39)
BASE EXCESS BLDA CALC-SCNC: 3 MMOL/L (ref -2–3)
BASOPHILS # BLD AUTO: 0.05 THOUSANDS/ΜL (ref 0–0.1)
BASOPHILS NFR BLD AUTO: 1 % (ref 0–1)
BILIRUB SERPL-MCNC: 0.62 MG/DL (ref 0.2–1)
BUN SERPL-MCNC: 11 MG/DL (ref 5–25)
CA-I BLD-SCNC: 1.22 MMOL/L (ref 1.12–1.32)
CALCIUM SERPL-MCNC: 9.4 MG/DL (ref 8.4–10.2)
CHLORIDE SERPL-SCNC: 104 MMOL/L (ref 96–108)
CO2 SERPL-SCNC: 30 MMOL/L (ref 21–32)
CREAT SERPL-MCNC: 0.89 MG/DL (ref 0.6–1.3)
EOSINOPHIL # BLD AUTO: 0.11 THOUSAND/ΜL (ref 0–0.61)
EOSINOPHIL NFR BLD AUTO: 1 % (ref 0–6)
ERYTHROCYTE [DISTWIDTH] IN BLOOD BY AUTOMATED COUNT: 14.9 % (ref 11.6–15.1)
GFR SERPL CREATININE-BSD FRML MDRD: 89 ML/MIN/1.73SQ M
GLUCOSE SERPL-MCNC: 127 MG/DL (ref 65–140)
GLUCOSE SERPL-MCNC: 130 MG/DL (ref 65–140)
HCO3 BLDA-SCNC: 28.2 MMOL/L (ref 24–30)
HCT VFR BLD AUTO: 41.2 % (ref 36.5–49.3)
HCT VFR BLD CALC: 40 % (ref 36.5–49.3)
HGB BLD-MCNC: 12.7 G/DL (ref 12–17)
HGB BLDA-MCNC: 13.6 G/DL (ref 12–17)
HOLD SPECIMEN: NORMAL
IMM GRANULOCYTES # BLD AUTO: 0.04 THOUSAND/UL (ref 0–0.2)
IMM GRANULOCYTES NFR BLD AUTO: 1 % (ref 0–2)
LYMPHOCYTES # BLD AUTO: 1.14 THOUSANDS/ΜL (ref 0.6–4.47)
LYMPHOCYTES NFR BLD AUTO: 14 % (ref 14–44)
MCH RBC QN AUTO: 23.2 PG (ref 26.8–34.3)
MCHC RBC AUTO-ENTMCNC: 30.8 G/DL (ref 31.4–37.4)
MCV RBC AUTO: 75 FL (ref 82–98)
MONOCYTES # BLD AUTO: 0.7 THOUSAND/ΜL (ref 0.17–1.22)
MONOCYTES NFR BLD AUTO: 9 % (ref 4–12)
NEUTROPHILS # BLD AUTO: 6.22 THOUSANDS/ΜL (ref 1.85–7.62)
NEUTS SEG NFR BLD AUTO: 74 % (ref 43–75)
NRBC BLD AUTO-RTO: 0 /100 WBCS
PCO2 BLD: 30 MMOL/L (ref 21–32)
PCO2 BLD: 43.8 MM HG (ref 42–50)
PH BLD: 7.42 [PH] (ref 7.3–7.4)
PLATELET # BLD AUTO: 340 THOUSANDS/UL (ref 149–390)
PMV BLD AUTO: 8.8 FL (ref 8.9–12.7)
PO2 BLD: 30 MM HG (ref 35–45)
POTASSIUM BLD-SCNC: 3.9 MMOL/L (ref 3.5–5.3)
POTASSIUM SERPL-SCNC: 4 MMOL/L (ref 3.5–5.3)
PROT SERPL-MCNC: 6.9 G/DL (ref 6.4–8.4)
RBC # BLD AUTO: 5.48 MILLION/UL (ref 3.88–5.62)
SAO2 % BLD FROM PO2: 58 % (ref 60–85)
SODIUM BLD-SCNC: 139 MMOL/L (ref 136–145)
SODIUM SERPL-SCNC: 139 MMOL/L (ref 135–147)
SPECIMEN SOURCE: ABNORMAL
WBC # BLD AUTO: 8.26 THOUSAND/UL (ref 4.31–10.16)

## 2025-02-12 PROCEDURE — 85025 COMPLETE CBC W/AUTO DIFF WBC: CPT | Performed by: STUDENT IN AN ORGANIZED HEALTH CARE EDUCATION/TRAINING PROGRAM

## 2025-02-12 PROCEDURE — 71260 CT THORAX DX C+: CPT

## 2025-02-12 PROCEDURE — 74177 CT ABD & PELVIS W/CONTRAST: CPT

## 2025-02-12 PROCEDURE — 99205 OFFICE O/P NEW HI 60 MIN: CPT | Performed by: STUDENT IN AN ORGANIZED HEALTH CARE EDUCATION/TRAINING PROGRAM

## 2025-02-12 PROCEDURE — 36415 COLL VENOUS BLD VENIPUNCTURE: CPT | Performed by: STUDENT IN AN ORGANIZED HEALTH CARE EDUCATION/TRAINING PROGRAM

## 2025-02-12 PROCEDURE — 72125 CT NECK SPINE W/O DYE: CPT

## 2025-02-12 PROCEDURE — 99284 EMERGENCY DEPT VISIT MOD MDM: CPT

## 2025-02-12 PROCEDURE — 82330 ASSAY OF CALCIUM: CPT

## 2025-02-12 PROCEDURE — 70450 CT HEAD/BRAIN W/O DYE: CPT

## 2025-02-12 PROCEDURE — 71045 X-RAY EXAM CHEST 1 VIEW: CPT

## 2025-02-12 PROCEDURE — 84295 ASSAY OF SERUM SODIUM: CPT

## 2025-02-12 PROCEDURE — 93308 TTE F-UP OR LMTD: CPT | Performed by: STUDENT IN AN ORGANIZED HEALTH CARE EDUCATION/TRAINING PROGRAM

## 2025-02-12 PROCEDURE — EDAIR PR ED AIR: Performed by: EMERGENCY MEDICINE

## 2025-02-12 PROCEDURE — 76705 ECHO EXAM OF ABDOMEN: CPT | Performed by: STUDENT IN AN ORGANIZED HEALTH CARE EDUCATION/TRAINING PROGRAM

## 2025-02-12 PROCEDURE — 80053 COMPREHEN METABOLIC PANEL: CPT | Performed by: STUDENT IN AN ORGANIZED HEALTH CARE EDUCATION/TRAINING PROGRAM

## 2025-02-12 PROCEDURE — 84132 ASSAY OF SERUM POTASSIUM: CPT

## 2025-02-12 PROCEDURE — 82803 BLOOD GASES ANY COMBINATION: CPT

## 2025-02-12 PROCEDURE — 82947 ASSAY GLUCOSE BLOOD QUANT: CPT

## 2025-02-12 PROCEDURE — 85014 HEMATOCRIT: CPT

## 2025-02-12 RX ADMIN — IOHEXOL 100 ML: 350 INJECTION, SOLUTION INTRAVENOUS at 18:06

## 2025-02-12 NOTE — ED PROVIDER NOTES
Emergency Department Airway Evaluation and Management Form    History  Obtained from: EMS  Patient has no allergy information on record.  Chief Complaint   Patient presents with    Fall     Fall with headstrike on eliquis     HPI    63 y/o male, fall, hit head, on eliquis    Airway intact. Rest of eval and treatment by trauma team    No past medical history on file.  No past surgical history on file.  No family history on file.     I have reviewed and agree with the history as documented.    Review of Systems    Physical Exam  There were no vitals taken for this visit.    Physical Exam    ED Medications  Medications - No data to display    Intubation  Procedures    Notes      Final Diagnosis  Final diagnoses:   None       ED Provider  Electronically Signed by     Ivana Marks MD  02/12/25 0083

## 2025-02-12 NOTE — PROCEDURES
POC FAST US    Date/Time: 2/12/2025 6:35 PM    Performed by: Mireille Lam MD  Authorized by: Mireille Lam MD    Patient location:  Trauma  Other Assisting Provider: No    Procedure details:     Exam Type:  Diagnostic    Indications: blunt abdominal trauma      Technique: FAST      Views obtained:  RUQ - Desir's Pouch, Heart - Pericardial sac, LUQ - Splenorenal space and Suprapubic - Pouch of [place]    Image quality: diagnostic      Image availability:  Images available in PACS  FAST Findings:     RUQ (Hepatorenal) free fluid: absent      LUQ (Splenorenal) free fluid: absent      Suprapubic free fluid: absent      Cardiac wall motion: identified      Pericardial effusion: absent    Interpretation:     Impressions: negative

## 2025-02-12 NOTE — H&P
H&P - Trauma   Name: Evert Orozco 64 y.o. male I MRN: [id number]  Unit/Bed#: ED-33 I Date of Admission: 2/12/2025   Date of Service: 2/12/2025 I Hospital Day: 0     Assessment & Plan    64yoM with hx of afib on eliquis brought in by EMS after mechanical fall with headstrike. No LOC. Patient alert and oriented here in the ED. CTH, C-spine, chest/abd/pelvis negative for acute traumatic injury. Patient feels at his baseline level of health. Will be discharged home. Able to ambulate independently out of the department.      Trauma Alert: Level B   Model of Arrival: Ambulance    Trauma Team: Attending [place] and Residents PRATIK Lam  Consultants:     None     History of Present Illness   Chief Complaint: headache, neck and back pain after fall on eliquis  Mechanism:Mckayla Orozco is a 64 y.o. male who presents via EMS after fall with headstrike on eliquis. Per patient, he was bending down behind his recliner to get something when he lost his balance and fell, hitting his head on the ground. Initially his family only called 911 for a lift assist but when EMS was obtaining more hx and heard that he hit his head and takes eliquis, they encouraged him to come to the ED for further evaluation. He is alert and oriented on arrival. He denies any preceding chest pain, dizziness or palpitations prior to the fall.     Review of Systems   Constitutional:  Negative for chills and fever.   HENT:  Negative for ear pain and sore throat.    Eyes:  Negative for pain and visual disturbance.   Respiratory:  Negative for cough and shortness of breath.    Cardiovascular:  Negative for chest pain and leg swelling.   Gastrointestinal:  Negative for abdominal pain, blood in stool, constipation, diarrhea, nausea and vomiting.   Genitourinary:  Negative for dysuria and hematuria.   Musculoskeletal:  Positive for back pain and neck pain. Negative for neck stiffness.   Neurological:  Positive for headaches. Negative for dizziness,  syncope, weakness and light-headedness.   All other systems reviewed and are negative.    Past medical history: BPH, morbid obesity, GERD, CHF, hypertension, COPD, JOSIAH, asthma, type 2 diabetes, A-fib on Eliquis, Parkinson's disease  Past surgical history: Total knee replacement, cholecystectomy, gastric bypass, hernia repair x 2    There is no immunization history on file for this patient.  Last Tetanus: unknown    1. Before the illness or injury that brought you to the Emergency, did you need someone to help you on a regular basis? 1=Yes   2. Since the illness or injury that brought you to the Emergency, have you needed more help than usual to take care of yourself? 0=No   3. Have you been hospitalized for one or more nights during the past 6 months (excluding a stay in the Emergency Department)? 1=Yes   4. In general, do you see well? 0=Yes   5. In general, do you have serious problems with your memory? 0=No   6. Do you take more than three different medications everyday? 1=Yes   TOTAL   3     Did you order a geriatric consult if the score was 2 or greater?: n/a, patient being discharged       Objective :  Temp:  [98.4 °F (36.9 °C)] 98.4 °F (36.9 °C)  HR:  [60-70] 62  BP: (110-125)/(52-73) 125/61  Resp:  [18] 18  SpO2:  [98 %-100 %] 98 %  O2 Device: None (Room air)    Initial Vitals:   Temperature: 98.4 °F (36.9 °C) (02/12/25 1754)  Pulse: 70 (02/12/25 1754)  Respirations: 18 (02/12/25 1754)  Blood Pressure: 124/73 (02/12/25 1754)    Primary Survey:   Airway:        Status: patent;        Pre-hospital Interventions: none        Hospital Interventions: none  Breathing:        Pre-hospital Interventions: none       Effort: normal       Right breath sounds: normal       Left breath sounds: normal  Circulation:        Rhythm: regular       Rate: regular   Right Pulses Left Pulses    R radial: 2+  R femoral: 2+  R pedal: 2+     L radial: 2+  L femoral: 2+  L pedal: 2+       Disability:        GCS: Eye: 4; Verbal: 5  Motor: 6 Total: 15       Right Pupil: round;  reactive         Left Pupil:  round;  reactive      R Motor Strength L Motor Strength    R : 5/5  R dorsiflex: 5/5  R plantarflex: 5/5 L : 5/5  L dorsiflex: 5/5  L plantarflex: 5/5        Sensory:  No sensory deficit  Exposure:       Completed: Yes      Secondary Survey:  Physical Exam  Constitutional:       General: He is not in acute distress.     Appearance: Normal appearance. He is obese. He is not ill-appearing.   HENT:      Head: Normocephalic and atraumatic.      Nose: Nose normal.   Eyes:      Extraocular Movements: Extraocular movements intact.      Conjunctiva/sclera: Conjunctivae normal.      Pupils: Pupils are equal, round, and reactive to light.   Neck:      Comments: C collar in place  Cardiovascular:      Rate and Rhythm: Normal rate and regular rhythm.      Pulses: Normal pulses.      Heart sounds: Normal heart sounds. No murmur heard.  Pulmonary:      Effort: Pulmonary effort is normal. No respiratory distress.      Breath sounds: Normal breath sounds. No stridor. No wheezing, rhonchi or rales.   Abdominal:      General: There is no distension.      Tenderness: There is no abdominal tenderness. There is no right CVA tenderness, left CVA tenderness, guarding or rebound.      Hernia: No hernia is present.   Musculoskeletal:      Cervical back: Bony tenderness present. No swelling, edema, deformity, erythema, signs of trauma, lacerations, rigidity, spasms or torticollis. Spinous process tenderness and muscular tenderness present.      Thoracic back: Bony tenderness present. No swelling, edema, deformity, signs of trauma, lacerations or spasms.      Lumbar back: Normal.      Right lower leg: No edema.      Left lower leg: No edema.   Skin:     General: Skin is warm and dry.      Coloration: Skin is not jaundiced.      Findings: No rash.   Neurological:      General: No focal deficit present.      Mental Status: He is alert and oriented to person,  place, and time.             Lab Results: I have reviewed the following results:  Recent Labs     02/12/25  1758 02/12/25  1800   WBC 8.26  --    HGB 12.7 13.6   HCT 41.2 40     --    SODIUM 139  --    K 4.0  --      --    CO2 30 30   BUN 11  --    CREATININE 0.89  --    GLUC 127  --    CAIONIZED  --  1.22   AST 9*  --    ALT 11  --    ALB 4.0  --    TBILI 0.62  --    ALKPHOS 80  --        Imaging Results: I have personally reviewed pertinent images saved in PACS. CT scan findings (and other pertinent positive findings on images) were discussed with radiology. My interpretation of the images/reports are as follows:  Chest Xray(s): negative for acute findings   FAST exam(s): negative for acute findings   CT Scan(s): negative for acute findings   Additional Xray(s): N/A     Other Studies: Other Study Results Review: No additional pertinent studies reviewed.

## 2025-02-13 NOTE — QUICK NOTE
Cervical Collar Clearance:    The patient had a CT scan of the cervical spine demonstrating no acute injury. On exam, the patient had no midline point tenderness or paresthesias/numbness/weakness in the extremities. The patient had full range of motion (was then able to flex, extend, and rotate head laterally) without pain. There were no distracting injuries and the patient was not intoxicated.      The patient's cervical spine was cleared radiologically and clinically. Cervical collar removed at this time.     Mireille Lam MD  2/12/2025 7:27 PM

## 2025-03-31 ENCOUNTER — OFFICE VISIT (OUTPATIENT)
Dept: GASTROENTEROLOGY | Facility: CLINIC | Age: 65
End: 2025-03-31
Payer: MEDICARE

## 2025-03-31 ENCOUNTER — TRANSCRIBE ORDERS (OUTPATIENT)
Dept: GASTROENTEROLOGY | Facility: CLINIC | Age: 65
End: 2025-03-31

## 2025-03-31 VITALS
BODY MASS INDEX: 43.4 KG/M2 | SYSTOLIC BLOOD PRESSURE: 112 MMHG | DIASTOLIC BLOOD PRESSURE: 70 MMHG | WEIGHT: 293 LBS | HEART RATE: 80 BPM | HEIGHT: 69 IN

## 2025-03-31 DIAGNOSIS — R07.89 ATYPICAL CHEST PAIN: ICD-10-CM

## 2025-03-31 DIAGNOSIS — E66.01 MORBID OBESITY WITH BMI OF 40.0-44.9, ADULT (HCC): ICD-10-CM

## 2025-03-31 DIAGNOSIS — R11.0 NAUSEA IN ADULT: ICD-10-CM

## 2025-03-31 DIAGNOSIS — R10.30 LOWER ABDOMINAL PAIN: Primary | ICD-10-CM

## 2025-03-31 DIAGNOSIS — K21.9 GASTROESOPHAGEAL REFLUX DISEASE WITHOUT ESOPHAGITIS: ICD-10-CM

## 2025-03-31 DIAGNOSIS — Z98.84 HISTORY OF GASTRIC BYPASS: ICD-10-CM

## 2025-03-31 DIAGNOSIS — K59.00 CONSTIPATION, UNSPECIFIED CONSTIPATION TYPE: ICD-10-CM

## 2025-03-31 DIAGNOSIS — Z86.0101 HX OF ADENOMATOUS COLONIC POLYPS: ICD-10-CM

## 2025-03-31 PROCEDURE — G2211 COMPLEX E/M VISIT ADD ON: HCPCS | Performed by: PHYSICIAN ASSISTANT

## 2025-03-31 PROCEDURE — 99214 OFFICE O/P EST MOD 30 MIN: CPT | Performed by: PHYSICIAN ASSISTANT

## 2025-03-31 RX ORDER — AMOXICILLIN 875 MG/1
TABLET, COATED ORAL
COMMUNITY
Start: 2025-03-20 | End: 2025-04-04 | Stop reason: ALTCHOICE

## 2025-03-31 RX ORDER — ISOSORBIDE MONONITRATE 30 MG/1
30 TABLET, EXTENDED RELEASE ORAL DAILY
COMMUNITY
Start: 2025-03-19

## 2025-03-31 RX ORDER — FLUTICASONE PROPIONATE 50 MCG
SPRAY, SUSPENSION (ML) NASAL
COMMUNITY
Start: 2025-01-05

## 2025-03-31 RX ORDER — GABAPENTIN 300 MG/1
1 CAPSULE ORAL 2 TIMES DAILY
COMMUNITY
Start: 2025-02-27

## 2025-03-31 RX ORDER — ALBUTEROL SULFATE 90 UG/1
INHALANT RESPIRATORY (INHALATION)
COMMUNITY
Start: 2025-01-05

## 2025-03-31 RX ORDER — BUDESONIDE AND FORMOTEROL FUMARATE DIHYDRATE 160; 4.5 UG/1; UG/1
2 AEROSOL RESPIRATORY (INHALATION) 2 TIMES DAILY
COMMUNITY
Start: 2025-01-17

## 2025-03-31 RX ORDER — SODIUM CHLORIDE, SODIUM LACTATE, POTASSIUM CHLORIDE, CALCIUM CHLORIDE 600; 310; 30; 20 MG/100ML; MG/100ML; MG/100ML; MG/100ML
125 INJECTION, SOLUTION INTRAVENOUS CONTINUOUS
OUTPATIENT
Start: 2025-03-31

## 2025-03-31 NOTE — PROGRESS NOTES
Name: Evert Orozco      : 1960      MRN: [id number]  Encounter Provider: Maria Ines Espinal PA-C  Encounter Date: 3/31/2025   Encounter department: [facility] [place]  :  Assessment & Plan  Lower abdominal pain  Patient complains of constant lower abdominal pain for months.  This is in the setting of a history of constipation.  He underwent colonoscopy in the past, most recent in 2024 at [facility] which was a poor prep.  Colonoscopy in  was also poor prep.  I explained to the patient that I suspect his abdominal pain is secondary to underlying constipation.  We reviewed his recent CT scan from the ER which was grossly unremarkable.    Recommend patient undergo colonoscopy with 2-day GoLytely bowel preparation in the hospital setting with clearance from cardiology to hold Eliquis prior to procedure for further evaluation of his ongoing symptoms.    Also recommended patient begin MiraLAX powder, 1 capful of the powder over-the-counter once daily dissolved in water.  Recommend patient monitor his bowel habits on this medication.  Suspect with more regular, complete bowel movements his abdominal pain will hopefully improve.  Otherwise we discussed the importance of staying hydrated and I recommended he incorporate more fiber into his diet.    Patient and wife expressed understanding to plan.  All questions answered.  Orders:    Colonoscopy; Future    polyethylene glycol (GOLYTELY) 4000 mL solution; Take 4,000 mL by mouth once for 1 dose Take as directed by office instructions prior to colonoscopy.    Constipation, unspecified constipation type  As above.   Orders:    Colonoscopy; Future    polyethylene glycol (GOLYTELY) 4000 mL solution; Take 4,000 mL by mouth once for 1 dose Take as directed by office instructions prior to colonoscopy.    Nausea in adult  As above.   Orders:    Colonoscopy; Future    polyethylene glycol (GOLYTELY) 4000 mL solution; Take 4,000 mL  by mouth once for 1 dose Take as directed by office instructions prior to colonoscopy.    Hx of adenomatous colonic polyps  As above.   Orders:    polyethylene glycol (GOLYTELY) 4000 mL solution; Take 4,000 mL by mouth once for 1 dose Take as directed by office instructions prior to colonoscopy.    Gastroesophageal reflux disease without esophagitis  Patient also complains of heartburn, sore throat, chest discomfort for a few months.  He was just started on esomeprazole yesterday.  He had a nuclear medicine cardiac stress test 3/26/2025 which was negative for ischemia.  Ejection fraction was 57%.    Recommend patient continue with esomeprazole sent to patient's pharmacy with refills  Will order EGD with biopsy to r/o Mesa's esophagus/esophagitis/ gastritis/ H pylori/ PUD to be completed in the hospital setting at time of colonoscopy with clearance from cardiology to hold Eliquis prior to procedure. I educated patient on GERD diet and lifestyle including avoidance of trigger foods including fatty, greasy, spicy foods, chocolate, caffeine, alcohol, citrus foods, tomato sauces. We discussed  the importance of eating smaller frequent meals, not eating close to bedtime. I also advised to limit NSAIDs if able.      Orders:    EGD; Future    esomeprazole (NexIUM) 20 mg capsule; Take 1 capsule (20 mg total) by mouth daily before breakfast    Atypical chest pain  As above.   Orders:    EGD; Future    esomeprazole (NexIUM) 20 mg capsule; Take 1 capsule (20 mg total) by mouth daily before breakfast    History of gastric bypass  As above.   Orders:    EGD; Future    Morbid obesity with BMI of 40.0-44.9, adult (HCC)  Procedures to be completed in the hospital setting with clearance from cardiology prior to hold Eliquis prior to procedure.             The risk/benefits/alternatives of the procedure/s were discussed with the patient.  Risks included, but not limited to, infection, bleeding, perforation, injury to organs in the  "abdomen, missed lesion and incomplete procedure were discussed.  Patient expressed understanding and agreeable to proceed with procedure/s.    Patient was instructed to call the office with any questions, concerns, new/ worsening/ persisting GI symptoms. Advised patient go to the ER with any severe or worsening abdominal pain, fevers/ chills, intractable N/V, chest pain, SOB, dizziness, lightheadedness, feeling something stuck in esophagus that will not go down. Patient expressed understanding and is in agreement with treatment plan.     Will plan to follow up after diagnostic tests in a few months       History of Present Illness   HPI  Evert Oorzco is a 64 y.o. male with a past medical history of heart failure, hypertension, A-fib on Eliquis, pacemaker in place, COPD, sleep apnea on CPAP, GERD, history of constipation, type 2 diabetes mellitus, Parkinson's disease, history of BPH, ambulatory dysfunction, morbid obesity, history of adenomatous colonic polyps, history gastric bypass surgery, history of abdominal hernias status postrepair  who presents to the office today for follow-up visit to discuss ongoing abdominal pain.  Unfortunately, per chart review it appears patient was recently seen in the ER at [facility] twice.  Both times he presented with abdominal pain.  Most recent ER note reviewed.  CBC revealed a hemoglobin of 12.6, low MCV.  No leukocytosis.  CMP grossly unremarkable.  Liver enzymes normal.  Lipase within normal limits.  Patient underwent CTA of the abdomen and pelvis with and without IV contrast which showed no acute abnormality in the abdomen or pelvis.  It was recommended patient follow-up with GI upon discharge.      Patient presents with wife today.  Patient complains of abdominal pain located primarily lower abdomen x few months.   He describes the pain as \"pains\". It is constant. Does not radiate. No known triggers. Pain is not related to Bms.   There is associated nausea " but no vomiting.   He has a BM every other day and stool is soft and complete. He takes miralax prn constipation.  He also complains of chest discomfort that comes and goes and is worse with certain foods like chili. This has been also going on for months.  He had a nuclear medicine cardiac stress test 3/26/2025 which was negative for cardiac ischemia.  Ejection fraction was 57%.  There is associated sore throat and heartburn. He denies feeling foods getting stuck after a swallow.   Patient is prescribed esomeprazole 20 mg once daily - he just started the esomeprazole yesterday.   He notes he was on esomeprazole a while ago (years ago) for ulcers.   Patient denies any fevers/ chills, unintentional weight loss, decreased appetite,  black or bloody stools  Denies chest pain, SOB    Tobacco use- None  Alcohol use- None  NSAID use- None    Patient denies first-degree relative with colon cancer, inflammatory bowel disease, celiac disease     Prior Procedure Results/Reports   Last EGD reviewed done in June 2023 for upper abdominal pain which was completely normal    Patient underwent colonoscopy in March 2022 in the setting of change in bowel habits, constipation.  This report was reviewed.  Prep was very poor and it was recommended patient undergo repeat colonoscopy in 1 year.  A descending colon tubular adenomatous colon polyp was removed.  Exam was technically difficult due to body habitus.    More recently, patient underwent colonoscopy at [facility] 12/24/2024, this report was reviewed in care everywhere, again prep was poor with significant amount of liquid stool all throughout the colon at time of the colonoscopy.    Review of Systems   Constitutional:  Negative for chills and fever.   HENT:  Negative for ear pain and sore throat.    Eyes:  Negative for pain and visual disturbance.   Respiratory:  Negative for cough and shortness of breath.    Cardiovascular:  Negative for chest pain and palpitations.   Gastrointestinal:  " Positive for abdominal pain and nausea. Negative for vomiting.   Genitourinary:  Negative for dysuria and hematuria.   Musculoskeletal:  Negative for arthralgias and back pain.   Skin:  Negative for color change and rash.   Neurological:  Negative for seizures and syncope.   All other systems reviewed and are negative.      Objective   /70   Pulse 80   Ht 5' 9\" (1.753 m)   Wt 133 kg (293 lb)   BMI 43.27 kg/m²      Physical Exam  Vitals reviewed.   Constitutional:       General: He is not in acute distress.     Appearance: He is well-developed. He is obese.   HENT:      Head: Normocephalic and atraumatic.   Eyes:      Conjunctiva/sclera: Conjunctivae normal.   Cardiovascular:      Rate and Rhythm: Normal rate and regular rhythm.      Heart sounds: No murmur heard.  Pulmonary:      Effort: Pulmonary effort is normal. No respiratory distress.      Breath sounds: Normal breath sounds.   Abdominal:      General: Bowel sounds are normal.      Palpations: Abdomen is soft.      Tenderness: There is generalized abdominal tenderness.   Musculoskeletal:         General: No swelling.      Cervical back: Neck supple.   Skin:     General: Skin is warm and dry.      Capillary Refill: Capillary refill takes less than 2 seconds.   Neurological:      General: No focal deficit present.      Mental Status: He is alert. Mental status is at baseline.   Psychiatric:         Mood and Affect: Mood normal.         "

## 2025-03-31 NOTE — ASSESSMENT & PLAN NOTE
Procedures to be completed in the hospital setting with clearance from cardiology prior to hold Eliquis prior to procedure.

## 2025-03-31 NOTE — ASSESSMENT & PLAN NOTE
As above.   Orders:    polyethylene glycol (GOLYTELY) 4000 mL solution; Take 4,000 mL by mouth once for 1 dose Take as directed by office instructions prior to colonoscopy.

## 2025-03-31 NOTE — H&P (VIEW-ONLY)
Name: Evert Orozco      : 1960      MRN: [id number]  Encounter Provider: Maria Ines Espinal PA-C  Encounter Date: 3/31/2025   Encounter department: [facility] [place]  :  Assessment & Plan  Lower abdominal pain  Patient complains of constant lower abdominal pain for months.  This is in the setting of a history of constipation.  He underwent colonoscopy in the past, most recent in 2024 at [facility] which was a poor prep.  Colonoscopy in  was also poor prep.  I explained to the patient that I suspect his abdominal pain is secondary to underlying constipation.  We reviewed his recent CT scan from the ER which was grossly unremarkable.    Recommend patient undergo colonoscopy with 2-day GoLytely bowel preparation in the hospital setting with clearance from cardiology to hold Eliquis prior to procedure for further evaluation of his ongoing symptoms.    Also recommended patient begin MiraLAX powder, 1 capful of the powder over-the-counter once daily dissolved in water.  Recommend patient monitor his bowel habits on this medication.  Suspect with more regular, complete bowel movements his abdominal pain will hopefully improve.  Otherwise we discussed the importance of staying hydrated and I recommended he incorporate more fiber into his diet.    Patient and wife expressed understanding to plan.  All questions answered.  Orders:    Colonoscopy; Future    polyethylene glycol (GOLYTELY) 4000 mL solution; Take 4,000 mL by mouth once for 1 dose Take as directed by office instructions prior to colonoscopy.    Constipation, unspecified constipation type  As above.   Orders:    Colonoscopy; Future    polyethylene glycol (GOLYTELY) 4000 mL solution; Take 4,000 mL by mouth once for 1 dose Take as directed by office instructions prior to colonoscopy.    Nausea in adult  As above.   Orders:    Colonoscopy; Future    polyethylene glycol (GOLYTELY) 4000 mL solution; Take 4,000 mL  by mouth once for 1 dose Take as directed by office instructions prior to colonoscopy.    Hx of adenomatous colonic polyps  As above.   Orders:    polyethylene glycol (GOLYTELY) 4000 mL solution; Take 4,000 mL by mouth once for 1 dose Take as directed by office instructions prior to colonoscopy.    Gastroesophageal reflux disease without esophagitis  Patient also complains of heartburn, sore throat, chest discomfort for a few months.  He was just started on esomeprazole yesterday.  He had a nuclear medicine cardiac stress test 3/26/2025 which was negative for ischemia.  Ejection fraction was 57%.    Recommend patient continue with esomeprazole sent to patient's pharmacy with refills  Will order EGD with biopsy to r/o Mesa's esophagus/esophagitis/ gastritis/ H pylori/ PUD to be completed in the hospital setting at time of colonoscopy with clearance from cardiology to hold Eliquis prior to procedure. I educated patient on GERD diet and lifestyle including avoidance of trigger foods including fatty, greasy, spicy foods, chocolate, caffeine, alcohol, citrus foods, tomato sauces. We discussed  the importance of eating smaller frequent meals, not eating close to bedtime. I also advised to limit NSAIDs if able.      Orders:    EGD; Future    esomeprazole (NexIUM) 20 mg capsule; Take 1 capsule (20 mg total) by mouth daily before breakfast    Atypical chest pain  As above.   Orders:    EGD; Future    esomeprazole (NexIUM) 20 mg capsule; Take 1 capsule (20 mg total) by mouth daily before breakfast    History of gastric bypass  As above.   Orders:    EGD; Future    Morbid obesity with BMI of 40.0-44.9, adult (HCC)  Procedures to be completed in the hospital setting with clearance from cardiology prior to hold Eliquis prior to procedure.             The risk/benefits/alternatives of the procedure/s were discussed with the patient.  Risks included, but not limited to, infection, bleeding, perforation, injury to organs in the  "abdomen, missed lesion and incomplete procedure were discussed.  Patient expressed understanding and agreeable to proceed with procedure/s.    Patient was instructed to call the office with any questions, concerns, new/ worsening/ persisting GI symptoms. Advised patient go to the ER with any severe or worsening abdominal pain, fevers/ chills, intractable N/V, chest pain, SOB, dizziness, lightheadedness, feeling something stuck in esophagus that will not go down. Patient expressed understanding and is in agreement with treatment plan.     Will plan to follow up after diagnostic tests in a few months       History of Present Illness   HPI  Evert Orozco is a 64 y.o. male with a past medical history of heart failure, hypertension, A-fib on Eliquis, pacemaker in place, COPD, sleep apnea on CPAP, GERD, history of constipation, type 2 diabetes mellitus, Parkinson's disease, history of BPH, ambulatory dysfunction, morbid obesity, history of adenomatous colonic polyps, history gastric bypass surgery, history of abdominal hernias status postrepair  who presents to the office today for follow-up visit to discuss ongoing abdominal pain.  Unfortunately, per chart review it appears patient was recently seen in the ER at [facility] twice.  Both times he presented with abdominal pain.  Most recent ER note reviewed.  CBC revealed a hemoglobin of 12.6, low MCV.  No leukocytosis.  CMP grossly unremarkable.  Liver enzymes normal.  Lipase within normal limits.  Patient underwent CTA of the abdomen and pelvis with and without IV contrast which showed no acute abnormality in the abdomen or pelvis.  It was recommended patient follow-up with GI upon discharge.      Patient presents with wife today.  Patient complains of abdominal pain located primarily lower abdomen x few months.   He describes the pain as \"pains\". It is constant. Does not radiate. No known triggers. Pain is not related to Bms.   There is associated nausea " but no vomiting.   He has a BM every other day and stool is soft and complete. He takes miralax prn constipation.  He also complains of chest discomfort that comes and goes and is worse with certain foods like chili. This has been also going on for months.  He had a nuclear medicine cardiac stress test 3/26/2025 which was negative for cardiac ischemia.  Ejection fraction was 57%.  There is associated sore throat and heartburn. He denies feeling foods getting stuck after a swallow.   Patient is prescribed esomeprazole 20 mg once daily - he just started the esomeprazole yesterday.   He notes he was on esomeprazole a while ago (years ago) for ulcers.   Patient denies any fevers/ chills, unintentional weight loss, decreased appetite,  black or bloody stools  Denies chest pain, SOB    Tobacco use- None  Alcohol use- None  NSAID use- None    Patient denies first-degree relative with colon cancer, inflammatory bowel disease, celiac disease     Prior Procedure Results/Reports   Last EGD reviewed done in June 2023 for upper abdominal pain which was completely normal    Patient underwent colonoscopy in March 2022 in the setting of change in bowel habits, constipation.  This report was reviewed.  Prep was very poor and it was recommended patient undergo repeat colonoscopy in 1 year.  A descending colon tubular adenomatous colon polyp was removed.  Exam was technically difficult due to body habitus.    More recently, patient underwent colonoscopy at [facility] 12/24/2024, this report was reviewed in care everywhere, again prep was poor with significant amount of liquid stool all throughout the colon at time of the colonoscopy.    Review of Systems   Constitutional:  Negative for chills and fever.   HENT:  Negative for ear pain and sore throat.    Eyes:  Negative for pain and visual disturbance.   Respiratory:  Negative for cough and shortness of breath.    Cardiovascular:  Negative for chest pain and palpitations.   Gastrointestinal:  " Positive for abdominal pain and nausea. Negative for vomiting.   Genitourinary:  Negative for dysuria and hematuria.   Musculoskeletal:  Negative for arthralgias and back pain.   Skin:  Negative for color change and rash.   Neurological:  Negative for seizures and syncope.   All other systems reviewed and are negative.      Objective   /70   Pulse 80   Ht 5' 9\" (1.753 m)   Wt 133 kg (293 lb)   BMI 43.27 kg/m²      Physical Exam  Vitals reviewed.   Constitutional:       General: He is not in acute distress.     Appearance: He is well-developed. He is obese.   HENT:      Head: Normocephalic and atraumatic.   Eyes:      Conjunctiva/sclera: Conjunctivae normal.   Cardiovascular:      Rate and Rhythm: Normal rate and regular rhythm.      Heart sounds: No murmur heard.  Pulmonary:      Effort: Pulmonary effort is normal. No respiratory distress.      Breath sounds: Normal breath sounds.   Abdominal:      General: Bowel sounds are normal.      Palpations: Abdomen is soft.      Tenderness: There is generalized abdominal tenderness.   Musculoskeletal:         General: No swelling.      Cervical back: Neck supple.   Skin:     General: Skin is warm and dry.      Capillary Refill: Capillary refill takes less than 2 seconds.   Neurological:      General: No focal deficit present.      Mental Status: He is alert. Mental status is at baseline.   Psychiatric:         Mood and Affect: Mood normal.         "

## 2025-03-31 NOTE — PATIENT INSTRUCTIONS
Recommended patient use Miralax powder OTC once daily dissolved in water at dinnertime  Incorporate a more high fiber diet  Continue esomeprazole once daily in the AM before breakfast     Scheduled date of colonoscopy/EGD (as of today): 04/16/2025  Physician performing colonoscopy: Dr. Scott   Location of colonoscopy: [facility]  Bowel prep reviewed with patient: 2 Day Gollaurily   Instructions reviewed with patient by: Haily MACDONALD   Clearances:  Eliquis - fax sent to  03/31/2025    Provided pt w/ Diabetes Medication Instruction Sheet - Jardiance/Metformin

## 2025-04-02 ENCOUNTER — TRANSCRIBE ORDERS (OUTPATIENT)
Dept: GASTROENTEROLOGY | Facility: CLINIC | Age: 65
End: 2025-04-02

## 2025-04-10 ENCOUNTER — TELEPHONE (OUTPATIENT)
Dept: GASTROENTEROLOGY | Facility: CLINIC | Age: 65
End: 2025-04-10

## 2025-04-10 NOTE — TELEPHONE ENCOUNTER
Request to hold Eliquis faxed to  [phone].  Dr. Vee Reyes.        Okay to hold 2 days received.     Spoke with pt, inform him of okay to hold eliquis 2 days and reminded him to hold Jardiance 4 days.       Okay to hold eliquis scanned into media.

## 2025-04-16 ENCOUNTER — ANESTHESIA EVENT (OUTPATIENT)
Dept: GASTROENTEROLOGY | Facility: HOSPITAL | Age: 65
End: 2025-04-16
Payer: MEDICARE

## 2025-04-16 ENCOUNTER — HOSPITAL ENCOUNTER (OUTPATIENT)
Dept: GASTROENTEROLOGY | Facility: HOSPITAL | Age: 65
Setting detail: OUTPATIENT SURGERY
Discharge: HOME | End: 2025-04-16
Payer: MEDICARE

## 2025-04-16 ENCOUNTER — ANESTHESIA (OUTPATIENT)
Dept: GASTROENTEROLOGY | Facility: HOSPITAL | Age: 65
End: 2025-04-16
Payer: MEDICARE

## 2025-04-16 VITALS
SYSTOLIC BLOOD PRESSURE: 124 MMHG | RESPIRATION RATE: 18 BRPM | OXYGEN SATURATION: 99 % | TEMPERATURE: 97.8 F | DIASTOLIC BLOOD PRESSURE: 63 MMHG | WEIGHT: 282.1 LBS | BODY MASS INDEX: 41.78 KG/M2 | HEIGHT: 69 IN | HEART RATE: 69 BPM

## 2025-04-16 DIAGNOSIS — K59.00 CONSTIPATION, UNSPECIFIED CONSTIPATION TYPE: ICD-10-CM

## 2025-04-16 DIAGNOSIS — K21.9 GASTROESOPHAGEAL REFLUX DISEASE WITHOUT ESOPHAGITIS: ICD-10-CM

## 2025-04-16 DIAGNOSIS — R11.0 NAUSEA IN ADULT: ICD-10-CM

## 2025-04-16 DIAGNOSIS — Z98.84 HISTORY OF GASTRIC BYPASS: ICD-10-CM

## 2025-04-16 DIAGNOSIS — R10.30 LOWER ABDOMINAL PAIN: ICD-10-CM

## 2025-04-16 DIAGNOSIS — R07.89 ATYPICAL CHEST PAIN: ICD-10-CM

## 2025-04-16 LAB — GLUCOSE SERPL-MCNC: 95 MG/DL (ref 65–140)

## 2025-04-16 PROCEDURE — 88305 TISSUE EXAM BY PATHOLOGIST: CPT | Performed by: PATHOLOGY

## 2025-04-16 PROCEDURE — 43235 EGD DIAGNOSTIC BRUSH WASH: CPT | Performed by: INTERNAL MEDICINE

## 2025-04-16 PROCEDURE — 82948 REAGENT STRIP/BLOOD GLUCOSE: CPT

## 2025-04-16 PROCEDURE — 45385 COLONOSCOPY W/LESION REMOVAL: CPT | Performed by: INTERNAL MEDICINE

## 2025-04-16 RX ORDER — SODIUM CHLORIDE, SODIUM LACTATE, POTASSIUM CHLORIDE, CALCIUM CHLORIDE 600; 310; 30; 20 MG/100ML; MG/100ML; MG/100ML; MG/100ML
125 INJECTION, SOLUTION INTRAVENOUS CONTINUOUS
Status: DISCONTINUED | OUTPATIENT
Start: 2025-04-16 | End: 2025-04-20 | Stop reason: HOSPADM

## 2025-04-16 RX ORDER — PROPOFOL 10 MG/ML
INJECTION, EMULSION INTRAVENOUS AS NEEDED
Status: DISCONTINUED | OUTPATIENT
Start: 2025-04-16 | End: 2025-04-16

## 2025-04-16 RX ORDER — LIDOCAINE HYDROCHLORIDE 10 MG/ML
INJECTION, SOLUTION EPIDURAL; INFILTRATION; INTRACAUDAL; PERINEURAL AS NEEDED
Status: DISCONTINUED | OUTPATIENT
Start: 2025-04-16 | End: 2025-04-16

## 2025-04-16 RX ADMIN — PROPOFOL 50 MG: 10 INJECTION, EMULSION INTRAVENOUS at 08:07

## 2025-04-16 RX ADMIN — SODIUM CHLORIDE, SODIUM LACTATE, POTASSIUM CHLORIDE, AND CALCIUM CHLORIDE: .6; .31; .03; .02 INJECTION, SOLUTION INTRAVENOUS at 08:04

## 2025-04-16 RX ADMIN — PROPOFOL 20 MG: 10 INJECTION, EMULSION INTRAVENOUS at 08:25

## 2025-04-16 RX ADMIN — PROPOFOL 50 MG: 10 INJECTION, EMULSION INTRAVENOUS at 08:10

## 2025-04-16 RX ADMIN — PROPOFOL 50 MG: 10 INJECTION, EMULSION INTRAVENOUS at 08:18

## 2025-04-16 RX ADMIN — PROPOFOL 30 MG: 10 INJECTION, EMULSION INTRAVENOUS at 08:21

## 2025-04-16 RX ADMIN — PROPOFOL 30 MG: 10 INJECTION, EMULSION INTRAVENOUS at 08:13

## 2025-04-16 RX ADMIN — LIDOCAINE HYDROCHLORIDE 50 MG: 10 INJECTION, SOLUTION EPIDURAL; INFILTRATION; INTRACAUDAL at 08:04

## 2025-04-16 RX ADMIN — PROPOFOL 70 MG: 10 INJECTION, EMULSION INTRAVENOUS at 08:04

## 2025-04-16 RX ADMIN — PROPOFOL 20 MG: 10 INJECTION, EMULSION INTRAVENOUS at 08:29

## 2025-04-16 NOTE — PERIOPERATIVE NURSING NOTE
Pt & wife expressing understanding to discharge instructions, no complaints of pain. IV site removed. Pt tolerated snack uneventfully.    (3) adequate

## 2025-04-16 NOTE — ANESTHESIA POSTPROCEDURE EVALUATION
Post-Op Assessment Note    CV Status:  Stable    Pain management: adequate       Mental Status:  Awake and alert   Hydration Status:  Euvolemic   PONV Controlled:  Controlled   Airway Patency:  Patent     Post Op Vitals Reviewed: Yes    No anethesia notable event occurred.    Staff: CRNA           Last Filed PACU Vitals:  Vitals Value Taken Time   Temp     Pulse 69    /68    Resp 21    SpO2 98

## 2025-04-16 NOTE — INTERVAL H&P NOTE
H&P reviewed. After examining the patient I find no changes in the patients condition since the H&P had been written.    Vitals:    04/16/25 0728   BP: 116/69   Pulse: 80   Resp: 14   Temp: 97.5 °F (36.4 °C)   SpO2: 96%

## 2025-04-17 ENCOUNTER — OFFICE VISIT (OUTPATIENT)
Dept: NEPHROLOGY | Facility: CLINIC | Age: 65
End: 2025-04-17
Payer: MEDICARE

## 2025-04-17 VITALS
WEIGHT: 280 LBS | DIASTOLIC BLOOD PRESSURE: 68 MMHG | HEART RATE: 90 BPM | HEIGHT: 69 IN | SYSTOLIC BLOOD PRESSURE: 110 MMHG | BODY MASS INDEX: 41.47 KG/M2

## 2025-04-17 DIAGNOSIS — E87.6 HYPOKALEMIA: ICD-10-CM

## 2025-04-17 DIAGNOSIS — I50.32 CHRONIC DIASTOLIC CHF (CONGESTIVE HEART FAILURE) (HCC): Primary | ICD-10-CM

## 2025-04-17 DIAGNOSIS — I10 BENIGN ESSENTIAL HYPERTENSION: ICD-10-CM

## 2025-04-17 PROCEDURE — 99214 OFFICE O/P EST MOD 30 MIN: CPT | Performed by: INTERNAL MEDICINE

## 2025-04-17 NOTE — PATIENT INSTRUCTIONS
Your kidney function is stable and your potassium level is appropriate with the current combination of medications.  I recommend no changes to your medications today.  At this time, you do not need follow-up with us.  Continue to see your PCP and your cardiologist for continued management of your diuretics for your heart failure.  Please feel free to give us a call for any questions or concerns.

## 2025-04-17 NOTE — ASSESSMENT & PLAN NOTE
K is at goal. 3.7 on 3/29/25  Potassium levels over the past few months has been stable.   Continue Kdur 40 meq daily and Spironolactone 25 mg daily.

## 2025-04-17 NOTE — ASSESSMENT & PLAN NOTE
He has lower extremity edema but this could be due to lymphedema.  His BNP level is 98.  Continue torsemide 60 mg BID 3 days a week and 80 mg BID 4 days a week.   Diuretics being managed by cardiology.

## 2025-04-17 NOTE — PROGRESS NOTES
NEPHROLOGY OFFICE PROGRESS NOTE   Evert Orozco 64 y.o. male MRN: [id number]  DATE: 04/17/25  Reason for visit: Continued evaluation and management of hyponatremia, elevated creatinine.     Assessment & Plan  Hypokalemia  K is at goal. 3.7 on 3/29/25  Potassium levels over the past few months has been stable.   Continue Kdur 40 meq daily and Spironolactone 25 mg daily.     Chronic diastolic CHF (congestive heart failure) (HCC)  He has lower extremity edema but this could be due to lymphedema.  His BNP level is 98.  Continue torsemide 60 mg BID 3 days a week and 80 mg BID 4 days a week.   Diuretics being managed by cardiology.     Benign essential hypertension  BP is at goal (<130/80)  Continue current BP medications without changes (see BP meds below).       Overall, Evert's potassium level, volume status, and renal function have been stable for the past few months. He does get frequent labs done due to the frequency of his ER visits and has established regular follow up with cardiology. Given the stability of his renal related issues (hypokalemia), I did not arrange for renal follow up with us and he can be seen on an as needed basis. Please feel free to call us back for any questions or concerns.     Patient Instructions   Your kidney function is stable and your potassium level is appropriate with the current combination of medications.  I recommend no changes to your medications today.  At this time, you do not need follow-up with us.  Continue to see your PCP and your cardiologist for continued management of your diuretics for your heart failure.  Please feel free to give us a call for any questions or concerns.    SUBJECTIVE / INTERVAL HISTORY:  Evert was last seen by me in the office in Oct 2024.   He was admitted to [facility] in December 2024 after presenting with a fall.  He was worked up for a stroke during the hospital but MRI was negative.  EF during the hospital stay was 45 to 50%.  He  "currently remains on torsemide 60 mg BID 3 days a week and 80 mg BID 4 days a week and spironolactone 25 mg daily.  This is managed by cardiology.  He is now doing PT and OT due to worsening Parkinson's.  He has lost 16 pounds since his last office visit.  He thinks it is a combination of caloric and fluid weight.  He had an EGD and a colonoscopy done yesterday by Dr. Scott.  Over the course of the past few months, he has had multiple ER visits for various complaints and issues.   LE edema is stable.   Wife, Loretta, is with him today.     BP meds/diuretics:  Torsemide 60 mg BID   Spironolactone 25 mg OD.     Other renal pertinent meds:  Kdur 40 meq daily.   Jardiance ? Mg daily  Vitamin D 1000 units OD.     PMH/PSH: HTN, DM, CHF, CAD, PAF, SSS s/p PPM, DVT s/p IVC filter, cholecystectomy, fatty liver, GERD, obesity s/p gastric bypass in Jan 2002, COPD, JOSIAH on CPAP, DJD, parkinson's, OA s/p L TKA.     Previous work up:  7/12/23 Echo - EF 60-65%. Normal diastolic function. RV is mildly dilated.     ALLERGIES:   Allergies   Allergen Reactions    Gluten Meal - Food Allergy     Adhesive [Medical Tape] Rash     REVIEW OF SYSTEMS:  Review of Systems   Constitutional:  Negative for chills and fever.   Respiratory:  Positive for shortness of breath (on exertion). Negative for cough.    Cardiovascular:  Positive for chest pain (on and off) and leg swelling.   Gastrointestinal:  Positive for diarrhea. Negative for nausea and vomiting.   Genitourinary:  Negative for hematuria.   Neurological:  Positive for light-headedness (on and off).     OBJECTIVE:  /68 (BP Location: Left arm, Patient Position: Sitting, Cuff Size: Large)   Pulse 90   Ht 5' 9\" (1.753 m)   Wt 127 kg (280 lb)   BMI 41.35 kg/m²   Current Weight: Weight - Scale: 127 kg (280 lb) Body mass index is 41.35 kg/m².  Physical Exam  Constitutional:       General: He is not in acute distress.     Appearance: He is obese. He is not toxic-appearing or diaphoretic. "   HENT:      Head: Normocephalic and atraumatic.   Eyes:      Conjunctiva/sclera: Conjunctivae normal.   Cardiovascular:      Rate and Rhythm: Normal rate and regular rhythm.      Heart sounds: Normal heart sounds.   Pulmonary:      Effort: Pulmonary effort is normal.      Breath sounds: Normal breath sounds.   Abdominal:      General: Bowel sounds are normal.      Palpations: Abdomen is soft.   Musculoskeletal:      Comments: + bilateral non pitting LE edema - lymphedema.    Skin:     General: Skin is warm and dry.   Neurological:      Mental Status: He is alert. Mental status is at baseline.   Psychiatric:         Behavior: Behavior normal.         Judgment: Judgment normal.       Medications:  Current Outpatient Medications:     acetaminophen (TYLENOL) 500 mg tablet, Take 1,000 mg by mouth every 8 (eight) hours, Disp: , Rfl:     albuterol (PROVENTIL HFA,VENTOLIN HFA) 90 mcg/act inhaler, Prn, Disp: , Rfl:     apixaban (Eliquis) 5 mg, Take 1 tablet (5 mg total) by mouth 2 (two) times a day, Disp: 60 tablet, Rfl: 0    atorvastatin (LIPITOR) 20 mg tablet, Take 20 mg by mouth daily at bedtime, Disp: , Rfl:     buPROPion (WELLBUTRIN XL) 300 mg 24 hr tablet, Take 300 mg by mouth daily, Disp: , Rfl:     carbidopa-levodopa (SINEMET)  mg per tablet, TAKE 1 TABLET BY MOUTH THREE TIMES DAILY. INCREASE SLOWLY ACCORDING TO SEPARATE SCHEDULE, Disp: , Rfl:     Cholecalciferol (VITAMIN D3) 1,000 units tablet, Take 2,000 Units by mouth daily, Disp: , Rfl:     Empagliflozin (JARDIANCE PO), Take by mouth, Disp: , Rfl:     esomeprazole (NexIUM) 20 mg capsule, Take 1 capsule (20 mg total) by mouth daily before breakfast, Disp: 30 capsule, Rfl: 4    famotidine (PEPCID) 40 MG tablet, Take 40 mg by mouth daily, Disp: , Rfl:     fluticasone (FLONASE) 50 mcg/act nasal spray, Prn, Disp: , Rfl:     gabapentin (NEURONTIN) 100 mg capsule, Take 3 capsules (300 mg total) by mouth daily at bedtime, Disp: , Rfl:     isosorbide mononitrate  (IMDUR) 30 mg 24 hr tablet, Take 30 mg by mouth daily, Disp: , Rfl:     metFORMIN (GLUMETZA) 500 MG (MOD) 24 hr tablet, Take 500 mg by mouth 3 (three) times a day, Disp: , Rfl:     nitroglycerin (NITROSTAT) 0.4 mg SL tablet, Place 0.4 mg under the tongue every 5 (five) minutes as needed for chest pain, Disp: , Rfl:     polyethylene glycol (GLYCOLAX) 17 GM/SCOOP powder, Take 17 g by mouth daily, Disp: 289 g, Rfl: 1    potassium chloride (K-DUR,KLOR-CON) 20 mEq tablet, TAKE TOTAL 2 TABLETS ALONG WITH TORSEMIDE 40 MG DOSE. CAN LOWER TO 1 TABLET IF TORSEMIDE IS LOWERED TO 20 MG TABLET, Disp: , Rfl:     Symbicort 160-4.5 MCG/ACT inhaler, Inhale 2 puffs 2 (two) times a day, Disp: , Rfl:     torsemide (DEMADEX) 20 mg tablet, Take 3 tablets (60 mg total) by mouth 3 (three) times a week AND 4 tablets (80 mg total) 4 (four) times a week. 60 mg MWF and 80 mg TThSatSun., Disp: , Rfl:     Vraylar 3 MG capsule, Take 3 mg by mouth daily, Disp: , Rfl:     gabapentin (NEURONTIN) 300 mg capsule, Take 1 capsule by mouth 2 (two) times a day (Patient not taking: Reported on 4/17/2025), Disp: , Rfl:     polyethylene glycol (GOLYTELY) 4000 mL solution, Take 4,000 mL by mouth once for 1 dose Take as directed by office instructions prior to colonoscopy., Disp: 4000 mL, Rfl: 0    traZODone (DESYREL) 150 mg tablet, Take 150 mg by mouth daily at bedtime (Patient not taking: Reported on 4/17/2025), Disp: , Rfl:   No current facility-administered medications for this visit.    Facility-Administered Medications Ordered in Other Visits:     lactated ringers infusion, 125 mL/hr, Intravenous, Continuous, Maria Ines Espinal PA-C, New Bag at 04/16/25 0804    Laboratory Results:  Lab Results   Component Value Date    SODIUM 139 03/29/2025    K 3.7 03/29/2025     03/29/2025    CO2 28 03/29/2025    BUN 13 03/29/2025    CREATININE 0.9 03/29/2025    GLUC 110 (H) 03/29/2025    CALCIUM 9.1 03/29/2025     Lab Results   Component Value Date    WBC 8.26  02/12/2025    HGB 13.6 02/12/2025    HCT 40 02/12/2025    MCV 75 (L) 02/12/2025     02/12/2025

## 2025-04-18 NOTE — ANESTHESIA PREPROCEDURE EVALUATION
Procedure:  COLONOSCOPY  EGD    Relevant Problems   CARDIO   (+) Benign essential hypertension   (+) Chest pain, atypical   (+) Dyspnea on exertion   (+) Hypertension   (+) Paroxysmal atrial fibrillation (HCC)      ENDO   (+) Type 2 diabetes mellitus without complication, without long-term current use of insulin (HCC)      GI/HEPATIC   (+) GERD without esophagitis   (+) Idiopathic acute pancreatitis without infection or necrosis      /RENAL   (+) BPH (benign prostatic hyperplasia)      NEURO/PSYCH   (+) Depression      PULMONARY   (+) Asthma   (+) Chronic obstructive pulmonary disease (HCC)   (+) Dyspnea on exertion   (+) JOSIAH on CPAP        Physical Exam    Airway    Mallampati score: II  TM Distance: >3 FB  Neck ROM: full     Dental       Cardiovascular      Pulmonary      Other Findings        Anesthesia Plan  ASA Score- 3     Anesthesia Type- IV sedation with anesthesia with ASA Monitors.         Additional Monitors:     Airway Plan:            Plan Factors-Exercise tolerance (METS): >4 METS.    Chart reviewed.                      Induction- intravenous.    Postoperative Plan- Plan for postoperative opioid use. Planned trial extubation    Perioperative Resuscitation Plan - Level 1 - Full Code.       Informed Consent- Anesthetic plan and risks discussed with patient.  I personally reviewed this patient with the CRNA. Discussed and agreed on the Anesthesia Plan with the CRNA..      NPO Status:  Vitals Value Taken Time   Date of last liquid 04/15/25 04/16/25 0727   Time of last liquid     Date of last solid 04/14/25 04/16/25 0727   Time of last solid

## 2025-04-21 PROCEDURE — 88305 TISSUE EXAM BY PATHOLOGIST: CPT | Performed by: PATHOLOGY

## 2025-05-08 ENCOUNTER — RESULTS FOLLOW-UP (OUTPATIENT)
Age: 65
End: 2025-05-08

## 2025-05-08 NOTE — RESULT ENCOUNTER NOTE
Please notify the patient regarding results.  Colon polyp was a benign adenoma.  Repeat colonoscopy in 3 years

## 2025-05-12 NOTE — TELEPHONE ENCOUNTER
----- Message from Ladarius Scott MD sent at 5/8/2025  3:09 PM EDT -----  Please notify the patient regarding results.  Colon polyp was a benign adenoma.  Repeat colonoscopy in 3 years  
Left message for patient to return call. 3 year colon recall placed.   
Relayed results to patient as per provider message. Patient expressed understanding and did not have any further questions.       
Patient with one or more new problems requiring additional work-up/treatment.

## (undated) DEVICE — BRUSH ENDO CLEANING DBL-HEADER

## (undated) DEVICE — GLOVE EXAM NON-STRL NTRL PLUS LRG PF

## (undated) DEVICE — AIRLIFE™  ADULT CUSHION NASAL CANNULA WITH 7 FOOT (2.1 M) CRUSH-RESISTANT OXYGEN TUBING, AND U/CONNECT-IT ADAPTER: Brand: AIRLIFE™

## (undated) DEVICE — 1200CC GUARDIAN II: Brand: GUARDIAN

## (undated) DEVICE — TRAVELKIT CONTAINS FIRST STEP KIT (200ML EP-4 KIT) AND SOILED SCOPE BAG - 1 KIT: Brand: TRAVELKIT CONTAINS FIRST STEP KIT AND SOILED SCOPE BAG

## (undated) DEVICE — "MH-443 SUCTION VALVE F/EVIS140 EVIS160": Brand: SUCTION VALVE

## (undated) DEVICE — 60 ML SYRINGE,REGULAR TIP: Brand: MONOJECT

## (undated) DEVICE — "MH-438 A/W VLVE F/140 EVIS-140": Brand: AIR/WATER VALVE

## (undated) DEVICE — BITE BLOCK ENDO 60FR ADLT MAXI  DISP W/STRAP

## (undated) DEVICE — "MB-142 MOUTHPIECE": Brand: MOUTHPIECE

## (undated) DEVICE — "MAJ-901 WATER CONTAINER SET CV-160/140": Brand: WATER CONTAINER

## (undated) DEVICE — DISPOSABLE BIOPSY VALVE MAJ-1555: Brand: SINGLE USE BIOPSY VALVE (STERILE)

## (undated) DEVICE — MEDI-VAC YANKAUER SUCTION HANDLE: Brand: CARDINAL HEALTH

## (undated) DEVICE — TUBING AUX CHANNEL

## (undated) DEVICE — SOLIDIFIER FLUID WASTE CONTROL 1500ML

## (undated) DEVICE — TUBING BUBBLE CLEAR 5MM X 100 FT NS

## (undated) DEVICE — LUBRICANT SURGILUBE TUBE 4 OZ  FLIP TOP

## (undated) DEVICE — GAUZE SPONGES,16 PLY: Brand: CURITY